# Patient Record
Sex: FEMALE | Race: WHITE | NOT HISPANIC OR LATINO | Employment: OTHER | URBAN - NONMETROPOLITAN AREA
[De-identification: names, ages, dates, MRNs, and addresses within clinical notes are randomized per-mention and may not be internally consistent; named-entity substitution may affect disease eponyms.]

---

## 2017-05-15 ENCOUNTER — OFFICE VISIT (OUTPATIENT)
Dept: CARDIOLOGY | Facility: CLINIC | Age: 77
End: 2017-05-15

## 2017-05-15 VITALS
BODY MASS INDEX: 28 KG/M2 | DIASTOLIC BLOOD PRESSURE: 70 MMHG | SYSTOLIC BLOOD PRESSURE: 130 MMHG | HEART RATE: 74 BPM | OXYGEN SATURATION: 98 % | WEIGHT: 158 LBS | HEIGHT: 63 IN

## 2017-05-15 DIAGNOSIS — Z95.1 S/P CABG X 2: Chronic | ICD-10-CM

## 2017-05-15 DIAGNOSIS — I10 ESSENTIAL HYPERTENSION: Chronic | ICD-10-CM

## 2017-05-15 DIAGNOSIS — I25.10 CAD IN NATIVE ARTERY: Primary | Chronic | ICD-10-CM

## 2017-05-15 DIAGNOSIS — E78.2 MIXED HYPERLIPIDEMIA: Chronic | ICD-10-CM

## 2017-05-15 PROCEDURE — 99214 OFFICE O/P EST MOD 30 MIN: CPT | Performed by: NURSE PRACTITIONER

## 2018-02-06 ENCOUNTER — OFFICE VISIT (OUTPATIENT)
Dept: CARDIOLOGY | Facility: CLINIC | Age: 78
End: 2018-02-06

## 2018-02-06 VITALS
HEIGHT: 63 IN | DIASTOLIC BLOOD PRESSURE: 82 MMHG | SYSTOLIC BLOOD PRESSURE: 150 MMHG | OXYGEN SATURATION: 95 % | BODY MASS INDEX: 29.95 KG/M2 | HEART RATE: 69 BPM | WEIGHT: 169 LBS

## 2018-02-06 DIAGNOSIS — R60.0 LOCALIZED EDEMA: ICD-10-CM

## 2018-02-06 DIAGNOSIS — I10 ESSENTIAL HYPERTENSION: Chronic | ICD-10-CM

## 2018-02-06 DIAGNOSIS — I25.10 CAD IN NATIVE ARTERY: Primary | Chronic | ICD-10-CM

## 2018-02-06 DIAGNOSIS — R06.02 SOB (SHORTNESS OF BREATH): Chronic | ICD-10-CM

## 2018-02-06 DIAGNOSIS — E78.2 MIXED HYPERLIPIDEMIA: Chronic | ICD-10-CM

## 2018-02-06 DIAGNOSIS — Z95.1 S/P CABG X 2: Chronic | ICD-10-CM

## 2018-02-06 PROCEDURE — 93000 ELECTROCARDIOGRAM COMPLETE: CPT | Performed by: NURSE PRACTITIONER

## 2018-02-06 PROCEDURE — 99214 OFFICE O/P EST MOD 30 MIN: CPT | Performed by: NURSE PRACTITIONER

## 2018-02-06 RX ORDER — FUROSEMIDE 20 MG/1
20 TABLET ORAL DAILY PRN
Qty: 30 TABLET | Refills: 11 | Status: SHIPPED | OUTPATIENT
Start: 2018-02-06 | End: 2019-02-06

## 2018-02-06 RX ORDER — LISINOPRIL 20 MG/1
20 TABLET ORAL 2 TIMES DAILY
COMMUNITY
Start: 2018-01-11

## 2018-02-06 RX ORDER — MIRABEGRON 25 MG/1
25 TABLET, FILM COATED, EXTENDED RELEASE ORAL DAILY PRN
COMMUNITY
Start: 2018-01-30 | End: 2020-02-10

## 2018-02-06 NOTE — PATIENT INSTRUCTIONS
"DASH Eating Plan  DASH stands for \"Dietary Approaches to Stop Hypertension.\" The DASH eating plan is a healthy eating plan that has been shown to reduce high blood pressure (hypertension). Additional health benefits may include reducing the risk of type 2 diabetes mellitus, heart disease, and stroke. The DASH eating plan may also help with weight loss.  What do I need to know about the DASH eating plan?  For the DASH eating plan, you will follow these general guidelines:  · Choose foods with less than 150 milligrams of sodium per serving (as listed on the food label).  · Use salt-free seasonings or herbs instead of table salt or sea salt.  · Check with your health care provider or pharmacist before using salt substitutes.  · Eat lower-sodium products. These are often labeled as \"low-sodium\" or \"no salt added.\"  · Eat fresh foods. Avoid eating a lot of canned foods.  · Eat more vegetables, fruits, and low-fat dairy products.  · Choose whole grains. Look for the word \"whole\" as the first word in the ingredient list.  · Choose fish and skinless chicken or turkey more often than red meat. Limit fish, poultry, and meat to 6 oz (170 g) each day.  · Limit sweets, desserts, sugars, and sugary drinks.  · Choose heart-healthy fats.  · Eat more home-cooked food and less restaurant, buffet, and fast food.  · Limit fried foods.  · Do not valdivia foods. Cook foods using methods such as baking, boiling, grilling, and broiling instead.  · When eating at a restaurant, ask that your food be prepared with less salt, or no salt if possible.  What foods can I eat?  Seek help from a dietitian for individual calorie needs.  Grains   Whole grain or whole wheat bread. Brown rice. Whole grain or whole wheat pasta. Quinoa, bulgur, and whole grain cereals. Low-sodium cereals. Corn or whole wheat flour tortillas. Whole grain cornbread. Whole grain crackers. Low-sodium crackers.  Vegetables   Fresh or frozen vegetables (raw, steamed, roasted, or " grilled). Low-sodium or reduced-sodium tomato and vegetable juices. Low-sodium or reduced-sodium tomato sauce and paste. Low-sodium or reduced-sodium canned vegetables.  Fruits   All fresh, canned (in natural juice), or frozen fruits.  Meat and Other Protein Products   Ground beef (85% or leaner), grass-fed beef, or beef trimmed of fat. Skinless chicken or turkey. Ground chicken or turkey. Pork trimmed of fat. All fish and seafood. Eggs. Dried beans, peas, or lentils. Unsalted nuts and seeds. Unsalted canned beans.  Dairy   Low-fat dairy products, such as skim or 1% milk, 2% or reduced-fat cheeses, low-fat ricotta or cottage cheese, or plain low-fat yogurt. Low-sodium or reduced-sodium cheeses.  Fats and Oils   Tub margarines without trans fats. Light or reduced-fat mayonnaise and salad dressings (reduced sodium). Avocado. Safflower, olive, or canola oils. Natural peanut or almond butter.  Other   Unsalted popcorn and pretzels.  The items listed above may not be a complete list of recommended foods or beverages. Contact your dietitian for more options.   What foods are not recommended?  Grains   White bread. White pasta. White rice. Refined cornbread. Bagels and croissants. Crackers that contain trans fat.  Vegetables   Creamed or fried vegetables. Vegetables in a cheese sauce. Regular canned vegetables. Regular canned tomato sauce and paste. Regular tomato and vegetable juices.  Fruits   Canned fruit in light or heavy syrup. Fruit juice.  Meat and Other Protein Products   Fatty cuts of meat. Ribs, chicken wings, edouard, sausage, bologna, salami, chitterlings, fatback, hot dogs, bratwurst, and packaged luncheon meats. Salted nuts and seeds. Canned beans with salt.  Dairy   Whole or 2% milk, cream, half-and-half, and cream cheese. Whole-fat or sweetened yogurt. Full-fat cheeses or blue cheese. Nondairy creamers and whipped toppings. Processed cheese, cheese spreads, or cheese curds.  Condiments   Onion and garlic  salt, seasoned salt, table salt, and sea salt. Canned and packaged gravies. Worcestershire sauce. Tartar sauce. Barbecue sauce. Teriyaki sauce. Soy sauce, including reduced sodium. Steak sauce. Fish sauce. Oyster sauce. Cocktail sauce. Horseradish. Ketchup and mustard. Meat flavorings and tenderizers. Bouillon cubes. Hot sauce. Tabasco sauce. Marinades. Taco seasonings. Relishes.  Fats and Oils   Butter, stick margarine, lard, shortening, ghee, and edouard fat. Coconut, palm kernel, or palm oils. Regular salad dressings.  Other   Pickles and olives. Salted popcorn and pretzels.  The items listed above may not be a complete list of foods and beverages to avoid. Contact your dietitian for more information.   Where can I find more information?  National [employer], [employer], [employer] [place]: www.nhlbi.nih.gov/health/health-topics/topics/dash/  This information is not intended to replace advice given to you by your health care provider. Make sure you discuss any questions you have with your health care provider.  Document Released: 12/06/2012 Document Revised: 05/25/2017 Document Reviewed: 10/22/2014  Elsevier Interactive Patient Education © 2017 Elsevier Inc.

## 2018-02-06 NOTE — PROGRESS NOTES
Subjective:     Encounter Date:02/06/2018    Chief Complaint:    Patient ID: Imani Chauhan is a 77 y.o. female here today for cardiac follow-up.    HPI     Coronary Artery Disease    Additional comments: nuclear stress test without ischemia 11/2016, has twinges in her chest that worry her, had CABG 2010           Shortness of Breath    Additional comments: gets SOA with activity, gets to wheezing, has to pace activities, has been a while since having PFTs. Has tried inhalers but doesn't like them - hard to use. Has never seen a pulmonologist. Doesn't exercise regularly. Has gained 11 lbs since last visit.             Hypertension    Additional comments: runs 120-130/70s at home but doesn't check much, runs higher at clinic visits       Last edited by SOFIYA Zimmerman on 2/6/2018  3:08 PM. (History)        History:   Past Medical History:   Diagnosis Date   • CAD in native artery    • GERD (gastroesophageal reflux disease)    • Heart murmur    • Hyperlipidemia    • Hypertension    • Hypothyroidism     Hypothyroidism   • Mitral regurgitation     mild per 2013 echo   • Myalgia    • Myalgia    • Near syncope    • SOB (shortness of breath)    • Syncope    • Tricuspid regurgitation     mild per 2013 echo     Past Surgical History:   Procedure Laterality Date   • BACK SURGERY     • CARDIAC CATHETERIZATION     • COLONOSCOPY     • CORONARY ARTERY BYPASS GRAFT  10/11/2010    Dr. Karina Lora, [facility]   • HYSTERECTOMY     • LAPAROSCOPIC CHOLECYSTECTOMY     • MEDIAN STERNOTOMY      Thymus cyst resection at same time as CABG   • OTHER SURGICAL HISTORY      Rectal surgery   • PARTIAL THYMECTOMY  10/11/2010    Dr. Lora   • REPLACEMENT TOTAL KNEE Left 2005     Social History     Social History   • Marital status:      Spouse name: N/A   • Number of children: N/A   • Years of education: N/A     Occupational History   • Not on file.     Social History Main Topics   • Smoking status: Passive Smoke Exposure - Never Smoker    • Smokeless tobacco: Never Used      Comment: father smoked pipe in the house,  smoked   • Alcohol use No   • Drug use: No   • Sexual activity: Defer     Other Topics Concern   • Not on file     Social History Narrative     Family History   Problem Relation Age of Onset   • Coronary artery disease Father    • Alzheimer's disease Father    • Hypertension Father    • Alzheimer's disease Mother    • Hypertension Mother    • Hyperlipidemia Mother        Outpatient Prescriptions Marked as Taking for the 2/6/18 encounter (Office Visit) with SOFIYA Zimmerman   Medication Sig Dispense Refill   • aspirin 81 MG EC tablet Take 81 mg by mouth Daily.     • atorvastatin (LIPITOR) 10 MG tablet Take 10 mg by mouth Every Night.     • DULoxetine (CYMBALTA) 30 MG capsule Take 30 mg by mouth Daily.     • estradiol (ESTRACE) 0.5 MG tablet Take 0.25 mg by mouth Daily. Takes 1/2 tab = 0.25 mg     • fluticasone (FLONASE) 50 MCG/ACT nasal spray 2 sprays into each nostril Daily As Needed for Rhinitis.     • GRALISE 600 MG tablet tablet Take 600-1,800 mg by mouth Daily Before Supper.     • levothyroxine (SYNTHROID, LEVOTHROID) 75 MCG tablet Take 75 mcg by mouth Daily. Takes 1/2 tab M, W, &F and 1 tab on S, Tu, Th, & Sa     • lisinopril (PRINIVIL,ZESTRIL) 20 MG tablet Take 20 mg by mouth Daily.     • magnesium oxide (MAGOX) 400 (241.3 MG) MG tablet tablet Take 400 mg by mouth 2 (Two) Times a Day.     • meloxicam (MOBIC) 7.5 MG tablet Take 7.5 mg by mouth Daily.     • metFORMIN (GLUCOPHAGE) 1000 MG tablet Take 500 mg by mouth 2 (Two) Times a Day With Meals.     • metoprolol tartrate (LOPRESSOR) 50 MG tablet Take 50 mg by mouth Every Night. She thinks may be taking succinate     • MYRBETRIQ 25 MG tablet sustained-release 24 hour 24 hr tablet Take 25 mg by mouth Daily.       Review of Systems:  Review of Systems   Constitution: Negative for chills, fever and malaise/fatigue.   HENT: Negative for congestion, nosebleeds and sore  "throat.    Eyes: Negative for blurred vision and double vision.   Cardiovascular: Positive for chest pain (occ twinge), dyspnea on exertion, irregular heartbeat and leg swelling (ankles a little at the end of the day ). Negative for orthopnea, paroxysmal nocturnal dyspnea and syncope.   Respiratory: Positive for shortness of breath. Negative for cough and wheezing.    Endocrine: Negative.    Hematologic/Lymphatic: Does not bruise/bleed easily.   Skin: Positive for dry skin.   Musculoskeletal: Positive for back pain, joint pain, muscle cramps, neck pain and stiffness.   Gastrointestinal: Positive for heartburn (depends on what she eats). Negative for abdominal pain, constipation, diarrhea, melena, nausea and vomiting.   Genitourinary: Positive for bladder incontinence and frequency. Negative for dysuria and hematuria.   Neurological: Positive for dizziness, headaches, loss of balance (seeing Dr. Powell, only when walking - goes to the left sometimes) and numbness. Negative for light-headedness.   Psychiatric/Behavioral: Negative for depression. The patient does not have insomnia and is not nervous/anxious.           Objective:   /82 (BP Location: Left arm, Patient Position: Sitting, Cuff Size: Adult)  Pulse 69  Ht 160 cm (63\")  Wt 76.7 kg (169 lb)  SpO2 95%  BMI 29.94 kg/m2  Wt Readings from Last 3 Encounters:   02/06/18 76.7 kg (169 lb)   05/15/17 71.7 kg (158 lb)   11/10/16 72.6 kg (160 lb)         Physical Exam   Constitutional: She is oriented to person, place, and time. She appears well-developed and well-nourished.   Eyes: No scleral icterus.   Neck: No JVD present.   Cardiovascular: Normal rate, regular rhythm, normal heart sounds and intact distal pulses.  Exam reveals no gallop and no friction rub.    No murmur heard.  Spider varicosities in ankles   Pulmonary/Chest: Effort normal and breath sounds normal.   Musculoskeletal: She exhibits edema (trace BLEs L worse than R) and tenderness (BLEs). "   Neurological: She is alert and oriented to person, place, and time.   Skin: Skin is warm and dry.   Psychiatric: She has a normal mood and affect.   Vitals reviewed.      Lab/Diagnostics Review:   11/22/2016 Stress Cardiolite no evidence of infarction or ischemia, EF 80% or greater     10/13/2016  BMP sodium 140, potassium 5, chloride 104, CO2 25, BUN 22, creatinine 1.1, calcium 9.7, glucose 74  Hemoglobin A1c 6.1%  Lipid panel total cholesterol 141, HDL 54, triglycerides 152, LDL 56  Hepatic function panel alkaline phosphatase 81, AST 24, ALT 30, total protein 6.4, albumin 3.7, total bilirubin 0.8     06/15/2016 EKG sinus rhythm 78 bpm) axis deviationand significant change from 11/28/2011     10/11/2010 median sternotomy with excision of anterior mediastinal mass and coronary bypass grafting ×2 with LIMA to ramus intermediate and saphenous vein graft to obtuse marginal      ECG 12 Lead  Date/Time: 2/6/2018 3:17 PM  Performed by: LAKISHA VERA  Authorized by: LAKISHA VERA   Comparison: compared with previous ECG from 6/15/2016  Similar to previous ECG  Rhythm: sinus rhythm  Rate: normal  BPM: 70  Other findings: LAE  Other findings comments: possible  Clinical impression comment: borderline                  Assessment/Plan:         Imani was seen today for coronary artery disease, shortness of breath and hypertension.    Diagnoses and all orders for this visit:    CAD in native artery  Comments:  stable s/p CABG x 2 2010, atypcial chest pain, continue medication, no reversible ischemia per nuclear stress 11/2016  Orders:  -     ECG 12 Lead    SOB (shortness of breath)  Comments:  and wheezingwill review most recent CXR or CT chest, check if not done recently, check PFTs with and without bronchodilator, encouraged routine aerobic exercise  Orders:  -     Pulmonary Function Test    Essential hypertension  Comments:  reportedly controlled per home readings    Mixed hyperlipidemia  Comments:  well  controlled per 7/2017 labs on atorvastatin    S/P CABG x 2    Localized edema  Comments:  add low dose furosemide as needed  Orders:  -     furosemide (LASIX) 20 MG tablet; Take 1 tablet by mouth Daily As Needed (swelling).    She will call if feels like needs to stay on diuretic daily so I can order a BMP.    Return in about 6 months (around 8/6/2018) for Recheck.           Meghan Freeman, APRN, ACNP-BC, CHFN-BC

## 2018-02-07 DIAGNOSIS — R06.02 SOB (SHORTNESS OF BREATH): Primary | ICD-10-CM

## 2018-02-09 DIAGNOSIS — R06.02 SOB (SHORTNESS OF BREATH): ICD-10-CM

## 2018-02-16 LAB
DIFF CAP.CO: 19.8 ML/MMHG SEC
FEV1/FVC: 75 %
FEV1: 1.98 LITERS
FVC VOL RESPIRATORY: 2.65 LITERS
TLC: 4.56 LITERS

## 2018-02-16 ASSESSMENT — PULMONARY FUNCTION TESTS
FVC: 2.65
FEV1: 1.98
FEV1/FVC: 75

## 2018-02-17 NOTE — PROGRESS NOTES
Notify patient PFTs show moderate restriction and small airway disease but no significant bronchodilator response.  Send a copy of PFTs to Dr. Wallace and have her review to see if any inhalers or further treatment/evaluation appropriate.

## 2018-02-19 ENCOUNTER — TELEPHONE (OUTPATIENT)
Dept: CARDIOLOGY | Facility: CLINIC | Age: 78
End: 2018-02-19

## 2018-02-19 NOTE — TELEPHONE ENCOUNTER
----- Message from SOFIYA Zimmerman sent at 2/9/2018  3:33 PM CST -----  Please notify her of normal CXR. Will await PFTs. TX!    Pt informed.

## 2018-02-19 NOTE — TELEPHONE ENCOUNTER
----- Message from SOFIYA Zimmerman sent at 2/16/2018  6:07 PM CST -----  Not sure if I routed this to you the first time. TX!  Called pt with results,  She voiced understand,  I did call her pcp because she said she had blood work done. So I was trying to get a copy of labs for our chart.

## 2018-02-28 ENCOUNTER — TELEPHONE (OUTPATIENT)
Dept: FAMILY MEDICINE CLINIC | Facility: CLINIC | Age: 78
End: 2018-02-28

## 2018-03-01 NOTE — TELEPHONE ENCOUNTER
Let her know Mg Plus Protein might be tolerated better and is available OTC if she wants to try it.  Unfortunately Mg is a natural laxative for most people.  TX!

## 2018-03-01 NOTE — TELEPHONE ENCOUNTER
Patient came by this morning because she said that she wouldn't be at home today to take any calls.  I notified her of increasing Mg filled foods, and asked if she thought she could increase the Mg supplement.  She said that she can not increase what she is taking now which in 400mg 2x a day.  She attempted to take more and she could not stay out of the bathroom. She said she heard of a different supplement of mg that was easier for bodies to process but she couldn't think of it.

## 2018-03-02 NOTE — TELEPHONE ENCOUNTER
CALLED PT HAD TO LVM WITH THIS INFO PER LAKISHA,  IF SHE HAS ANY QUESTIONS SHE CAN GIVE US A CALL BACK.

## 2018-05-01 ENCOUNTER — OUTSIDE FACILITY SERVICE (OUTPATIENT)
Dept: CARDIOLOGY | Facility: CLINIC | Age: 78
End: 2018-05-01

## 2018-05-01 PROCEDURE — 93306 TTE W/DOPPLER COMPLETE: CPT | Performed by: INTERNAL MEDICINE

## 2018-06-27 ENCOUNTER — HOSPITAL ENCOUNTER (OUTPATIENT)
Dept: NON INVASIVE DIAGNOSTICS | Age: 78
Discharge: HOME | End: 2018-06-27
Payer: MEDICARE

## 2018-06-27 ENCOUNTER — HOSPITAL ENCOUNTER (OUTPATIENT)
Dept: PREADMISSION TESTING | Age: 78
Setting detail: OUTPATIENT SURGERY
Discharge: HOME | End: 2018-07-01

## 2018-06-27 ENCOUNTER — HOSPITAL ENCOUNTER (OUTPATIENT)
Dept: LAB | Age: 78
Discharge: HOME | End: 2018-06-27
Payer: MEDICARE

## 2018-06-27 VITALS — BODY MASS INDEX: 29.02 KG/M2 | HEIGHT: 64 IN | WEIGHT: 170 LBS

## 2018-06-27 LAB
ANION GAP SERPL CALCULATED.3IONS-SCNC: 16 MMOL/L (ref 7–19)
BASOPHILS ABSOLUTE: 0 K/UL (ref 0–0.2)
BASOPHILS RELATIVE PERCENT: 0.6 % (ref 0–1)
BUN BLDV-MCNC: 29 MG/DL (ref 8–23)
CALCIUM SERPL-MCNC: 9.7 MG/DL (ref 8.8–10.2)
CHLORIDE BLD-SCNC: 99 MMOL/L (ref 98–111)
CO2: 25 MMOL/L (ref 22–29)
CREAT SERPL-MCNC: 1.2 MG/DL (ref 0.5–0.9)
EOSINOPHILS ABSOLUTE: 0.3 K/UL (ref 0–0.6)
EOSINOPHILS RELATIVE PERCENT: 4.9 % (ref 0–5)
GFR NON-AFRICAN AMERICAN: 44
GLUCOSE BLD-MCNC: 105 MG/DL (ref 74–109)
HCT VFR BLD CALC: 41.7 % (ref 37–47)
HEMOGLOBIN: 13.2 G/DL (ref 12–16)
LYMPHOCYTES ABSOLUTE: 1.9 K/UL (ref 1.1–4.5)
LYMPHOCYTES RELATIVE PERCENT: 30.3 % (ref 20–40)
MCH RBC QN AUTO: 30.5 PG (ref 27–31)
MCHC RBC AUTO-ENTMCNC: 31.7 G/DL (ref 33–37)
MCV RBC AUTO: 96.3 FL (ref 81–99)
MONOCYTES ABSOLUTE: 0.7 K/UL (ref 0–0.9)
MONOCYTES RELATIVE PERCENT: 11.2 % (ref 0–10)
NEUTROPHILS ABSOLUTE: 3.3 K/UL (ref 1.5–7.5)
NEUTROPHILS RELATIVE PERCENT: 52.7 % (ref 50–65)
PDW BLD-RTO: 14.9 % (ref 11.5–14.5)
PLATELET # BLD: 274 K/UL (ref 130–400)
PMV BLD AUTO: 11 FL (ref 9.4–12.3)
POTASSIUM SERPL-SCNC: 4.3 MMOL/L (ref 3.5–5)
RBC # BLD: 4.33 M/UL (ref 4.2–5.4)
SODIUM BLD-SCNC: 140 MMOL/L (ref 136–145)
WBC # BLD: 6.3 K/UL (ref 4.8–10.8)

## 2018-06-27 PROCEDURE — 93005 ELECTROCARDIOGRAM TRACING: CPT

## 2018-06-27 RX ORDER — FUROSEMIDE 20 MG/1
20 TABLET ORAL DAILY PRN
COMMUNITY

## 2018-06-27 RX ORDER — ESOMEPRAZOLE MAGNESIUM 40 MG/1
40 FOR SUSPENSION ORAL DAILY
COMMUNITY

## 2018-06-27 RX ORDER — MAGNESIUM OXIDE 400 MG/1
400 TABLET ORAL DAILY
COMMUNITY

## 2018-06-27 RX ORDER — LISINOPRIL 20 MG/1
20 TABLET ORAL DAILY
COMMUNITY

## 2018-06-27 RX ORDER — MELOXICAM 7.5 MG/1
7.5 TABLET ORAL DAILY
COMMUNITY

## 2018-06-27 RX ORDER — AMPICILLIN TRIHYDRATE 250 MG
CAPSULE ORAL
COMMUNITY

## 2018-06-27 RX ORDER — VITAMIN B COMPLEX
1 CAPSULE ORAL DAILY
COMMUNITY
End: 2019-05-15

## 2018-06-27 RX ORDER — ASPIRIN 81 MG/1
81 TABLET ORAL DAILY
Status: ON HOLD | COMMUNITY
End: 2019-06-06 | Stop reason: HOSPADM

## 2018-06-27 RX ORDER — LEVOTHYROXINE SODIUM 0.07 MG/1
75 TABLET ORAL DAILY
COMMUNITY

## 2018-06-27 RX ORDER — ATORVASTATIN CALCIUM 20 MG/1
10 TABLET, FILM COATED ORAL DAILY
COMMUNITY

## 2018-06-27 RX ORDER — DULOXETIN HYDROCHLORIDE 30 MG/1
30 CAPSULE, DELAYED RELEASE ORAL DAILY
COMMUNITY

## 2018-06-27 RX ORDER — M-VIT,TX,IRON,MINS/CALC/FOLIC 27MG-0.4MG
1 TABLET ORAL DAILY
COMMUNITY

## 2018-07-03 ENCOUNTER — ANESTHESIA EVENT (OUTPATIENT)
Dept: OPERATING ROOM | Age: 78
End: 2018-07-03

## 2018-07-05 ENCOUNTER — HOSPITAL ENCOUNTER (OUTPATIENT)
Age: 78
Setting detail: OUTPATIENT SURGERY
Discharge: HOME | End: 2018-07-05
Attending: ORTHOPAEDIC SURGERY | Admitting: ORTHOPAEDIC SURGERY

## 2018-07-05 ENCOUNTER — ANESTHESIA (OUTPATIENT)
Dept: OPERATING ROOM | Age: 78
End: 2018-07-05

## 2018-07-05 VITALS
SYSTOLIC BLOOD PRESSURE: 94 MMHG | OXYGEN SATURATION: 98 % | TEMPERATURE: 98.6 F | DIASTOLIC BLOOD PRESSURE: 56 MMHG | RESPIRATION RATE: 13 BRPM

## 2018-07-05 VITALS
WEIGHT: 170 LBS | HEART RATE: 70 BPM | DIASTOLIC BLOOD PRESSURE: 71 MMHG | OXYGEN SATURATION: 98 % | SYSTOLIC BLOOD PRESSURE: 114 MMHG | RESPIRATION RATE: 18 BRPM | BODY MASS INDEX: 29.02 KG/M2 | HEIGHT: 64 IN | TEMPERATURE: 97.5 F

## 2018-07-05 DIAGNOSIS — S83.271A COMPLEX TEAR OF LATERAL MENISCUS OF RIGHT KNEE AS CURRENT INJURY, INITIAL ENCOUNTER: Primary | ICD-10-CM

## 2018-07-05 PROCEDURE — 29881 ARTHRS KNE SRG MNISECTMY M/L: CPT

## 2018-07-05 PROCEDURE — G8907 PT DOC NO EVENTS ON DISCHARG: HCPCS | Performed by: NURSE PRACTITIONER

## 2018-07-05 PROCEDURE — G8916 PT W IV AB GIVEN ON TIME: HCPCS | Performed by: NURSE PRACTITIONER

## 2018-07-05 RX ORDER — LABETALOL HYDROCHLORIDE 5 MG/ML
5 INJECTION, SOLUTION INTRAVENOUS EVERY 10 MIN PRN
Status: DISCONTINUED | OUTPATIENT
Start: 2018-07-05 | End: 2018-07-05 | Stop reason: HOSPADM

## 2018-07-05 RX ORDER — MORPHINE SULFATE 10 MG/ML
4 INJECTION, SOLUTION INTRAMUSCULAR; INTRAVENOUS EVERY 5 MIN PRN
Status: DISCONTINUED | OUTPATIENT
Start: 2018-07-05 | End: 2018-07-05 | Stop reason: HOSPADM

## 2018-07-05 RX ORDER — LIDOCAINE HYDROCHLORIDE 10 MG/ML
INJECTION, SOLUTION EPIDURAL; INFILTRATION; INTRACAUDAL; PERINEURAL PRN
Status: DISCONTINUED | OUTPATIENT
Start: 2018-07-05 | End: 2018-07-05 | Stop reason: SDUPTHER

## 2018-07-05 RX ORDER — HYDROMORPHONE HCL 110MG/55ML
0.25 PATIENT CONTROLLED ANALGESIA SYRINGE INTRAVENOUS EVERY 5 MIN PRN
Status: DISCONTINUED | OUTPATIENT
Start: 2018-07-05 | End: 2018-07-05 | Stop reason: HOSPADM

## 2018-07-05 RX ORDER — FENTANYL CITRATE 50 UG/ML
INJECTION, SOLUTION INTRAMUSCULAR; INTRAVENOUS PRN
Status: DISCONTINUED | OUTPATIENT
Start: 2018-07-05 | End: 2018-07-05 | Stop reason: SDUPTHER

## 2018-07-05 RX ORDER — HYDROCODONE BITARTRATE AND ACETAMINOPHEN 5; 325 MG/1; MG/1
1 TABLET ORAL PRN
Status: DISCONTINUED | OUTPATIENT
Start: 2018-07-05 | End: 2018-07-05 | Stop reason: HOSPADM

## 2018-07-05 RX ORDER — HYDROCODONE BITARTRATE AND ACETAMINOPHEN 5; 325 MG/1; MG/1
2 TABLET ORAL PRN
Status: DISCONTINUED | OUTPATIENT
Start: 2018-07-05 | End: 2018-07-05 | Stop reason: HOSPADM

## 2018-07-05 RX ORDER — SODIUM CHLORIDE, SODIUM LACTATE, POTASSIUM CHLORIDE, CALCIUM CHLORIDE 600; 310; 30; 20 MG/100ML; MG/100ML; MG/100ML; MG/100ML
INJECTION, SOLUTION INTRAVENOUS CONTINUOUS
Status: DISCONTINUED | OUTPATIENT
Start: 2018-07-05 | End: 2018-07-05 | Stop reason: HOSPADM

## 2018-07-05 RX ORDER — BUPIVACAINE HYDROCHLORIDE 5 MG/ML
INJECTION, SOLUTION EPIDURAL; INTRACAUDAL PRN
Status: DISCONTINUED | OUTPATIENT
Start: 2018-07-05 | End: 2018-07-05 | Stop reason: HOSPADM

## 2018-07-05 RX ORDER — HYDROCODONE BITARTRATE AND ACETAMINOPHEN 10; 325 MG/1; MG/1
1 TABLET ORAL EVERY 4 HOURS PRN
Qty: 90 TABLET | Refills: 0 | Status: SHIPPED | OUTPATIENT
Start: 2018-07-05 | End: 2018-07-12

## 2018-07-05 RX ORDER — HYDROMORPHONE HCL 110MG/55ML
0.5 PATIENT CONTROLLED ANALGESIA SYRINGE INTRAVENOUS EVERY 5 MIN PRN
Status: DISCONTINUED | OUTPATIENT
Start: 2018-07-05 | End: 2018-07-05 | Stop reason: HOSPADM

## 2018-07-05 RX ORDER — MORPHINE SULFATE 10 MG/ML
2 INJECTION, SOLUTION INTRAMUSCULAR; INTRAVENOUS EVERY 5 MIN PRN
Status: DISCONTINUED | OUTPATIENT
Start: 2018-07-05 | End: 2018-07-05 | Stop reason: HOSPADM

## 2018-07-05 RX ORDER — METOCLOPRAMIDE HYDROCHLORIDE 5 MG/ML
10 INJECTION INTRAMUSCULAR; INTRAVENOUS
Status: DISCONTINUED | OUTPATIENT
Start: 2018-07-05 | End: 2018-07-05 | Stop reason: HOSPADM

## 2018-07-05 RX ORDER — ENALAPRILAT 2.5 MG/2ML
1.25 INJECTION INTRAVENOUS
Status: DISCONTINUED | OUTPATIENT
Start: 2018-07-05 | End: 2018-07-05 | Stop reason: HOSPADM

## 2018-07-05 RX ORDER — DIPHENHYDRAMINE HYDROCHLORIDE 50 MG/ML
12.5 INJECTION INTRAMUSCULAR; INTRAVENOUS
Status: DISCONTINUED | OUTPATIENT
Start: 2018-07-05 | End: 2018-07-05 | Stop reason: HOSPADM

## 2018-07-05 RX ORDER — MEPERIDINE HYDROCHLORIDE 25 MG/ML
12.5 INJECTION INTRAMUSCULAR; INTRAVENOUS; SUBCUTANEOUS EVERY 5 MIN PRN
Status: DISCONTINUED | OUTPATIENT
Start: 2018-07-05 | End: 2018-07-05 | Stop reason: HOSPADM

## 2018-07-05 RX ORDER — PROMETHAZINE HYDROCHLORIDE 25 MG/ML
6.25 INJECTION, SOLUTION INTRAMUSCULAR; INTRAVENOUS
Status: DISCONTINUED | OUTPATIENT
Start: 2018-07-05 | End: 2018-07-05 | Stop reason: HOSPADM

## 2018-07-05 RX ORDER — PROPOFOL 10 MG/ML
INJECTION, EMULSION INTRAVENOUS PRN
Status: DISCONTINUED | OUTPATIENT
Start: 2018-07-05 | End: 2018-07-05 | Stop reason: SDUPTHER

## 2018-07-05 RX ORDER — HYDRALAZINE HYDROCHLORIDE 20 MG/ML
5 INJECTION INTRAMUSCULAR; INTRAVENOUS EVERY 10 MIN PRN
Status: DISCONTINUED | OUTPATIENT
Start: 2018-07-05 | End: 2018-07-05 | Stop reason: HOSPADM

## 2018-07-05 RX ORDER — ONDANSETRON 2 MG/ML
INJECTION INTRAMUSCULAR; INTRAVENOUS PRN
Status: DISCONTINUED | OUTPATIENT
Start: 2018-07-05 | End: 2018-07-05 | Stop reason: SDUPTHER

## 2018-07-05 RX ADMIN — ONDANSETRON 4 MG: 2 INJECTION INTRAMUSCULAR; INTRAVENOUS at 10:46

## 2018-07-05 RX ADMIN — SODIUM CHLORIDE, SODIUM LACTATE, POTASSIUM CHLORIDE, CALCIUM CHLORIDE: 600; 310; 30; 20 INJECTION, SOLUTION INTRAVENOUS at 08:55

## 2018-07-05 RX ADMIN — FENTANYL CITRATE 50 MCG: 50 INJECTION, SOLUTION INTRAMUSCULAR; INTRAVENOUS at 10:22

## 2018-07-05 RX ADMIN — LIDOCAINE HYDROCHLORIDE 40 MG: 10 INJECTION, SOLUTION EPIDURAL; INFILTRATION; INTRACAUDAL; PERINEURAL at 09:51

## 2018-07-05 RX ADMIN — PROPOFOL 160 MG: 10 INJECTION, EMULSION INTRAVENOUS at 09:51

## 2018-07-05 RX ADMIN — FENTANYL CITRATE 50 MCG: 50 INJECTION, SOLUTION INTRAMUSCULAR; INTRAVENOUS at 10:01

## 2018-07-05 NOTE — ANESTHESIA PRE PROCEDURE
Department of Anesthesiology  Preprocedure Note       Name:  Michele Orellana   Age:  69 y.o.  :  1940                                          MRN:  [id number]         Date:  2018      Surgeon: Rosa Rodgers):  Mark Morel DO    Procedure: Procedure(s):  KNEE ARTHROSCOPY    Medications prior to admission:   Prior to Admission medications    Medication Sig Start Date End Date Taking? Authorizing Provider   levothyroxine (SYNTHROID) 75 MCG tablet Take 75 mcg by mouth Daily   Yes Historical Provider, MD   aspirin 81 MG EC tablet Take 81 mg by mouth daily   Yes Historical Provider, MD   DULoxetine (CYMBALTA) 30 MG extended release capsule Take 30 mg by mouth daily    Historical Provider, MD   lisinopril (PRINIVIL;ZESTRIL) 20 MG tablet Take 20 mg by mouth daily    Historical Provider, MD   meloxicam (MOBIC) 7.5 MG tablet Take 7.5 mg by mouth daily    Historical Provider, MD   metoprolol tartrate (LOPRESSOR) 25 MG tablet Take 25 mg by mouth 2 times daily    Historical Provider, MD   esomeprazole Magnesium (NEXIUM) 40 MG PACK Take 40 mg by mouth daily    Historical Provider, MD   furosemide (LASIX) 20 MG tablet Take 20 mg by mouth 2 times daily    Historical Provider, MD   metFORMIN (GLUCOPHAGE) 1000 MG tablet Take 1,000 mg by mouth 2 times daily (with meals)    Historical Provider, MD   Mirabegron ER (MYRBETRIQ) 25 MG TB24 Take by mouth    Historical Provider, MD   atorvastatin (LIPITOR) 20 MG tablet Take 10 mg by mouth daily    Historical Provider, MD   b complex vitamins capsule Take 1 capsule by mouth daily    Historical Provider, MD   Gabapentin, Once-Daily, (GRALISE) 600 MG TABS Take 600 mg by mouth. .    Historical Provider, MD   magnesium oxide (MAG-OX) 400 MG tablet Take 400 mg by mouth daily    Historical Provider, MD   Coenzyme Q10 (COQ10) 200 MG CAPS Take by mouth    Historical Provider, MD   Multiple Vitamins-Minerals (THERAPEUTIC MULTIVITAMIN-MINERALS) tablet Take 1 tablet by mouth daily    Historical 06/27/2018    RDW 14.9 06/27/2018     06/27/2018       CMP:   Lab Results   Component Value Date     06/27/2018    K 4.3 06/27/2018    CL 99 06/27/2018    CO2 25 06/27/2018    BUN 29 06/27/2018    CREATININE 1.2 06/27/2018    LABGLOM 44 06/27/2018    GLUCOSE 105 06/27/2018    CALCIUM 9.7 06/27/2018       POC Tests: No results for input(s): POCGLU, POCNA, POCK, POCCL, POCBUN, POCHEMO, POCHCT in the last 72 hours. Coags: No results found for: PROTIME, INR, APTT    HCG (If Applicable): No results found for: PREGTESTUR, PREGSERUM, HCG, HCGQUANT     ABGs: No results found for: PHART, PO2ART, QUJ1ZXI, LVC9VPI, BEART, H4NBWYGB     Type & Screen (If Applicable):  No results found for: LABABO, [address]    Anesthesia Evaluation  Patient summary reviewed and Nursing notes reviewed no history of anesthetic complications:   Airway: Mallampati: II  TM distance: >3 FB   Neck ROM: full  Mouth opening: > = 3 FB Dental: normal exam         Pulmonary:Negative Pulmonary ROS and normal exam                               Cardiovascular:    (+) hypertension:, CAD:,          Beta Blocker:  Dose within 24 Hrs         Neuro/Psych:   (+) depression/anxiety             GI/Hepatic/Renal:   (+) GERD: well controlled,           Endo/Other:    (+) DiabetesType II DM, , hypothyroidism::., .                 Abdominal:           Vascular: negative vascular ROS. Anesthesia Plan      general     ASA 3       Induction: intravenous. MIPS: Prophylactic antiemetics administered. Anesthetic plan and risks discussed with patient.                       Leonel Traylor, APRN - CRNA   7/5/2018

## 2018-07-05 NOTE — H&P
[facility], [place] ([facility]) Pre-Operative History and Physical    Patient Name: Madisyn Holly  : 1940        Chief complaint: Right knee pain    History of Present Illness:   69 y.o. female seen and evaluated in the office with complaints of right knee pain    Radiographic and clinical evaluation reveals  a moderate osteoarthritis right knee    Past Medical Hisotry:          Diagnosis Date    Arthritis     CAD (coronary artery disease)     by pass surgery    Diabetes mellitus ([address]     Hyperlipidemia     Hypertension     Thyroid disease        Past Surgical History:          Procedure Laterality Date    BACK SURGERY      CARPAL TUNNEL RELEASE Right     CHOLECYSTECTOMY      EYE SURGERY      pciol ou    HYSTERECTOMY      JOINT REPLACEMENT      left knee       Medications:      Prior to Admission medications    Medication Sig Start Date End Date Taking? Authorizing Provider   DULoxetine (CYMBALTA) 30 MG extended release capsule Take 30 mg by mouth daily    Historical Provider, MD   lisinopril (PRINIVIL;ZESTRIL) 20 MG tablet Take 20 mg by mouth daily    Historical Provider, MD   meloxicam (MOBIC) 7.5 MG tablet Take 7.5 mg by mouth daily    Historical Provider, MD   metoprolol tartrate (LOPRESSOR) 25 MG tablet Take 25 mg by mouth 2 times daily    Historical Provider, MD   esomeprazole Magnesium (NEXIUM) 40 MG PACK Take 40 mg by mouth daily    Historical Provider, MD   furosemide (LASIX) 20 MG tablet Take 20 mg by mouth 2 times daily    Historical Provider, MD   metFORMIN (GLUCOPHAGE) 1000 MG tablet Take 1,000 mg by mouth 2 times daily (with meals)    Historical Provider, MD   Mirabegron ER (MYRBETRIQ) 25 MG TB24 Take by mouth    Historical Provider, MD   atorvastatin (LIPITOR) 20 MG tablet Take 10 mg by mouth daily    Historical Provider, MD   b complex vitamins capsule Take 1 capsule by mouth daily    Historical Provider, MD   Gabapentin, Once-Daily, (GRALISE) 600 MG TABS Take 600 mg by mouth. .    Historical Provider, MD   levothyroxine (SYNTHROID) 75 MCG tablet Take 75 mcg by mouth Daily    Historical Provider, MD   magnesium oxide (MAG-OX) 400 MG tablet Take 400 mg by mouth daily    Historical Provider, MD   Coenzyme Q10 (COQ10) 200 MG CAPS Take by mouth    Historical Provider, MD   aspirin 81 MG EC tablet Take 81 mg by mouth daily    Historical Provider, MD   Multiple Vitamins-Minerals (THERAPEUTIC MULTIVITAMIN-MINERALS) tablet Take 1 tablet by mouth daily    Historical Provider, MD       Allergies:     Patient has no known allergies. Social History:   Tobacco:  reports that she has never smoked. She has never used smokeless tobacco.   Alcohol:  reports that she does not drink alcohol. Review of Systems:  General: Denies any fever or chills  EYES: Denies any diplopia  ENT: Tinnitus or vertigo  Resp: Denies any shortness of breath, cough or wheezing  Cardiac: Denies any chest pain, palpitations, claudication or edema  GI: Denies any melena, hematochezia, hematemesis or pyrosis  : Denies any frequency, urgency, hesitancy or incontinence  Musculoskeletal: Denies back pain, joint pain, myalgias  Heme: Denies bruising or bleeding easily  Endocrine: Denies any history of diabetes or thyroid disease  Psych: Denies anxiety or depression  Neuro: Denies any focal motor or sensory deficits      Physical Exam:  Vitals: There were no vitals taken for this visit. CONSTITUTIONAL: Alert, appropriate, no acute distress. PSYCH: mood and affect are normal with a normal rate and tone of speech  EYES: Non icteric, EOM intact, pupils equal round and reactive to light  ENT: Mucus membranes moist, no oral pharyngeal lesions, nares patent   NECK: Supple, no masses, no JVD, trachea mid line   CHEST/LUNGS: CTA bilaterally, normal respiratory effort   CARDIOVASCULAR: RRR, no murmurs,  2+ DP and radial pulses bilaterally  ABDOMEN: soft, nontender  EXTREMITIES: warm, well perfused, no edema.   Joint with mildly reduced range of motion and generalized tenderness. Neurovascular exam normal  SKIN: warm, dry with no rashes or lesions  LYMPH: No cervical or inguinal lymphadenopathy    Laboratory:  CBC :    Lab Results   Component Value Date    WBC 6.3 06/27/2018    HGB 13.2 06/27/2018    HCT 41.7 06/27/2018     06/27/2018     BMP:   Lab Results   Component Value Date     06/27/2018    K 4.3 06/27/2018    CL 99 06/27/2018    CO2 25 06/27/2018    BUN 29 06/27/2018    CREATININE 1.2 06/27/2018    CALCIUM 9.7 06/27/2018    LABGLOM 44 06/27/2018    GLUCOSE 105 06/27/2018     PT/INR:  No results found for: PROTIME, INR  U/A: No results found for: NITRITE, WBCUA, RBCUA, BACTERIA  HgBA1c:  No components found for: HGBA1C    Radiology:     IMPRESSION:  Mild to moderate arthritis right knee    PLAN: A lengthy discussion was carried out with the patient and the patient has agreed to proceed with the purposed operative procedure of arthroscopic chondroplasty right knee    Permit and pre-operative orders where completed in the office. Patient will be re-evaluated in the pre-operative holding area.         Provider: Jimi Castañeda  Date: 7/5/2018

## 2018-08-07 ENCOUNTER — OFFICE VISIT (OUTPATIENT)
Dept: CARDIOLOGY | Facility: CLINIC | Age: 78
End: 2018-08-07

## 2018-08-07 VITALS
OXYGEN SATURATION: 95 % | HEIGHT: 63 IN | HEART RATE: 71 BPM | SYSTOLIC BLOOD PRESSURE: 102 MMHG | BODY MASS INDEX: 28.7 KG/M2 | DIASTOLIC BLOOD PRESSURE: 52 MMHG | WEIGHT: 162 LBS | RESPIRATION RATE: 16 BRPM

## 2018-08-07 DIAGNOSIS — I10 ESSENTIAL HYPERTENSION: Chronic | ICD-10-CM

## 2018-08-07 DIAGNOSIS — Z95.1 S/P CABG X 2: Chronic | ICD-10-CM

## 2018-08-07 DIAGNOSIS — R06.02 SOB (SHORTNESS OF BREATH): ICD-10-CM

## 2018-08-07 DIAGNOSIS — I25.10 CAD IN NATIVE ARTERY: Primary | Chronic | ICD-10-CM

## 2018-08-07 DIAGNOSIS — E78.2 MIXED HYPERLIPIDEMIA: Chronic | ICD-10-CM

## 2018-08-07 PROCEDURE — 99214 OFFICE O/P EST MOD 30 MIN: CPT | Performed by: NURSE PRACTITIONER

## 2018-08-07 PROCEDURE — 93000 ELECTROCARDIOGRAM COMPLETE: CPT | Performed by: NURSE PRACTITIONER

## 2018-08-07 RX ORDER — ESOMEPRAZOLE MAGNESIUM 40 MG/1
40 CAPSULE, DELAYED RELEASE ORAL DAILY PRN
COMMUNITY
End: 2022-08-25 | Stop reason: ALTCHOICE

## 2018-08-07 RX ORDER — UBIDECARENONE 100 MG
200 CAPSULE ORAL DAILY
COMMUNITY
End: 2019-08-05

## 2018-08-07 NOTE — PROGRESS NOTES
Subjective:     Encounter Date:08/07/2018    Chief Complaint:    Patient ID: Imani Chauhan is a 78 y.o. female here today for 6 month cardiac follow-up.    HPI     Coronary Artery Disease    Additional comments: Patient not having the twinges anymore.  Leg swelling from arthritis in the knee.  No chest pain or chest discomfort.           Shortness of Breath    Additional comments: Shortness of breath has improved some over the last 6 months.  Patient is not on oxygen.  Had PFT done on 02/15/2018 at [facility] and saw Dr. Ha who recommended she lose weight but did not recommend medications/inhalers. He felt changes due to having CABG.            Hypertension    Additional comments: near syncope/fell while trying to get to the BR 7/5/2018 with urinary incontinence, remembers falling - hit her knee - denies LOC       Last edited by Meghan Freeman APRN on 8/7/2018  5:41 PM. (History)        History:   Past Medical History:   Diagnosis Date   • CAD in native artery    • GERD (gastroesophageal reflux disease)    • Heart murmur    • Hyperlipidemia    • Hypertension    • Hypothyroidism    • Mitral regurgitation     mild per 2013 echo   • Myalgia    • Near syncope    • SOB (shortness of breath)    • Syncope    • Tricuspid regurgitation     mild per 2013 echo     Past Surgical History:   Procedure Laterality Date   • BACK SURGERY     • CARDIAC CATHETERIZATION     • COLONOSCOPY     • CORONARY ARTERY BYPASS GRAFT  10/11/2010    Dr. Karina Lora, [facility]   • HYSTERECTOMY     • LAPAROSCOPIC CHOLECYSTECTOMY     • MEDIAN STERNOTOMY      Thymus cyst resection at same time as CABG   • OTHER SURGICAL HISTORY      Rectal surgery   • PARTIAL THYMECTOMY  10/11/2010    Dr. Lora   • REPLACEMENT TOTAL KNEE Left 2005     Social History     Social History   • Marital status:      Spouse name: N/A   • Number of children: N/A   • Years of education: N/A     Occupational History   • Not on file.     Social History Main Topics   • Smoking  status: Passive Smoke Exposure - Never Smoker   • Smokeless tobacco: Never Used      Comment: father smoked pipe in the house,  smoked   • Alcohol use No   • Drug use: No   • Sexual activity: Defer     Other Topics Concern   • Not on file     Social History Narrative   • No narrative on file     Family History   Problem Relation Age of Onset   • Coronary artery disease Father    • Alzheimer's disease Father    • Hypertension Father    • Alzheimer's disease Mother    • Hypertension Mother    • Hyperlipidemia Mother        Outpatient Prescriptions Marked as Taking for the 8/7/18 encounter (Office Visit) with Meghan Freeman APRN   Medication Sig Dispense Refill   • aspirin 81 MG EC tablet Take 81 mg by mouth Daily.     • atorvastatin (LIPITOR) 10 MG tablet Take 10 mg by mouth Every Night.     • coenzyme Q10 100 MG capsule Take 200 mg by mouth Daily.     • Cyanocobalamin (VITAMIN B 12 PO) Take  by mouth Daily.     • DULoxetine (CYMBALTA) 30 MG capsule Take 30 mg by mouth Daily.     • esomeprazole (nexIUM) 40 MG capsule Take 40 mg by mouth Every Morning Before Breakfast.     • furosemide (LASIX) 20 MG tablet Take 1 tablet by mouth Daily As Needed (swelling). (Patient taking differently: Take 20 mg by mouth Daily As Needed (swelling). Hasn't taken in a month) 30 tablet 11   • GRALISE 600 MG tablet tablet Take 600-1,800 mg by mouth Daily Before Supper.     • levothyroxine (SYNTHROID, LEVOTHROID) 75 MCG tablet Take 75 mcg by mouth Daily. Takes 1/2 tab M, W, &F and 1 tab on S, Tu, Th, & Sa     • lisinopril (PRINIVIL,ZESTRIL) 20 MG tablet Take 20 mg by mouth Daily.     • magnesium oxide (MAGOX) 400 (241.3 MG) MG tablet tablet Take 400 mg by mouth 2 (Two) Times a Day.     • metFORMIN (GLUCOPHAGE) 1000 MG tablet Take 500 mg by mouth 2 (Two) Times a Day With Meals.     • metoprolol tartrate (LOPRESSOR) 25 MG tablet Take 25 mg by mouth Every Night. She thinks may be taking succinate     • MYRBETRIQ 25 MG tablet  "sustained-release 24 hour 24 hr tablet Take 25 mg by mouth Daily.     • [DISCONTINUED] Carbonyl Iron (FEOSOL PO) Take 1 tablet by mouth Every Night.     • [DISCONTINUED] fluticasone (FLONASE) 50 MCG/ACT nasal spray 2 sprays into each nostril Daily As Needed for Rhinitis.         Review of Systems:  Review of Systems   Constitution: Negative for chills, fever, weakness and malaise/fatigue.   HENT: Negative for congestion, nosebleeds and sore throat.    Eyes: Negative for blurred vision and double vision.   Cardiovascular: Positive for leg swelling (R ankle a little at the end of the day since knee surgery) and syncope (07/05/2018 ). Negative for chest pain, dyspnea on exertion, irregular heartbeat, near-syncope, orthopnea and paroxysmal nocturnal dyspnea.   Respiratory: Positive for shortness of breath. Negative for cough and wheezing.    Endocrine: Negative.    Hematologic/Lymphatic: Does not bruise/bleed easily.   Skin: Positive for dry skin.   Musculoskeletal: Positive for back pain, falls and joint pain ( Especially R knee). Negative for muscle cramps, neck pain and stiffness.   Gastrointestinal: Positive for heartburn (depends on what she eats). Negative for abdominal pain, constipation, diarrhea, melena, nausea and vomiting.   Genitourinary: Positive for bladder incontinence and frequency. Negative for dysuria and hematuria.   Neurological: Positive for dizziness (when first gets up), light-headedness, loss of balance (seeing Dr. Powell, only when walking - goes to the left sometimes) and numbness. Negative for headaches.   Psychiatric/Behavioral: Negative for depression. The patient does not have insomnia and is not nervous/anxious.             Objective:   /52 (BP Location: Right arm, Patient Position: Sitting, Cuff Size: Adult)   Pulse 71   Resp 16   Ht 160 cm (63\")   Wt 73.5 kg (162 lb)   SpO2 95%   BMI 28.70 kg/m²   Wt Readings from Last 3 Encounters:   08/07/18 73.5 kg (162 lb)   02/06/18 76.7 " kg (169 lb)   05/15/17 71.7 kg (158 lb)         Physical Exam   Constitutional: She is oriented to person, place, and time. She appears well-developed and well-nourished.   Eyes: No scleral icterus.   Neck: No JVD present.   Cardiovascular: Normal rate, regular rhythm, normal heart sounds and intact distal pulses.  Exam reveals no gallop and no friction rub.    No murmur heard.  Spider varicosities in ankles   Pulmonary/Chest: Effort normal and breath sounds normal.   Musculoskeletal: She exhibits edema (trace BLEs R worse than L ) and tenderness (RLE).   Neurological: She is alert and oriented to person, place, and time. Gait (limping from R knee pain (meniscal tear)) abnormal.   Skin: Skin is warm and dry.   Psychiatric: She has a normal mood and affect.   Vitals reviewed.      Lab/Diagnostics Review:   05/01/2018 echocardiogram EF 65% stage I diastolic dysfunction, mild AI, trivial TR, normal RV size and function    05/01/2018 lower extremity Doppler normal.  Duplex exam of right lower extremity without evidence of deep vein thrombosis, as read by Dr. Aramis Hernandez    02/12/2018  CBC WBC 6700, hemoglobin 14, hematocrit 42.2%, platelets 224,000  BMP sodium 139, potassium 4.5, chloride 99, CO2 28, creatinine 1.0, BUN 24, calcium 9.9, glucose 109  Hepatic function panel alkaline phosphatase 71, AST 29, ALT 37, total protein 6.5, albumin 3.9, total bilirubin 0.6  Lipid panel total cholesterol 140, triglycerides 218, HDL 53, LDL 44  Magnesium 1.4  Hemoglobin A1c 8.4%  TSH 0.695, free T4 1.06    02/15/2018 PFTs optimal study restrictive pattern.  No significant bronchodilator response.  Small airway disease, moderate restriction, mild reduction in diffusion capacity but when corrected for alveolar volume .  DLCO was within normal limits.  As read by Dr. Ha     11/22/2016 Stress Cardiolite no evidence of infarction or ischemia, EF 80% or greater     10/13/2016  BMP sodium 140, potassium 5, chloride 104, CO2 25, BUN  22, creatinine 1.1, calcium 9.7, glucose 74  Hemoglobin A1c 6.1%  Lipid panel total cholesterol 141, HDL 54, triglycerides 152, LDL 56  Hepatic function panel alkaline phosphatase 81, AST 24, ALT 30, total protein 6.4, albumin 3.7, total bilirubin 0.8     06/15/2016 EKG sinus rhythm 78 bpm) axis deviationand significant change from 11/28/2011     10/11/2010 median sternotomy with excision of anterior mediastinal mass and coronary bypass grafting ×2 with LIMA to ramus intermediate and saphenous vein graft to obtuse marginal       ECG 12 Lead  Date/Time: 2/6/2018 3:17 PM  Performed by: LAKISHA VERA  Authorized by: LAKISHA VERA   Comparison: compared with previous ECG from 6/15/2016  Similar to previous ECG  Rhythm: sinus rhythm  Rate: normal  BPM: 70  Other findings: LAE  Other findings comments: possible  Clinical impression comment: borderline      ECG 12 Lead  Date/Time: 8/7/2018 5:43 PM  Performed by: LAKISHA VERA  Authorized by: LAKISHA VERA   Comparison: compared with previous ECG from 2/6/2018  Similar to previous ECG  Rhythm: sinus rhythm  BPM: 78  ST segment depression noted on lead: nonspecific ST abnormality.  Other findings: LAE  Other findings comments: possible                  Assessment/Plan:         Problem List Items Addressed This Visit        Cardiovascular and Mediastinum    CAD in native artery - Primary (Chronic)    Current Assessment & Plan     Stable, continue medications         Relevant Orders    ECG 12 Lead    Hypertension (Chronic)    Current Assessment & Plan     Well controlled but may need to decrease lisinopril if has hypotension (SBP less than 90). To call if has near syncope, syncope, or hypotension.          Hyperlipidemia (Chronic)    Current Assessment & Plan     Well controlled with exception of triglycerides. Encouraged low fat diet and increase activity (as knee pain tolerates).             Respiratory    SOB (shortness of breath)    Current  Assessment & Plan     Stable. Likely due to deconditioning. No major findings on PFTs and no recommendations from pulmonologist. Encouraged increased activity and to find activities she can tolerate with knee pain.             Other    S/P CABG x 2 (Chronic)          Return in about 1 year (around 8/7/2019) for Recheck, sooner if needed.           SOFIYA Blanco, ACNP-BC, CHFN-BC

## 2018-08-07 NOTE — ASSESSMENT & PLAN NOTE
Stable. Likely due to deconditioning. No major findings on PFTs and no recommendations from pulmonologist. Encouraged increased activity and to find activities she can tolerate with knee pain.

## 2018-08-07 NOTE — ASSESSMENT & PLAN NOTE
Well controlled but may need to decrease lisinopril if has hypotension (SBP less than 90). To call if has near syncope, syncope, or hypotension.

## 2018-08-07 NOTE — ASSESSMENT & PLAN NOTE
Well controlled with exception of triglycerides. Encouraged low fat diet and increase activity (as knee pain tolerates).

## 2019-05-06 RX ORDER — FUROSEMIDE 20 MG/1
20 TABLET ORAL DAILY PRN
Qty: 30 TABLET | Refills: 11 | Status: SHIPPED | OUTPATIENT
Start: 2019-05-06 | End: 2023-02-23

## 2019-05-15 ENCOUNTER — HOSPITAL ENCOUNTER (OUTPATIENT)
Dept: LAB | Age: 79
Discharge: HOME | End: 2019-05-15
Payer: MEDICARE

## 2019-05-15 ENCOUNTER — HOSPITAL ENCOUNTER (OUTPATIENT)
Dept: GENERAL RADIOLOGY | Age: 79
End: 2019-05-15
Payer: MEDICARE

## 2019-05-15 ENCOUNTER — HOSPITAL ENCOUNTER (OUTPATIENT)
Dept: PREADMISSION TESTING | Age: 79
Setting detail: OUTPATIENT SURGERY
Discharge: HOME | End: 2019-05-19

## 2019-05-15 ENCOUNTER — HOSPITAL ENCOUNTER (OUTPATIENT)
Dept: NON INVASIVE DIAGNOSTICS | Age: 79
Discharge: HOME | End: 2019-05-15
Payer: MEDICARE

## 2019-05-15 VITALS — BODY MASS INDEX: 28.35 KG/M2 | WEIGHT: 160 LBS | HEIGHT: 63 IN

## 2019-05-15 LAB
ANION GAP SERPL CALCULATED.3IONS-SCNC: 14 MMOL/L (ref 7–19)
BASOPHILS ABSOLUTE: 0.1 K/UL (ref 0–0.2)
BASOPHILS RELATIVE PERCENT: 0.8 % (ref 0–1)
BUN BLDV-MCNC: 16 MG/DL (ref 8–23)
CALCIUM SERPL-MCNC: 9.7 MG/DL (ref 8.8–10.2)
CHLORIDE BLD-SCNC: 105 MMOL/L (ref 98–111)
CO2: 28 MMOL/L (ref 22–29)
CREAT SERPL-MCNC: 1 MG/DL (ref 0.5–0.9)
EOSINOPHILS ABSOLUTE: 0.5 K/UL (ref 0–0.6)
EOSINOPHILS RELATIVE PERCENT: 6.9 % (ref 0–5)
GFR NON-AFRICAN AMERICAN: 54
GLUCOSE BLD-MCNC: 101 MG/DL (ref 74–109)
HCT VFR BLD CALC: 42.7 % (ref 37–47)
HEMOGLOBIN: 13.5 G/DL (ref 12–16)
LYMPHOCYTES ABSOLUTE: 1.9 K/UL (ref 1.1–4.5)
LYMPHOCYTES RELATIVE PERCENT: 25.3 % (ref 20–40)
MCH RBC QN AUTO: 29.3 PG (ref 27–31)
MCHC RBC AUTO-ENTMCNC: 31.6 G/DL (ref 33–37)
MCV RBC AUTO: 92.6 FL (ref 81–99)
MONOCYTES ABSOLUTE: 0.7 K/UL (ref 0–0.9)
MONOCYTES RELATIVE PERCENT: 8.6 % (ref 0–10)
NEUTROPHILS ABSOLUTE: 4.4 K/UL (ref 1.5–7.5)
NEUTROPHILS RELATIVE PERCENT: 58 % (ref 50–65)
PDW BLD-RTO: 15.3 % (ref 11.5–14.5)
PLATELET # BLD: 273 K/UL (ref 130–400)
PMV BLD AUTO: 11.1 FL (ref 9.4–12.3)
POTASSIUM SERPL-SCNC: 5.5 MMOL/L (ref 3.5–5)
RBC # BLD: 4.61 M/UL (ref 4.2–5.4)
SODIUM BLD-SCNC: 147 MMOL/L (ref 136–145)
WBC # BLD: 7.6 K/UL (ref 4.8–10.8)

## 2019-05-15 PROCEDURE — 93005 ELECTROCARDIOGRAM TRACING: CPT

## 2019-05-24 ENCOUNTER — ANESTHESIA EVENT (OUTPATIENT)
Dept: OPERATING ROOM | Age: 79
End: 2019-05-24

## 2019-06-06 ENCOUNTER — HOSPITAL ENCOUNTER (OUTPATIENT)
Age: 79
Setting detail: OUTPATIENT SURGERY
Discharge: HOME | End: 2019-06-06
Attending: ORTHOPAEDIC SURGERY | Admitting: ORTHOPAEDIC SURGERY

## 2019-06-06 ENCOUNTER — ANESTHESIA (OUTPATIENT)
Dept: OPERATING ROOM | Age: 79
End: 2019-06-06

## 2019-06-06 VITALS
SYSTOLIC BLOOD PRESSURE: 125 MMHG | DIASTOLIC BLOOD PRESSURE: 60 MMHG | BODY MASS INDEX: 28.35 KG/M2 | HEIGHT: 63 IN | OXYGEN SATURATION: 98 % | WEIGHT: 160 LBS | RESPIRATION RATE: 18 BRPM | TEMPERATURE: 97.6 F | HEART RATE: 67 BPM

## 2019-06-06 VITALS
SYSTOLIC BLOOD PRESSURE: 124 MMHG | RESPIRATION RATE: 4 BRPM | DIASTOLIC BLOOD PRESSURE: 65 MMHG | OXYGEN SATURATION: 97 %

## 2019-06-06 DIAGNOSIS — M24.662 FIBROSIS OF LEFT KNEE JOINT: Primary | ICD-10-CM

## 2019-06-06 PROCEDURE — 29877 ARTHRS KNEE SURG DBRDMT/SHVG: CPT

## 2019-06-06 PROCEDURE — G8916 PT W IV AB GIVEN ON TIME: HCPCS | Performed by: NURSE PRACTITIONER

## 2019-06-06 PROCEDURE — G8907 PT DOC NO EVENTS ON DISCHARG: HCPCS | Performed by: NURSE PRACTITIONER

## 2019-06-06 RX ORDER — KETOROLAC TROMETHAMINE 30 MG/ML
INJECTION, SOLUTION INTRAMUSCULAR; INTRAVENOUS PRN
Status: DISCONTINUED | OUTPATIENT
Start: 2019-06-06 | End: 2019-06-06 | Stop reason: SDUPTHER

## 2019-06-06 RX ORDER — HYDROCODONE BITARTRATE AND ACETAMINOPHEN 10; 325 MG/1; MG/1
1 TABLET ORAL EVERY 4 HOURS PRN
Qty: 90 TABLET | Refills: 0 | Status: SHIPPED | OUTPATIENT
Start: 2019-06-06 | End: 2019-06-13

## 2019-06-06 RX ORDER — ONDANSETRON 4 MG/1
8 TABLET, FILM COATED ORAL
Status: CANCELLED | OUTPATIENT
Start: 2019-06-06 | End: 2019-06-06

## 2019-06-06 RX ORDER — MIDAZOLAM HYDROCHLORIDE 1 MG/ML
INJECTION INTRAMUSCULAR; INTRAVENOUS PRN
Status: DISCONTINUED | OUTPATIENT
Start: 2019-06-06 | End: 2019-06-06 | Stop reason: SDUPTHER

## 2019-06-06 RX ORDER — PROPOFOL 10 MG/ML
INJECTION, EMULSION INTRAVENOUS PRN
Status: DISCONTINUED | OUTPATIENT
Start: 2019-06-06 | End: 2019-06-06 | Stop reason: SDUPTHER

## 2019-06-06 RX ORDER — FENTANYL CITRATE 50 UG/ML
INJECTION, SOLUTION INTRAMUSCULAR; INTRAVENOUS PRN
Status: DISCONTINUED | OUTPATIENT
Start: 2019-06-06 | End: 2019-06-06 | Stop reason: SDUPTHER

## 2019-06-06 RX ORDER — HYDROCODONE BITARTRATE AND ACETAMINOPHEN 5; 325 MG/1; MG/1
2 TABLET ORAL PRN
Status: DISCONTINUED | OUTPATIENT
Start: 2019-06-06 | End: 2019-06-06 | Stop reason: HOSPADM

## 2019-06-06 RX ORDER — HYDROCODONE BITARTRATE AND ACETAMINOPHEN 5; 325 MG/1; MG/1
1 TABLET ORAL PRN
Status: DISCONTINUED | OUTPATIENT
Start: 2019-06-06 | End: 2019-06-06 | Stop reason: HOSPADM

## 2019-06-06 RX ORDER — SODIUM CHLORIDE, SODIUM LACTATE, POTASSIUM CHLORIDE, CALCIUM CHLORIDE 600; 310; 30; 20 MG/100ML; MG/100ML; MG/100ML; MG/100ML
INJECTION, SOLUTION INTRAVENOUS CONTINUOUS
Status: DISCONTINUED | OUTPATIENT
Start: 2019-06-06 | End: 2019-06-06 | Stop reason: HOSPADM

## 2019-06-06 RX ORDER — SODIUM CHLORIDE, SODIUM LACTATE, POTASSIUM CHLORIDE, CALCIUM CHLORIDE 600; 310; 30; 20 MG/100ML; MG/100ML; MG/100ML; MG/100ML
INJECTION, SOLUTION INTRAVENOUS CONTINUOUS
Status: CANCELLED | OUTPATIENT
Start: 2019-06-06

## 2019-06-06 RX ORDER — DEXAMETHASONE SODIUM PHOSPHATE 4 MG/ML
INJECTION, SOLUTION INTRA-ARTICULAR; INTRALESIONAL; INTRAMUSCULAR; INTRAVENOUS; SOFT TISSUE PRN
Status: DISCONTINUED | OUTPATIENT
Start: 2019-06-06 | End: 2019-06-06 | Stop reason: SDUPTHER

## 2019-06-06 RX ORDER — ONDANSETRON 2 MG/ML
INJECTION INTRAMUSCULAR; INTRAVENOUS PRN
Status: DISCONTINUED | OUTPATIENT
Start: 2019-06-06 | End: 2019-06-06 | Stop reason: SDUPTHER

## 2019-06-06 RX ADMIN — SODIUM CHLORIDE, SODIUM LACTATE, POTASSIUM CHLORIDE, CALCIUM CHLORIDE: 600; 310; 30; 20 INJECTION, SOLUTION INTRAVENOUS at 06:18

## 2019-06-06 RX ADMIN — PROPOFOL 80 MG: 10 INJECTION, EMULSION INTRAVENOUS at 07:33

## 2019-06-06 RX ADMIN — MIDAZOLAM HYDROCHLORIDE 1 MG: 1 INJECTION INTRAMUSCULAR; INTRAVENOUS at 07:32

## 2019-06-06 RX ADMIN — FENTANYL CITRATE 50 MCG: 50 INJECTION, SOLUTION INTRAMUSCULAR; INTRAVENOUS at 07:32

## 2019-06-06 RX ADMIN — DEXAMETHASONE SODIUM PHOSPHATE 4 MG: 4 INJECTION, SOLUTION INTRA-ARTICULAR; INTRALESIONAL; INTRAMUSCULAR; INTRAVENOUS; SOFT TISSUE at 08:08

## 2019-06-06 RX ADMIN — PROPOFOL 70 MG: 10 INJECTION, EMULSION INTRAVENOUS at 07:32

## 2019-06-06 RX ADMIN — KETOROLAC TROMETHAMINE 30 MG: 30 INJECTION, SOLUTION INTRAMUSCULAR; INTRAVENOUS at 08:39

## 2019-06-06 RX ADMIN — KETOROLAC TROMETHAMINE 30 MG: 30 INJECTION, SOLUTION INTRAMUSCULAR; INTRAVENOUS at 08:37

## 2019-06-06 RX ADMIN — ONDANSETRON 4 MG: 2 INJECTION INTRAMUSCULAR; INTRAVENOUS at 08:08

## 2019-06-06 NOTE — H&P
[facility] ([facility]) Pre-Operative History and Physical    Patient Name: Eugenio Woodard  : 1940        Chief complaint: Left knee pain and stiffness    History of Present Illness:   69 y.o. female seen and evaluated in the office with complaints of left knee pain and stiffness    Radiographic and clinical evaluation reveals Arthrofibrosis status post left total knee replacement arthroplasty    Past Medical Hisotry:          Diagnosis Date    Arthritis     CAD (coronary artery disease)     by pass surgery    Diabetes mellitus ([address]     GERD (gastroesophageal reflux disease)     Hyperlipidemia     Hypertension     Thyroid disease        Past Surgical History:          Procedure Laterality Date    BACK SURGERY      CARDIAC SURGERY  2010    2 bypasses    CARPAL TUNNEL RELEASE Right     CHOLECYSTECTOMY      EYE SURGERY      pciol ou    HYSTERECTOMY      JOINT REPLACEMENT      left knee    KNEE ARTHROSCOPY Right     MI ARTHRS KNEE W/MENISCECTOMY MED&LAT W/SHAVING Right 2018    RIGHT KNEE ARTHROSCOPY, Arthroscopic chondroplasty patellofemoral joint, medial femoral condyle, lateral femoral condyle and partial lateral meniscectomy right knee performed by Benjamin Maloney DO at [facility] OR       Medications:      Prior to Admission medications    Medication Sig Start Date End Date Taking?  Authorizing Provider   levothyroxine (SYNTHROID) 75 MCG tablet Take 75 mcg by mouth Daily   Yes Historical Provider, MD   DULoxetine (CYMBALTA) 30 MG extended release capsule Take 30 mg by mouth daily    Historical Provider, MD   lisinopril (PRINIVIL;ZESTRIL) 20 MG tablet Take 20 mg by mouth daily    Historical Provider, MD   meloxicam (MOBIC) 7.5 MG tablet Take 7.5 mg by mouth daily    Historical Provider, MD   metoprolol tartrate (LOPRESSOR) 25 MG tablet Take 25 mg by mouth nightly     Historical Provider, MD   esomeprazole Magnesium (NEXIUM) 40 MG PACK Take 40 mg by mouth daily    Historical Provider, MD   furosemide (LASIX) 20 MG tablet Take 20 mg by mouth daily as needed     Historical Provider, MD   metFORMIN (GLUCOPHAGE) 1000 MG tablet Take 1,000 mg by mouth 2 times daily (with meals)    Historical Provider, MD   Mirabegron ER (MYRBETRIQ) 25 MG TB24 Take by mouth as needed     Historical Provider, MD   atorvastatin (LIPITOR) 20 MG tablet Take 10 mg by mouth daily    Historical Provider, MD   Gabapentin, Once-Daily, (GRALISE) 600 MG TABS Take 600 mg by mouth. .    Historical Provider, MD   magnesium oxide (MAG-OX) 400 MG tablet Take 400 mg by mouth daily    Historical Provider, MD   Coenzyme Q10 (COQ10) 200 MG CAPS Take by mouth    Historical Provider, MD   aspirin 81 MG EC tablet Take 81 mg by mouth daily    Historical Provider, MD   Multiple Vitamins-Minerals (THERAPEUTIC MULTIVITAMIN-MINERALS) tablet Take 1 tablet by mouth daily    Historical Provider, MD       Allergies:     Patient has no known allergies. Social History:   Tobacco:  reports that she has never smoked. She has never used smokeless tobacco.   Alcohol:  reports that she does not drink alcohol. Review of Systems:  General: Denies any fever or chills  EYES: Denies any diplopia  ENT: Tinnitus or vertigo  Resp: Denies any shortness of breath, cough or wheezing  Cardiac: Denies any chest pain, palpitations, claudication or edema  GI: Denies any melena, hematochezia, hematemesis or pyrosis  : Denies any frequency, urgency, hesitancy or incontinence  Musculoskeletal: Denies back pain, joint pain, myalgias  Heme: Denies bruising or bleeding easily  Endocrine: Denies any history of diabetes or thyroid disease  Psych: Denies anxiety or depression  Neuro: Denies any focal motor or sensory deficits      Physical Exam:  Vitals: /72   Pulse 75   Temp 97.6 °F (36.4 °C) (Temporal)   Resp 20   Ht 5' 3\" (1.6 m)   Wt 160 lb (72.6 kg)   SpO2 99%   BMI 28.34 kg/m²   CONSTITUTIONAL: Alert, appropriate, no acute distress.   PSYCH: mood and affect are normal with a normal rate and tone of speech  EYES: Non icteric, EOM intact, pupils equal round and reactive to light  ENT: Mucus membranes moist, no oral pharyngeal lesions, nares patent   NECK: Supple, no masses, no JVD, trachea mid line   CHEST/LUNGS: CTA bilaterally, normal respiratory effort   CARDIOVASCULAR: RRR, no murmurs,  2+ DP and radial pulses bilaterally  ABDOMEN: soft, nontender  EXTREMITIES: warm, well perfused, no edema. Joint with mildly reduced range of motion and generalized tenderness. Neurovascular exam normal  SKIN: warm, dry with no rashes or lesions  LYMPH: No cervical or inguinal lymphadenopathy    Laboratory:  CBC :    Lab Results   Component Value Date    WBC 7.6 05/15/2019    HGB 13.5 05/15/2019    HCT 42.7 05/15/2019     05/15/2019     BMP:   Lab Results   Component Value Date     05/15/2019    K 5.5 05/15/2019     05/15/2019    CO2 28 05/15/2019    BUN 16 05/15/2019    CREATININE 1.0 05/15/2019    CALCIUM 9.7 05/15/2019    LABGLOM 54 05/15/2019    GLUCOSE 101 05/15/2019     PT/INR:  No results found for: PROTIME, INR  U/A: No results found for: NITRITE, WBCUA, RBCUA, BACTERIA  HgBA1c:  No components found for: HGBA1C    Radiology: Plain x-rays left knee reveal left total knee replacement arthroplasty, no signs or symptoms of component complications or loosening    IMPRESSION:  Arthrofibrosis status post left total knee replacement arthroplasty    PLAN: A lengthy discussion was carried out with the patient and the patient has agreed to proceed with the purposed operative procedure of arthroscopic debridement and manipulation under anesthesia left total knee replacement arthroplasty    Permit and pre-operative orders where completed in the office. Patient will be re-evaluated in the pre-operative holding area.         Provider: Hillary De Leon  Date: 6/6/2019

## 2019-06-06 NOTE — ANESTHESIA POSTPROCEDURE EVALUATION
Department of Anesthesiology  Postprocedure Note    Patient: Frieda Weinstein  MRN: [id number]  YOB: 1940  Date of evaluation: 6/6/2019  Time:  9:19 AM     Procedure Summary     Date:  06/06/19 Room / Location:  [facility] OR  / [facility] OR    Anesthesia Start:  4154 Anesthesia Stop:  1258    Procedure:  ARTHROSCOPIC DEBRIDEMENT MANIPULATION UNDER ANESTHESIA LEFT KNEE (Left Knee) Diagnosis:  (ARTHROFIBROSIS)    Surgeon:  Humberto Vila DO Responsible Provider: YUDY Nickerson CRNA    Anesthesia Type:  general ASA Status:  2          Anesthesia Type: general    Becca Phase I:      Becca Phase II: Becca Score: 10    Last vitals: Reviewed and per EMR flowsheets.        Anesthesia Post Evaluation    Patient location during evaluation: bedside  Patient participation: complete - patient participated  Level of consciousness: awake  Airway patency: patent  Nausea & Vomiting: no nausea  Complications: no  Cardiovascular status: blood pressure returned to baseline  Respiratory status: room air and spontaneous ventilation  Hydration status: euvolemic

## 2019-06-06 NOTE — OP NOTE
arthroplasty     Arthroscopic electrocautery unit and arthroscopic shaver were intermittently introduced into the anterior medial arthroscopic portal. An arthroscopic debridement patellofemoral joint medial and lateral gutter anterior aspect left total knee replacement arthroplasty and manipulation under anesthesia left total knee replacement arthroplasty was carried out in the left knee      Arthroscopic photographs are taken throughout the procedure to confirmed both diagnosis and completion of procedure. Arthroscopic instruments were removed from the knee in their entirety. Arthroscopic portals reapproximated with surgical staples. Local anesthetic was injected into the incision sites for postoperative pain management. Sterile dressings were applied. Tourniquet was deflated. Estimated blood loss minimal. Sponge and needle counts being correct throughout the case. PLAN:  Activities as tolerated, active assisted, active range of motion exercises.  Follow up in the office in 2 weeks.

## 2019-06-06 NOTE — ANESTHESIA PRE PROCEDURE
medications:    Current Facility-Administered Medications   Medication Dose Route Frequency Provider Last Rate Last Dose    lactated ringers infusion   Intravenous Continuous YUDY Miller CRNA 125 mL/hr at 06/06/19 7359         Allergies:  No Known Allergies    Problem List:    Patient Active Problem List   Diagnosis Code    Complex tear of lateral meniscus of right knee as current injury S83.271A       Past Medical History:        Diagnosis Date    Arthritis     CAD (coronary artery disease)     by pass surgery    Diabetes mellitus ([address]     GERD (gastroesophageal reflux disease)     Hyperlipidemia     Hypertension     Thyroid disease        Past Surgical History:        Procedure Laterality Date    BACK SURGERY      CARDIAC SURGERY  2010    2 bypasses    CARPAL TUNNEL RELEASE Right     CHOLECYSTECTOMY      EYE SURGERY      pciol ou    HYSTERECTOMY      JOINT REPLACEMENT      left knee    KNEE ARTHROSCOPY Right     SD ARTHRS KNEE W/MENISCECTOMY MED&LAT W/SHAVING Right 7/5/2018    RIGHT KNEE ARTHROSCOPY, Arthroscopic chondroplasty patellofemoral joint, medial femoral condyle, lateral femoral condyle and partial lateral meniscectomy right knee performed by Eri Rodriguez DO at [facility] OR       Social History:    Social History     Tobacco Use    Smoking status: Never Smoker    Smokeless tobacco: Never Used   Substance Use Topics    Alcohol use:  No                                Counseling given: Not Answered      Vital Signs (Current):   Vitals:    06/06/19 0613   BP: 139/72   Pulse: 75   Resp: 20   Temp: 97.6 °F (36.4 °C)   TempSrc: Temporal   SpO2: 99%   Weight: 160 lb (72.6 kg)   Height: 5' 3\" (1.6 m)                                              BP Readings from Last 3 Encounters:   06/06/19 139/72   07/05/18 (!) 94/56   07/05/18 114/71       NPO Status: Time of last liquid consumption: 2330                        Time of last solid consumption: 2330                        Date of last liquid consumption: 06/05/19                        Date of last solid food consumption: 06/05/19    BMI:   Wt Readings from Last 3 Encounters:   06/06/19 160 lb (72.6 kg)   05/15/19 160 lb (72.6 kg)   07/05/18 170 lb (77.1 kg)     Body mass index is 28.34 kg/m². CBC:   Lab Results   Component Value Date    WBC 7.6 05/15/2019    RBC 4.61 05/15/2019    HGB 13.5 05/15/2019    HCT 42.7 05/15/2019    MCV 92.6 05/15/2019    RDW 15.3 05/15/2019     05/15/2019       CMP:   Lab Results   Component Value Date     05/15/2019    K 5.5 05/15/2019     05/15/2019    CO2 28 05/15/2019    BUN 16 05/15/2019    CREATININE 1.0 05/15/2019    LABGLOM 54 05/15/2019    GLUCOSE 101 05/15/2019    CALCIUM 9.7 05/15/2019       POC Tests: No results for input(s): POCGLU, POCNA, POCK, POCCL, POCBUN, POCHEMO, POCHCT in the last 72 hours. Coags: No results found for: PROTIME, INR, APTT    HCG (If Applicable): No results found for: PREGTESTUR, PREGSERUM, HCG, HCGQUANT     ABGs: No results found for: PHART, PO2ART, URA8ILS, LJO0KUK, BEART, [id number]     Type & Screen (If Applicable):  No results found for: LABABO, [address]    Anesthesia Evaluation  Patient summary reviewed and Nursing notes reviewed  Airway: Mallampati: II  TM distance: >3 FB   Neck ROM: full  Mouth opening: > = 3 FB Dental: normal exam         Pulmonary:Negative Pulmonary ROS and normal exam                               Cardiovascular:    (+) hypertension:,       ECG reviewed               Beta Blocker:  Not on Beta Blocker         Neuro/Psych:   Negative Neuro/Psych ROS              GI/Hepatic/Renal:   (+) GERD:,           Endo/Other:    (+) Diabetes, . Abdominal:           Vascular: negative vascular ROS. Anesthesia Plan      general     ASA 2       Induction: intravenous. MIPS: Postoperative opioids intended and Prophylactic antiemetics administered.   Anesthetic plan and risks discussed with patient. Plan discussed with CRNA.                   YUDY Miller - OTILIA   6/6/2019

## 2019-08-05 ENCOUNTER — OFFICE VISIT (OUTPATIENT)
Dept: CARDIOLOGY | Facility: CLINIC | Age: 79
End: 2019-08-05

## 2019-08-05 VITALS
OXYGEN SATURATION: 96 % | HEART RATE: 58 BPM | DIASTOLIC BLOOD PRESSURE: 80 MMHG | WEIGHT: 162.2 LBS | HEIGHT: 63 IN | BODY MASS INDEX: 28.74 KG/M2 | SYSTOLIC BLOOD PRESSURE: 150 MMHG

## 2019-08-05 DIAGNOSIS — I10 ESSENTIAL HYPERTENSION: Chronic | ICD-10-CM

## 2019-08-05 DIAGNOSIS — I25.10 CAD IN NATIVE ARTERY: Primary | Chronic | ICD-10-CM

## 2019-08-05 DIAGNOSIS — Z95.1 S/P CABG X 2: Chronic | ICD-10-CM

## 2019-08-05 DIAGNOSIS — E78.2 MIXED HYPERLIPIDEMIA: Chronic | ICD-10-CM

## 2019-08-05 PROCEDURE — 99214 OFFICE O/P EST MOD 30 MIN: CPT | Performed by: NURSE PRACTITIONER

## 2019-08-05 PROCEDURE — 93000 ELECTROCARDIOGRAM COMPLETE: CPT | Performed by: NURSE PRACTITIONER

## 2019-08-05 RX ORDER — NEBIVOLOL 10 MG/1
10 TABLET ORAL DAILY
Qty: 30 TABLET | Refills: 11 | Status: SHIPPED | OUTPATIENT
Start: 2019-08-05 | End: 2019-08-05

## 2019-08-05 RX ORDER — MELOXICAM 15 MG/1
TABLET ORAL
COMMUNITY
End: 2020-02-10

## 2019-08-05 RX ORDER — NEBIVOLOL 10 MG/1
10 TABLET ORAL DAILY
Qty: 30 TABLET | Refills: 11 | Status: SHIPPED | OUTPATIENT
Start: 2019-08-05 | End: 2020-08-04

## 2019-08-05 RX ORDER — M-VIT,TX,IRON,MINS/CALC/FOLIC 27MG-0.4MG
1 TABLET ORAL
COMMUNITY

## 2019-08-05 RX ORDER — CALCIUM CITRATE/VITAMIN D3 200MG-6.25
TABLET ORAL
Refills: 5 | COMMUNITY
Start: 2019-06-14

## 2019-08-05 NOTE — PROGRESS NOTES
Subjective:     Encounter Date:08/05/2019    Chief Complaint:    Patient ID: Imani Chauhan is a 79 y.o. female here today for yearly cardiac follow-up.    HPI     Coronary Artery Disease      Additional comments: no chest discomfort other than soreness from coughing              Hypertension      Additional comments: has had trouble controlling after taking steroids for bronchitis, feels tired, takes longer to get her usual housework done              Syncope      Additional comments: sometimes gets lightheaded or dizzy headed - mostly early in the morning and when stands up quickly          Last edited by Meghan Freeman APRN on 8/5/2019 11:57 AM. (History)        History:   Past Medical History:   Diagnosis Date   • CAD in native artery    • GERD (gastroesophageal reflux disease)    • Heart murmur    • Hyperlipidemia    • Hypertension    • Hypothyroidism    • Mitral regurgitation     mild per 2013 echo   • Myalgia    • Near syncope    • SOB (shortness of breath)    • Syncope    • Tricuspid regurgitation     mild per 2013 echo     Past Surgical History:   Procedure Laterality Date   • BACK SURGERY     • CARDIAC CATHETERIZATION     • COLONOSCOPY     • CORONARY ARTERY BYPASS GRAFT  10/11/2010    Dr. Karina Lora, [facility]   • HYSTERECTOMY     • KNEE SURGERY  06/2019   • LAPAROSCOPIC CHOLECYSTECTOMY     • MEDIAN STERNOTOMY      Thymus cyst resection at same time as CABG   • OTHER SURGICAL HISTORY      Rectal surgery   • PARTIAL THYMECTOMY  10/11/2010    Dr. Lora   • REPLACEMENT TOTAL KNEE Left 2005     Social History     Socioeconomic History   • Marital status:      Spouse name: Not on file   • Number of children: Not on file   • Years of education: Not on file   • Highest education level: Not on file   Tobacco Use   • Smoking status: Passive Smoke Exposure - Never Smoker   • Smokeless tobacco: Never Used   • Tobacco comment: father smoked pipe in the house,  smoked   Substance and Sexual Activity   •  Alcohol use: No   • Drug use: No   • Sexual activity: Defer     Family History   Problem Relation Age of Onset   • Coronary artery disease Father    • Alzheimer's disease Father    • Hypertension Father    • Alzheimer's disease Mother    • Hypertension Mother    • Hyperlipidemia Mother        Outpatient Medications Marked as Taking for the 8/5/19 encounter (Office Visit) with Meghan Freeman APRN   Medication Sig Dispense Refill   • aspirin 81 MG EC tablet Take 81 mg by mouth Daily.     • atorvastatin (LIPITOR) 10 MG tablet Take 10 mg by mouth Every Night.     • esomeprazole (nexIUM) 40 MG capsule Take 40 mg by mouth Every Morning Before Breakfast.     • furosemide (LASIX) 20 MG tablet Take 1 tablet by mouth Daily As Needed (swelling). 30 tablet 11   • GRALISE 600 MG tablet tablet Take 1,800 mg by mouth Daily Before Supper.     • levothyroxine (SYNTHROID, LEVOTHROID) 75 MCG tablet Take 37.5-75 mcg by mouth Daily. Takes 1/2 tab M, W, &F and 1 tab on S, Tu, Th, & Sa     • lisinopril (PRINIVIL,ZESTRIL) 20 MG tablet Take 40 mg by mouth 2 (Two) Times a Day.     • magnesium oxide (MAGOX) 400 (241.3 MG) MG tablet tablet Take 400 mg by mouth 2 (Two) Times a Day.     • metFORMIN (GLUCOPHAGE) 1000 MG tablet Take 500 mg by mouth 2 (Two) Times a Day With Meals.     • MYRBETRIQ 25 MG tablet sustained-release 24 hour 24 hr tablet Take 25 mg by mouth Daily As Needed.     • therapeutic multivitamin-minerals (THERAGRAN-M) tablet Take 1 tablet by mouth.     • TRUE METRIX BLOOD GLUCOSE TEST test strip   5   • [DISCONTINUED] metoprolol tartrate (LOPRESSOR) 25 MG tablet Take 50 mg by mouth 2 (Two) Times a Day.         Review of Systems:  Review of Systems   Constitution: Negative for chills, fever, weakness and malaise/fatigue.   HENT: Negative for congestion, nosebleeds and sore throat.    Eyes: Negative for blurred vision and double vision.   Cardiovascular: Positive for leg swelling (L ankle a little at the end of the day since  "knee surgery ). Negative for chest pain, dyspnea on exertion, irregular heartbeat, near-syncope, orthopnea, paroxysmal nocturnal dyspnea and syncope (07/05/2018 ).   Respiratory: Positive for cough and shortness of breath. Negative for wheezing.    Endocrine: Negative.    Hematologic/Lymphatic: Does not bruise/bleed easily.   Skin: Negative for dry skin, itching and rash.   Musculoskeletal: Positive for arthritis, back pain and joint pain ( Especially R knee). Negative for falls, muscle cramps, neck pain and stiffness.   Gastrointestinal: Positive for heartburn (depends on what she eats). Negative for abdominal pain, constipation, diarrhea, melena, nausea and vomiting.   Genitourinary: Positive for bladder incontinence, frequency and nocturia. Negative for dysuria and hematuria.   Neurological: Positive for excessive daytime sleepiness, dizziness (when first gets up), light-headedness and loss of balance (seeing Dr. Powell, only when walking - goes to the left sometimes). Negative for headaches and numbness.   Psychiatric/Behavioral: Negative for depression. The patient has insomnia. The patient is not nervous/anxious.           Objective:   /80 (BP Location: Left arm, Patient Position: Sitting, Cuff Size: Adult)   Pulse 58   Ht 160 cm (63\")   Wt 73.6 kg (162 lb 3.2 oz)   SpO2 96%   BMI 28.73 kg/m²   Wt Readings from Last 3 Encounters:   08/05/19 73.6 kg (162 lb 3.2 oz)   08/07/18 73.5 kg (162 lb)   02/06/18 76.7 kg (169 lb)       Physical Exam   Constitutional: She is oriented to person, place, and time. She appears well-developed and well-nourished.   Eyes: No scleral icterus.   Neck: No JVD present.   Cardiovascular: Normal rate, regular rhythm, normal heart sounds and intact distal pulses. Exam reveals no gallop and no friction rub.   No murmur heard.  Spider varicosities in ankles   Pulmonary/Chest: Effort normal and breath sounds normal.   Musculoskeletal: She exhibits edema (trace BLEs L worse than " R  ) and tenderness (LLE).   Neurological: She is alert and oriented to person, place, and time. Gait (limping from R knee pain (meniscal tear)) abnormal.   Skin: Skin is warm and dry.   Psychiatric: She has a normal mood and affect.   Vitals reviewed.    Lab/Diagnostics Review:   05/01/2018 echocardiogram EF 65% stage I diastolic dysfunction, mild AI, trivial TR, normal RV size and function     05/01/2018 lower extremity Doppler normal.  Duplex exam of right lower extremity without evidence of deep vein thrombosis, as read by Dr. Aramis Hernandez     02/12/2018  CBC WBC 6700, hemoglobin 14, hematocrit 42.2%, platelets 224,000  BMP sodium 139, potassium 4.5, chloride 99, CO2 28, creatinine 1.0, BUN 24, calcium 9.9, glucose 109  Hepatic function panel alkaline phosphatase 71, AST 29, ALT 37, total protein 6.5, albumin 3.9, total bilirubin 0.6  Lipid panel total cholesterol 140, triglycerides 218, HDL 53, LDL 44  Magnesium 1.4  Hemoglobin A1c 8.4%  TSH 0.695, free T4 1.06     02/15/2018 PFTs optimal study restrictive pattern.  No significant bronchodilator response.  Small airway disease, moderate restriction, mild reduction in diffusion capacity but when corrected for alveolar volume .  DLCO was within normal limits.  As read by Dr. Ha      11/22/2016 Stress Cardiolite no evidence of infarction or ischemia, EF 80% or greater     10/13/2016  BMP sodium 140, potassium 5, chloride 104, CO2 25, BUN 22, creatinine 1.1, calcium 9.7, glucose 74  Hemoglobin A1c 6.1%  Lipid panel total cholesterol 141, HDL 54, triglycerides 152, LDL 56  Hepatic function panel alkaline phosphatase 81, AST 24, ALT 30, total protein 6.4, albumin 3.7, total bilirubin 0.8     06/15/2016 EKG sinus rhythm 78 bpm) axis deviationand significant change from 11/28/2011     10/11/2010 median sternotomy with excision of anterior mediastinal mass and coronary bypass grafting ×2 with LIMA to ramus intermediate and saphenous vein graft to obtuse  marginal      ECG 12 Lead  Date/Time: 2/6/2018 3:17 PM  Performed by: MEGHAN VERA  Authorized by: MEGHAN VERA   Comparison: compared with previous ECG from 6/15/2016  Similar to previous ECG  Rhythm: sinus rhythm  Rate: normal  BPM: 70  Other findings: LAE  Other findings comments: possible  Clinical impression comment: borderline        ECG 12 Lead  Date/Time: 8/7/2018 5:43 PM  Performed by: MEGHAN VERA  Authorized by: MEGHAN VERA   Comparison: compared with previous ECG from 2/6/2018  Similar to previous ECG  Rhythm: sinus rhythm  BPM: 78  ST segment depression noted on lead: nonspecific ST abnormality.  Other findings: LAE  Other findings comments: possible      ECG 12 Lead  Date/Time: 8/5/2019 10:57 AM  Performed by: Meghan Vera APRN  Authorized by: Meghan Vera APRN   Comparison: compared with previous ECG from 8/7/2018  Comparison to previous ECG: Heart rate has decreased from 78 bpm  Rhythm: sinus bradycardia  Rate: bradycardic  BPM: 53  QRS axis: right  Other findings: left atrial abnormality    Clinical impression: abnormal EKG                Assessment/Plan:         Problem List Items Addressed This Visit        Cardiovascular and Mediastinum    CAD in native artery - Primary (Chronic)    Current Assessment & Plan     Stable, continue aspirin and metoprolol. Limit NSAIDs if possible and discontinue as soon as possible.          Relevant Medications    nebivolol (BYSTOLIC) 10 MG tablet    Other Relevant Orders    ECG 12 Lead    Hypertension (Chronic)    Current Assessment & Plan     Not controlled per today's reading. Willing to try Bystolic 10 mg daily instead of metoprolol. Hopefully will better control BP without bradycardia and may help with fatigue.         Relevant Medications    nebivolol (BYSTOLIC) 10 MG tablet    Hyperlipidemia (Chronic)    Current Assessment & Plan     Hasn't had checked since 2/2018 - never fasting when she goes in for labs.           Relevant Orders    Lipid Panel       Other    S/P CABG x 2 (Chronic)        Return in about 6 months (around 2/5/2020) for Recheck.           Meghan Freeman, APRN, ACNP-BC, CHFN-BC

## 2019-08-05 NOTE — ASSESSMENT & PLAN NOTE
Not controlled per today's reading. Willing to try Bystolic 10 mg daily instead of metoprolol. Hopefully will better control BP without bradycardia and may help with fatigue.

## 2019-08-05 NOTE — ASSESSMENT & PLAN NOTE
Stable, continue aspirin and metoprolol. Limit NSAIDs if possible and discontinue as soon as possible.

## 2019-08-07 ENCOUNTER — RESULTS ENCOUNTER (OUTPATIENT)
Dept: CARDIOLOGY | Facility: CLINIC | Age: 79
End: 2019-08-07

## 2019-08-07 DIAGNOSIS — E78.2 MIXED HYPERLIPIDEMIA: Chronic | ICD-10-CM

## 2019-08-14 ENCOUNTER — TELEPHONE (OUTPATIENT)
Dept: CARDIOLOGY | Facility: CLINIC | Age: 79
End: 2019-08-14

## 2019-11-12 ENCOUNTER — TELEPHONE (OUTPATIENT)
Dept: CARDIOLOGY | Facility: CLINIC | Age: 79
End: 2019-11-12

## 2019-11-12 NOTE — TELEPHONE ENCOUNTER
Pt states HR was 48 this morning , advise pt to go to the ER with BP being high and HR being low.

## 2019-11-12 NOTE — TELEPHONE ENCOUNTER
If HR is above 60 she can take an extra Bystolic. If doesn't improve within a hour or two, she needs to go to ER.

## 2019-11-12 NOTE — TELEPHONE ENCOUNTER
Pt mom  and service is tomorrow and her BP has been high the last 2 days, today its 201/99 and she is dizzy and has headache.

## 2020-02-10 ENCOUNTER — OFFICE VISIT (OUTPATIENT)
Dept: CARDIOLOGY | Facility: CLINIC | Age: 80
End: 2020-02-10

## 2020-02-10 VITALS
BODY MASS INDEX: 28.24 KG/M2 | SYSTOLIC BLOOD PRESSURE: 132 MMHG | HEART RATE: 62 BPM | OXYGEN SATURATION: 97 % | HEIGHT: 63 IN | DIASTOLIC BLOOD PRESSURE: 74 MMHG | WEIGHT: 159.4 LBS

## 2020-02-10 DIAGNOSIS — Z95.1 S/P CABG X 2: Chronic | ICD-10-CM

## 2020-02-10 DIAGNOSIS — E78.2 MIXED HYPERLIPIDEMIA: Chronic | ICD-10-CM

## 2020-02-10 DIAGNOSIS — I10 ESSENTIAL HYPERTENSION: Chronic | ICD-10-CM

## 2020-02-10 DIAGNOSIS — E03.9 HYPOTHYROIDISM, UNSPECIFIED TYPE: Chronic | ICD-10-CM

## 2020-02-10 DIAGNOSIS — I25.10 CAD IN NATIVE ARTERY: Primary | Chronic | ICD-10-CM

## 2020-02-10 PROCEDURE — 99214 OFFICE O/P EST MOD 30 MIN: CPT | Performed by: NURSE PRACTITIONER

## 2020-02-10 RX ORDER — AMLODIPINE BESYLATE 5 MG/1
5 TABLET ORAL NIGHTLY
COMMUNITY
Start: 2020-01-22 | End: 2020-10-02

## 2020-02-10 NOTE — PROGRESS NOTES
Subjective:     Encounter Date:02/10/2020    Chief Complaint:    Patient ID: Imani Chauhan is a 79 y.o. female here today for 6 month cardiac follow-up.    HPI     Coronary Artery Disease      Additional comments: no chest discomfort, never had angina before CABG x 2 in . Had decreased exercise tolerance/dyspnea prior to CABG - denies now. No ischemia per 2016 nuclear stress.              Hypertension      Additional comments: had to go to ER for 3 hours in November after mother , elevated again in December after having vomiting - Dr. Gamez treated with steroids for possible bronchitis. Bystolic helped. Dr. Gamez added amlodipine. BP running 130-140/70s.               Syncope      Additional comments: no syncope, feels a little dizzy when gets up in the mornings          Last edited by Meghan Freeman APRN on 2/10/2020 10:13 AM. (History)        History:   Past Medical History:   Diagnosis Date   • CAD in native artery    • GERD (gastroesophageal reflux disease)    • Heart murmur    • Hyperlipidemia    • Hypertension    • Hypothyroidism    • Mitral regurgitation     mild per 2013 echo   • Myalgia    • Near syncope    • SOB (shortness of breath)    • Syncope    • Tricuspid regurgitation     mild per 2013 echo     Past Surgical History:   Procedure Laterality Date   • BACK SURGERY     • CARDIAC CATHETERIZATION     • COLONOSCOPY     • CORONARY ARTERY BYPASS GRAFT  10/11/2010    Dr. Karina Lora, [facility]   • HYSTERECTOMY     • KNEE SURGERY  2019   • LAPAROSCOPIC CHOLECYSTECTOMY     • MEDIAN STERNOTOMY      Thymus cyst resection at same time as CABG   • OTHER SURGICAL HISTORY      Rectal surgery   • PARTIAL THYMECTOMY  10/11/2010    Dr. Lora   • REPLACEMENT TOTAL KNEE Left      Social History     Socioeconomic History   • Marital status:      Spouse name: Not on file   • Number of children: Not on file   • Years of education: Not on file   • Highest education level: Not on file   Tobacco Use      • Smoking status: Passive Smoke Exposure - Never Smoker   • Smokeless tobacco: Never Used   • Tobacco comment: father smoked pipe in the house,  smoked   Substance and Sexual Activity   • Alcohol use: Never     Frequency: Never   • Drug use: Never   • Sexual activity: Defer     Family History   Problem Relation Age of Onset   • Coronary artery disease Father    • Alzheimer's disease Father    • Hypertension Father    • Alzheimer's disease Mother    • Hypertension Mother    • Hyperlipidemia Mother      Outpatient Medications Marked as Taking for the 2/10/20 encounter (Office Visit) with Meghan Freeman APRN   Medication Sig Dispense Refill   • amLODIPine (NORVASC) 5 MG tablet Take 5 mg by mouth Every Night.     • aspirin 81 MG EC tablet Take 81 mg by mouth Daily.     • atorvastatin (LIPITOR) 10 MG tablet Take 10 mg by mouth Every Night.     • esomeprazole (nexIUM) 40 MG capsule Take 40 mg by mouth Every Morning Before Breakfast.     • furosemide (LASIX) 20 MG tablet Take 1 tablet by mouth Daily As Needed (swelling). 30 tablet 11   • GRALISE 600 MG tablet tablet Take 1,800 mg by mouth Daily Before Supper.     • levothyroxine (SYNTHROID, LEVOTHROID) 75 MCG tablet Take 37.5-75 mcg by mouth Daily. Takes 1/2 tab M, W, &F and 1 tab on S, Tu, Th, & Sa     • lisinopril (PRINIVIL,ZESTRIL) 20 MG tablet Take 40 mg by mouth 2 (Two) Times a Day.     • magnesium oxide (MAGOX) 400 (241.3 MG) MG tablet tablet Take 400 mg by mouth 2 (Two) Times a Day.     • metFORMIN (GLUCOPHAGE) 1000 MG tablet Take 500-1,000 mg by mouth 2 (Two) Times a Day With Meals.     • nebivolol (BYSTOLIC) 10 MG tablet Take 1 tablet by mouth Daily. 30 tablet 11   • therapeutic multivitamin-minerals (THERAGRAN-M) tablet Take 1 tablet by mouth.     • TRUE METRIX BLOOD GLUCOSE TEST test strip   5     Review of Systems:  Review of Systems   Constitution: Negative for chills, fever and malaise/fatigue.   HENT: Negative for congestion, nosebleeds and  "sore throat.    Eyes: Negative for blurred vision and double vision.   Cardiovascular: Positive for leg swelling (L ankle a little at the end of the day since knee surgery). Negative for chest pain, dyspnea on exertion, irregular heartbeat, near-syncope, orthopnea, paroxysmal nocturnal dyspnea and syncope (07/05/2018).   Respiratory: Negative for cough, shortness of breath and wheezing.    Endocrine: Negative.    Hematologic/Lymphatic: Does not bruise/bleed easily.   Skin: Negative for dry skin, itching and rash.   Musculoskeletal: Positive for arthritis, back pain, joint pain ( Especially R knee - thinking about having knee surgery) and neck pain. Negative for falls, muscle cramps and stiffness.   Gastrointestinal: Positive for heartburn (depends on what she eats). Negative for abdominal pain, constipation, diarrhea, melena, nausea and vomiting.   Genitourinary: Positive for nocturia. Negative for bladder incontinence, dysuria, frequency and hematuria.   Neurological: Positive for excessive daytime sleepiness, dizziness (when first gets up), light-headedness, loss of balance (seeing Dr. Powell, only when walking - goes to the left sometimes) and paresthesias (BLEs from neuropathy). Negative for headaches, numbness and weakness.   Psychiatric/Behavioral: Negative for depression. The patient has insomnia. The patient is not nervous/anxious.         Objective:   /74 (BP Location: Left arm, Patient Position: Sitting, Cuff Size: Adult)   Pulse 62   Ht 160 cm (63\")   Wt 72.3 kg (159 lb 6.4 oz)   SpO2 97%   BMI 28.24 kg/m²   Wt Readings from Last 3 Encounters:   02/10/20 72.3 kg (159 lb 6.4 oz)   08/05/19 73.6 kg (162 lb 3.2 oz)   08/07/18 73.5 kg (162 lb)       Physical Exam   Constitutional: She is oriented to person, place, and time. She appears well-developed and well-nourished.   Eyes: No scleral icterus.   Neck: No JVD present.   Cardiovascular: Normal rate, regular rhythm, normal heart sounds and intact " distal pulses. Exam reveals no gallop and no friction rub.   No murmur heard.  Spider varicosities in ankles   Pulmonary/Chest: Effort normal and breath sounds normal.   Musculoskeletal: She exhibits edema (trace BLEs L worse than R  ) and tenderness (LLE).   Neurological: She is alert and oriented to person, place, and time. Gait (limping from R knee pain/osteoarthritis) abnormal.   Skin: Skin is warm and dry.   Psychiatric: She has a normal mood and affect.   Vitals reviewed.    Lab/Diagnostics Review:   8/12/2019 Lipid panel total cholesterol 139, triglycerides 95, HDL 57, LDL 63    5/28/2019 BMP sodium 140 potassium 5.1 chloride 104 CO2 28 BUN 32 creatinine 1.2 calcium 10.2 glucose 101  Lab Results   Component Value Date    WBC 7.6 05/15/2019    HGB 13.5 05/15/2019    HCT 42.7 05/15/2019    MCV 92.6 05/15/2019     05/15/2019     05/01/2018 echocardiogram [facility] Dr. Alcantara EF 65% stage I diastolic dysfunction, mild AI, trivial TR, normal RV size and function    05/01/2018 lower extremity Doppler normal.  Duplex exam of right lower extremity without evidence of deep vein thrombosis, as read by Dr. Aramis Hernandez     02/12/2018  CBC WBC 6700, hemoglobin 14, hematocrit 42.2%, platelets 224,000  BMP sodium 139, potassium 4.5, chloride 99, CO2 28, creatinine 1.0, BUN 24, calcium 9.9, glucose 109  Hepatic function panel alkaline phosphatase 71, AST 29, ALT 37, total protein 6.5, albumin 3.9, total bilirubin 0.6  Lipid panel total cholesterol 140, triglycerides 218, HDL 53, LDL 44  Magnesium 1.4  Hemoglobin A1c 8.4%  TSH 0.695, free T4 1.06     02/15/2018 PFTs optimal study restrictive pattern.  No significant bronchodilator response.  Small airway disease, moderate restriction, mild reduction in diffusion capacity but when corrected for alveolar volume .  DLCO was within normal limits.  As read by Dr. Ha      11/22/2016 Stress Cardiolite no evidence of infarction or ischemia, EF 80% or  greater     10/13/2016  BMP sodium 140, potassium 5, chloride 104, CO2 25, BUN 22, creatinine 1.1, calcium 9.7, glucose 74  Hemoglobin A1c 6.1%  Lipid panel total cholesterol 141, HDL 54, triglycerides 152, LDL 56  Hepatic function panel alkaline phosphatase 81, AST 24, ALT 30, total protein 6.4, albumin 3.7, total bilirubin 0.8     06/15/2016 EKG sinus rhythm 78 bpm) axis deviationand significant change from 11/28/2011     10/11/2010 median sternotomy with excision of anterior mediastinal mass and coronary bypass grafting ×2 with LIMA to ramus intermediate and saphenous vein graft to obtuse marginal      ECG 12 Lead  Date/Time: 2/6/2018 3:17 PM  Performed by: MEGHAN VERA  Authorized by: MEGHAN VERA   Comparison: compared with previous ECG from 6/15/2016  Similar to previous ECG  Rhythm: sinus rhythm  Rate: normal  BPM: 70  Other findings: LAE  Other findings comments: possible  Clinical impression comment: borderline      ECG 12 Lead  Date/Time: 8/7/2018 5:43 PM  Performed by: MEGHAN VERA  Authorized by: MEGHAN VERA   Comparison: compared with previous ECG from 2/6/2018  Similar to previous ECG  Rhythm: sinus rhythm  BPM: 78  ST segment depression noted on lead: nonspecific ST abnormality.  Other findings: LAE  Other findings comments: possible     ECG 12 Lead  Date/Time: 8/5/2019 10:57 AM  Performed by: Meghan Vera APRN  Authorized by: Meghan Vera APRN   Comparison: compared with previous ECG from 8/7/2018  Comparison to previous ECG: Heart rate has decreased from 78 bpm  Rhythm: sinus bradycardia  Rate: bradycardic  BPM: 53  QRS axis: right  Other findings: left atrial abnormality    Clinical impression: abnormal EKG    Procedures: none in office today      Assessment/Plan:       Problem List Items Addressed This Visit        Cardiovascular and Mediastinum    CAD in native artery - Primary (Chronic)    Overview     CABG x 2 2010, Dr. Guido COMBS  No ischemia  11/2016 nuclear stress         Current Assessment & Plan     Stable, continue aspirin, Bystolic, and atorvastatin. Call if has decrease in exercise tolerance or dyspnea similar to before CABG in 2010.          Relevant Medications    amLODIPine (NORVASC) 5 MG tablet    Hyperlipidemia (Chronic)    Current Assessment & Plan     Very well controlled with atorvastatin per 8/2019 labs. Continue present therapy.         Hypertension (Chronic)    Current Assessment & Plan     Improved with addition of Bystolic and amlodipine. Continue medicaitons.         Relevant Medications    amLODIPine (NORVASC) 5 MG tablet       Endocrine    Hypothyroidism (Chronic)    Current Assessment & Plan     Reportedly controlled on levothyroxine.            Other    S/P CABG x 2 (Chronic)        Return in about 6 months (around 8/10/2020) for Recheck.         Meghan Freeman, APRN, ACNP-BC, CHFN-BC

## 2020-02-10 NOTE — ASSESSMENT & PLAN NOTE
Stable, continue aspirin, Bystolic, and atorvastatin. Call if has decrease in exercise tolerance or dyspnea similar to before CABG in 2010.

## 2020-10-02 ENCOUNTER — OFFICE VISIT (OUTPATIENT)
Dept: CARDIOLOGY | Facility: CLINIC | Age: 80
End: 2020-10-02

## 2020-10-02 VITALS
BODY MASS INDEX: 27.39 KG/M2 | SYSTOLIC BLOOD PRESSURE: 168 MMHG | HEART RATE: 82 BPM | OXYGEN SATURATION: 98 % | TEMPERATURE: 96.9 F | DIASTOLIC BLOOD PRESSURE: 74 MMHG | WEIGHT: 154.6 LBS | HEIGHT: 63 IN

## 2020-10-02 DIAGNOSIS — Z95.1 S/P CABG X 2: Chronic | ICD-10-CM

## 2020-10-02 DIAGNOSIS — I10 ESSENTIAL HYPERTENSION: Primary | Chronic | ICD-10-CM

## 2020-10-02 DIAGNOSIS — E78.2 MIXED HYPERLIPIDEMIA: Chronic | ICD-10-CM

## 2020-10-02 DIAGNOSIS — I25.10 CAD IN NATIVE ARTERY: Chronic | ICD-10-CM

## 2020-10-02 PROCEDURE — 93000 ELECTROCARDIOGRAM COMPLETE: CPT | Performed by: NURSE PRACTITIONER

## 2020-10-02 PROCEDURE — 99214 OFFICE O/P EST MOD 30 MIN: CPT | Performed by: NURSE PRACTITIONER

## 2020-10-02 RX ORDER — CLONIDINE HYDROCHLORIDE 0.1 MG/1
0.1 TABLET ORAL 2 TIMES DAILY PRN
COMMUNITY
End: 2021-01-18 | Stop reason: SDUPTHER

## 2020-10-02 RX ORDER — AMLODIPINE BESYLATE 2.5 MG/1
2.5 TABLET ORAL NIGHTLY
Qty: 30 TABLET | Refills: 11 | Status: SHIPPED | OUTPATIENT
Start: 2020-10-02 | End: 2021-01-25

## 2020-10-02 NOTE — ASSESSMENT & PLAN NOTE
Not as well controlled since stopping Bystolic and amlodipine. Restart amlodipine at 2.5 mg daily - will hold off on restarting Bystolic given previous bradycardia. Hold amlodipine and call if SBP consistently less than 100 or call if SBP consistently greater than 140.

## 2020-10-02 NOTE — PROGRESS NOTES
Subjective:     Encounter Date:10/02/2020    Chief Complaint:    Patient ID: Imani Chauhan is a 80 y.o. female here today for overdue 6 month cardiac follow-up.    HPI     Coronary Artery Disease      Additional comments: no chest discomfort other than occasional twinges, never had angina before CABG x 2 in 2010. Previous anginal equivalent was decreased exercise tolerance/dyspnea. No ischemia per 2016 nuclear stress. Knees limit her activity - surgery postponed due to COVID and then Dr. Quesada retired.              Hypertension      Additional comments: BP has been high for the last week 200/90 the other day - took a few hours after taking clonidine for it to come back down. Thinks Bystolic and amlodipine were stopped last winter after having low BP from diarrhea. Had been running 120/70 to 140/70s on lisinopril but hasn't been checking until recently. Complains of headache and dizziness with elevated BPs. Feet have been a little swollen.              Syncope      Additional comments: no further syncope, sometimes feels dizzy when she gets up but better since getting up more slowly          Last edited by Meghan Freeman APRN on 10/2/2020  2:22 PM. (History)        History:   Past Medical History:   Diagnosis Date   • CAD in native artery    • GERD (gastroesophageal reflux disease)    • Heart murmur    • Hyperlipidemia    • Hypertension    • Hypothyroidism    • Mitral regurgitation     mild per 2013 echo   • Myalgia    • Near syncope    • SOB (shortness of breath)    • Syncope    • Tricuspid regurgitation     mild per 2013 echo     Past Surgical History:   Procedure Laterality Date   • BACK SURGERY     • CARDIAC CATHETERIZATION     • COLONOSCOPY     • CORONARY ARTERY BYPASS GRAFT  10/11/2010    Dr. Karina Lora, [facility]   • HYSTERECTOMY     • KNEE SURGERY  06/2019   • LAPAROSCOPIC CHOLECYSTECTOMY     • MEDIAN STERNOTOMY      Thymus cyst resection at same time as CABG   • OTHER SURGICAL HISTORY      Rectal  surgery   • PARTIAL THYMECTOMY  10/11/2010    Dr. Lora   • REPLACEMENT TOTAL KNEE Left 2005     Social History     Socioeconomic History   • Marital status:      Spouse name: Not on file   • Number of children: Not on file   • Years of education: Not on file   • Highest education level: Not on file   Tobacco Use   • Smoking status: Passive Smoke Exposure - Never Smoker   • Smokeless tobacco: Never Used   • Tobacco comment: father smoked pipe in the house,  smoked   Substance and Sexual Activity   • Alcohol use: Never     Frequency: Never   • Drug use: Never   • Sexual activity: Defer     Family History   Problem Relation Age of Onset   • Coronary artery disease Father    • Alzheimer's disease Father    • Hypertension Father    • Alzheimer's disease Mother    • Hypertension Mother    • Hyperlipidemia Mother      Outpatient Medications Marked as Taking for the 10/2/20 encounter (Office Visit) with Meghan Freeman APRN   Medication Sig Dispense Refill   • aspirin 81 MG EC tablet Take 81 mg by mouth Daily.     • atorvastatin (LIPITOR) 10 MG tablet Take 10 mg by mouth Every Night.     • cloNIDine (CATAPRES) 0.1 MG tablet Take 0.1 mg by mouth 2 (Two) Times a Day As Needed for High Blood Pressure.     • esomeprazole (nexIUM) 40 MG capsule Take 40 mg by mouth Every Morning Before Breakfast.     • furosemide (LASIX) 20 MG tablet Take 1 tablet by mouth Daily As Needed (swelling). 30 tablet 11   • GRALISE 600 MG tablet tablet Take 1,800 mg by mouth Daily Before Supper.     • levothyroxine (SYNTHROID, LEVOTHROID) 75 MCG tablet Take 37.5-75 mcg by mouth Daily. Takes 1/2 tab M, W, &F and 1 tab on S, Tu, Th, & Sa     • lisinopril (PRINIVIL,ZESTRIL) 20 MG tablet Take 40 mg by mouth 2 (Two) Times a Day.     • magnesium oxide (MAGOX) 400 (241.3 MG) MG tablet tablet Take 400 mg by mouth 2 (Two) Times a Day.     • metFORMIN (GLUCOPHAGE) 1000 MG tablet Take 500-1,000 mg by mouth 2 (Two) Times a Day With Meals.     •  "therapeutic multivitamin-minerals (THERAGRAN-M) tablet Take 1 tablet by mouth.     • TRUE METRIX BLOOD GLUCOSE TEST test strip   5     Review of Systems:  Review of Systems   Constitution: Negative for chills, fever and malaise/fatigue.   HENT: Negative for congestion, nosebleeds and sore throat.    Eyes: Positive for blurred vision. Negative for double vision.   Cardiovascular: Positive for chest pain, dyspnea on exertion and leg swelling (L ankle a little at the end of the day since knee surgery). Negative for irregular heartbeat, near-syncope, orthopnea, palpitations, paroxysmal nocturnal dyspnea and syncope (07/05/2018).   Respiratory: Negative for cough, shortness of breath and wheezing.    Endocrine: Negative.    Hematologic/Lymphatic: Bruises/bleeds easily.   Skin: Negative for dry skin, itching and rash.   Musculoskeletal: Positive for arthritis, back pain, falls, joint pain ( Especially R knee - thinking about having knee surgery) and neck pain. Negative for muscle cramps and stiffness.   Gastrointestinal: Negative for abdominal pain, constipation, diarrhea, heartburn (depends on what she eats), melena, nausea and vomiting.   Genitourinary: Positive for nocturia. Negative for bladder incontinence, dysuria, frequency and hematuria.   Neurological: Positive for dizziness (when first gets up), light-headedness, loss of balance (seeing Dr. Powell, only when walking - goes to the left sometimes) and paresthesias (BLEs from neuropathy). Negative for excessive daytime sleepiness, headaches, numbness and weakness.   Psychiatric/Behavioral: Negative for depression. The patient has insomnia. The patient is not nervous/anxious.         Objective:   /74 (BP Location: Left arm, Patient Position: Sitting, Cuff Size: Adult)   Pulse 82   Temp 96.9 °F (36.1 °C) (Infrared)   Ht 160 cm (63\")   Wt 70.1 kg (154 lb 9.6 oz)   SpO2 98%   BMI 27.39 kg/m²   Wt Readings from Last 3 Encounters:   10/02/20 70.1 kg (154 lb " 9.6 oz)   02/10/20 72.3 kg (159 lb 6.4 oz)   08/05/19 73.6 kg (162 lb 3.2 oz)       Vitals signs reviewed.   Constitutional:       Appearance: Normal and healthy appearance. Well-developed, well-groomed and not in distress.   Eyes:      General: No scleral icterus.  HENT:      Head:      Comments: Wears glasses  Neck:      Vascular: No JVD.   Pulmonary:      Effort: Pulmonary effort is normal.      Breath sounds: Normal breath sounds.   Cardiovascular:      Normal rate. Regular rhythm.      No gallop.      Comments: BLEs tender to palpation  Pulses:     Intact distal pulses.   Edema:     Ankle: bilateral trace non-pitting edema of the ankle.  Skin:     General: Skin is warm and dry.   Neurological:      Mental Status: Alert and oriented to person, place, and time.       Lab/Diagnostics Review:   8/13/2020 [facility]  CMP sodium 143 potassium 4.8 chloride 108 CO2 27.9 BUN 18 creatinine 0.9 total protein 6.8 albumin 3.5 total bilirubin 0.41 alk phos 103 AST 22 ALT 29 calcium 9.0 glucose 106  Lipid total cholesterol 126 triglycerides 123 HDL 51 LDL 50  TSH 1.579    8/12/2019 Lipid panel total cholesterol 139, triglycerides 95, HDL 57, LDL 63     5/28/2019 BMP sodium 140 potassium 5.1 chloride 104 CO2 28 BUN 32 creatinine 1.2 calcium 10.2 glucose 101        Lab Results   Component Value Date     WBC 7.6 05/15/2019     HGB 13.5 05/15/2019     HCT 42.7 05/15/2019     MCV 92.6 05/15/2019      05/15/2019      05/01/2018 echocardiogram [facility] Dr. Alcantara EF 65% stage I diastolic dysfunction, mild AI, trivial TR, normal RV size and function     05/01/2018 lower extremity Doppler normal.  Duplex exam of right lower extremity without evidence of deep vein thrombosis, as read by Dr. Aramis Hernandez     02/12/2018  CBC WBC 6700, hemoglobin 14, hematocrit 42.2%, platelets 224,000  BMP sodium 139, potassium 4.5, chloride 99, CO2 28, creatinine 1.0, BUN 24, calcium 9.9, glucose 109  Hepatic function panel alkaline phosphatase 71, AST 29, ALT  37, total protein 6.5, albumin 3.9, total bilirubin 0.6  Lipid panel total cholesterol 140, triglycerides 218, HDL 53, LDL 44  Magnesium 1.4  Hemoglobin A1c 8.4%  TSH 0.695, free T4 1.06     02/15/2018 PFTs optimal study restrictive pattern.  No significant bronchodilator response.  Small airway disease, moderate restriction, mild reduction in diffusion capacity but when corrected for alveolar volume .  DLCO was within normal limits.  As read by Dr. Ha      11/22/2016 Stress Cardiolite no evidence of infarction or ischemia, EF 80% or greater     10/13/2016  BMP sodium 140, potassium 5, chloride 104, CO2 25, BUN 22, creatinine 1.1, calcium 9.7, glucose 74  Hemoglobin A1c 6.1%  Lipid panel total cholesterol 141, HDL 54, triglycerides 152, LDL 56  Hepatic function panel alkaline phosphatase 81, AST 24, ALT 30, total protein 6.4, albumin 3.7, total bilirubin 0.8     06/15/2016 EKG sinus rhythm 78 bpm) axis deviationand significant change from 11/28/2011     10/11/2010 median sternotomy with excision of anterior mediastinal mass and coronary bypass grafting ×2 with LIMA to ramus intermediate and saphenous vein graft to obtuse marginal      ECG 12 Lead  Date/Time: 2/6/2018 3:17 PM  Performed by: MEGHAN VERA  Authorized by: MEGHAN VERA   Comparison: compared with previous ECG from 6/15/2016  Similar to previous ECG  Rhythm: sinus rhythm  Rate: normal  BPM: 70  Other findings: LAE  Other findings comments: possible  Clinical impression comment: borderline      ECG 12 Lead  Date/Time: 8/7/2018 5:43 PM  Performed by: MEGHAN VERA  Authorized by: MEGHAN VERA   Comparison: compared with previous ECG from 2/6/2018  Similar to previous ECG  Rhythm: sinus rhythm  BPM: 78  ST segment depression noted on lead: nonspecific ST abnormality.  Other findings: LAE  Other findings comments: possible     ECG 12 Lead  Date/Time: 8/5/2019 10:57 AM  Performed by: Meghan Vera, SOFIYA  Authorized  by: Meghan Freeman APRN   Comparison: compared with previous ECG from 8/7/2018  Comparison to previous ECG: Heart rate has decreased from 78 bpm  Rhythm: sinus bradycardia  Rate: bradycardic  BPM: 53  QRS axis: right  Other findings: left atrial abnormality  Clinical impression: abnormal EKG      ECG 12 Lead    Date/Time: 10/2/2020 2:28 PM  Performed by: Meghan Freeman APRN  Authorized by: Meghan Freeman APRN   Comparison: compared with previous ECG from 8/5/2019  Comparison to previous ECG: HR has increased from 53 bpm  Rhythm: sinus rhythm  Rate: normal  BPM: 68  QRS axis: right  Other findings: left atrial abnormality    Clinical impression: abnormal EKG              Assessment/Plan:       Problem List Items Addressed This Visit        Cardiovascular and Mediastinum    CAD in native artery (Chronic)    Overview     CABG x 2 2010, [facility]. [facility]  No ischemia 11/2016 nuclear stress         Current Assessment & Plan     Stable, continue aspirin and atorvastatin. Call if has decrease in exercise tolerance or dyspnea as similar to before CABG in 2010. Continue risk factor modification.         Relevant Medications    amLODIPine (NORVASC) 2.5 MG tablet    Other Relevant Orders    ECG 12 Lead    Hyperlipidemia (Chronic)    Current Assessment & Plan     Very well controlled with atorvastatin per 8/2019 and 8/2020 labs. Continue present therapy.         Hypertension - Primary (Chronic)    Current Assessment & Plan     Not as well controlled since stopping Bystolic and amlodipine. Restart amlodipine at 2.5 mg daily - will hold off on restarting Bystolic given previous bradycardia. Hold amlodipine and call if SBP consistently less than 100 or call if SBP consistently greater than 140.         Relevant Medications    cloNIDine (CATAPRES) 0.1 MG tablet    amLODIPine (NORVASC) 2.5 MG tablet    Other Relevant Orders    ECG 12 Lead       Other    S/P CABG x 2 (Chronic)    Overview     2010             Advance  Care Planning   ACP discussion was held with the patient during this visit. Patient has an advance directive (not in EMR), copy requested.    Return in about 6 months (around 4/2/2021) for Recheck.         Meghan Freeman, SOFIYA, ACNP-BC, CHFN-BC

## 2021-01-18 DIAGNOSIS — I10 ESSENTIAL HYPERTENSION: Primary | Chronic | ICD-10-CM

## 2021-01-18 RX ORDER — CLONIDINE HYDROCHLORIDE 0.1 MG/1
0.1 TABLET ORAL 2 TIMES DAILY PRN
Qty: 60 TABLET | Refills: 11 | Status: SHIPPED | OUTPATIENT
Start: 2021-01-18 | End: 2021-04-07

## 2021-01-18 NOTE — TELEPHONE ENCOUNTER
Agreed. We need more readings. She can take her clonidine as needed for SBP greater than 160 or DBP greater than 100 if doesn't improve after taking usual medications. Continue amlodipine for now and take furosemide as needed and wear compression for any swelling. TX!

## 2021-01-18 NOTE — TELEPHONE ENCOUNTER
PT CALLED AND STATED HER BP HAS BEEN ELEVATED SHE STATED THIS AM IT /101 BEFORE MEDICATION THEN AFTER HER MEDICATION ABOUT 10 MIN LATER IT /91 HEART RATES IN THE 80''S SHE STATED SHE IS TAKING LISINOPRIL 20MG 2 TIMES A DAY AND THEN SHE STATED SHE HAD SOME NORVASC FROM BEFORE AND SHE HAS BEEN TAKING THIS FOR 2 WEEKS MADE PT AN APPT FOR 01/25/2021 AND INFORMED HER TO KEEP A BP/HR LOG AND ALSO BRING HER MACHINE IN THE DAY SHE COMES FOR APPT PLEASE ADVISE

## 2021-01-18 NOTE — TELEPHONE ENCOUNTER
Called spoke to pt informed her per flaquita pretty she needed to keep a bp/hr log and to take clonidine if her sbp greater than 160 and her dsp is greater than 100 and if this does not help to keep taking amlodipine for now and take furosemide for swelling and wear compression stockings pt voiced understanding

## 2021-01-25 ENCOUNTER — OFFICE VISIT (OUTPATIENT)
Dept: CARDIOLOGY | Facility: CLINIC | Age: 81
End: 2021-01-25

## 2021-01-25 VITALS
HEIGHT: 63 IN | DIASTOLIC BLOOD PRESSURE: 82 MMHG | SYSTOLIC BLOOD PRESSURE: 150 MMHG | TEMPERATURE: 96.9 F | WEIGHT: 148 LBS | OXYGEN SATURATION: 98 % | HEART RATE: 87 BPM | BODY MASS INDEX: 26.22 KG/M2

## 2021-01-25 DIAGNOSIS — E78.2 MIXED HYPERLIPIDEMIA: Chronic | ICD-10-CM

## 2021-01-25 DIAGNOSIS — Z95.1 S/P CABG X 2: Chronic | ICD-10-CM

## 2021-01-25 DIAGNOSIS — I10 ESSENTIAL HYPERTENSION: Chronic | ICD-10-CM

## 2021-01-25 DIAGNOSIS — I25.10 CAD IN NATIVE ARTERY: Primary | Chronic | ICD-10-CM

## 2021-01-25 PROCEDURE — 99215 OFFICE O/P EST HI 40 MIN: CPT | Performed by: NURSE PRACTITIONER

## 2021-01-25 RX ORDER — ACETAMINOPHEN 160 MG
2000 TABLET,DISINTEGRATING ORAL 2 TIMES DAILY
COMMUNITY
End: 2023-02-23

## 2021-01-25 RX ORDER — AMLODIPINE BESYLATE 10 MG/1
10 TABLET ORAL NIGHTLY
Qty: 30 TABLET | Refills: 11 | Status: SHIPPED | OUTPATIENT
Start: 2021-01-25 | End: 2021-04-07

## 2021-01-25 NOTE — ASSESSMENT & PLAN NOTE
Atypical but had no previous angina other than dyspnea. She declines repeating nuclear stress test at this time but agrees to call if worsens or go to ER with any severe symptoms. Continue current medications. Encouraged gradual increase in aerobic activity as knee permits.

## 2021-01-25 NOTE — ASSESSMENT & PLAN NOTE
Very well controlled with atorvastatin per 8/2019 and 8/2020 labs without side effects. Continue present therapy.

## 2021-01-25 NOTE — ASSESSMENT & PLAN NOTE
Not as well controlled since stopping Bystolic but better controlled since restarting amlodipine. Increase amlodipine to 5 mg daily (or 10 mg if already taking 5 mg) - will hold off on restarting Bystolic given previous bradycardia. Hold amlodipine and call if SBP consistently less than 100 or call if SBP consistently greater than 140.

## 2021-01-25 NOTE — PROGRESS NOTES
Encounter Date:01/25/2021  Chief Complaint:   Nasreen Chauhan is a 80 y.o. female who presents to [facility] today for three month cardiac follow-up.    History of Present Illness   Hypertension  This is a chronic problem. The current episode started more than 1 year ago. The problem has been waxing and waning since onset. The problem is uncontrolled. Associated symptoms include chest pain. Agents associated with hypertension include thyroid hormones. Risk factors for coronary artery disease include sedentary lifestyle and diabetes mellitus. Past treatments include beta blockers. Current antihypertension treatment includes calcium channel blockers and ACE inhibitors. The current treatment provides moderate improvement. Compliance problems include exercise (recovering from knee replacement 11/2020).  Hypertensive end-organ damage includes CAD/MI. Identifiable causes of hypertension include a thyroid problem.   Coronary Artery Disease  Presents for follow-up visit. Symptoms include chest pain. The symptoms have been stable. Compliance with diet is good. Compliance with exercise is poor. Compliance with medications is good.   Syncope  This is a chronic problem. The problem occurs rarely. The problem has been resolved. Associated symptoms include chest pain. Her past medical history is significant for CAD, DM and HTN.      HPI     Hypertension      Additional comments: BP has continued to fluctuate running 120-190/ but mostly 130-150/70-80s per home BP readings. She has taken clonidine several times. Thinks she may have been taking amlodipine 5 mg but isn't sure. Brought an old list from 2/2020. Not sleeping well - only 2 hours at a time. Hurting more from neuropathy. Left side of face/neck hurts when BP is elevated. Bystolic was working but too expensive. She isn't sure how much amlodipine she is taking. Heart rates have been 60-70s.              Coronary Artery  Disease      Additional comments: has had some midsternal chest discomfort at rest for a few minutes - never had angina before CABG x 2 2010. No ischemia per 2016 nuclear stress.              Syncope      Additional comments: no further episodes          Last edited by Meghan Freeman APRN on 1/25/2021  3:09 PM. (History)        History: Past medical, surgical, family, and social history reviewed.    Outpatient Medications Marked as Taking for the 1/25/21 encounter (Office Visit) with Meghan Freeman APRN   Medication Sig Dispense Refill   • amLODIPine (NORVASC) 2.5 MG tablet Take 1 tablet by mouth Every Night. 30 tablet 11   • aspirin 81 MG EC tablet Take 81 mg by mouth Daily.     • atorvastatin (LIPITOR) 10 MG tablet Take 10 mg by mouth Every Night.     • cloNIDine (CATAPRES) 0.1 MG tablet Take 1 tablet by mouth 2 (Two) Times a Day As Needed for High Blood Pressure (SBP greater than 160 or DBP greater than 100). 60 tablet 11   • esomeprazole (nexIUM) 40 MG capsule Take 40 mg by mouth Every Morning Before Breakfast.     • GRALISE 600 MG tablet tablet Take 1,800 mg by mouth Daily Before Supper.     • levothyroxine (SYNTHROID, LEVOTHROID) 75 MCG tablet Take 37.5-75 mcg by mouth Daily. Takes 1/2 tab M, W, &F and 1 tab on S, Tu, Th, & Sa     • lisinopril (PRINIVIL,ZESTRIL) 20 MG tablet Take 40 mg by mouth 2 (Two) Times a Day.     • MAGNESIUM GLYCINATE PO Take 400 mg by mouth 2 (two) times a day.     • metFORMIN (GLUCOPHAGE) 1000 MG tablet Take 500 mg by mouth 2 (Two) Times a Day With Meals.     • therapeutic multivitamin-minerals (THERAGRAN-M) tablet Take 1 tablet by mouth.     • TRUE METRIX BLOOD GLUCOSE TEST test strip   5   • vitamin D3 125 MCG (5000 UT) capsule capsule Take 5,000 Units by mouth Daily.     • [DISCONTINUED] magnesium oxide (MAGOX) 400 (241.3 MG) MG tablet tablet Take 400 mg by mouth 2 (Two) Times a Day.     She isn't sure if taking 2.5 or 5 mg of amlodipine. To call with current dose when  "she returns home.     Objective     Vital Signs:   /82 (BP Location: Left arm, Patient Position: Sitting, Cuff Size: Adult)   Pulse 87   Temp 96.9 °F (36.1 °C) (Infrared)   Ht 160 cm (63\")   Wt 67.1 kg (148 lb)   SpO2 98%   BMI 26.22 kg/m²   Wt Readings from Last 3 Encounters:   01/25/21 67.1 kg (148 lb)   10/02/20 70.1 kg (154 lb 9.6 oz)   02/10/20 72.3 kg (159 lb 6.4 oz)         Vitals signs reviewed.   Constitutional:       Appearance: Normal and healthy appearance. Well-developed, well-groomed and not in distress.   Eyes:      General: No scleral icterus.  HENT:      Head:      Comments: Wears glasses  Neck:      Vascular: No JVD.   Pulmonary:      Effort: Pulmonary effort is normal.      Breath sounds: Normal breath sounds.   Cardiovascular:      Normal rate. Regular rhythm.      No gallop.      Comments: BLEs mildly tender to palpation  Pulses:     Intact distal pulses.   Edema:     Peripheral edema absent.      Comments: Wearing knee high graduated compression  Skin:     General: Skin is warm and dry.   Neurological:      Mental Status: Alert and oriented to person, place, and time.   Psychiatric:         Mood and Affect: Mood is anxious (mildly).         Result Review  Data Reviewed:  The following data was reviewed by: SOFIYA Zimmerman on 01/25/2021       Scan on 11/5/2020 by Meghan Freeman APRN: CBC,BMP 11/5/2020  Scan on 10/2/2020 1538 by Tawanna Wilburn MA: EKG 10/02/2020  Scan on 8/13/2020 by Meghan Freeman APRN: [facility]/ 8- and Lipid Panel  Scan on 11/12/2019 by Meghan Freeman APRN: BMP, CBC 11/12/2019   Scan on 8/5/2019 1611 by Tawanna Wilburn MA: EKG 8/5/19  Scan on 8/12/2019 by Meghan Freeman APRN: LIPID 8/12/19    Scan on 5/1/2018 by Michael Alcantara MD: 05/01/2018 [facility] ECHO COMPLETE  Scan on 2/2/2018 1447 by Jaclyn Campos CMA: LIPID, HFP, BMP, CBC W/DIFF, HGBA1C, 07/31/2017  Scan on 11/30/2016 1410 by Nickie Astudillo, MACEY: " EXERCISE STRESS /MYOCARDIAL PERFUSION 11/22/2016  SCANNED - ECHOCARDIOGRAM (10/11/2010)  SCANNED - CARDIOLOGY (09/27/2010) Heart cath, Dr. Chavez, [facility]        Assessment and Plan   Problem List Items Addressed This Visit        Other    CAD in native artery - Primary (Chronic)    Overview     CABG x 2 2010, [facility], Dr. Lora  No ischemia 11/2016 nuclear stress         Current Assessment & Plan     Atypical but had no previous angina other than dyspnea. She declines repeating nuclear stress test at this time but agrees to call if worsens or go to ER with any severe symptoms. Continue current medications. Encouraged gradual increase in aerobic activity as knee permits.         Hyperlipidemia (Chronic)    Current Assessment & Plan     Very well controlled with atorvastatin per 8/2019 and 8/2020 labs without side effects. Continue present therapy.         Hypertension (Chronic)    Current Assessment & Plan     Not as well controlled since stopping Bystolic but better controlled since restarting amlodipine. Increase amlodipine to 5 mg daily (or 10 mg if already taking 5 mg) - will hold off on restarting Bystolic given previous bradycardia. Hold amlodipine and call if SBP consistently less than 100 or call if SBP consistently greater than 140.         S/P CABG x 2 (Chronic)    Overview     2010             Patient was given instructions and counseling regarding her condition or for health maintenance advice. Please see specific information pulled into the AVS if appropriate.    Answered questions about COVID and vaccine. She is on the list at [facility].    Follow Up :   Return for Next scheduled follow up for 3 months.       Time Spent: I spent 45 minutes caring for Imani on this date of service. This time includes time spent by me in the following activities: preparing for the visit, reviewing tests, performing a medically appropriate examination and/or evaluation, counseling and educating the  patient/family/caregiver, ordering medications, tests, or procedures and documenting information in the medical record.       Meghan Freeman APRN, ACNP-BC, CHFN-BC

## 2021-03-04 ENCOUNTER — TELEPHONE (OUTPATIENT)
Dept: CARDIOLOGY | Facility: CLINIC | Age: 81
End: 2021-03-04

## 2021-03-04 DIAGNOSIS — I10 ESSENTIAL HYPERTENSION: Primary | Chronic | ICD-10-CM

## 2021-03-04 RX ORDER — SPIRONOLACTONE 25 MG/1
25 TABLET ORAL DAILY
Qty: 30 TABLET | Refills: 11 | Status: SHIPPED | OUTPATIENT
Start: 2021-03-04 | End: 2021-04-07

## 2021-03-04 NOTE — TELEPHONE ENCOUNTER
Have her continue present therapy and add spironolactone 25 mg daily. Check BMP in 1-2 weeks. Continue to check BP and call readings in 2 weeks. TX!

## 2021-03-04 NOTE — TELEPHONE ENCOUNTER
Let her know I reviewed BP readings. She has some fluctuations but rarely needs clonidine and has some days it's really well controlled but frequently running just a little higher than I would like. How much amlodipine is she taking currently?

## 2021-03-04 NOTE — TELEPHONE ENCOUNTER
Patient brought in BP log for your review.  I have uploaded it to patients chart, under media.    Thank you

## 2021-03-05 NOTE — TELEPHONE ENCOUNTER
Advise pt to continue present therapy and add Spironolactone 25mg and labs in 1-2 weeks. Also keep BP log and call with reading in 2 weeks. Lab order sent to [facility].

## 2021-03-08 ENCOUNTER — TELEPHONE (OUTPATIENT)
Dept: CARDIOLOGY | Facility: CLINIC | Age: 81
End: 2021-03-08

## 2021-03-08 NOTE — TELEPHONE ENCOUNTER
Patient called and she does not think that she needs Amlodipine or Spironolactone. She states her BP was ok this weekend without Spironolactone and some times she did not even take the Amlodipine and only took Lisinopril. This morning just waking up BP was 131/81 before meds . Reading this weekend with Spironolactone were 106/66, 120/77, 118/66, 124/66.

## 2021-03-08 NOTE — TELEPHONE ENCOUNTER
Patient said she would stay on Amlodipine 5mg . I advise that id SBP is less than 100 to not take it. I also advise to keep an BP log .

## 2021-03-08 NOTE — TELEPHONE ENCOUNTER
Based on her BP readings she called in a few days ago, I would recommend she stay on at least amlodipine 5 mg or spironolactone daily as her readings were not quite to goal of less than 130/80. Have her continue to check her BP but only hold amlodipine if her SBP is less than 100. TX!

## 2021-03-17 ENCOUNTER — TELEPHONE (OUTPATIENT)
Dept: CARDIOLOGY | Facility: CLINIC | Age: 81
End: 2021-03-17

## 2021-03-17 NOTE — TELEPHONE ENCOUNTER
Patient stated she would come in tomorrow. She stated she just finished antibiotics for fluid on her ears and that is better, I advised her to still try taking the meclizine otc you suggested. Patient voiced understanding.

## 2021-03-17 NOTE — TELEPHONE ENCOUNTER
Ok to come in at 2:30 tomorrow or Friday or one day next week. Have her continue to monitor BP and HR and bring readings and monitor to visit. Call if has near syncope or syncope. If dizzy even with sitting she might try meclizine OTC just to make sure not related to inner ear/vertigo. TX!.

## 2021-03-17 NOTE — TELEPHONE ENCOUNTER
PATIENT CALLED AND SAID BP IS LOW    TOOK MEDS AT 7 AM  3-  113/53    DIZZY AND LIGHTHEADED ALL DAY    116/65         11:45    112/67        12:30    123/64          1:30    128/62          2:30    117/69          7:30   TOOK LISINIPRIL    130/59         9:30 PM    3-  5:45  128/77 (HAVENT TAKEN MEDS)    PATIENT WANTS TO COME IN AND BE SEEN, PATIENT STATED ITS UP AND DOWN    PLEASE ADVISE.

## 2021-04-07 ENCOUNTER — OFFICE VISIT (OUTPATIENT)
Dept: CARDIOLOGY | Facility: CLINIC | Age: 81
End: 2021-04-07

## 2021-04-07 VITALS
TEMPERATURE: 97.1 F | OXYGEN SATURATION: 97 % | WEIGHT: 150.6 LBS | HEIGHT: 63 IN | HEART RATE: 77 BPM | BODY MASS INDEX: 26.68 KG/M2 | SYSTOLIC BLOOD PRESSURE: 150 MMHG | DIASTOLIC BLOOD PRESSURE: 78 MMHG

## 2021-04-07 DIAGNOSIS — E83.42 HYPOMAGNESEMIA: ICD-10-CM

## 2021-04-07 DIAGNOSIS — I10 ESSENTIAL HYPERTENSION: Chronic | ICD-10-CM

## 2021-04-07 DIAGNOSIS — R42 DIZZINESS: ICD-10-CM

## 2021-04-07 DIAGNOSIS — Z95.1 S/P CABG X 2: Chronic | ICD-10-CM

## 2021-04-07 DIAGNOSIS — E78.2 MIXED HYPERLIPIDEMIA: Chronic | ICD-10-CM

## 2021-04-07 DIAGNOSIS — I25.10 CAD IN NATIVE ARTERY: Primary | Chronic | ICD-10-CM

## 2021-04-07 DIAGNOSIS — E03.9 HYPOTHYROIDISM, UNSPECIFIED TYPE: Chronic | ICD-10-CM

## 2021-04-07 PROCEDURE — 99215 OFFICE O/P EST HI 40 MIN: CPT | Performed by: NURSE PRACTITIONER

## 2021-04-07 RX ORDER — CLONIDINE HYDROCHLORIDE 0.1 MG/1
0.1 TABLET ORAL 2 TIMES DAILY PRN
Qty: 60 TABLET | Refills: 11
Start: 2021-04-07 | End: 2023-02-23

## 2021-04-07 RX ORDER — SPIRONOLACTONE 25 MG/1
12.5 TABLET ORAL DAILY
Start: 2021-04-07 | End: 2021-08-06 | Stop reason: SDUPTHER

## 2021-04-07 NOTE — ASSESSMENT & PLAN NOTE
Possibly related to hyper and/or hypotension or vertigo. May have some component of autonomic neuropathy. Provided Epley maneuver instructions for her to try. Call if worsens.

## 2021-04-07 NOTE — ASSESSMENT & PLAN NOTE
Continues to fluctuate but not taking spironolactone and amlodipine consistently. Encouraged her to try taking spironolactone 12.5 mg daily (she wants to take with evening dose of lisinopril) and hold amlodipine for now. Goal BP less than 130/80. Only take clonidine for persistent BP greater than 180/100. If unable to regulate will consider restarting Bystolic. Reviewed proper BP measurement and need to take medications consistently to achieve BP control. She will call BP and HR readings in 2 weeks and check BMP & Mg in 1 week.

## 2021-04-07 NOTE — ASSESSMENT & PLAN NOTE
Remains atypical but had no previous angina other than dyspnea. She declines repeating nuclear stress test at this time but agrees to call if worsens or go to ER with any severe symptoms. Take 1/2 tab spironolactone = 12.5 mg daily. Encouraged gradual increase in aerobic activity as knee permits.

## 2021-04-07 NOTE — PATIENT INSTRUCTIONS
How to Perform the Epley Maneuver  The Epley maneuver is an exercise that relieves symptoms of vertigo. Vertigo is the feeling that you or your surroundings are moving when they are not. When you feel vertigo, you may feel like the room is spinning and may have trouble walking. The Epley maneuver is used for a type of vertigo caused by a calcium deposit in a part of the inner ear. The maneuver involves changing head positions to help the deposit move out of the area.  You can do this maneuver at home whenever you have symptoms of vertigo. You can repeat it in 24 hours if your vertigo has not gone away.  Even though the Epley maneuver may relieve your vertigo for a few weeks, it is possible that your symptoms will return. This maneuver relieves vertigo, but it does not relieve dizziness.  What are the risks?  If it is done correctly, the Epley maneuver is considered safe. Sometimes it can lead to dizziness or nausea that goes away after a short time. If you develop other symptoms--such as changes in vision, weakness, or numbness--stop doing the maneuver and call your health care provider.  Supplies needed:  · A bed or table.  · A pillow.  How to do the Epley maneuver         1. Sit on the edge of a bed or table with your back straight and your legs extended or hanging over the edge of the bed or table.  2. Turn your head long term toward the affected ear or side as told by your health care provider.  3. Lie backward quickly with your head turned until you are lying flat on your back. You may want to position a pillow under your shoulders.  4. Hold this position for at least 30 seconds. If you feel dizzy or have symptoms of vertigo, continue to hold the position until the symptoms stop.  5. Turn your head to the opposite direction until your unaffected ear is facing the floor.  6. Hold this position for at least 30 seconds. If you feel dizzy or have symptoms of vertigo, continue to hold the position until the symptoms  stop.  7. Turn your whole body to the same side as your head so that you are positioned on your side. Your head will now be nearly facedown. Hold for at least 30 seconds. If you feel dizzy or have symptoms of vertigo, continue to hold the position until the symptoms stop.  8. Sit back up.  You can repeat the maneuver in 24 hours if your vertigo does not go away.  Follow these instructions at home:  For 24 hours after doing the Epley maneuver:  · Keep your head in an upright position.  · When lying down to sleep or rest, keep your head raised (elevated) with two or more pillows.  · Avoid excessive neck movements.  Activity  · Do not drive or use machinery if you feel dizzy.  · After doing the Epley maneuver, return to your normal activities as told by your health care provider. Ask your health care provider what activities are safe for you.  General instructions  · Drink enough fluid to keep your urine pale yellow.  · Do not drink alcohol.  · Take over-the-counter and prescription medicines only as told by your health care provider.  · Keep all follow-up visits as told by your health care provider. This is important.  Preventing vertigo symptoms  Ask your health care provider if there is anything you should do at home to prevent vertigo. He or she may recommend that you:  · Keep your head elevated with two or more pillows while you sleep.  · Do not sleep on the side of your affected ear.  · Get up slowly from bed.  · Avoid sudden movements during the day.  · Avoid extreme head positions or movement, such as looking up or bending over.  Contact a health care provider if:  · Your vertigo gets worse.  · You have other symptoms, including:  ? Nausea.  ? Vomiting.  ? Headache.  Get help right away if you:  · Have vision changes.  · Have a headache or neck pain that is severe or getting worse.  · Cannot stop vomiting.  · Have new numbness or weakness in any part of your body.  Summary  · Vertigo is the feeling that you or  your surroundings are moving when they are not.  · The Epley maneuver is an exercise that relieves symptoms of vertigo.  · If the Epley maneuver is done correctly, it is considered safe and relieves vertigo quickly.  This information is not intended to replace advice given to you by your health care provider. Make sure you discuss any questions you have with your health care provider.  Document Revised: 10/14/2020 Document Reviewed: 10/14/2020  Elsevier Patient Education © 2021 [employer]

## 2021-04-07 NOTE — PROGRESS NOTES
Encounter Date:04/07/2021  Chief Complaint:   Nasreen Chauhan is a 80 y.o. female who presents to [facility] [place] today for 3 month cardiac follow-up.    History of Present Illness   Coronary Artery Disease  Presents for follow-up visit. Symptoms include dizziness and leg swelling (at the end of the day). The symptoms have been stable. Compliance with diet is good. Compliance with exercise is good. Compliance with medications is variable.   Hypertension  This is a chronic problem. The current episode started more than 1 year ago. The problem has been waxing and waning since onset. The problem is uncontrolled. Associated symptoms include anxiety and headaches (when BP high). Risk factors for coronary artery disease include post-menopausal state. Current antihypertension treatment includes ACE inhibitors, calcium channel blockers, diuretics and lifestyle changes. The current treatment provides moderate improvement. Compliance problems include psychosocial issues.  Hypertensive end-organ damage includes CAD/MI. Identifiable causes of hypertension include a thyroid problem.      HPI     Coronary Artery Disease      Additional comments: twinge every now and then, some SOA walking up hill - nothing similar to SOA before CABG in 2010 - no ischemia per 2016 nuclear              Hypertension      Additional comments: still fluctuating, has had to take clonidine a few times. Has only been taking amlodipine when BP is high in the evenings and spironolactone when high in the mornings. Has only been taking lisinopril consistently. She is afraid to take too much and afraid she might not take enough and had a stroke. Still fighting dizziness especially when she leans or bends over. Hasn't tried Epley maneuvers. Complains of neuropathy in feet.          Last edited by Meghan Freeman APRN on 4/7/2021  2:36 PM. (History)        History: Past medical, surgical, family, and social  history reviewed.    Outpatient Medications Marked as Taking for the 4/7/21 encounter (Office Visit) with Meghan Freeman APRN   Medication Sig Dispense Refill   • aspirin 81 MG EC tablet Take 81 mg by mouth Daily.     • atorvastatin (LIPITOR) 10 MG tablet Take 10 mg by mouth Every Night.     • cloNIDine (CATAPRES) 0.1 MG tablet Take 1 tablet by mouth 2 (Two) Times a Day As Needed for High Blood Pressure (SBP greater than 180 or DBP greater than 100). 60 tablet 11   • esomeprazole (nexIUM) 40 MG capsule Take 40 mg by mouth Every Morning Before Breakfast.     • furosemide (LASIX) 20 MG tablet Take 1 tablet by mouth Daily As Needed (swelling). 30 tablet 11   • GRALISE 600 MG tablet tablet Take 1,800 mg by mouth Daily Before Supper.     • levothyroxine (SYNTHROID, LEVOTHROID) 75 MCG tablet Take 37.5-75 mcg by mouth Daily. Takes 1/2 tab M, W, &F and 1 tab on S, Tu, Th, & Sa     • lisinopril (PRINIVIL,ZESTRIL) 20 MG tablet Take 40 mg by mouth 2 (Two) Times a Day.     • MAGNESIUM GLYCINATE PO Take 400 mg by mouth 2 (two) times a day.     • metFORMIN (GLUCOPHAGE) 1000 MG tablet Take 500 mg by mouth 2 (Two) Times a Day With Meals.     • spironolactone (ALDACTONE) 25 MG tablet Take 0.5 tablets by mouth Daily.     • therapeutic multivitamin-minerals (THERAGRAN-M) tablet Take 1 tablet by mouth.     • TRUE METRIX BLOOD GLUCOSE TEST test strip   5   • vitamin D3 125 MCG (5000 UT) capsule capsule Take 5,000 Units by mouth Daily.     • [DISCONTINUED] amLODIPine (NORVASC) 10 MG tablet Take 1 tablet by mouth Every Night. (Patient taking differently: Take 10 mg by mouth Every Night. Only taking as needed) 30 tablet 11   • [DISCONTINUED] cloNIDine (CATAPRES) 0.1 MG tablet Take 1 tablet by mouth 2 (Two) Times a Day As Needed for High Blood Pressure (SBP greater than 160 or DBP greater than 100). (Patient taking differently: Take 0.1 mg by mouth 2 (Two) Times a Day As Needed for High Blood Pressure (SBP greater than 160 or DBP  "greater than 100). Taking as needed) 60 tablet 11   • [DISCONTINUED] spironolactone (ALDACTONE) 25 MG tablet Take 1 tablet by mouth Daily. (Patient taking differently: Take 25 mg by mouth Daily. Only taking as needed) 30 tablet 11        Objective     Vital Signs:   /78 (BP Location: Left arm, Patient Position: Sitting, Cuff Size: Adult)   Pulse 77   Temp 97.1 °F (36.2 °C) (Infrared)   Ht 160 cm (63\")   Wt 68.3 kg (150 lb 9.6 oz)   SpO2 97%   BMI 26.68 kg/m²   Wt Readings from Last 3 Encounters:   04/07/21 68.3 kg (150 lb 9.6 oz)   01/25/21 67.1 kg (148 lb)   10/02/20 70.1 kg (154 lb 9.6 oz)         Vitals reviewed.   Constitutional:       Appearance: Normal and healthy appearance. Well-developed, well-groomed and not in distress.   Eyes:      General: No scleral icterus.  HENT:      Head:      Comments: Wears glasses  Neck:      Vascular: No JVD.   Pulmonary:      Effort: Pulmonary effort is normal.      Breath sounds: Normal breath sounds.   Cardiovascular:      Normal rate. Regular rhythm.      No gallop.      Comments: LLE tender to palpation  Pulses:     Intact distal pulses.   Edema:     Peripheral edema absent.   Skin:     General: Skin is warm and dry.   Neurological:      Mental Status: Alert and oriented to person, place, and time.   Psychiatric:         Mood and Affect: Mood is anxious (mildly).         Result Review  Data Reviewed:  The following data was reviewed by: SOFIYA Zimmerman on 04/07/2021  Scan on 11/5/2020 by Meghan Freeman APRN: BGBMP 11/5/2020  Scan on 10/2/2020 1538 by Tawanna Wilburn MA: EKG 10/02/2020  Scan on 8/13/2020 by Meghan Freeman APRN: [facility]/ 8- and Lipid Panel  Scan on 11/12/2019 by Meghan Freeman APRN: [facility] BREEN 11/12/2019   Scan on 8/5/2019 1611 by Tawanna Wilburn MA: EKG 8/5/19  Scan on 8/12/2019 by Meghan Freeman APRN: LIPID 8/12/19    Scan on 5/1/2018 by Michael Alcantara MD: 05/01/2018 [facility] ECHO " COMPLETE  Scan on 2/2/2018 1447 by Jaclyn Cmapos CMA: LIPID, HFP, BMP, CBC W/DIFF, HGBA1C, 07/31/2017  Scan on 11/30/2016 1410 by Nickie Astudillo CMA: EXERCISE STRESS /MYOCARDIAL PERFUSION 11/22/2016  SCANNED - ECHOCARDIOGRAM (10/11/2010)  SCANNED - CARDIOLOGY (09/27/2010) Heart cath, Dr. Chavez, [facility]              Assessment and Plan   Problem List Items Addressed This Visit        Cardiac and Vasculature    CAD in native artery - Primary (Chronic)    Overview     CABG x 2 2010, [facility], Dr. Lora  No ischemia 11/2016 nuclear stress         Current Assessment & Plan     Remains atypical but had no previous angina other than dyspnea. She declines repeating nuclear stress test at this time but agrees to call if worsens or go to ER with any severe symptoms. Take 1/2 tab spironolactone = 12.5 mg daily. Encouraged gradual increase in aerobic activity as knee permits.         Hyperlipidemia (Chronic)    Current Assessment & Plan     Very well controlled with atorvastatin per 8/2019 and 8/2020 labs without side effects. Continue present therapy.         Hypertension (Chronic)    Current Assessment & Plan     Continues to fluctuate but not taking spironolactone and amlodipine consistently. Encouraged her to try taking spironolactone 12.5 mg daily (she wants to take with evening dose of lisinopril) and hold amlodipine for now. Goal BP less than 130/80. Only take clonidine for persistent BP greater than 180/100. If unable to regulate will consider restarting Bystolic. Reviewed proper BP measurement and need to take medications consistently to achieve BP control. She will call BP and HR readings in 2 weeks and check BMP & Mg in 1 week.         Relevant Medications    spironolactone (ALDACTONE) 25 MG tablet    cloNIDine (CATAPRES) 0.1 MG tablet    S/P CABG x 2 (Chronic)    Overview     2010            Endocrine and Metabolic    Hypothyroidism (Chronic)    Current Assessment & Plan     Reportedly controlled on levothyroxine.             Symptoms and Signs    Dizziness    Current Assessment & Plan     Possibly related to hyper and/or hypotension or vertigo. May have some component of autonomic neuropathy. Provided Epley maneuver instructions for her to try. Call if worsens.           Other Visit Diagnoses     Hypomagnesemia        Relevant Orders    Magnesium        Patient was given instructions and counseling regarding her condition or for health maintenance advice. Please see specific information pulled into the AVS if appropriate.    Advance Care Planning   ACP discussion was held with the patient during this visit. Patient has an advance directive (not in EMR), copy requested.    Follow Up :   Return in about 3 months (around 7/7/2021) for Recheck.       Time Spent: I spent 48 minutes caring for Imani on this date of service. This time includes time spent by me in the following activities: reviewing tests, performing a medically appropriate examination and/or evaluation, counseling and educating the patient/family/caregiver, ordering medications, tests, or procedures and documenting information in the medical record.       Meghan Freeman, APRN, ACNP-BC, CHFN-BC

## 2021-04-19 DIAGNOSIS — E83.42 HYPOMAGNESEMIA: Primary | ICD-10-CM

## 2021-04-19 DIAGNOSIS — I10 ESSENTIAL HYPERTENSION: ICD-10-CM

## 2021-04-27 ENCOUNTER — TELEPHONE (OUTPATIENT)
Dept: CARDIOLOGY | Facility: CLINIC | Age: 81
End: 2021-04-27

## 2021-04-27 NOTE — TELEPHONE ENCOUNTER
Patient brought in BP log today. I uploaded it to patients chart under media for your review.    Thank you

## 2021-04-28 NOTE — TELEPHONE ENCOUNTER
"She called back and relayed the message. She has \"bummed knee\". She starts physical therapy on 5/6/21  "

## 2021-04-28 NOTE — TELEPHONE ENCOUNTER
Please notify patient overall BPs are looking better. Encourage her to only check once or twice a day or if feeling poorly. She seems to be stressing about it more than she should. Only take clonidine for persistent BP greater than 180/100. Encourage healthy diet and gradual increase in routine aerobic activity. Continue present therapy. TX!

## 2021-05-20 DIAGNOSIS — E83.42 HYPOMAGNESEMIA: Primary | ICD-10-CM

## 2021-06-17 DIAGNOSIS — R42 DIZZINESS: Primary | ICD-10-CM

## 2021-06-17 DIAGNOSIS — E83.42 HYPOMAGNESEMIA: ICD-10-CM

## 2021-08-06 ENCOUNTER — OFFICE VISIT (OUTPATIENT)
Dept: CARDIOLOGY | Facility: CLINIC | Age: 81
End: 2021-08-06

## 2021-08-06 VITALS
HEART RATE: 76 BPM | OXYGEN SATURATION: 98 % | DIASTOLIC BLOOD PRESSURE: 80 MMHG | BODY MASS INDEX: 27.21 KG/M2 | SYSTOLIC BLOOD PRESSURE: 140 MMHG | WEIGHT: 153.6 LBS | HEIGHT: 63 IN

## 2021-08-06 DIAGNOSIS — I25.10 CAD IN NATIVE ARTERY: Primary | Chronic | ICD-10-CM

## 2021-08-06 DIAGNOSIS — I10 ESSENTIAL HYPERTENSION: Chronic | ICD-10-CM

## 2021-08-06 DIAGNOSIS — Z13.21 ENCOUNTER FOR VITAMIN DEFICIENCY SCREENING: ICD-10-CM

## 2021-08-06 DIAGNOSIS — E78.2 MIXED HYPERLIPIDEMIA: Chronic | ICD-10-CM

## 2021-08-06 DIAGNOSIS — Z95.1 S/P CABG X 2: Chronic | ICD-10-CM

## 2021-08-06 DIAGNOSIS — E83.42 HYPOMAGNESEMIA: Chronic | ICD-10-CM

## 2021-08-06 PROCEDURE — 99214 OFFICE O/P EST MOD 30 MIN: CPT | Performed by: NURSE PRACTITIONER

## 2021-08-06 RX ORDER — SPIRONOLACTONE 25 MG/1
25 TABLET ORAL DAILY
Qty: 90 TABLET | Refills: 3 | Status: SHIPPED | OUTPATIENT
Start: 2021-08-06 | End: 2022-08-25 | Stop reason: SDUPTHER

## 2021-08-06 RX ORDER — IBUPROFEN 200 MG
400 TABLET ORAL EVERY 6 HOURS PRN
COMMUNITY
End: 2023-02-23

## 2021-08-06 NOTE — ASSESSMENT & PLAN NOTE
Remains stable. Encouraged gradual increase in aerobic activity as knee permits. Continue present therapy. Call if worsens.

## 2021-08-06 NOTE — ASSESSMENT & PLAN NOTE
Better controlled per home readings. Continue present therapy. Call if persistently greater than 130/80.

## 2021-08-06 NOTE — ASSESSMENT & PLAN NOTE
Improved with supplementation but having GI side effects. She can try decreasing by 1 tablet daily and check with pharmacist to see if another formulation might be better tolerated. Recheck level in 2-4 weeks.

## 2021-08-06 NOTE — PROGRESS NOTES
Encounter Date:08/06/2021  Chief Complaint:   Subjective    Imani Chauhan is a 81 y.o. female who presents to [facility] [place] today for three month cardiac follow-up.      History of Present Illness   HPI     Coronary Artery Disease      Additional comments: hx CABG 2010, no ischemia per 2016 nuclear, no chest discomfort              Hypertension      Additional comments: has been better but up to 150/70s the last few days              Low Magnesium      Additional comments: levels are better but having GI upset - hasn't tried other formulations          Last edited by Meghan Freeman APRN on 8/6/2021  2:27 PM. (History)        History: Past medical, surgical, family, and social history reviewed.    Outpatient Medications Marked as Taking for the 8/6/21 encounter (Office Visit) with Meghan Freeman APRN   Medication Sig Dispense Refill   • aspirin 81 MG EC tablet Take 81 mg by mouth Daily.     • atorvastatin (LIPITOR) 10 MG tablet Take 10 mg by mouth Every Night.     • esomeprazole (nexIUM) 40 MG capsule Take 40 mg by mouth Daily As Needed.     • GRALISE 600 MG tablet tablet Take 1,200 mg by mouth Daily Before Supper.     • ibuprofen (ADVIL,MOTRIN) 200 MG tablet Take 400 mg by mouth Every 6 (Six) Hours As Needed for Mild Pain .     • levothyroxine (SYNTHROID, LEVOTHROID) 75 MCG tablet Take 37.5-75 mcg by mouth Daily. Takes 1/2 tab M, W, &F and 1 tab on S, Tu, Th, & Sa     • lisinopril (PRINIVIL,ZESTRIL) 20 MG tablet Take 20 mg by mouth 2 (Two) Times a Day.     • MAGNESIUM GLYCINATE PO Take 1,200 mg by mouth 2 (two) times a day.     • metFORMIN (GLUCOPHAGE) 1000 MG tablet Take 500 mg by mouth 2 (Two) Times a Day With Meals.     • spironolactone (ALDACTONE) 25 MG tablet Take 1 tablet by mouth Daily. 90 tablet 3   • therapeutic multivitamin-minerals (THERAGRAN-M) tablet Take 1 tablet by mouth.     • TRUE METRIX BLOOD GLUCOSE TEST test strip   5   • vitamin D3 125 MCG (5000 UT)  "capsule capsule Take 5,000 Units by mouth Daily.     • [DISCONTINUED] spironolactone (ALDACTONE) 25 MG tablet Take 0.5 tablets by mouth Daily. (Patient taking differently: Take 25 mg by mouth Daily.)          Objective     Vital Signs:   /80 (BP Location: Left arm, Patient Position: Sitting, Cuff Size: Adult)   Pulse 76   Ht 160 cm (63\")   Wt 69.7 kg (153 lb 9.6 oz)   SpO2 98%   BMI 27.21 kg/m²   Wt Readings from Last 3 Encounters:   08/06/21 69.7 kg (153 lb 9.6 oz)   04/07/21 68.3 kg (150 lb 9.6 oz)   01/25/21 67.1 kg (148 lb)         Vitals reviewed.   Constitutional:       Appearance: Normal and healthy appearance. Well-developed, well-groomed and not in distress.   Eyes:      General: No scleral icterus.  HENT:      Head:      Comments: Wears glasses  Neck:      Vascular: No JVD.   Pulmonary:      Effort: Pulmonary effort is normal.      Breath sounds: Normal breath sounds.   Cardiovascular:      Normal rate. Regular rhythm.      No gallop.      Comments: LLE tender to palpation  Pulses:     Intact distal pulses.   Edema:     Peripheral edema absent.   Skin:     General: Skin is warm and dry.   Neurological:      Mental Status: Alert and oriented to person, place, and time.   Psychiatric:         Mood and Affect: Mood and affect normal. Mood is not anxious.         Result Review  Data Reviewed:  The following data was reviewed by: SOFIYA Zimmerman on 08/06/2021  Scan on 7/15/2021 by Meghan Freeman APRN: MAGNESIUM  Scan on 6/17/2021 by Meghan Freeman APRN: MAGNESIUM   Scan on 5/19/2021 by Meghan Freeman APRN: BASIC METABOLIC PANEL  Mg  Scan on 4/16/2021 by Meghan Freeman APRN: BASIC METABOLIC PANEL Mg   Scan on 11/5/2020 by Meghan Freeman APRN: CBC,BMP 11/5/2020  Scan on 10/2/2020 1538 by Tawanna Wilburn MA: EKG 10/02/2020  Scan on 8/13/2020 by Meghan Freeman APRN: BMP/ [facility]/ 8- and Lipid Panel  Scan on 11/12/2019 by Meghan Freeman, " APRN: BMP, CBC 11/12/2019   Scan on 8/5/2019 1611 by Tawanna Wilburn, MA: EKG 8/5/19  Scan on 8/12/2019 by Meghan Freeman, APRN: LIPID 8/12/19    Scan on 5/1/2018 by Michael Alcantara MD: 05/01/2018 [facility] ECHO COMPLETE  Scan on 2/2/2018 1447 by Jaclyn Campos, CMA: LIPID, HFP, BMP, CBC W/DIFF, HGBA1C, 07/31/2017  Scan on 11/30/2016 1410 by Nickie Astudillo CMA: EXERCISE STRESS /MYOCARDIAL PERFUSION 11/22/2016  SCANNED - ECHOCARDIOGRAM (10/11/2010)  SCANNED - CARDIOLOGY (09/27/2010) Heart cath, Dr. Chavez, [facility]                 Assessment and Plan   Problem List Items Addressed This Visit        Cardiac and Vasculature    CAD in native artery - Primary (Chronic)    Overview     CABG x 2 2010, [facility], Dr. Lora  No ischemia 11/2016 nuclear stress         Current Assessment & Plan     Remains stable. Encouraged gradual increase in aerobic activity as knee permits. Continue present therapy. Call if worsens.         Relevant Orders    CBC Auto Differential    Lipid Panel    Vitamin D 1,25 Dihydroxy    Hyperlipidemia (Chronic)    Current Assessment & Plan     Very well controlled with atorvastatin per 8/2019 and 8/2020 labs without side effects. Continue present therapy. Check labs in 2-4 weeks.         Relevant Orders    Comprehensive Metabolic Panel    Lipid Panel    TSH    Hypertension (Chronic)    Current Assessment & Plan     Better controlled per home readings. Continue present therapy. Call if persistently greater than 130/80.         Relevant Medications    spironolactone (ALDACTONE) 25 MG tablet    S/P CABG x 2 (Chronic)    Overview     2010            Genitourinary and Reproductive     Hypomagnesemia (Chronic)    Current Assessment & Plan     Improved with supplementation but having GI side effects. She can try decreasing by 1 tablet daily and check with pharmacist to see if another formulation might be better tolerated. Recheck level in 2-4 weeks.         Relevant Orders    Magnesium      Other Visit  Diagnoses     Encounter for vitamin deficiency screening        Relevant Orders    Vitamin D 1,25 Dihydroxy    Vitamin B12        Patient was given instructions and counseling regarding her condition or for health maintenance advice. Please see specific information pulled into the AVS if appropriate.    Encouraged her to see PCP for Wellness visit, labs, and primary care.    Follow Up :   Return in about 6 months (around 2/6/2022) for Recheck.             Meghan Freeman APRN, ACNP-BC, CHFN-BC

## 2021-08-06 NOTE — ASSESSMENT & PLAN NOTE
Very well controlled with atorvastatin per 8/2019 and 8/2020 labs without side effects. Continue present therapy. Check labs in 2-4 weeks.

## 2021-08-12 ENCOUNTER — TELEPHONE (OUTPATIENT)
Dept: CARDIOLOGY | Facility: CLINIC | Age: 81
End: 2021-08-12

## 2021-08-12 NOTE — TELEPHONE ENCOUNTER
Patient stated she has shingles.  She thinks the medications they gave her is increased bp/hr    BP TODAY 177/87 hr 64  After 15 min of rest:  161/88 HR 61    methylprednisolone 4mg   Acyclovir 400 mg/ 1 pill 5x a day    Patient states that she needs to get her labs done, but didn't know if she needed to wait until after completing her antibiotic and steroid.    Please advise.

## 2021-08-12 NOTE — TELEPHONE ENCOUNTER
The pain from the shingles and the steroids can both increase her BP. I would wait a few weeks after finishing steroids before getting labs done. TX!

## 2021-08-17 ENCOUNTER — TELEPHONE (OUTPATIENT)
Dept: CARDIOLOGY | Facility: CLINIC | Age: 81
End: 2021-08-17

## 2021-08-17 NOTE — TELEPHONE ENCOUNTER
Have her recheck this evening and go ahead and take evening doses if SBP greater than 100. Make sure she is only taking clonidine as needed and not scheduled. She can decrease spironolactone to 1/2 tab tomorrow if SBP less than 110. Now that the steroids have worn off and less discomfort her BP will not be as high. Keep us updated on how she is doing. TX!

## 2021-08-17 NOTE — TELEPHONE ENCOUNTER
Patient called today stating she isnt feeling well and her bp is low.  She checked it today and it was 90/59  Patient states she's got dizziness.      8-15-21  112/67  105/56  Hr 70s    8-16-21  119/78  104/64  121/69  Hr 73    8-17-21 (held her medications today-afraid her bp may bottom)  98/66  115/67  90/59  Hr 72

## 2021-08-26 DIAGNOSIS — E11.65 TYPE 2 DIABETES MELLITUS WITH HYPERGLYCEMIA, WITHOUT LONG-TERM CURRENT USE OF INSULIN (HCC): Primary | ICD-10-CM

## 2022-02-09 ENCOUNTER — OFFICE VISIT (OUTPATIENT)
Dept: CARDIOLOGY | Facility: CLINIC | Age: 82
End: 2022-02-09

## 2022-02-09 VITALS
HEIGHT: 63 IN | OXYGEN SATURATION: 99 % | WEIGHT: 158 LBS | HEART RATE: 70 BPM | DIASTOLIC BLOOD PRESSURE: 72 MMHG | BODY MASS INDEX: 28 KG/M2 | SYSTOLIC BLOOD PRESSURE: 122 MMHG

## 2022-02-09 DIAGNOSIS — E83.42 HYPOMAGNESEMIA: Chronic | ICD-10-CM

## 2022-02-09 DIAGNOSIS — I25.10 CAD IN NATIVE ARTERY: Primary | Chronic | ICD-10-CM

## 2022-02-09 DIAGNOSIS — Z95.1 S/P CABG X 2: Chronic | ICD-10-CM

## 2022-02-09 DIAGNOSIS — I10 PRIMARY HYPERTENSION: Chronic | ICD-10-CM

## 2022-02-09 DIAGNOSIS — E78.2 MIXED HYPERLIPIDEMIA: Chronic | ICD-10-CM

## 2022-02-09 PROCEDURE — 93000 ELECTROCARDIOGRAM COMPLETE: CPT | Performed by: NURSE PRACTITIONER

## 2022-02-09 PROCEDURE — 99214 OFFICE O/P EST MOD 30 MIN: CPT | Performed by: NURSE PRACTITIONER

## 2022-02-09 RX ORDER — MONTELUKAST SODIUM 10 MG/1
10 TABLET ORAL DAILY
COMMUNITY
Start: 2022-01-10 | End: 2022-08-25 | Stop reason: ALTCHOICE

## 2022-02-09 NOTE — ASSESSMENT & PLAN NOTE
Improved with supplementation but having GI side effects. She requested refill on Nexium - I discussed impact on Mg absorption and recommended she try famotidine and discuss with PCP/GI. Recommended she recheck Mg after on new Mg formulation for a month or two as it may not be adequate since only taking once a day.

## 2022-02-09 NOTE — PROGRESS NOTES
Encounter Date:02/09/2022  Chief Complaint:   Nasreen Chauhan is a 81 y.o. female who presents to [facility] [place] today for six month cardiac follow-up.       History of Present Illness   HPI     Coronary Artery Disease      Additional comments: hx CABG 2010, no ischemia per 2016 nuclear, no chest discomfort, not exercising due to chronic back discomfort              Hypertension      Additional comments: Has been running 120s-140s/70-80s. No headaches. Chronic dizziness - better with allergy pill. No nosebleeds.              Hyperlipidemia      Additional comments: well controlled per 1/10/22 labs, no myalgias              Hypomagnesemia      Additional comments: quit taking 1,200 mg of Mg glycinate and started Extra Strength Mg daily recently - doesn't know dose - Mg 1.7 on 1/10/22          Last edited by Meghan Freeman APRN on 2/9/2022 12:03 PM. (History)        History: Past medical, surgical, family, and social history reviewed.    Outpatient Medications Marked as Taking for the 2/9/22 encounter (Office Visit) with Meghan Freeman APRN   Medication Sig Dispense Refill   • Ascorbic Acid (SUPER C COMPLEX PO) Take  by mouth Daily.     • aspirin 81 MG EC tablet Take 81 mg by mouth Daily.     • atorvastatin (LIPITOR) 10 MG tablet Take 10 mg by mouth Every Night.     • Bioflavonoid Products (SUPER-C 1000 PO) Take  by mouth. Super c, zinc OTC     • cholecalciferol (VITAMIN D3) 25 MCG (1000 UT) tablet Take 1,000 Units by mouth 2 (Two) Times a Day. Decreased from 10,000 - 15,000 units - Vit D 133 1/10/22     • cloNIDine (CATAPRES) 0.1 MG tablet Take 1 tablet by mouth 2 (Two) Times a Day As Needed for High Blood Pressure (SBP greater than 180 or DBP greater than 100). 60 tablet 11   • esomeprazole (nexIUM) 40 MG capsule Take 40 mg by mouth Daily As Needed.     • furosemide (LASIX) 20 MG tablet Take 1 tablet by mouth Daily As Needed (swelling). 30 tablet 11   •  "GRALISE 600 MG tablet tablet Take 1,200 mg by mouth Daily Before Supper.     • ibuprofen (ADVIL,MOTRIN) 200 MG tablet Take 400 mg by mouth Every 6 (Six) Hours As Needed for Mild Pain .     • levothyroxine (SYNTHROID, LEVOTHROID) 75 MCG tablet Take 37.5-75 mcg by mouth Daily. Takes 1/2 tab M, W, &F and 1 tab on S, Tu, Th, & Sa     • lisinopril (PRINIVIL,ZESTRIL) 20 MG tablet Take 20 mg by mouth 2 (Two) Times a Day.     • Magnesium Oxide (MAGNESIUM EXTRA STRENGTH PO) Take 1 tablet by mouth Daily.     • metFORMIN (GLUCOPHAGE) 1000 MG tablet Take 500 mg by mouth 2 (Two) Times a Day With Meals.     • montelukast (SINGULAIR) 10 MG tablet Take 10 mg by mouth Daily.     • spironolactone (ALDACTONE) 25 MG tablet Take 1 tablet by mouth Daily. 90 tablet 3   • therapeutic multivitamin-minerals (THERAGRAN-M) tablet Take 1 tablet by mouth.     • TRUE METRIX BLOOD GLUCOSE TEST test strip   5   • [DISCONTINUED] MAGNESIUM GLYCINATE PO Take 1,200 mg by mouth 2 (two) times a day.          Objective     Vital Signs:   /72 (BP Location: Left arm, Patient Position: Sitting, Cuff Size: Adult)   Pulse 70   Ht 160 cm (62.99\")   Wt 71.7 kg (158 lb)   SpO2 99%   BMI 28.00 kg/m²   Wt Readings from Last 3 Encounters:   02/09/22 71.7 kg (158 lb)   08/06/21 69.7 kg (153 lb 9.6 oz)   04/07/21 68.3 kg (150 lb 9.6 oz)         Vitals reviewed.   Constitutional:       Appearance: Normal and healthy appearance. Well-developed, well-groomed and not in distress.   Eyes:      General: No scleral icterus.  HENT:      Head:      Comments: Wears glasses  Neck:      Vascular: No JVD.   Pulmonary:      Effort: Pulmonary effort is normal.      Breath sounds: Normal breath sounds.   Cardiovascular:      Normal rate. Regular rhythm.      No gallop.      Comments: BLE tender to palpation  Pulses:     Intact distal pulses.   Edema:     Peripheral edema absent.   Skin:     General: Skin is warm and dry.   Neurological:      Mental Status: Alert and " oriented to person, place, and time.   Psychiatric:         Mood and Affect: Mood and affect normal. Mood is not anxious.         Result Review  Data Reviewed:  The following data was reviewed by: SOFIYA Zimmerman on 02/09/2022  1/10/22 labs reviewed from [facility] via Care Everywhere           ECG 12 Lead    Date/Time: 2/9/2022 11:02 AM  Performed by: Meghan Freeman APRN  Authorized by: Meghan Freeman APRN   Comparison: compared with previous ECG from 10/2/2020  Similar to previous ECG  Rhythm: sinus rhythm  Rate: normal  BPM: 65  Other findings comments: nonspecific T wave abnormality    Clinical impression: non-specific ECG               Assessment and Plan   Problem List Items Addressed This Visit        Cardiac and Vasculature    CAD in native artery - Primary (Chronic)    Overview     [facility]  No [facility]2016 [facility] stress         Current Assessment & Plan     Remains stable. Encouraged gradual increase in aerobic activity as knee/back permit. Continue present therapy. Call if worsens.         Relevant Orders    ECG 12 Lead    Hyperlipidemia (Chronic)    Current Assessment & Plan     Very well controlled with atorvastatin without side effects. Continue present therapy.         Hypertension (Chronic)    Current Assessment & Plan     Well controlled per office and most home readings. Continue present therapy. Call if persistently greater than 130/80.         Relevant Orders    ECG 12 Lead    S/P CABG x 2 (Chronic)    Overview     2010            Genitourinary and Reproductive     Hypomagnesemia (Chronic)    Current Assessment & Plan     Improved with supplementation but having GI side effects. She requested refill on Nexium - I discussed impact on Mg absorption and recommended she try famotidine and discuss with PCP/GI. Recommended she recheck Mg after on new Mg formulation for a month or two as it may not be adequate since only taking once a day.             Patient  was given instructions and counseling regarding her condition or for health maintenance advice. Please see specific information pulled into the AVS if appropriate.    Advance Care Planning   ACP discussion was held with the patient during this visit. Patient has an advance directive (not in EMR), copy requested.    Follow Up :   Return in about 6 months (around 8/9/2022) for Recheck.             Meghan Freeman, APRN, ACNP-BC, CHFN-BC

## 2022-02-09 NOTE — ASSESSMENT & PLAN NOTE
Remains stable. Encouraged gradual increase in aerobic activity as knee/back permit. Continue present therapy. Call if worsens.

## 2022-02-09 NOTE — ASSESSMENT & PLAN NOTE
Well controlled per office and most home readings. Continue present therapy. Call if persistently greater than 130/80.

## 2022-08-25 ENCOUNTER — OFFICE VISIT (OUTPATIENT)
Dept: CARDIOLOGY | Facility: CLINIC | Age: 82
End: 2022-08-25

## 2022-08-25 VITALS
BODY MASS INDEX: 28.6 KG/M2 | OXYGEN SATURATION: 99 % | DIASTOLIC BLOOD PRESSURE: 88 MMHG | HEART RATE: 69 BPM | WEIGHT: 161.4 LBS | SYSTOLIC BLOOD PRESSURE: 130 MMHG | HEIGHT: 63 IN

## 2022-08-25 DIAGNOSIS — E55.9 VITAMIN D INSUFFICIENCY: ICD-10-CM

## 2022-08-25 DIAGNOSIS — Z95.1 S/P CABG X 2: Chronic | ICD-10-CM

## 2022-08-25 DIAGNOSIS — E78.2 MIXED HYPERLIPIDEMIA: ICD-10-CM

## 2022-08-25 DIAGNOSIS — I10 ESSENTIAL HYPERTENSION: Chronic | ICD-10-CM

## 2022-08-25 DIAGNOSIS — R20.2 NUMBNESS AND TINGLING OF BOTH FEET: ICD-10-CM

## 2022-08-25 DIAGNOSIS — E03.9 HYPOTHYROIDISM, UNSPECIFIED TYPE: Chronic | ICD-10-CM

## 2022-08-25 DIAGNOSIS — I25.10 CAD IN NATIVE ARTERY: ICD-10-CM

## 2022-08-25 DIAGNOSIS — I10 PRIMARY HYPERTENSION: ICD-10-CM

## 2022-08-25 DIAGNOSIS — E83.42 HYPOMAGNESEMIA: Primary | ICD-10-CM

## 2022-08-25 DIAGNOSIS — R73.03 PRE-DIABETES: ICD-10-CM

## 2022-08-25 DIAGNOSIS — R20.0 NUMBNESS AND TINGLING OF BOTH FEET: ICD-10-CM

## 2022-08-25 PROCEDURE — 99214 OFFICE O/P EST MOD 30 MIN: CPT | Performed by: NURSE PRACTITIONER

## 2022-08-25 PROCEDURE — 93000 ELECTROCARDIOGRAM COMPLETE: CPT | Performed by: NURSE PRACTITIONER

## 2022-08-25 RX ORDER — UBIDECARENONE 200 MG
200 TABLET ORAL DAILY
COMMUNITY
End: 2023-02-23

## 2022-08-25 RX ORDER — LANSOPRAZOLE 15 MG/1
15 CAPSULE, DELAYED RELEASE ORAL DAILY PRN
COMMUNITY
End: 2023-02-23 | Stop reason: ALTCHOICE

## 2022-08-25 RX ORDER — SPIRONOLACTONE 25 MG/1
25 TABLET ORAL DAILY
Qty: 90 TABLET | Refills: 3 | Status: SHIPPED | OUTPATIENT
Start: 2022-08-25 | End: 2023-08-25

## 2022-08-25 NOTE — ASSESSMENT & PLAN NOTE
Has been well controlled per office and most home readings - a little high today. Continue present therapy. Call if persistently greater than 130/80 - check BP at least weekly.

## 2022-08-25 NOTE — PROGRESS NOTES
Encounter Date:08/25/2022  Chief Complaint:   Subjective    Imani Chauhan is a 82 y.o. female who presents to [facility] [place] today for six month cardiac follow-up.      History of Present Illness   HPI     Coronary Artery Disease      Additional comments: No chest discomfort - has been able to be a little more active since getting injections by ortho at [facility]. Hx CABG 2010, no ischemia per 2016 nuclear              Hyperlipidemia      Additional comments: Dr. Archer (ortho) has her off atorvastatin for a month - held about 2 weeks ago. Hasn't noticed any difference so far in back/hip discomfort. Injections are helping.              Hypertension      Additional comments: Hasn't been checking very often - was ?/80s. No headaches or nosebleeds. Chronic dizziness especially with standing - she attributes to allergy/sinus issues. Worse at night.              Follow-up      Additional comments: 6 mo fu - had [employer] screening (normal) and insurance nurse (abnormal) get different readings on ABIs - complains of numbness and tingling in both legs especially at night. Denies claudication.          Last edited by Meghan Freeman APRN on 8/25/2022  1:25 PM. (History)        History: Past medical, surgical, family, and social history reviewed.    Outpatient Medications Marked as Taking for the 8/25/22 encounter (Office Visit) with Meghan Freeman APRN   Medication Sig Dispense Refill   • aspirin 81 MG EC tablet Take 81 mg by mouth Daily.     • atorvastatin (LIPITOR) 10 MG tablet Take 10 mg by mouth Every Night. Orthopaedic stopped due to bursitis in hips- stopping for 1 month     • Bioflavonoid Products (SUPER-C 1000 PO) Take 1 tablet by mouth Daily. Super c 900 mg, zinc 11 mg, vitamin D3 1,000 units, Vit A 450 mcg, Vitamin E 15 mg OTC     • Cholecalciferol (Vitamin D3) 50 MCG (2000 UT) capsule Take 2,000 Units by mouth 2 (Two) Times a Day. Decreased from 10,000 - 15,000 units - Vit D  "133 1/10/22     • cloNIDine (CATAPRES) 0.1 MG tablet Take 1 tablet by mouth 2 (Two) Times a Day As Needed for High Blood Pressure (SBP greater than 180 or DBP greater than 100). 60 tablet 11   • Coenzyme Q10 200 MG tablet Take 200 mg by mouth Daily.     • Cyanocobalamin (Vitamin B-12) 500 MCG lozenge Take 500 mcg by mouth Daily.     • furosemide (LASIX) 20 MG tablet Take 1 tablet by mouth Daily As Needed (swelling). (Patient taking differently: Take 20 mg by mouth Daily As Needed (swelling). prn) 30 tablet 11   • GRALISE 600 MG tablet tablet Take 1,800 mg by mouth Daily Before Supper.     • ibuprofen (ADVIL,MOTRIN) 200 MG tablet Take 400 mg by mouth Every 6 (Six) Hours As Needed for Mild Pain . Rotates with Aleve and Tylenol Arthritis     • lansoprazole (PREVACID) 15 MG capsule Take 15 mg by mouth Daily As Needed (indigestion/reflux).     • levothyroxine (SYNTHROID, LEVOTHROID) 75 MCG tablet Take 37.5-75 mcg by mouth Daily. Takes 1/2 tab M, W, &F and 1 tab on S, Tu, Th, & Sa     • lisinopril (PRINIVIL,ZESTRIL) 20 MG tablet Take 20 mg by mouth 2 (Two) Times a Day.     • Magnesium Oxide -Mg Supplement 400 MG capsule Take 1 tablet by mouth Daily.     • metFORMIN (GLUCOPHAGE) 1000 MG tablet Take 500 mg by mouth 2 (Two) Times a Day With Meals.     • spironolactone (ALDACTONE) 25 MG tablet Take 1 tablet by mouth Daily. 90 tablet 3   • therapeutic multivitamin-minerals (THERAGRAN-M) tablet Take 1 tablet by mouth.     • TRUE METRIX BLOOD GLUCOSE TEST test strip   5   • [DISCONTINUED] Ascorbic Acid (SUPER C COMPLEX PO) Take  by mouth Daily. With zinc     • [DISCONTINUED] spironolactone (ALDACTONE) 25 MG tablet Take 1 tablet by mouth Daily. 90 tablet 3        Objective     Vital Signs:   /88 (BP Location: Left arm, Patient Position: Sitting, Cuff Size: Adult)   Pulse 69   Ht 160 cm (62.99\")   Wt 73.2 kg (161 lb 6.4 oz)   SpO2 99%   BMI 28.60 kg/m²   Wt Readings from Last 3 Encounters:   08/25/22 73.2 kg (161 lb 6.4 " oz)   02/09/22 71.7 kg (158 lb)   08/06/21 69.7 kg (153 lb 9.6 oz)         Vitals reviewed.   Constitutional:       Appearance: Normal and healthy appearance. Well-developed, well-groomed and not in distress.   Eyes:      General: No scleral icterus.  HENT:      Head:      Comments: Wears glasses  Neck:      Vascular: No JVD.   Pulmonary:      Effort: Pulmonary effort is normal.      Breath sounds: Normal breath sounds.   Cardiovascular:      Normal rate. Regular rhythm.      No gallop.      Comments: BLE tender to palpation  Pulses:     Intact distal pulses.   Edema:     Peripheral edema absent.   Skin:     General: Skin is warm and dry.   Neurological:      Mental Status: Alert and oriented to person, place, and time.      Gait: Gait abnormal (walks with a cane).   Psychiatric:         Mood and Affect: Mood and affect normal. Mood is not anxious.         Result Review  Data Reviewed:  The following data was reviewed by: SOFIYA Zimmerman on 08/25/2022  EXTERNAL MEDICAL RECORDS - SCAN - [facility] PAPERWORK/[facility]/08- (08/25/2022)  EXTERNAL MEDICAL RECORDS - SCAN - [employer] SCREENING/[facility]/04- (04/26/2022)    Scan on 1/10/2022 by Meghan Freeman APRN: CBC/CMP/LIPID/MG/TSH/B12/VITD/ [facility]/ 1-   Scan on 8/31/2021 by Meghan Freeman APRN: HEMOGLOBIN A1C   Scan on 8/20/2021 by Meghan Freeman APRN: VITAMIN D 1,25 DIHYDROXY           ECG 12 Lead    Date/Time: 8/25/2022 4:40 PM  Performed by: Meghan Freeman APRN  Authorized by: Meghan Freeman APRN   Comparison: compared with previous ECG from 2/9/2022  Comparison to previous ECG: Sinus arrhythmia vs PAC is new  Rhythm: sinus rhythm  Ectopy: atrial premature contractions  Ectopy comments: vs sinus arrhythmia  Rate: normal  BPM: 69    Clinical impression: non-specific ECG               Assessment and Plan   Problem List Items Addressed This Visit        Cardiac and Vasculature    CAD in  native artery (Chronic)    Overview     CABG x 2 2010, [facility], Dr. Lora  No ischemia 11/2016 nuclear stress         Current Assessment & Plan     Remains stable. Encouraged gradual increase in aerobic activity as knee/back permit. Encouraged chair exercises. She declines water aerobics - hates water. Continue present therapy. Call if worsens.         Relevant Orders    CBC Auto Differential    Hyperlipidemia (Chronic)    Current Assessment & Plan     Had been very well controlled with atorvastatin without side effects but ortho advised her to hold for a month. If no improvement in joint pain, recommend restarting - recheck CMP & lipid panel 6-8 weeks after restarting.         Relevant Orders    Comprehensive Metabolic Panel    Lipid Panel    Hypertension (Chronic)    Current Assessment & Plan     Has been well controlled per office and most home readings - a little high today. Continue present therapy. Call if persistently greater than 130/80 - check BP at least weekly.         Relevant Medications    spironolactone (ALDACTONE) 25 MG tablet    S/P CABG x 2 (Chronic)    Overview     2010            Endocrine and Metabolic    Pre-diabetes (Chronic)    Current Assessment & Plan     She requests order for Hgb A1c. She won't see Dr. Gamez until 10/2022.         Relevant Orders    Hemoglobin A1c    Vitamin D insufficiency (Chronic)    Current Assessment & Plan     Recheck Vit D levels - was supra-therapeutic. She has been taking more than I recommended.         Relevant Orders    Vitamin D 25 Hydroxy    Hypothyroidism (Chronic)    Current Assessment & Plan     Reportedly controlled on levothyroxine. She requested a TSH. Won't see Dr. Gamez until October. Will send him a copy of labs.         Relevant Orders    TSH       Genitourinary and Reproductive     Hypomagnesemia - Primary (Chronic)    Current Assessment & Plan     Recheck Mg level.         Relevant Orders    Magnesium       Symptoms and Signs    Numbness and  tingling of both feet    Current Assessment & Plan     No clear symptoms of claudication. Normal NHAN per [employer] screening but abnormal per insurance nurse. Continue aspirin. Discussed checking resting and exercise ABIs especially if develops claudication.         Relevant Orders    Vitamin B12      Other Visit Diagnoses     Essential hypertension  (Chronic)       Relevant Medications    spironolactone (ALDACTONE) 25 MG tablet        Patient was given instructions and counseling regarding her condition or for health maintenance advice. Please see specific information pulled into the AVS if appropriate.    Follow Up :   Return in about 6 months (around 2/25/2023) for Recheck.             Meghan Freeman, APRN, ACNP-BC, CHFN-BC

## 2022-08-25 NOTE — ASSESSMENT & PLAN NOTE
Please let patient know vitamin D level is slightly low and I will send in vitamin D replacement to take weekly. Recheck in 3 months. Her LDL cholesterol is slightly elevated at 109. She can lower this by exercise and taking a diet and polysaturated fats. Recheck in 3 months. If still elevated should consider taking a cholesterol-lowering medication.   Aline Gordon MD Reportedly controlled on levothyroxine. She requested a TSH. Won't see Dr. Gamez until October. Will send him a copy of labs.

## 2022-08-25 NOTE — ASSESSMENT & PLAN NOTE
Had been very well controlled with atorvastatin without side effects but ortho advised her to hold for a month. If no improvement in joint pain, recommend restarting - recheck CMP & lipid panel 6-8 weeks after restarting.

## 2022-08-25 NOTE — ASSESSMENT & PLAN NOTE
No clear symptoms of claudication. Normal NHAN per [employer] screening but abnormal per insurance nurse. Continue aspirin. Discussed checking resting and exercise ABIs especially if develops claudication.

## 2022-08-25 NOTE — ASSESSMENT & PLAN NOTE
Remains stable. Encouraged gradual increase in aerobic activity as knee/back permit. Encouraged chair exercises. She declines water aerobics - hates water. Continue present therapy. Call if worsens.

## 2022-08-29 DIAGNOSIS — E78.2 MIXED HYPERLIPIDEMIA: ICD-10-CM

## 2022-08-29 DIAGNOSIS — I25.10 CAD IN NATIVE ARTERY: ICD-10-CM

## 2022-08-29 DIAGNOSIS — E83.42 HYPOMAGNESEMIA: Primary | ICD-10-CM

## 2022-10-11 ENCOUNTER — TELEPHONE (OUTPATIENT)
Dept: CARDIOLOGY | Facility: CLINIC | Age: 82
End: 2022-10-11

## 2022-10-11 NOTE — TELEPHONE ENCOUNTER
"Patient called stating her BP has been running a lot higher than usually the last 2 days. She had been keeping a log of BP readings. Readings last month ranged 121/70 to 134/77. The last 2 days its been 171/83  163/89  180/78  209/90    Today 168/82    Advised patient to take clonidine if sbp higher than 160/180 or dbp 100 or higher. She voiced understanding.  Stated she took lisinopril and spironolactone this am around 9am. Pcp started mobic/meloxicam daily. She states her bp has increased since starting that medication.    Stated she feels \"swimmy headed\" and has been resting all day. Did not have any other symptoms of chest pain    Please advise.  "

## 2022-10-11 NOTE — TELEPHONE ENCOUNTER
Please have her notify her PCP of elevation in BP since starting meloxicam. This is a known side effect as well as increasing cardiovascular and stomach bleeding risk. See if they can try something else and if not, we will need to increase her BP medications if she's going to stay on meloxicam long term. She probably feels swimmy headed due to elevated BP. Call back if doesn't improve with clonidine. TX!

## 2022-10-11 NOTE — TELEPHONE ENCOUNTER
Meloxicam was prescribed by ortho, no pcp, my error. Advised patient to reach out to them for alternative medications for hip/ joint pain. She stated shes not hurting that bad and will just stop taking to see if BP comes down over the next few days. I advised her to still let them know and to call if bp still elevated after clonidine. She verbalized understanding.

## 2022-10-12 NOTE — TELEPHONE ENCOUNTER
Have her take furosemide today and see if that helps her SOA and elevated BP - the meloxicam may have caused some fluid retention too. Call if no improvement or any worsening. TX!

## 2022-10-12 NOTE — TELEPHONE ENCOUNTER
This pt came by the office today to have her bp check. BP in the office at 11 am was 170/90. It was 150/87 at 9 am. She took clonidine last at noon yesterday. She has soa with exertion. She d/c mobic Sunday.

## 2022-10-13 NOTE — TELEPHONE ENCOUNTER
Patient called stating her bp was 184/85 today, she sat and rested a while and is now 174/90. Advised to take a clonidine. She stated she took furosemide yesterday as instructed.    Please provide recommendations.    Thank you

## 2022-10-13 NOTE — TELEPHONE ENCOUNTER
Agree. Have her continue to take furosemide daily along with usual BP medications and clonidine as needed for sustained SBP greater than 160 or DBP greater than 100. Is her SOA any better?

## 2022-10-25 DIAGNOSIS — E83.42 HYPOMAGNESEMIA: ICD-10-CM

## 2022-10-25 DIAGNOSIS — N18.31 STAGE 3A CHRONIC KIDNEY DISEASE: Primary | ICD-10-CM

## 2022-11-22 NOTE — ASSESSMENT & PLAN NOTE
Stable, continue aspirin and atorvastatin. Call if has decrease in exercise tolerance or dyspnea as similar to before CABG in 2010. Continue risk factor modification.   Cyclosporine Counseling:  I discussed with the patient the risks of cyclosporine including but not limited to hypertension, gingival hyperplasia,myelosuppression, immunosuppression, liver damage, kidney damage, neurotoxicity, lymphoma, and serious infections. The patient understands that monitoring is required including baseline blood pressure, CBC, CMP, lipid panel and uric acid, and then 1-2 times monthly CMP and blood pressure.

## 2022-12-02 DIAGNOSIS — E83.42 HYPOMAGNESEMIA: Primary | Chronic | ICD-10-CM

## 2022-12-02 DIAGNOSIS — E87.5 HYPERKALEMIA: ICD-10-CM

## 2022-12-09 ENCOUNTER — TELEPHONE (OUTPATIENT)
Dept: CARDIOLOGY | Facility: CLINIC | Age: 82
End: 2022-12-09

## 2022-12-09 NOTE — TELEPHONE ENCOUNTER
Have her check with nephrology and see what they recommend. I've tried her on amlodipine, and she can't take higher dose of BYstolic due to HR in the 60s. I wouldn't increase spironolactone due to borderline hyperkalemia on current dose. Let me know what they recommend. Hydralazine or minoxidil might be some options. TX!

## 2022-12-09 NOTE — TELEPHONE ENCOUNTER
"Patient called stating that she has been experiencing higher than normal BP's again the last few days. She is experiencing some dizziness and her head is \"swimmy\" this morning. She went to see pcp last week for swollen lymph nodes and he told her to take Zyrtec otc. I verified that it does not have a decongestant. She also stated she saw nephrology and he advised her to drink around 50 oz of fluids/ day.    Her BP readings:    12-7-22  162/87  169/80- took clonidine  127/70    12-8-22  141/84  122/73  134/76    12-9-22  159/81  171/74    "

## 2022-12-16 ENCOUNTER — TELEPHONE (OUTPATIENT)
Dept: CARDIOLOGY | Facility: CLINIC | Age: 82
End: 2022-12-16

## 2022-12-16 NOTE — TELEPHONE ENCOUNTER
Were you able to contact Dr. Marte's office before you were out sick or have you heard back from them? TX!

## 2022-12-16 NOTE — TELEPHONE ENCOUNTER
Repeat BP was 158/80. Advised patient to continue clonidine if  or higher, or  or higher. Patient stated sometimes when its high she will take 1/2 clonidine. She states sometimes at night taking a whole clonidine drops her BP to 90's SBP. Advised to keep log and call if anything worsens.

## 2022-12-16 NOTE — TELEPHONE ENCOUNTER
"Ok for her to take 1/2 clonidine as needed if SBP staying above 160 or DBP staying above 100. She needs to go to ER if the dizziness and dull ache in L chest doesn't improve especially if worsens. I didn't know she was having symptoms when you told me about her BP readings when she was here earlier. If her symptoms have improved with an improvement in her BP just have her continue to monitor and let us know if staying above 130/80 or if having worsening chest discomfort. Dull aching discomfort is more concerning for angina than \"pain\". TX!  "

## 2023-01-01 ENCOUNTER — TELEPHONE (OUTPATIENT)
Dept: ONCOLOGY | Facility: CLINIC | Age: 83
End: 2023-01-01

## 2023-01-11 ENCOUNTER — TELEPHONE (OUTPATIENT)
Dept: CARDIOLOGY | Facility: CLINIC | Age: 83
End: 2023-01-11
Payer: MEDICARE

## 2023-01-11 NOTE — TELEPHONE ENCOUNTER
Left pt voicemail advising to continue current dose of magnesium glycinate. Repeat mg level in 1 month. Does she want us to order or her pcp?    Awaiting a call back.  Thanks

## 2023-01-12 ENCOUNTER — TELEPHONE (OUTPATIENT)
Dept: CARDIOLOGY | Facility: CLINIC | Age: 83
End: 2023-01-12
Payer: MEDICARE

## 2023-01-12 NOTE — TELEPHONE ENCOUNTER
Advise patient to continue current dose of MG and repeat lab in 1 month. She would like for PCP to order. She is going to let PCP know at appt at the end of Jan.

## 2023-02-23 ENCOUNTER — OFFICE VISIT (OUTPATIENT)
Dept: CARDIOLOGY | Facility: CLINIC | Age: 83
End: 2023-02-23
Payer: MEDICARE

## 2023-02-23 VITALS
OXYGEN SATURATION: 100 % | SYSTOLIC BLOOD PRESSURE: 154 MMHG | BODY MASS INDEX: 26.65 KG/M2 | WEIGHT: 150.4 LBS | DIASTOLIC BLOOD PRESSURE: 80 MMHG | HEART RATE: 71 BPM | HEIGHT: 63 IN

## 2023-02-23 DIAGNOSIS — E55.9 VITAMIN D INSUFFICIENCY: Chronic | ICD-10-CM

## 2023-02-23 DIAGNOSIS — R73.03 PRE-DIABETES: Chronic | ICD-10-CM

## 2023-02-23 DIAGNOSIS — E78.2 MIXED HYPERLIPIDEMIA: Chronic | ICD-10-CM

## 2023-02-23 DIAGNOSIS — E83.42 HYPOMAGNESEMIA: Chronic | ICD-10-CM

## 2023-02-23 DIAGNOSIS — Z95.1 S/P CABG X 2: Chronic | ICD-10-CM

## 2023-02-23 DIAGNOSIS — I10 PRIMARY HYPERTENSION: ICD-10-CM

## 2023-02-23 DIAGNOSIS — I25.10 CAD IN NATIVE ARTERY: Primary | Chronic | ICD-10-CM

## 2023-02-23 DIAGNOSIS — I10 ESSENTIAL HYPERTENSION: Chronic | ICD-10-CM

## 2023-02-23 PROCEDURE — 99214 OFFICE O/P EST MOD 30 MIN: CPT | Performed by: NURSE PRACTITIONER

## 2023-02-23 RX ORDER — LANSOPRAZOLE 15 MG/1
15 CAPSULE, DELAYED RELEASE ORAL DAILY PRN
COMMUNITY

## 2023-02-23 RX ORDER — SPIRONOLACTONE 25 MG/1
25 TABLET ORAL DAILY
Qty: 90 TABLET | Refills: 3 | Status: CANCELLED | OUTPATIENT
Start: 2023-02-23 | End: 2024-02-23

## 2023-02-23 RX ORDER — METFORMIN HYDROCHLORIDE 500 MG/1
500 TABLET, EXTENDED RELEASE ORAL 2 TIMES DAILY
COMMUNITY

## 2023-02-23 RX ORDER — ACETAMINOPHEN 500 MG
500 TABLET ORAL EVERY 6 HOURS PRN
COMMUNITY

## 2023-02-23 NOTE — ASSESSMENT & PLAN NOTE
Well controlled per 8/22 labs (84). Due for recheck next month. Dose decreased due to high levels on high dose supplement.

## 2023-02-23 NOTE — ASSESSMENT & PLAN NOTE
Remains stable. Encouraged gradual increase in aerobic activity as knee/back permit. Again encouraged chair exercises. She declines water aerobics - hates water. Continue present therapy. Call if worsens.

## 2023-02-23 NOTE — ASSESSMENT & PLAN NOTE
Remains very well controlled with atorvastatin without side effects per 10/222 labs. No improvement in joint pain when held for a month.

## 2023-02-23 NOTE — PROGRESS NOTES
Encounter Date:02/23/2023  Chief Complaint:   Subjective    Imani Chauhan is a 82 y.o. female who presents to [facility] today for six month cardiac follow-up.       History of Present Illness   HPI     Coronary Artery Disease     Additional comments: No chest discomfort. Not exercising. Does all her housework. Hx CABG 2010, no ischemia per 2016 nuclear           Hypertension     Additional comments: Brought bp log. PCP cut spironolactone in 1/2 due to low mg. BP up and down since then. 100-160s/60-80s. Occasional sharp shooting pain in temples. Chronic dizziness with standing unchanged - no near syncope or syncope. No nosebleeds.           Hyperlipidemia     Additional comments: Restarted atorvastatin. No myalgias.           Follow-up     Additional comments: 6 MO FU           Low Magnesium     Additional comments: Tolerating Mg glycinate since early January. Mg only up to 1.6 2/6/23 from 1.5. Only takes PPI PRN.           Last edited by Meghan Freeman APRN on 2/23/2023  3:31 PM.        History: Past medical, surgical, family, and social history reviewed.    Outpatient Medications Marked as Taking for the 2/23/23 encounter (Office Visit) with Meghan Freeman APRN   Medication Sig Dispense Refill   • acetaminophen (TYLENOL) 500 MG tablet Take 500 mg by mouth Every 6 (Six) Hours As Needed for Mild Pain. prn     • aspirin 81 MG EC tablet Take 81 mg by mouth Daily.     • atorvastatin (LIPITOR) 10 MG tablet Take 10 mg by mouth Every Night. Orthopaedic stopped due to bursitis in hips- stopping for 1 month     • Bioflavonoid Products (SUPER-C 1000 PO) Take 1 tablet by mouth Daily. Super c 900 mg, zinc 11 mg, vitamin D3 1,000 units, Vit A 450 mcg, Vitamin E 15 mg OTC     • Ca Carbonate-Mag Hydroxide (ROLAIDS PO) Take  by mouth Daily As Needed.     • cloNIDine (CATAPRES) 0.1 MG tablet Take 1 tablet by mouth 2 (Two) Times a Day As Needed for High Blood Pressure (SBP greater  "than 180 or DBP greater than 100). 60 tablet 11   • furosemide (LASIX) 20 MG tablet Take 1 tablet by mouth Daily As Needed (swelling). (Patient taking differently: Take 20 mg by mouth Daily As Needed (swelling). prn) 30 tablet 11   • Gabapentin, Once-Daily, 300 MG tablet Take 300 mg by mouth Daily Before Supper. 300 mg once at night     • lansoprazole (PREVACID) 15 MG capsule Take 15 mg by mouth Daily As Needed.     • levothyroxine (SYNTHROID, LEVOTHROID) 75 MCG tablet Take 37.5-75 mcg by mouth Daily. Takes 1/2 tab M, W, &F and 1 tab on S, Tu, Th, & Sa     • lisinopril (PRINIVIL,ZESTRIL) 20 MG tablet Take 20 mg by mouth 2 (Two) Times a Day.     • MAGNESIUM GLYCINATE PO Take 700 mg by mouth 2 (Two) Times a Day. Takes 2- 350 mg tablets 2 times a day     • metFORMIN ER (GLUCOPHAGE-XR) 500 MG 24 hr tablet Take 500 mg by mouth 2 (Two) Times a Day.     • spironolactone (ALDACTONE) 25 MG tablet Take 1 tablet by mouth Daily. (Patient taking differently: Take 12.5 mg by mouth Daily. Pcp cut in 1/2 due to low mg) 90 tablet 3   • therapeutic multivitamin-minerals (THERAGRAN-M) tablet Take 1 tablet by mouth.     • TRUE METRIX BLOOD GLUCOSE TEST test strip   5   • [DISCONTINUED] ibuprofen (ADVIL,MOTRIN) 200 MG tablet Take 400 mg by mouth Every 6 (Six) Hours As Needed for Mild Pain . Rotates with Aleve and Tylenol Arthritis     • [DISCONTINUED] lansoprazole (PREVACID) 15 MG capsule Take 15 mg by mouth Daily As Needed (indigestion/reflux).     • [DISCONTINUED] metFORMIN (GLUCOPHAGE) 500 MG tablet Take 500 mg by mouth 2 (Two) Times a Day With Meals.          Objective     Vital Signs:   /80 (BP Location: Left arm, Patient Position: Sitting, Cuff Size: Adult)   Pulse 71   Ht 160 cm (62.99\")   Wt 68.2 kg (150 lb 6.4 oz)   SpO2 100%   BMI 26.65 kg/m²   Wt Readings from Last 3 Encounters:   02/23/23 68.2 kg (150 lb 6.4 oz)   08/25/22 73.2 kg (161 lb 6.4 oz)   02/09/22 71.7 kg (158 lb)         Vitals reviewed. "   Constitutional:       Appearance: Normal and healthy appearance. Well-developed, well-groomed and not in distress.   Eyes:      General: No scleral icterus.  HENT:      Head:      Comments: Wears glasses  Neck:      Vascular: No JVD.   Pulmonary:      Effort: Pulmonary effort is normal.      Breath sounds: Normal breath sounds.   Cardiovascular:      Normal rate. Regular rhythm.      No gallop.      Comments: BLE tender to palpation  Pulses:     Intact distal pulses.   Edema:     Peripheral edema absent.   Skin:     General: Skin is warm and dry.   Neurological:      Mental Status: Alert and oriented to person, place, and time.      Gait: Gait abnormal (walks with a cane).   Psychiatric:         Mood and Affect: Mood and affect normal. Mood is not anxious.         Result Review  Data Reviewed:  The following data was reviewed by: SOFIYA Zimmerman on 02/23/2023  Scan on 2/6/2023 by Meghan Freeman APRN: MG/TSH/A1C/ [facility]/ 2-6-2023  Scan on 1/10/2023 1245 by Nilda Chu RegSched Rep: CMP/MG/JANEE SERUM/PTH/ [facility]/ 1-4-2023     Scan on 1/4/2023 by Meghan Freeman APRN: US RENAL/ [facility]/ 1-4-2023     Scan on 12/1/2022 0918 by Nilda Chu RegSched Rep: BMP/ MG/ [facility]/   11-     Scan on 10/25/2022 by Meghan Freeman APRN: CMP/ LIPID/ MG/ [facility]/ 10-                    Assessment and Plan   Problem List Items Addressed This Visit        Cardiac and Vasculature    CAD in native artery - Primary (Chronic)    Overview     CABG x 2 2010, NITADr. Lora  No ischemia 11/2016 nuclear stress         Current Assessment & Plan     Remains stable. Encouraged gradual increase in aerobic activity as knee/back permit. Again encouraged chair exercises. She declines water aerobics - hates water. Continue present therapy. Call if worsens.         Hyperlipidemia (Chronic)    Current Assessment & Plan     Remains very well controlled with atorvastatin without side effects per 10/222 labs. No improvement in  joint pain when held for a month.         Hypertension (Chronic)    Overview     154/80 2/23/23 office cuff  158/98 2/23/23 patient's monitor         Current Assessment & Plan     Not as well controlled since decrease in spironolactone by PCP. Consider increasing spironolactone or addition of amlodipine or other non-HR lowering med - defer to PCP/nephrology. Discussed when to take PRN clonidine.         S/P CABG x 2 (Chronic)    Overview     2010            Endocrine and Metabolic    Pre-diabetes (Chronic)    Current Assessment & Plan     Remains stable on metformin. Continue present therapy. PCP following.         Vitamin D insufficiency (Chronic)    Current Assessment & Plan     Well controlled per 8/22 labs (84). Due for recheck next month. Dose decreased due to high levels on high dose supplement.            Genitourinary and Reproductive     Hypomagnesemia (Chronic)    Current Assessment & Plan     Try increasing Mg glycinate to 3 tabs BID - she doesn't think she can remember to take TID. Go back to 2 tabs BID if unable to tolerate higher dose. Recheck Mg level ordered to be done next month by nephrology.        Other Visit Diagnoses     Essential hypertension  (Chronic)           Patient was given instructions and counseling regarding her condition or for health maintenance advice. Please see specific information pulled into the AVS if appropriate.    Follow Up :   Return in about 6 months (around 8/23/2023) for Recheck.             SOFIYA Blanco, ACNP-BC, CHFN-BC

## 2023-02-23 NOTE — ASSESSMENT & PLAN NOTE
Try increasing Mg glycinate to 3 tabs BID - she doesn't think she can remember to take TID. Go back to 2 tabs BID if unable to tolerate higher dose. Recheck Mg level ordered to be done next month by nephrology.

## 2023-02-23 NOTE — ASSESSMENT & PLAN NOTE
Not as well controlled since decrease in spironolactone by PCP. Consider increasing spironolactone or addition of amlodipine or other non-HR lowering med - defer to PCP/nephrology. Discussed when to take PRN clonidine.

## 2023-08-13 NOTE — TELEPHONE ENCOUNTER
"Patient came to office stating her BP is still running very high. I checked it in office with manual reading of 192/80 HR 84.  Patient is experiencing dizziness and dull ache on left side of chest- not \"pain\" necessarily she said.    Patient has not been taking clonidine tid, just using PRN.    Please advise    Thank you  " Kidney stone

## 2023-08-24 ENCOUNTER — OFFICE VISIT (OUTPATIENT)
Dept: CARDIOLOGY | Facility: CLINIC | Age: 83
End: 2023-08-24
Payer: MEDICARE

## 2023-08-24 VITALS
SYSTOLIC BLOOD PRESSURE: 142 MMHG | WEIGHT: 153.2 LBS | OXYGEN SATURATION: 98 % | HEART RATE: 81 BPM | DIASTOLIC BLOOD PRESSURE: 78 MMHG | HEIGHT: 63 IN | BODY MASS INDEX: 27.14 KG/M2

## 2023-08-24 DIAGNOSIS — E78.2 MIXED HYPERLIPIDEMIA: Chronic | ICD-10-CM

## 2023-08-24 DIAGNOSIS — I10 ESSENTIAL HYPERTENSION: Chronic | ICD-10-CM

## 2023-08-24 DIAGNOSIS — E83.42 HYPOMAGNESEMIA: Chronic | ICD-10-CM

## 2023-08-24 DIAGNOSIS — E55.9 VITAMIN D INSUFFICIENCY: Chronic | ICD-10-CM

## 2023-08-24 DIAGNOSIS — R60.0 LOCALIZED EDEMA: ICD-10-CM

## 2023-08-24 DIAGNOSIS — I10 PRIMARY HYPERTENSION: Chronic | ICD-10-CM

## 2023-08-24 DIAGNOSIS — Z95.1 S/P CABG X 2: Chronic | ICD-10-CM

## 2023-08-24 DIAGNOSIS — I25.10 CAD IN NATIVE ARTERY: Primary | Chronic | ICD-10-CM

## 2023-08-24 PROCEDURE — 1160F RVW MEDS BY RX/DR IN RCRD: CPT | Performed by: NURSE PRACTITIONER

## 2023-08-24 PROCEDURE — 93000 ELECTROCARDIOGRAM COMPLETE: CPT | Performed by: NURSE PRACTITIONER

## 2023-08-24 PROCEDURE — 1159F MED LIST DOCD IN RCRD: CPT | Performed by: NURSE PRACTITIONER

## 2023-08-24 PROCEDURE — 3077F SYST BP >= 140 MM HG: CPT | Performed by: NURSE PRACTITIONER

## 2023-08-24 PROCEDURE — 99214 OFFICE O/P EST MOD 30 MIN: CPT | Performed by: NURSE PRACTITIONER

## 2023-08-24 PROCEDURE — 3078F DIAST BP <80 MM HG: CPT | Performed by: NURSE PRACTITIONER

## 2023-08-24 RX ORDER — FUROSEMIDE 20 MG/1
20 TABLET ORAL DAILY PRN
Qty: 30 TABLET | Refills: 11 | Status: SHIPPED | OUTPATIENT
Start: 2023-08-24 | End: 2024-08-23

## 2023-08-24 RX ORDER — CLONIDINE HYDROCHLORIDE 0.1 MG/1
0.1 TABLET ORAL 2 TIMES DAILY PRN
Qty: 60 TABLET | Refills: 11
Start: 2023-08-24 | End: 2024-08-23

## 2023-08-24 NOTE — ASSESSMENT & PLAN NOTE
Remains stable. Encouraged gradual increase in aerobic activity as knee/back permit. Again encouraged chair exercises. She declines water aerobics - hates water. Continue present therapy. Call if worsens. Stopped taking Jardiance - she felt like it increased her BP and glucose. Willing to try Farxiga for CV and CKD benefits - will check to see if with Dr. Loyd - may need to stay on metformin too. Will try samples and have her check BP and post-prandial glucose for 2 weeks (if ok with Dr. Loyd - patient will call when/if needs samples/RX). Reviewed side effects. Considered nuclear stress test since had silent ischemia in the past. Will wait until next year due to CT/PET scans/radiation exposure. Advised her to call if has any anginal or equivalent symptoms.

## 2023-08-24 NOTE — ASSESSMENT & PLAN NOTE
Remains very well controlled with atorvastatin without side effects per 10/222 labs. No improvement in joint pain when held for a month. Continue present therapy. Recheck yearly.

## 2023-08-24 NOTE — PROGRESS NOTES
"Encounter Date:08/24/2023  Chief Complaint:   Subjective    Imani Chauhan is a 83 y.o. female who presents to [facility] CARDIOLOG[facility] today for six month cardiac follow-up. Has undergone workup for possible lymphoma but biopsy was not abnormal. Scheduled for repeat PET scan in October.      Coronary Artery Disease  Risk factors include hyperlipidemia.   Hyperlipidemia    Hypertension     HPI       Coronary Artery Disease     Additional comments: No chest discomfort - was asymptomatic, found during evaluation of thymus gland. Low risk nuclear stress 2016. Hx CABG 2010. Has some indigestion after eating about 3 times per week - better with Prevacid as needed. Tried Jardiance but felt like her glucose and BP were more elevated.             Hyperlipidemia     Additional comments: Doesn't think it's been checked since 10/2022. Hasn't been eating as well as she could. Loves fish but eats it fried. Eats fried chicken, okra, and squash. Eats some vegetables but not much fruit.              Hypertension     Additional comments: Head felt woozy yesterday. /80s. Glucose 108. Got too hot Tuesday mowing. Feels better today. Usually runs less than 140s. Doesn't check very often. Chronic dizziness with standing unchanged - not every time, goes away quickly. Has to hold on to a buggy when shopping or with a \"stick\" or walks off balance (neuropathy). Hasn't needed clonidine in months. Takes furosemide about once a week for feet/leg swelling.             Follow-up     Additional comments: 6 MO FU/ LABS, suspected lymphoma per PET/CT, CT REPORTS IN CHART. HAD BX W/ DR RESTREPO AT [facility]. Repeat PET/ CT w/ Dr Restrepo 10/23             Cardiac Valve Problem     Additional comments: Mild aortic regurgitation, trivial tricuspid regurgitation per 2018 echo. Occasional, chronic LE swelling unchanged.           Last edited by Meghan Freeman APRN on 8/24/2023  2:00 PM.        History: Past medical, surgical, " family, and social history reviewed.    Outpatient Medications Marked as Taking for the 8/24/23 encounter (Office Visit) with Meghan Freeman APRN   Medication Sig Dispense Refill    acetaminophen (TYLENOL) 500 MG tablet Take 1 tablet by mouth Every 6 (Six) Hours As Needed for Mild Pain. prn      aspirin 81 MG EC tablet Take 1 tablet by mouth Daily.      atorvastatin (LIPITOR) 10 MG tablet Take 1 tablet by mouth Every Night. Orthopaedic stopped due to bursitis in hips- stopping for 1 month      Bioflavonoid Products (SUPER-C 1000 PO) Take 1 tablet by mouth Daily. Super c 900 mg, zinc 11 mg, vitamin D3 1,000 units, Vit A 450 mcg, Vitamin E 15 mg OTC      Ca Carbonate-Mag Hydroxide (ROLAIDS PO) Take  by mouth Daily As Needed.      cloNIDine (CATAPRES) 0.1 MG tablet Take 1 tablet by mouth 2 (Two) Times a Day As Needed for High Blood Pressure (SBP greater than 180 or DBP greater than 100). 60 tablet 11    furosemide (LASIX) 20 MG tablet Take 1 tablet by mouth Daily As Needed (swelling). 30 tablet 11    Gabapentin, Once-Daily, 300 MG tablet Take 300-600 mg by mouth Daily Before Supper. Sometimes takes more than one but mostly takes one      lansoprazole (PREVACID) 15 MG capsule Take 1 capsule by mouth Daily As Needed.      levothyroxine (SYNTHROID, LEVOTHROID) 75 MCG tablet Take 0.5-1 tablets by mouth Daily. Takes 1/2 tab M, W, &F and 1 tab on S, Tu, Th, & Sa      lisinopril (PRINIVIL,ZESTRIL) 20 MG tablet Take 1 tablet by mouth 2 (Two) Times a Day.      MAGNESIUM GLYCINATE PO Take 650 mg by mouth 2 (Two) Times a Day. Takes 2- 650 mg in am and 1 650 mg at pm      metFORMIN ER (GLUCOPHAGE-XR) 500 MG 24 hr tablet Take 1 tablet by mouth 2 (Two) Times a Day.      spironolactone (ALDACTONE) 25 MG tablet Take 1 tablet by mouth Daily. (Patient taking differently: Take 1 tablet by mouth Daily. Pcp cut to half a tablet, but she increased back to 25 mg daily due to her HTN. Couldn't tolerate jardiance) 90 tablet 3     "therapeutic multivitamin-minerals (THERAGRAN-M) tablet Take 1 tablet by mouth.      TRUE METRIX BLOOD GLUCOSE TEST test strip   5    [DISCONTINUED] cloNIDine (CATAPRES) 0.1 MG tablet Take 1 tablet by mouth 2 (Two) Times a Day As Needed for High Blood Pressure (SBP greater than 180 or DBP greater than 100). 60 tablet 11    [DISCONTINUED] furosemide (LASIX) 20 MG tablet Take 1 tablet by mouth Daily As Needed (swelling). (Patient taking differently: Take 1 tablet by mouth Daily As Needed (swelling). prn) 30 tablet 11        Objective     Vital Signs:   /78 (BP Location: Left arm, Patient Position: Sitting, Cuff Size: Adult)   Pulse 81   Ht 160 cm (62.99\")   Wt 69.5 kg (153 lb 3.2 oz)   SpO2 98%   BMI 27.15 kg/mý   Wt Readings from Last 3 Encounters:   08/24/23 69.5 kg (153 lb 3.2 oz)   07/12/23 68 kg (150 lb)   07/05/23 68.4 kg (150 lb 12.8 oz)         Vitals reviewed.   Constitutional:       Appearance: Normal and healthy appearance. Well-developed, well-groomed and not in distress.   Eyes:      General: No scleral icterus.  HENT:      Head:      Comments: Wears glasses  Neck:      Vascular: No JVD.   Pulmonary:      Effort: Pulmonary effort is normal.      Breath sounds: Normal breath sounds.   Cardiovascular:      Normal rate. Regular rhythm.      No gallop.       Comments: BLE tender to palpation  Pulses:     Intact distal pulses.   Edema:     Peripheral edema absent.   Skin:     General: Skin is warm and dry.   Neurological:      Mental Status: Alert and oriented to person, place, and time.      Gait: Gait abnormal.   Psychiatric:         Mood and Affect: Mood and affect normal. Mood is not anxious.       Result Review  Data Reviewed:  The following data was reviewed by: SOFIYA Zimmerman on 08/24/2023  Fine Needle Aspiration (07/05/2023 09:00)   Scan on 6/8/2023 by Meghan Freeman APRN: CMP/LDH/URIC/ [facility]/ 6-8-2023    Scan on 4/18/2023 by Meghan Freeman APRN: " CBC/CMP/PHOS/URIC/MG/PTH/VIT D/ [facility]/ 4-    Scan on 2/6/2023 by Meghan Freeman APRN: MG/TSH/A1C/ [facility]/ 2-6-2023  Scan on 4/13/2023 by Meghan Freeman APRN: PET/CT SKULL-THIGH/ [facility]/ 4-      Scan on 3/15/2023 by Meghan Freeman APRN: CT AB PEL WO/ [facility]/ 3-    Scan on 1/4/2023 by Meghan Freeman APRN: US RENAL/ [facility]/ 1-4-2023      Lab Results - Last 18 Months   Lab Units 07/05/23  0744   INR  0.88*            ECG 12 Lead    Date/Time: 8/24/2023 3:15 PM  Performed by: Meghan Freeman APRN  Authorized by: Meghan Freeman APRN   Comparison: compared with previous ECG from 8/25/2022  Comparison to previous ECG: T wave abnormality in lead III is new  Rhythm: sinus rhythm and sinus arrhythmia  Rate: normal  BPM: 77  T inversion: III    Clinical impression: abnormal EKG           Assessment and Plan   Problem List Items Addressed This Visit          Cardiac and Vasculature    CAD in native artery - Primary (Chronic)    Overview     CABG x 2 2010, Dr. Guido COMBS  No ischemia 11/2016 nuclear stress         Current Assessment & Plan     Remains stable. Encouraged gradual increase in aerobic activity as knee/back permit. Again encouraged chair exercises. She declines water aerobics - hates water. Continue present therapy. Call if worsens. Stopped taking Jardiance - she felt like it increased her BP and glucose. Willing to try Farxiga for CV and CKD benefits - will check to see if with Dr. Loyd - may need to stay on metformin too. Will try samples and have her check BP and post-prandial glucose for 2 weeks (if ok with Dr. Loyd - patient will call when/if needs samples/RX). Reviewed side effects. Considered nuclear stress test since had silent ischemia in the past. Will wait until next year due to CT/PET scans/radiation exposure. Advised her to call if has any anginal or equivalent symptoms.         Hyperlipidemia (Chronic)    Current Assessment & Plan     Remains very  well controlled with atorvastatin without side effects per 10/222 labs. No improvement in joint pain when held for a month. Continue present therapy. Recheck yearly.         Hypertension (Chronic)    Overview     154/80 2/23/23 office cuff  158/98 2/23/23 patient's monitor         Current Assessment & Plan     Better controlled today but still not to goal. May need to consider increasing spironolactone or addition of amlodipine or other non-HR lowering med. Encouraged her to check more often at home to see what it is running when she feels ok. Goal BP 120s/70s.         Relevant Medications    cloNIDine (CATAPRES) 0.1 MG tablet    furosemide (LASIX) 20 MG tablet    S/P CABG x 2 (Chronic)    Overview     2010            Endocrine and Metabolic    Vitamin D insufficiency (Chronic)    Current Assessment & Plan     Remains well controlled per 4/2023 (73.3) and 8/22 labs (84). Had high levels on high dose supplement. Recheck yearly.            Genitourinary and Reproductive     Hypomagnesemia (Chronic)    Current Assessment & Plan     Recheck Mg level with next labs. Continue current Mg glycinate.         Relevant Orders    Magnesium       Symptoms and Signs    Localized edema    Relevant Medications    furosemide (LASIX) 20 MG tablet     Other Visit Diagnoses       Essential hypertension  (Chronic)       Relevant Medications    cloNIDine (CATAPRES) 0.1 MG tablet    furosemide (LASIX) 20 MG tablet          Patient was given instructions and counseling regarding her condition or for health maintenance advice. Please see specific information pulled into the AVS if appropriate.    Follow Up :   Return in about 6 months (around 2/24/2024) for Recheck.             Meghan Freeman, APRN, ACNP-BC, CHFN-BC

## 2023-08-24 NOTE — ASSESSMENT & PLAN NOTE
Remains well controlled per 4/2023 (73.3) and 8/22 labs (84). Had high levels on high dose supplement. Recheck yearly.

## 2023-08-24 NOTE — ASSESSMENT & PLAN NOTE
Better controlled today but still not to goal. May need to consider increasing spironolactone or addition of amlodipine or other non-HR lowering med. Encouraged her to check more often at home to see what it is running when she feels ok. Goal BP 120s/70s.

## 2023-08-30 DIAGNOSIS — E83.42 HYPOMAGNESEMIA: Chronic | ICD-10-CM

## 2023-08-30 DIAGNOSIS — E83.42 HYPOMAGNESEMIA: Primary | Chronic | ICD-10-CM

## 2023-09-14 ENCOUNTER — TELEPHONE (OUTPATIENT)
Dept: CARDIOLOGY | Facility: CLINIC | Age: 83
End: 2023-09-14
Payer: MEDICARE

## 2023-09-14 NOTE — TELEPHONE ENCOUNTER
Spoke to patient this morning regarding mg. While speaking she said after she left our office 8-24-23 she went to [facility] and fell. She said she noticed some bruising on her right side of her groin, the bruise is fading and now she call feel a hard knot and this morning it was red around it.  I told her I would let you know about that, most likely just needs to see her pcp. Her family thinks she needs to get it seen about.    She also stated she stopped her spironolactone after starting jardiance because her BP was running kind of low. She felt after she stopped the radha that her BP's have improved. Vickie routed her log to you yesterday 9-13-23.    Please advise

## 2023-09-14 NOTE — TELEPHONE ENCOUNTER
Agree - have her see PCP - may have a hematoma. Did she say how she fell? BP was better controlled since she stopped spironolactone. I'd like to keep her closer to 110-120s/60-70s and she's running higher in the 130s-140s. See if she could try taking 1/2 spironolactone and continue to monitor BP and HR. I though she told us at last visit she couldn't tolerate Jardiance.

## 2023-09-14 NOTE — TELEPHONE ENCOUNTER
Patient notified. She fell from catching her toe on a fruit pallet. Wasn't dizzy or anything she said. Agreed to 1/2 tab spironolactone daily and yes, she is tolerating Jardiance. Thx

## 2023-09-22 NOTE — PROGRESS NOTES
Please notify patient her Mg has improved to 1.8 which is in normal range. Continue present therapy. TX!

## 2023-11-11 ENCOUNTER — HOSPITAL ENCOUNTER (INPATIENT)
Facility: HOSPITAL | Age: 83
LOS: 9 days | Discharge: HOME HEALTH | DRG: 821 | End: 2023-11-20
Attending: EMERGENCY MEDICINE | Admitting: SURGERY
Payer: MEDICARE

## 2023-11-11 DIAGNOSIS — Z95.1 S/P CABG X 2: Chronic | ICD-10-CM

## 2023-11-11 DIAGNOSIS — I10 PRIMARY HYPERTENSION: Chronic | ICD-10-CM

## 2023-11-11 DIAGNOSIS — E55.9 VITAMIN D INSUFFICIENCY: Chronic | ICD-10-CM

## 2023-11-11 DIAGNOSIS — R20.0 NUMBNESS AND TINGLING OF BOTH FEET: ICD-10-CM

## 2023-11-11 DIAGNOSIS — E78.2 MIXED HYPERLIPIDEMIA: Chronic | ICD-10-CM

## 2023-11-11 DIAGNOSIS — E83.52 HYPERCALCEMIA: Primary | ICD-10-CM

## 2023-11-11 DIAGNOSIS — R60.0 LOCALIZED EDEMA: ICD-10-CM

## 2023-11-11 DIAGNOSIS — R42 DIZZINESS: ICD-10-CM

## 2023-11-11 DIAGNOSIS — R20.2 NUMBNESS AND TINGLING OF BOTH FEET: ICD-10-CM

## 2023-11-11 DIAGNOSIS — K21.9 GASTROESOPHAGEAL REFLUX DISEASE, UNSPECIFIED WHETHER ESOPHAGITIS PRESENT: Chronic | ICD-10-CM

## 2023-11-11 DIAGNOSIS — R06.02 SOB (SHORTNESS OF BREATH): ICD-10-CM

## 2023-11-11 DIAGNOSIS — R73.03 PRE-DIABETES: Chronic | ICD-10-CM

## 2023-11-11 DIAGNOSIS — R59.0 ABDOMINAL LYMPHADENOPATHY: ICD-10-CM

## 2023-11-11 DIAGNOSIS — I25.10 CAD IN NATIVE ARTERY: Chronic | ICD-10-CM

## 2023-11-11 DIAGNOSIS — E03.9 HYPOTHYROIDISM, UNSPECIFIED TYPE: Chronic | ICD-10-CM

## 2023-11-11 DIAGNOSIS — R59.1 LYMPHADENOPATHY: ICD-10-CM

## 2023-11-11 DIAGNOSIS — Z74.09 IMPAIRED FUNCTIONAL MOBILITY AND ACTIVITY TOLERANCE: ICD-10-CM

## 2023-11-11 DIAGNOSIS — N17.9 ACUTE KIDNEY INJURY: ICD-10-CM

## 2023-11-11 DIAGNOSIS — E83.42 HYPOMAGNESEMIA: Chronic | ICD-10-CM

## 2023-11-11 DIAGNOSIS — E87.1 HYPONATREMIA: ICD-10-CM

## 2023-11-11 LAB
ALBUMIN SERPL-MCNC: 3.9 G/DL (ref 3.5–5.2)
ALBUMIN/GLOB SERPL: 1.3 G/DL
ALP SERPL-CCNC: 185 U/L (ref 39–117)
ALT SERPL W P-5'-P-CCNC: 30 U/L (ref 1–33)
ANION GAP SERPL CALCULATED.3IONS-SCNC: 14 MMOL/L (ref 5–15)
AST SERPL-CCNC: 47 U/L (ref 1–32)
BACTERIA UR QL AUTO: ABNORMAL /HPF
BASOPHILS # BLD AUTO: 0.06 10*3/MM3 (ref 0–0.2)
BASOPHILS NFR BLD AUTO: 0.7 % (ref 0–1.5)
BILIRUB SERPL-MCNC: 0.5 MG/DL (ref 0–1.2)
BILIRUB UR QL STRIP: NEGATIVE
BUN SERPL-MCNC: 22 MG/DL (ref 8–23)
BUN/CREAT SERPL: 13.9 (ref 7–25)
CALCIUM SPEC-SCNC: 14.6 MG/DL (ref 8.6–10.5)
CHLORIDE SERPL-SCNC: 88 MMOL/L (ref 98–107)
CLARITY UR: CLEAR
CO2 SERPL-SCNC: 27 MMOL/L (ref 22–29)
COLOR UR: YELLOW
CREAT SERPL-MCNC: 1.58 MG/DL (ref 0.57–1)
D-LACTATE SERPL-SCNC: 2.2 MMOL/L (ref 0.5–2)
D-LACTATE SERPL-SCNC: 3.3 MMOL/L (ref 0.5–2)
D-LACTATE SERPL-SCNC: 3.9 MMOL/L (ref 0.5–2)
DEPRECATED RDW RBC AUTO: 44.8 FL (ref 37–54)
EGFRCR SERPLBLD CKD-EPI 2021: 32.6 ML/MIN/1.73
EOSINOPHIL # BLD AUTO: 0.1 10*3/MM3 (ref 0–0.4)
EOSINOPHIL NFR BLD AUTO: 1.1 % (ref 0.3–6.2)
ERYTHROCYTE [DISTWIDTH] IN BLOOD BY AUTOMATED COUNT: 13.8 % (ref 12.3–15.4)
GLOBULIN UR ELPH-MCNC: 2.9 GM/DL
GLUCOSE SERPL-MCNC: 92 MG/DL (ref 65–99)
GLUCOSE UR STRIP-MCNC: ABNORMAL MG/DL
HCT VFR BLD AUTO: 41.5 % (ref 34–46.6)
HGB BLD-MCNC: 13.4 G/DL (ref 12–15.9)
HGB UR QL STRIP.AUTO: NEGATIVE
HYALINE CASTS UR QL AUTO: ABNORMAL /LPF
IMM GRANULOCYTES # BLD AUTO: 0.07 10*3/MM3 (ref 0–0.05)
IMM GRANULOCYTES NFR BLD AUTO: 0.8 % (ref 0–0.5)
INR PPP: 1 (ref 0.91–1.09)
KETONES UR QL STRIP: ABNORMAL
LEUKOCYTE ESTERASE UR QL STRIP.AUTO: ABNORMAL
LIPASE SERPL-CCNC: 144 U/L (ref 13–60)
LYMPHOCYTES # BLD AUTO: 0.78 10*3/MM3 (ref 0.7–3.1)
LYMPHOCYTES NFR BLD AUTO: 8.6 % (ref 19.6–45.3)
MAGNESIUM SERPL-MCNC: 1.9 MG/DL (ref 1.6–2.4)
MCH RBC QN AUTO: 29.1 PG (ref 26.6–33)
MCHC RBC AUTO-ENTMCNC: 32.3 G/DL (ref 31.5–35.7)
MCV RBC AUTO: 90 FL (ref 79–97)
MONOCYTES # BLD AUTO: 0.64 10*3/MM3 (ref 0.1–0.9)
MONOCYTES NFR BLD AUTO: 7 % (ref 5–12)
NEUTROPHILS NFR BLD AUTO: 7.43 10*3/MM3 (ref 1.7–7)
NEUTROPHILS NFR BLD AUTO: 81.8 % (ref 42.7–76)
NITRITE UR QL STRIP: NEGATIVE
NRBC BLD AUTO-RTO: 0 /100 WBC (ref 0–0.2)
PH UR STRIP.AUTO: 5.5 [PH] (ref 5–8)
PLATELET # BLD AUTO: 538 10*3/MM3 (ref 140–450)
PMV BLD AUTO: 9.4 FL (ref 6–12)
POTASSIUM SERPL-SCNC: 4.8 MMOL/L (ref 3.5–5.2)
PROT SERPL-MCNC: 6.8 G/DL (ref 6–8.5)
PROT UR QL STRIP: NEGATIVE
PROTHROMBIN TIME: 13.3 SECONDS (ref 11.8–14.8)
RBC # BLD AUTO: 4.61 10*6/MM3 (ref 3.77–5.28)
RBC # UR STRIP: ABNORMAL /HPF
REF LAB TEST METHOD: ABNORMAL
SODIUM SERPL-SCNC: 129 MMOL/L (ref 136–145)
SP GR UR STRIP: 1.01 (ref 1–1.03)
SQUAMOUS #/AREA URNS HPF: ABNORMAL /HPF
UROBILINOGEN UR QL STRIP: ABNORMAL
WBC # UR STRIP: ABNORMAL /HPF
WBC NRBC COR # BLD: 9.08 10*3/MM3 (ref 3.4–10.8)

## 2023-11-11 PROCEDURE — 83605 ASSAY OF LACTIC ACID: CPT | Performed by: EMERGENCY MEDICINE

## 2023-11-11 PROCEDURE — 25810000003 SODIUM CHLORIDE 0.9 % SOLUTION: Performed by: EMERGENCY MEDICINE

## 2023-11-11 PROCEDURE — 25010000002 LABETALOL 5 MG/ML SOLUTION: Performed by: INTERNAL MEDICINE

## 2023-11-11 PROCEDURE — 36415 COLL VENOUS BLD VENIPUNCTURE: CPT

## 2023-11-11 PROCEDURE — 83735 ASSAY OF MAGNESIUM: CPT | Performed by: EMERGENCY MEDICINE

## 2023-11-11 PROCEDURE — 81001 URINALYSIS AUTO W/SCOPE: CPT | Performed by: EMERGENCY MEDICINE

## 2023-11-11 PROCEDURE — 83690 ASSAY OF LIPASE: CPT | Performed by: EMERGENCY MEDICINE

## 2023-11-11 PROCEDURE — 87086 URINE CULTURE/COLONY COUNT: CPT | Performed by: EMERGENCY MEDICINE

## 2023-11-11 PROCEDURE — 25810000003 LACTATED RINGERS SOLUTION: Performed by: EMERGENCY MEDICINE

## 2023-11-11 PROCEDURE — 99285 EMERGENCY DEPT VISIT HI MDM: CPT

## 2023-11-11 PROCEDURE — 25010000002 ZOLEDRONIC ACID 4 MG/100ML SOLUTION: Performed by: INTERNAL MEDICINE

## 2023-11-11 PROCEDURE — 80053 COMPREHEN METABOLIC PANEL: CPT | Performed by: EMERGENCY MEDICINE

## 2023-11-11 PROCEDURE — 85025 COMPLETE CBC W/AUTO DIFF WBC: CPT | Performed by: EMERGENCY MEDICINE

## 2023-11-11 PROCEDURE — 85610 PROTHROMBIN TIME: CPT | Performed by: EMERGENCY MEDICINE

## 2023-11-11 RX ORDER — SODIUM CHLORIDE 9 MG/ML
40 INJECTION, SOLUTION INTRAVENOUS AS NEEDED
Status: DISCONTINUED | OUTPATIENT
Start: 2023-11-11 | End: 2023-11-20 | Stop reason: HOSPADM

## 2023-11-11 RX ORDER — SODIUM CHLORIDE 9 MG/ML
150 INJECTION, SOLUTION INTRAVENOUS CONTINUOUS
Status: DISCONTINUED | OUTPATIENT
Start: 2023-11-11 | End: 2023-11-14 | Stop reason: SDUPTHER

## 2023-11-11 RX ORDER — LEVOTHYROXINE SODIUM 0.07 MG/1
37.5 TABLET ORAL
Status: DISCONTINUED | OUTPATIENT
Start: 2023-11-12 | End: 2023-11-20 | Stop reason: HOSPADM

## 2023-11-11 RX ORDER — ACETAMINOPHEN 325 MG/1
650 TABLET ORAL EVERY 4 HOURS PRN
Status: DISCONTINUED | OUTPATIENT
Start: 2023-11-11 | End: 2023-11-20 | Stop reason: HOSPADM

## 2023-11-11 RX ORDER — ATORVASTATIN CALCIUM 10 MG/1
10 TABLET, FILM COATED ORAL NIGHTLY
Status: DISCONTINUED | OUTPATIENT
Start: 2023-11-11 | End: 2023-11-20 | Stop reason: HOSPADM

## 2023-11-11 RX ORDER — ONDANSETRON 2 MG/ML
4 INJECTION INTRAMUSCULAR; INTRAVENOUS EVERY 6 HOURS PRN
Status: DISCONTINUED | OUTPATIENT
Start: 2023-11-11 | End: 2023-11-14 | Stop reason: SDUPTHER

## 2023-11-11 RX ORDER — NICOTINE POLACRILEX 4 MG
15 LOZENGE BUCCAL
Status: DISCONTINUED | OUTPATIENT
Start: 2023-11-11 | End: 2023-11-20 | Stop reason: HOSPADM

## 2023-11-11 RX ORDER — AMOXICILLIN 250 MG
2 CAPSULE ORAL 2 TIMES DAILY
Status: DISCONTINUED | OUTPATIENT
Start: 2023-11-11 | End: 2023-11-16

## 2023-11-11 RX ORDER — CLONIDINE HYDROCHLORIDE 0.1 MG/1
0.1 TABLET ORAL 2 TIMES DAILY PRN
Status: DISCONTINUED | OUTPATIENT
Start: 2023-11-11 | End: 2023-11-20 | Stop reason: HOSPADM

## 2023-11-11 RX ORDER — LABETALOL HYDROCHLORIDE 5 MG/ML
10 INJECTION, SOLUTION INTRAVENOUS EVERY 6 HOURS PRN
Status: DISCONTINUED | OUTPATIENT
Start: 2023-11-11 | End: 2023-11-20 | Stop reason: HOSPADM

## 2023-11-11 RX ORDER — SODIUM CHLORIDE 0.9 % (FLUSH) 0.9 %
10 SYRINGE (ML) INJECTION AS NEEDED
Status: DISCONTINUED | OUTPATIENT
Start: 2023-11-11 | End: 2023-11-20 | Stop reason: HOSPADM

## 2023-11-11 RX ORDER — FUROSEMIDE 20 MG/1
20 TABLET ORAL DAILY PRN
Status: DISCONTINUED | OUTPATIENT
Start: 2023-11-11 | End: 2023-11-20 | Stop reason: HOSPADM

## 2023-11-11 RX ORDER — IBUPROFEN 600 MG/1
1 TABLET ORAL
Status: DISCONTINUED | OUTPATIENT
Start: 2023-11-11 | End: 2023-11-20 | Stop reason: HOSPADM

## 2023-11-11 RX ORDER — BISACODYL 10 MG
10 SUPPOSITORY, RECTAL RECTAL DAILY PRN
Status: DISCONTINUED | OUTPATIENT
Start: 2023-11-11 | End: 2023-11-20 | Stop reason: HOSPADM

## 2023-11-11 RX ORDER — BISACODYL 5 MG/1
5 TABLET, DELAYED RELEASE ORAL DAILY PRN
Status: DISCONTINUED | OUTPATIENT
Start: 2023-11-11 | End: 2023-11-16

## 2023-11-11 RX ORDER — DEXTROSE MONOHYDRATE 25 G/50ML
25 INJECTION, SOLUTION INTRAVENOUS
Status: DISCONTINUED | OUTPATIENT
Start: 2023-11-11 | End: 2023-11-20 | Stop reason: HOSPADM

## 2023-11-11 RX ORDER — SODIUM CHLORIDE 0.9 % (FLUSH) 0.9 %
10 SYRINGE (ML) INJECTION EVERY 12 HOURS SCHEDULED
Status: DISCONTINUED | OUTPATIENT
Start: 2023-11-11 | End: 2023-11-20 | Stop reason: HOSPADM

## 2023-11-11 RX ORDER — ZOLEDRONIC ACID 0.04 MG/ML
4 INJECTION, SOLUTION INTRAVENOUS ONCE
Status: COMPLETED | OUTPATIENT
Start: 2023-11-11 | End: 2023-11-11

## 2023-11-11 RX ORDER — LISINOPRIL 10 MG/1
20 TABLET ORAL ONCE
Status: COMPLETED | OUTPATIENT
Start: 2023-11-11 | End: 2023-11-11

## 2023-11-11 RX ORDER — INSULIN LISPRO 100 [IU]/ML
2-7 INJECTION, SOLUTION INTRAVENOUS; SUBCUTANEOUS
Status: DISCONTINUED | OUTPATIENT
Start: 2023-11-11 | End: 2023-11-17

## 2023-11-11 RX ORDER — MULTIPLE VITAMINS W/ MINERALS TAB 9MG-400MCG
1 TAB ORAL DAILY
Status: DISCONTINUED | OUTPATIENT
Start: 2023-11-12 | End: 2023-11-20 | Stop reason: HOSPADM

## 2023-11-11 RX ORDER — POLYETHYLENE GLYCOL 3350 17 G/17G
17 POWDER, FOR SOLUTION ORAL DAILY PRN
Status: DISCONTINUED | OUTPATIENT
Start: 2023-11-11 | End: 2023-11-16

## 2023-11-11 RX ORDER — SODIUM CHLORIDE 9 MG/ML
125 INJECTION, SOLUTION INTRAVENOUS CONTINUOUS
Status: DISCONTINUED | OUTPATIENT
Start: 2023-11-11 | End: 2023-11-11 | Stop reason: DRUGHIGH

## 2023-11-11 RX ADMIN — CLONIDINE HYDROCHLORIDE 0.1 MG: 0.1 TABLET ORAL at 21:28

## 2023-11-11 RX ADMIN — SODIUM CHLORIDE 125 ML/HR: 9 INJECTION, SOLUTION INTRAVENOUS at 20:04

## 2023-11-11 RX ADMIN — ZOLEDRONIC ACID 4 MG: 0.04 INJECTION, SOLUTION INTRAVENOUS at 22:21

## 2023-11-11 RX ADMIN — EMPAGLIFLOZIN 10 MG: 10 TABLET, FILM COATED ORAL at 20:03

## 2023-11-11 RX ADMIN — SODIUM CHLORIDE 1000 ML: 9 INJECTION, SOLUTION INTRAVENOUS at 19:08

## 2023-11-11 RX ADMIN — LABETALOL HYDROCHLORIDE 10 MG: 5 INJECTION INTRAVENOUS at 22:53

## 2023-11-11 RX ADMIN — LISINOPRIL 20 MG: 10 TABLET ORAL at 20:03

## 2023-11-11 RX ADMIN — SODIUM CHLORIDE, POTASSIUM CHLORIDE, SODIUM LACTATE AND CALCIUM CHLORIDE 1000 ML: 600; 310; 30; 20 INJECTION, SOLUTION INTRAVENOUS at 18:13

## 2023-11-12 ENCOUNTER — APPOINTMENT (OUTPATIENT)
Dept: CT IMAGING | Facility: HOSPITAL | Age: 83
DRG: 821 | End: 2023-11-12
Payer: MEDICARE

## 2023-11-12 ENCOUNTER — APPOINTMENT (OUTPATIENT)
Dept: CARDIOLOGY | Facility: HOSPITAL | Age: 83
DRG: 821 | End: 2023-11-12
Payer: MEDICARE

## 2023-11-12 LAB
ALBUMIN SERPL-MCNC: 3.3 G/DL (ref 3.5–5.2)
ALBUMIN/GLOB SERPL: 1.4 G/DL
ALP SERPL-CCNC: 148 U/L (ref 39–117)
ALT SERPL W P-5'-P-CCNC: 24 U/L (ref 1–33)
ANION GAP SERPL CALCULATED.3IONS-SCNC: 12 MMOL/L (ref 5–15)
AST SERPL-CCNC: 39 U/L (ref 1–32)
BACTERIA SPEC AEROBE CULT: NO GROWTH
BASOPHILS # BLD AUTO: 0.07 10*3/MM3 (ref 0–0.2)
BASOPHILS NFR BLD AUTO: 0.8 % (ref 0–1.5)
BH CV ECHO MEAS - AO MAX PG: 13 MMHG
BH CV ECHO MEAS - AO MEAN PG: 7 MMHG
BH CV ECHO MEAS - AO ROOT DIAM: 2.6 CM
BH CV ECHO MEAS - AO V2 MAX: 180 CM/SEC
BH CV ECHO MEAS - AO V2 VTI: 35.8 CM
BH CV ECHO MEAS - AVA(I,D): 2.44 CM2
BH CV ECHO MEAS - EDV(CUBED): 53.6 ML
BH CV ECHO MEAS - EDV(MOD-SP2): 37.4 ML
BH CV ECHO MEAS - EDV(MOD-SP4): 36.8 ML
BH CV ECHO MEAS - EF(MOD-BP): 68.3 %
BH CV ECHO MEAS - EF(MOD-SP2): 70.9 %
BH CV ECHO MEAS - EF(MOD-SP4): 63.9 %
BH CV ECHO MEAS - ESV(CUBED): 6.3 ML
BH CV ECHO MEAS - ESV(MOD-SP2): 10.9 ML
BH CV ECHO MEAS - ESV(MOD-SP4): 13.3 ML
BH CV ECHO MEAS - FS: 50.9 %
BH CV ECHO MEAS - IVS/LVPW: 0.9 CM
BH CV ECHO MEAS - IVSD: 1.13 CM
BH CV ECHO MEAS - LA DIMENSION: 3 CM
BH CV ECHO MEAS - LAT PEAK E' VEL: 7.1 CM/SEC
BH CV ECHO MEAS - LV DIASTOLIC VOL/BSA (35-75): 20.9 CM2
BH CV ECHO MEAS - LV MASS(C)D: 149.5 GRAMS
BH CV ECHO MEAS - LV MAX PG: 8.5 MMHG
BH CV ECHO MEAS - LV MEAN PG: 4 MMHG
BH CV ECHO MEAS - LV SYSTOLIC VOL/BSA (12-30): 7.6 CM2
BH CV ECHO MEAS - LV V1 MAX: 146 CM/SEC
BH CV ECHO MEAS - LV V1 VTI: 27.8 CM
BH CV ECHO MEAS - LVIDD: 3.8 CM
BH CV ECHO MEAS - LVIDS: 1.85 CM
BH CV ECHO MEAS - LVOT AREA: 3.1 CM2
BH CV ECHO MEAS - LVOT DIAM: 2 CM
BH CV ECHO MEAS - LVPWD: 1.25 CM
BH CV ECHO MEAS - MED PEAK E' VEL: 5.6 CM/SEC
BH CV ECHO MEAS - MV A MAX VEL: 124 CM/SEC
BH CV ECHO MEAS - MV DEC TIME: 0.31 SEC
BH CV ECHO MEAS - MV E MAX VEL: 66.2 CM/SEC
BH CV ECHO MEAS - MV E/A: 0.53
BH CV ECHO MEAS - PI END-D VEL: 138 CM/SEC
BH CV ECHO MEAS - RAP SYSTOLE: 5 MMHG
BH CV ECHO MEAS - RVSP: 32.2 MMHG
BH CV ECHO MEAS - SI(MOD-SP2): 15.1 ML/M2
BH CV ECHO MEAS - SI(MOD-SP4): 13.4 ML/M2
BH CV ECHO MEAS - SV(LVOT): 87.3 ML
BH CV ECHO MEAS - SV(MOD-SP2): 26.5 ML
BH CV ECHO MEAS - SV(MOD-SP4): 23.5 ML
BH CV ECHO MEAS - TR MAX PG: 27.2 MMHG
BH CV ECHO MEAS - TR MAX VEL: 261 CM/SEC
BH CV ECHO MEASUREMENTS AVERAGE E/E' RATIO: 10.43
BILIRUB SERPL-MCNC: 0.4 MG/DL (ref 0–1.2)
BUN SERPL-MCNC: 23 MG/DL (ref 8–23)
BUN/CREAT SERPL: 13.5 (ref 7–25)
CALCIUM SPEC-SCNC: 13.4 MG/DL (ref 8.6–10.5)
CHLORIDE SERPL-SCNC: 92 MMOL/L (ref 98–107)
CO2 SERPL-SCNC: 25 MMOL/L (ref 22–29)
CREAT SERPL-MCNC: 1.71 MG/DL (ref 0.57–1)
D-LACTATE SERPL-SCNC: 2.6 MMOL/L (ref 0.5–2)
D-LACTATE SERPL-SCNC: 2.8 MMOL/L (ref 0.5–2)
D-LACTATE SERPL-SCNC: 3 MMOL/L (ref 0.5–2)
DEPRECATED RDW RBC AUTO: 47.1 FL (ref 37–54)
EGFRCR SERPLBLD CKD-EPI 2021: 29.6 ML/MIN/1.73
EOSINOPHIL # BLD AUTO: 0.08 10*3/MM3 (ref 0–0.4)
EOSINOPHIL NFR BLD AUTO: 0.9 % (ref 0.3–6.2)
ERYTHROCYTE [DISTWIDTH] IN BLOOD BY AUTOMATED COUNT: 13.9 % (ref 12.3–15.4)
GLOBULIN UR ELPH-MCNC: 2.4 GM/DL
GLUCOSE BLDC GLUCOMTR-MCNC: 100 MG/DL (ref 70–130)
GLUCOSE BLDC GLUCOMTR-MCNC: 113 MG/DL (ref 70–130)
GLUCOSE BLDC GLUCOMTR-MCNC: 115 MG/DL (ref 70–130)
GLUCOSE SERPL-MCNC: 86 MG/DL (ref 65–99)
HCT VFR BLD AUTO: 35.9 % (ref 34–46.6)
HGB BLD-MCNC: 11.3 G/DL (ref 12–15.9)
IMM GRANULOCYTES # BLD AUTO: 0.06 10*3/MM3 (ref 0–0.05)
IMM GRANULOCYTES NFR BLD AUTO: 0.7 % (ref 0–0.5)
LEFT ATRIUM VOLUME INDEX: 20.1 ML/M2
LEFT ATRIUM VOLUME: 35.4 ML
LIPASE SERPL-CCNC: 80 U/L (ref 13–60)
LYMPHOCYTES # BLD AUTO: 0.9 10*3/MM3 (ref 0.7–3.1)
LYMPHOCYTES NFR BLD AUTO: 10.1 % (ref 19.6–45.3)
MCH RBC QN AUTO: 29 PG (ref 26.6–33)
MCHC RBC AUTO-ENTMCNC: 31.5 G/DL (ref 31.5–35.7)
MCV RBC AUTO: 92.1 FL (ref 79–97)
MONOCYTES # BLD AUTO: 0.71 10*3/MM3 (ref 0.1–0.9)
MONOCYTES NFR BLD AUTO: 8 % (ref 5–12)
NEUTROPHILS NFR BLD AUTO: 7.06 10*3/MM3 (ref 1.7–7)
NEUTROPHILS NFR BLD AUTO: 79.5 % (ref 42.7–76)
NRBC BLD AUTO-RTO: 0 /100 WBC (ref 0–0.2)
OSMOLALITY UR: 195 MOSM/KG (ref 50–1400)
PLATELET # BLD AUTO: 452 10*3/MM3 (ref 140–450)
PMV BLD AUTO: 9.9 FL (ref 6–12)
POTASSIUM SERPL-SCNC: 4.5 MMOL/L (ref 3.5–5.2)
PROT SERPL-MCNC: 5.7 G/DL (ref 6–8.5)
RBC # BLD AUTO: 3.9 10*6/MM3 (ref 3.77–5.28)
SODIUM SERPL-SCNC: 129 MMOL/L (ref 136–145)
SODIUM UR-SCNC: 32 MMOL/L
T4 FREE SERPL-MCNC: 1.37 NG/DL (ref 0.93–1.7)
TSH SERPL DL<=0.05 MIU/L-ACNC: 2.95 UIU/ML (ref 0.27–4.2)
WBC NRBC COR # BLD: 8.88 10*3/MM3 (ref 3.4–10.8)

## 2023-11-12 PROCEDURE — 93306 TTE W/DOPPLER COMPLETE: CPT

## 2023-11-12 PROCEDURE — 74178 CT ABD&PLV WO CNTR FLWD CNTR: CPT

## 2023-11-12 PROCEDURE — 25810000003 SODIUM CHLORIDE 0.9 % SOLUTION: Performed by: HOSPITALIST

## 2023-11-12 PROCEDURE — 80053 COMPREHEN METABOLIC PANEL: CPT | Performed by: INTERNAL MEDICINE

## 2023-11-12 PROCEDURE — 25510000001 IOPAMIDOL 61 % SOLUTION: Performed by: HOSPITALIST

## 2023-11-12 PROCEDURE — 93306 TTE W/DOPPLER COMPLETE: CPT | Performed by: INTERNAL MEDICINE

## 2023-11-12 PROCEDURE — 83935 ASSAY OF URINE OSMOLALITY: CPT | Performed by: HOSPITALIST

## 2023-11-12 PROCEDURE — 82948 REAGENT STRIP/BLOOD GLUCOSE: CPT

## 2023-11-12 PROCEDURE — 83605 ASSAY OF LACTIC ACID: CPT | Performed by: EMERGENCY MEDICINE

## 2023-11-12 PROCEDURE — 25810000003 SODIUM CHLORIDE 0.9 % SOLUTION: Performed by: SURGERY

## 2023-11-12 PROCEDURE — 83690 ASSAY OF LIPASE: CPT | Performed by: INTERNAL MEDICINE

## 2023-11-12 PROCEDURE — 84300 ASSAY OF URINE SODIUM: CPT | Performed by: HOSPITALIST

## 2023-11-12 PROCEDURE — 84439 ASSAY OF FREE THYROXINE: CPT | Performed by: INTERNAL MEDICINE

## 2023-11-12 PROCEDURE — 84443 ASSAY THYROID STIM HORMONE: CPT | Performed by: INTERNAL MEDICINE

## 2023-11-12 PROCEDURE — 85025 COMPLETE CBC W/AUTO DIFF WBC: CPT | Performed by: INTERNAL MEDICINE

## 2023-11-12 RX ORDER — SPIRONOLACTONE 25 MG/1
25 TABLET ORAL DAILY
Status: ON HOLD | COMMUNITY
End: 2023-12-04

## 2023-11-12 RX ORDER — HYDROCODONE BITARTRATE AND ACETAMINOPHEN 7.5; 325 MG/1; MG/1
1 TABLET ORAL EVERY 6 HOURS PRN
Status: ON HOLD | COMMUNITY

## 2023-11-12 RX ORDER — HYDROCODONE BITARTRATE AND ACETAMINOPHEN 7.5; 325 MG/1; MG/1
1 TABLET ORAL EVERY 4 HOURS PRN
Status: DISCONTINUED | OUTPATIENT
Start: 2023-11-12 | End: 2023-11-14 | Stop reason: SDUPTHER

## 2023-11-12 RX ADMIN — EMPAGLIFLOZIN 10 MG: 10 TABLET, FILM COATED ORAL at 09:06

## 2023-11-12 RX ADMIN — Medication 10 ML: at 09:10

## 2023-11-12 RX ADMIN — DOCUSATE SODIUM 50 MG AND SENNOSIDES 8.6 MG 2 TABLET: 8.6; 5 TABLET, FILM COATED ORAL at 09:08

## 2023-11-12 RX ADMIN — HYDROCODONE BITARTRATE AND ACETAMINOPHEN 1 TABLET: 7.5; 325 TABLET ORAL at 03:53

## 2023-11-12 RX ADMIN — LEVOTHYROXINE SODIUM 37.5 MCG: 75 TABLET ORAL at 09:06

## 2023-11-12 RX ADMIN — SODIUM CHLORIDE 500 ML: 9 INJECTION, SOLUTION INTRAVENOUS at 15:58

## 2023-11-12 RX ADMIN — Medication 1 TABLET: at 09:06

## 2023-11-12 RX ADMIN — IOPAMIDOL 100 ML: 612 INJECTION, SOLUTION INTRAVENOUS at 17:13

## 2023-11-12 RX ADMIN — HYDROCODONE BITARTRATE AND ACETAMINOPHEN 1 TABLET: 7.5; 325 TABLET ORAL at 20:54

## 2023-11-12 RX ADMIN — SODIUM CHLORIDE 150 ML/HR: 9 INJECTION, SOLUTION INTRAVENOUS at 16:58

## 2023-11-12 RX ADMIN — ACETAMINOPHEN 650 MG: 325 TABLET, FILM COATED ORAL at 00:27

## 2023-11-12 RX ADMIN — ATORVASTATIN CALCIUM 10 MG: 10 TABLET, FILM COATED ORAL at 20:54

## 2023-11-12 RX ADMIN — SODIUM CHLORIDE 500 ML: 9 INJECTION, SOLUTION INTRAVENOUS at 17:36

## 2023-11-12 NOTE — CONSULTS
[facility] General Surgery Services - Consult Note    Patient Name: Imani Chauhan  : 1940  MRN: [id number]  Primary Care Physician:  Светлана Loyd MD  Referring Physician: Cale Lynch MD  Date of admission: 2023    Inpatient General Surgery Consult  Consult performed by: Adenike Ponce MD  Consult ordered by: Lei Baker DO          Subjective      Reason for Consult/ Chief Complaint: abdominal pain    History of Present Illness: Imani Chauhan is a 83 y.o. female presenting with abdominal pain and lymphadenopathy of unknown origin. The patient reports that over the last 3 weeks, she has had increased abdominal pain and distention. Reports moderate abdominal pain, infraumbilical, radiating to the left side. She had one episode of vomiting of food-like particles last night. No chest pains/SOB/fevers/chills. She has had a prior non-diagnostic CT guided retroperitoneal biopsy earlier this year.     ROS abdominal pain, nausea, vomiting    Personal History     Past Medical History:   Diagnosis Date    Aortic regurgitation     mild 2018    CAD in native artery     COVID-19 2022    mild case, not hospitalized, no MAB    GERD (gastroesophageal reflux disease)     Heart murmur     Hyperlipidemia     Hypertension     Hypothyroidism     Mitral regurgitation     mild per  echo, no significant per 2018    Myalgia     Near syncope     Pre-diabetes     SOB (shortness of breath)     Squamous cell cancer of skin of right forearm     Syncope     Tricuspid regurgitation     mild per  echo, trivial per 2018       Past Surgical History:   Procedure Laterality Date    BACK SURGERY      BIOPSY / EXCISION / DISSECTION AXILLARY NODE      had bx w/ Jennifer Restrepo MD at [facility]    CARDIAC CATHETERIZATION      COLONOSCOPY      CORONARY ARTERY BYPASS GRAFT  10/11/2010    Dr. Karina Lora, [facility]    HYSTERECTOMY      LAPAROSCOPIC CHOLECYSTECTOMY      MEDIAN STERNOTOMY       Thymus cyst resection at same time as CABG    OTHER SURGICAL HISTORY      Rectal surgery    PARTIAL THYMECTOMY  10/11/2010    Dr. Lora    REPLACEMENT TOTAL KNEE Left 2005    TOTAL KNEE ARTHROPLASTY Left 06/2019    TOTAL KNEE ARTHROPLASTY Right 11/04/2020    Dr. Santamaria       Family History: family history includes Alzheimer's disease in her father and mother; Coronary artery disease in her father; Hyperlipidemia in her mother; Hypertension in her father and mother. Otherwise pertinent FHx was reviewed and not pertinent to current issue.    Social History:  reports that she has never smoked. She has been exposed to tobacco smoke. She has never used smokeless tobacco. She reports that she does not drink alcohol and does not use drugs.    Home Medications:   Bioflavonoid Products, Ca Carbonate-Mag Hydroxide, Gabapentin (Once-Daily), HYDROcodone-acetaminophen, Magnesium Glycinate, acetaminophen, aspirin, atorvastatin, cloNIDine, empagliflozin, furosemide, glucose blood, lansoprazole, levothyroxine, lisinopril, metFORMIN ER, multivitamin with minerals, and spironolactone    Allergies:  No Known Allergies      Objective      Vitals:  Temp:  [97.7 °F (36.5 °C)-98.1 °F (36.7 °C)] 97.7 °F (36.5 °C)  Heart Rate:  [69-95] 70  Resp:  [18] 18  BP: (143-189)/(65-97) 162/70    Physical Exam  Constitutional:       General: He is not in acute distress.     Appearance: Normal appearance.   HENT:      Head: Normocephalic and atraumatic.   Eyes:      Extraocular Movements: Extraocular movements intact.      Pupils: Pupils are equal, round, and reactive to light.   Cardiovascular:      Rate and Rhythm: Normal rate and regular rhythm.      Pulses: Normal pulses.   Pulmonary:      Effort: Pulmonary effort is normal. No respiratory distress.      Breath sounds: Normal breath sounds.   Abdominal:      General: Bowel sounds are normal. +distention.      Palpations: Abdomen is soft, lower quadrants TTP, no rebound.   Neurological:      Mental  Status: He is alert.   Psychiatric:         Mood and Affect: Mood normal.         Behavior: Behavior normal.     Result Review    Result Review:  I have personally reviewed the results from the time of this admission to 11/12/2023 08:16 CST and agree with these findings:  [x]  Laboratory  []  Microbiology  []  Radiology  []  EKG/Telemetry   []  Cardiology/Vascular   []  Pathology  []  Old records  []  Other:  Most notable findings include: WBC wnL, hyponatremia      Assessment & Plan        Active Hospital Problems:  Active Hospital Problems    Diagnosis     **Hypercalcemia      Plan:   Per patient, she cannot receive MRI due to prior cardiac surgical restrictions.  Will proceed with CT abdomen-pelvis with prehydration. Benefits of obtaining CT, outweigh the risks of contrast.    Plan for repeat CT biopsy vs dx laparoscopy with biopsy and peritoneal fluid washout  ANTONIO OSUNA   Med-onc consult      Adenike Ponce MD  General Surgery  11/12/23  08:16 CST

## 2023-11-12 NOTE — ED PROVIDER NOTES
Subjective   History of Present Illness  83-year-old female presents to the ED with complaint of abdominal pain, back pain, nausea, generalized weakness fatigue, decreased appetite, decreased oral intake.  She has a history of hypertension, hyperlipidemia, and has been followed closely over the past 7 months for progressive lymphadenopathy thought secondary to malignant etiology.  Has had a CT-guided needle biopsy which was nondiagnostic.  Is followed by Dr. Ghazala Restrepo and has plan for excisional biopsy at some point in the near future.    Patient reports increased generalized weakness and fatigue over the past 2 weeks.  She is developed low back pain and bilateral hip pain, worsening abdominal discomfort.  She has had decreased appetite and decreased oral intake secondary to decreased appetite.  She also has nausea and last night reports vomiting.  No dysuria or hematuria.  She rates her symptoms as moderate severity with no aggravating relieving factors.    History provided by:  Patient      Review of Systems   All other systems reviewed and are negative.      Past Medical History:   Diagnosis Date    Aortic regurgitation     mild 2018    CAD in native artery     COVID-19 06/01/2022    mild case, not hospitalized, no MAB    GERD (gastroesophageal reflux disease)     Heart murmur     Hyperlipidemia     Hypertension     Hypothyroidism     Mitral regurgitation     mild per 2013 echo, no significant per 2018    Myalgia     Near syncope     Pre-diabetes     SOB (shortness of breath)     Squamous cell cancer of skin of right forearm     Syncope     Tricuspid regurgitation     mild per 2013 echo, trivial per 2018       No Known Allergies    Past Surgical History:   Procedure Laterality Date    BACK SURGERY      BIOPSY / EXCISION / DISSECTION AXILLARY NODE  2023    had bx w/ Jennifer Restreop MD at [facility]    CARDIAC CATHETERIZATION      COLONOSCOPY      CORONARY ARTERY BYPASS GRAFT  10/11/2010    Dr. Karina Lora, [facility]     HYSTERECTOMY      LAPAROSCOPIC CHOLECYSTECTOMY      MEDIAN STERNOTOMY      Thymus cyst resection at same time as CABG    OTHER SURGICAL HISTORY      Rectal surgery    PARTIAL THYMECTOMY  10/11/2010    Dr. Lora    REPLACEMENT TOTAL KNEE Left 2005    TOTAL KNEE ARTHROPLASTY Left 06/2019    TOTAL KNEE ARTHROPLASTY Right 11/04/2020    Dr. Santamaria       Family History   Problem Relation Age of Onset    Coronary artery disease Father     Alzheimer's disease Father     Hypertension Father     Alzheimer's disease Mother     Hypertension Mother     Hyperlipidemia Mother        Social History     Socioeconomic History    Marital status:    Tobacco Use    Smoking status: Never     Passive exposure: Yes    Smokeless tobacco: Never    Tobacco comments:     father smoked pipe in the house,  smoked   Vaping Use    Vaping Use: Never used   Substance and Sexual Activity    Alcohol use: Never    Drug use: Never    Sexual activity: Defer           Objective   Physical Exam  Vitals and nursing note reviewed.   Constitutional:       Appearance: She is well-developed and normal weight.   HENT:      Head: Normocephalic and atraumatic.   Cardiovascular:      Rate and Rhythm: Normal rate and regular rhythm.      Heart sounds: Normal heart sounds. No murmur heard.     No friction rub.   Pulmonary:      Effort: Pulmonary effort is normal.      Breath sounds: Normal breath sounds. No wheezing, rhonchi or rales.   Abdominal:      General: Abdomen is protuberant.      Palpations: Abdomen is soft.      Tenderness: There is generalized abdominal tenderness.      Hernia: No hernia is present.   Skin:     General: Skin is warm.      Capillary Refill: Capillary refill takes less than 2 seconds.      Coloration: Skin is not cyanotic.   Neurological:      General: No focal deficit present.      Mental Status: She is alert and oriented to person, place, and time.         Procedures       Lab Results (last 24 hours)       Procedure Component  Value Units Date/Time    CBC & Differential [[id number]]  (Abnormal) Collected: 11/11/23 1758    Specimen: Blood Updated: 11/11/23 1808    Narrative:      The following orders were created for panel order CBC & Differential.  Procedure                               Abnormality         Status                     ---------                               -----------         ------                     CBC Auto Differential[[id number]]        Abnormal            Final result                 Please view results for these tests on the individual orders.    Comprehensive Metabolic Panel [[id number]]  (Abnormal) Collected: 11/11/23 1758    Specimen: Blood Updated: 11/11/23 1831     Glucose 92 mg/dL      BUN 22 mg/dL      Creatinine 1.58 mg/dL      Sodium 129 mmol/L      Potassium 4.8 mmol/L      Chloride 88 mmol/L      CO2 27.0 mmol/L      Calcium 14.6 mg/dL      Total Protein 6.8 g/dL      Albumin 3.9 g/dL      ALT (SGPT) 30 U/L      AST (SGOT) 47 U/L      Alkaline Phosphatase 185 U/L      Total Bilirubin 0.5 mg/dL      Globulin 2.9 gm/dL      A/G Ratio 1.3 g/dL      BUN/Creatinine Ratio 13.9     Anion Gap 14.0 mmol/L      eGFR 32.6 mL/min/1.73     Narrative:      GFR Normal >60  Chronic Kidney Disease <60  Kidney Failure <15    The GFR formula is only valid for adults with stable renal function between ages 18 and 70.    Protime-INR [[id number]]  (Normal) Collected: 11/11/23 1758    Specimen: Blood Updated: 11/11/23 1816     Protime 13.3 Seconds      INR 1.00    Magnesium [[id number]]  (Normal) Collected: 11/11/23 1758    Specimen: Blood Updated: 11/11/23 1831     Magnesium 1.9 mg/dL     Lipase [[id number]]  (Abnormal) Collected: 11/11/23 1758    Specimen: Blood Updated: 11/11/23 1821     Lipase 144 U/L     Lactic Acid, Plasma [[id number]]  (Abnormal) Collected: 11/11/23 1758    Specimen: Blood Updated: 11/11/23 1831     Lactate 3.9 mmol/L     CBC Auto Differential [[id number]]  (Abnormal) Collected: 11/11/23 1758    Specimen:  Blood Updated: 11/11/23 1808     WBC 9.08 10*3/mm3      RBC 4.61 10*6/mm3      Hemoglobin 13.4 g/dL      Hematocrit 41.5 %      MCV 90.0 fL      MCH 29.1 pg      MCHC 32.3 g/dL      RDW 13.8 %      RDW-SD 44.8 fl      MPV 9.4 fL      Platelets 538 10*3/mm3      Neutrophil % 81.8 %      Lymphocyte % 8.6 %      Monocyte % 7.0 %      Eosinophil % 1.1 %      Basophil % 0.7 %      Immature Grans % 0.8 %      Neutrophils, Absolute 7.43 10*3/mm3      Lymphocytes, Absolute 0.78 10*3/mm3      Monocytes, Absolute 0.64 10*3/mm3      Eosinophils, Absolute 0.10 10*3/mm3      Basophils, Absolute 0.06 10*3/mm3      Immature Grans, Absolute 0.07 10*3/mm3      nRBC 0.0 /100 WBC          No Radiology Exams Resulted Within Past 24 Hours     ED Course  ED Course as of 11/11/23 1943   Sat Nov 11, 2023   1938 83-year-old female with history of hypertension, hyperlipidemia, diabetes who presents and has been followed closely for the past 7 months for progressive intra-abdominal and pelvic lymphadenopathy thought to be which is presumed to be secondary to a malignant etiology presents to the ED with abdominal pain, back pain, nausea, generalized weakness fatigue, decreased appetite and decreased oral intake.  In the ED, patient found to have hypercalcemia, calcium 14.6.  Albumin normal at 3.9.  Creatinine 1.58, no recent for comparison but may have acute kidney injury.  Sodium of 129.  Lactate 3.9.  We initially gave the patient a liter of LR but this was switched to a saline after we received calcium results.  Lipase 144, no epigastric tenderness.  GFR 32.6.     [AW]      ED Course User Index  [AW] Cale Lynch MD                                           Medical Decision Making  Amount and/or Complexity of Data Reviewed  Labs: ordered.    Risk  Prescription drug management.        Final diagnoses:   Hypercalcemia   Hyponatremia   Acute kidney injury       ED Disposition  ED Disposition       ED Disposition   Decision to Admit     Condition   --    Comment   Level of Care: Remote Telemetry [26]   Diagnosis: Hypercalcemia [275.42.ICD-9-CM]   Admitting Physician: MAHAD DAVIS [[id number]]   Attending Physician: MAHAD DAVIS [[id number]]   Isolate for COVID?: No [0]   Certification: I Certify That Inpatient Hospital Services Are Medically Necessary For Greater Than 2 Midnights                 No follow-up provider specified.       Medication List        Changed      spironolactone 25 MG tablet  Commonly known as: ALDACTONE  Take 1 tablet by mouth Daily.  What changed: additional instructions                 Cale Lynch MD  11/11/23 0204

## 2023-11-12 NOTE — H&P
[facility] Medicine Services  HISTORY AND PHYSICAL    Date of Admission: 11/11/2023  Primary Care Physician: Светлана Loyd MD    Subjective   Primary Historian: Patient    Chief Complaint: Abdominal pain and myalgia     Abdominal Pain  Associated symptoms include constipation, myalgias and vomiting. Pertinent negatives include no fever.   Vomiting   Associated symptoms include abdominal pain and myalgias. Pertinent negatives include no chills or fever.   83-year-old female who presents emergency department with complaints of generalized malaise.  Decreased p.o. intake.  Nausea and vomiting.  The patient has abdominal lymphadenopathy that she has a follow-up to see Dr. Restrepo next week.  She is supposed to have an incisional biopsy performed.  She was at [facility] 2 weeks ago for similar complaints, and was sent home.  She states that she has chest pain and abdominal pain that radiates into her right side.  When she has a deep breath it catches in her epigastric region secondary to her abdominal distention.  She has no dysuria.  She has chronic constipation, but she did have a bowel movement yesterday.  She is hypertensive in the emergency department on my exam.  The patient's work-up demonstrated multiple electrolyte abnormalities.        Review of Systems   Constitutional:  Negative for chills and fever.   Gastrointestinal:  Positive for abdominal distention, abdominal pain, constipation and vomiting.   Musculoskeletal:  Positive for myalgias.      Otherwise complete ROS reviewed and negative except as mentioned in the HPI.    Past Medical History:   Past Medical History:   Diagnosis Date    Aortic regurgitation     mild 2018    CAD in native artery     COVID-19 06/01/2022    mild case, not hospitalized, no MAB    GERD (gastroesophageal reflux disease)     Heart murmur     Hyperlipidemia     Hypertension     Hypothyroidism     Mitral regurgitation     mild per 2013 echo,  no significant per 2018    Myalgia     Near syncope     Pre-diabetes     SOB (shortness of breath)     Squamous cell cancer of skin of right forearm     Syncope     Tricuspid regurgitation     mild per 2013 echo, trivial per 2018     Past Surgical History:  Past Surgical History:   Procedure Laterality Date    BACK SURGERY      BIOPSY / EXCISION / DISSECTION AXILLARY NODE  2023    had bx w/ Jennifer Restrepo MD at [facility]    CARDIAC CATHETERIZATION      COLONOSCOPY      CORONARY ARTERY BYPASS GRAFT  10/11/2010    Dr. Karina Lora, [facility]    HYSTERECTOMY      LAPAROSCOPIC CHOLECYSTECTOMY      MEDIAN STERNOTOMY      Thymus cyst resection at same time as CABG    OTHER SURGICAL HISTORY      Rectal surgery    PARTIAL THYMECTOMY  10/11/2010    Dr. Lora    REPLACEMENT TOTAL KNEE Left 2005    TOTAL KNEE ARTHROPLASTY Left 06/2019    TOTAL KNEE ARTHROPLASTY Right 11/04/2020    Dr. Santamraia     Social History:  reports that she has never smoked. She has been exposed to tobacco smoke. She has never used smokeless tobacco. She reports that she does not drink alcohol and does not use drugs.    Family History: family history includes Alzheimer's disease in her father and mother; Coronary artery disease in her father; Hyperlipidemia in her mother; Hypertension in her father and mother.       Allergies:  No Known Allergies    Medications:  Prior to Admission medications    Medication Sig Start Date End Date Taking? Authorizing Provider   acetaminophen (TYLENOL) 500 MG tablet Take 1 tablet by mouth Every 6 (Six) Hours As Needed for Mild Pain. prn    Saige Cervantes MD   aspirin 81 MG EC tablet Take 1 tablet by mouth Daily.    ProviderSaige MD   atorvastatin (LIPITOR) 10 MG tablet Take 1 tablet by mouth Every Night. Orthopaedic stopped due to bursitis in hips- stopping for 1 month 8/16/16   ProviderSaige MD   Bioflavonoid Products (SUPER-C 1000 PO) Take 1 tablet by mouth Daily. Super c 900 mg, zinc 11 mg, vitamin D3 1,000  units, Vit A 450 mcg, Vitamin E 15 mg OTC    Saige Cervantes MD   Ca Carbonate-Mag Hydroxide (ROLAIDS PO) Take  by mouth Daily As Needed.    Saige Cervantes MD   cloNIDine (CATAPRES) 0.1 MG tablet Take 1 tablet by mouth 2 (Two) Times a Day As Needed for High Blood Pressure (SBP greater than 180 or DBP greater than 100). 8/24/23 8/23/24  Meghan Freeman APRN   empagliflozin (Jardiance) 10 MG tablet tablet Take 1 tablet by mouth Daily.    Saige Cervantes MD   furosemide (LASIX) 20 MG tablet Take 1 tablet by mouth Daily As Needed (swelling). 8/24/23 8/23/24  Meghan Freeman APRN   Gabapentin, Once-Daily, 300 MG tablet Take 300-600 mg by mouth Daily Before Supper. Sometimes takes more than one but mostly takes one 10/17/16   Saige Cervantes MD   lansoprazole (PREVACID) 15 MG capsule Take 1 capsule by mouth Daily As Needed.    Saige Cervantes MD   levothyroxine (SYNTHROID, LEVOTHROID) 75 MCG tablet Take 0.5-1 tablets by mouth Daily. Takes 1/2 tab M, W, &F and 1 tab on S, Tu, Th, & Sa 10/13/16   Saige Cervantes MD   lisinopril (PRINIVIL,ZESTRIL) 20 MG tablet Take 1 tablet by mouth 2 (Two) Times a Day. 1/11/18   Saige Cervantes MD   MAGNESIUM GLYCINATE PO Take 1,300 mg by mouth 2 (Two) Times a Day. Increase to 1300 mg BID 8/30/23 from 1300 in am and 650 in pm - Mg level 1.3    Saige Cervantes MD   metFORMIN ER (GLUCOPHAGE-XR) 500 MG 24 hr tablet Take 1 tablet by mouth 2 (Two) Times a Day.    Saige Cervantes MD   spironolactone (ALDACTONE) 25 MG tablet Take 1 tablet by mouth Daily.  Patient taking differently: Take 1 tablet by mouth Daily. Pcp cut to half a tablet, but she increased back to 25 mg daily due to her HTN. Couldn't tolerate jardiance 8/25/22 8/25/23  Meghan Freeman APRN   therapeutic multivitamin-minerals (THERAGRAN-M) tablet Take 1 tablet by mouth.    Siage Cervantes MD   TRUE METRIX BLOOD GLUCOSE TEST test strip  6/14/19   Provider,  "Historical, MD     I have utilized all available immediate resources to obtain, update, or review the patient's current medications (including all prescriptions, over-the-counter products, herbals, cannabis/cannabidiol products, and vitamin/mineral/dietary (nutritional) supplements).    Objective     Vital Signs: /94   Pulse 86   Temp 98.1 °F (36.7 °C) (Temporal)   Resp 18   Ht 162.6 cm (64\")   Wt 69.4 kg (153 lb)   SpO2 95%   BMI 26.26 kg/m²   Physical Exam  Vitals reviewed.   Constitutional:       Appearance: She is ill-appearing.   HENT:      Head: Normocephalic and atraumatic.      Right Ear: External ear normal.      Left Ear: External ear normal.      Nose: Nose normal.   Eyes:      General: No scleral icterus.     Conjunctiva/sclera: Conjunctivae normal.   Cardiovascular:      Rate and Rhythm: Normal rate and regular rhythm.      Heart sounds: Normal heart sounds.   Pulmonary:      Effort: Pulmonary effort is normal.      Breath sounds: Normal breath sounds.   Abdominal:      General: There is distension.      Palpations: Abdomen is soft.      Tenderness: There is abdominal tenderness.   Musculoskeletal:         General: No swelling or tenderness.      Cervical back: Normal range of motion and neck supple.      Right lower leg: No edema.      Left lower leg: No edema.   Skin:     General: Skin is warm and dry.   Neurological:      Mental Status: She is alert and oriented to person, place, and time.      Cranial Nerves: No cranial nerve deficit.   Psychiatric:         Mood and Affect: Mood normal.         Behavior: Behavior normal.        Results Reviewed:  Lab Results (last 24 hours)       Procedure Component Value Units Date/Time    Urinalysis With Culture If Indicated - Urine, Clean Catch [[id number]]  (Abnormal) Collected: 11/11/23 2016    Specimen: Urine, Clean Catch Updated: 11/11/23 2040     Color, UA Yellow     Appearance, UA Clear     pH, UA 5.5     Specific Gravity, UA 1.015     Glucose, "  mg/dL (1+)     Ketones, UA Trace     Bilirubin, UA Negative     Blood, UA Negative     Protein, UA Negative     Leuk Esterase, UA Trace     Nitrite, UA Negative     Urobilinogen, UA 0.2 E.U./dL    Narrative:      In absence of clinical symptoms, the presence of pyuria, bacteria, and/or nitrites on the urinalysis result does not correlate with infection.    Urinalysis, Microscopic Only - Urine, Clean Catch [[id number]]  (Abnormal) Collected: 11/11/23 2016    Specimen: Urine, Clean Catch Updated: 11/11/23 2040     RBC, UA 0-2 /HPF      WBC, UA 6-10 /HPF      Bacteria, UA None Seen /HPF      Squamous Epithelial Cells, UA 0-2 /HPF      Hyaline Casts, UA 3-6 /LPF      Methodology Automated Microscopy    Urine Culture - Urine, Urine, Clean Catch [[id number]] Collected: 11/11/23 2016    Specimen: Urine, Clean Catch Updated: 11/11/23 2040    STAT Lactic Acid, Reflex [[id number]] Collected: 11/11/23 2023    Specimen: Blood Updated: 11/11/23 2033    Comprehensive Metabolic Panel [[id number]]  (Abnormal) Collected: 11/11/23 1758    Specimen: Blood Updated: 11/11/23 1831     Glucose 92 mg/dL      BUN 22 mg/dL      Creatinine 1.58 mg/dL      Sodium 129 mmol/L      Potassium 4.8 mmol/L      Chloride 88 mmol/L      CO2 27.0 mmol/L      Calcium 14.6 mg/dL      Total Protein 6.8 g/dL      Albumin 3.9 g/dL      ALT (SGPT) 30 U/L      AST (SGOT) 47 U/L      Alkaline Phosphatase 185 U/L      Total Bilirubin 0.5 mg/dL      Globulin 2.9 gm/dL      A/G Ratio 1.3 g/dL      BUN/Creatinine Ratio 13.9     Anion Gap 14.0 mmol/L      eGFR 32.6 mL/min/1.73     Narrative:      GFR Normal >60  Chronic Kidney Disease <60  Kidney Failure <15    The GFR formula is only valid for adults with stable renal function between ages 18 and 70.    Magnesium [[id number]]  (Normal) Collected: 11/11/23 1758    Specimen: Blood Updated: 11/11/23 1831     Magnesium 1.9 mg/dL     Lactic Acid, Plasma [[id number]]  (Abnormal) Collected: 11/11/23 1758    Specimen:  Blood Updated: 11/11/23 1831     Lactate 3.9 mmol/L     Lipase [[id number]]  (Abnormal) Collected: 11/11/23 1758    Specimen: Blood Updated: 11/11/23 1821     Lipase 144 U/L     Protime-INR [[id number]]  (Normal) Collected: 11/11/23 1758    Specimen: Blood Updated: 11/11/23 1816     Protime 13.3 Seconds      INR 1.00    CBC & Differential [[id number]]  (Abnormal) Collected: 11/11/23 1758    Specimen: Blood Updated: 11/11/23 1808    Narrative:      The following orders were created for panel order CBC & Differential.  Procedure                               Abnormality         Status                     ---------                               -----------         ------                     CBC Auto Differential[[id number]]        Abnormal            Final result                 Please view results for these tests on the individual orders.    CBC Auto Differential [[id number]]  (Abnormal) Collected: 11/11/23 1758    Specimen: Blood Updated: 11/11/23 1808     WBC 9.08 10*3/mm3      RBC 4.61 10*6/mm3      Hemoglobin 13.4 g/dL      Hematocrit 41.5 %      MCV 90.0 fL      MCH 29.1 pg      MCHC 32.3 g/dL      RDW 13.8 %      RDW-SD 44.8 fl      MPV 9.4 fL      Platelets 538 10*3/mm3      Neutrophil % 81.8 %      Lymphocyte % 8.6 %      Monocyte % 7.0 %      Eosinophil % 1.1 %      Basophil % 0.7 %      Immature Grans % 0.8 %      Neutrophils, Absolute 7.43 10*3/mm3      Lymphocytes, Absolute 0.78 10*3/mm3      Monocytes, Absolute 0.64 10*3/mm3      Eosinophils, Absolute 0.10 10*3/mm3      Basophils, Absolute 0.06 10*3/mm3      Immature Grans, Absolute 0.07 10*3/mm3      nRBC 0.0 /100 WBC           Imaging Results (Last 24 Hours)       ** No results found for the last 24 hours. **          I have personally reviewed and interpreted the radiology studies and ECG obtained at time of admission.     Assessment / Plan   Assessment:   Active Hospital Problems    Diagnosis     **Hypercalcemia       Impression:  Hypercalcemia  Hypernatremia  Myalgia  Nausea and vomiting  Abdominal pain  RICHA  7.   Abnormal lymph nodes in the abdomen  8.   NIDDM  9.   Hypothyroid     Treatment Plan  [facility]  Zometa  Cardiac echho  Telemetry  General surgery consult  Bowel program with nausea control  SSI with accu-cehcks  FU labs in the am to include TSH and free T4    The patient will be admitted to my service here at [facility]. Primary team to take over in the am    Medical Decision Making  Number and Complexity of problems: >4 complex  Differential Diagnosis: Canceer    Conditions and Status        Condition is unchanged.     [facility] Data  External documents reviewed: None  Cardiac tracing (EKG, telemetry) interpretation: None  Radiology interpretation: None  Labs reviewed: Reviewed  Any tests that were considered but not ordered: None     Decision rules/scores evaluated (example [id number], Wells, etc): None     Discussed with: Patient and family at bedside     Care Planning  Shared decision making: Patient, Family, and ED staff  Code status and discussions: Full    Disposition  Social Determinants of Health that impact treatment or disposition: None  Estimated length of stay is 1-2 days.     I confirmed that the patient's advanced care plan is present, code status is documented, and a surrogate decision maker is listed in the patient's medical record.     The patient's surrogate decision maker is Family.     The patient was seen and examined by me on 11/11/2023 at 730.    Electronically signed by Lei Baker DO, 11/11/23, 20:52 CST.

## 2023-11-12 NOTE — PLAN OF CARE
Goal Outcome Evaluation:  Plan of Care Reviewed With: patient        Progress: no change  Outcome Evaluation: Patient recieved from ED without complication at start of shift. Patient alert and oriented x4, on room air. Patient abdomen is distended and tender. Patient reports more discomfort on her left hip and back, but reports generalized abdominal discomfort. Patient calcium is currently 13.4 post zometa in the 2200 hour. Patient up x 1 with walker to bathroom- voids spontaneously. Patient last M was 11/9.  Patient has recieved PRN pain medication - see mar.

## 2023-11-12 NOTE — CASE MANAGEMENT/SOCIAL WORK
Discharge Planning Assessment  [facility]     Patient Name: Imani Chauhan  MRN: [id number]  Today's Date: 11/12/2023    Admit Date: 11/11/2023        Discharge Needs Assessment       Row Name 11/12/23 1503       Living Environment    People in Home alone    Current Living Arrangements home    Potentially Unsafe Housing Conditions none    Primary Care Provided by self    Provides Primary Care For no one    Family Caregiver if Needed child(ana), adult;grandchild(ana), adult    Quality of Family Relationships helpful;involved;supportive       Resource/Environmental Concerns    Resource/Environmental Concerns none       Transition Planning    Patient/Family Anticipates Transition to home    Patient/Family Anticipated Services at Transition none    Transportation Anticipated family or friend will provide       Discharge Needs Assessment    Readmission Within the Last 30 Days no previous admission in last 30 days    Equipment Currently Used at Home cane, straight    Concerns to be Addressed adjustment to diagnosis/illness    Anticipated Changes Related to Illness none    Equipment Needed After Discharge none    Current Discharge Risk lives alone    Discharge Coordination/Progress Pt lives at home alone with an adult granddaughter that lives across the street. She also has a cousin near her who can help if needed. Pt was independent at home and uses a cane. She has rx coverage for her meds. Pt will return home at d/c. Pt will likely not have any needs at d/c unless something changes. She is possibly having a biospy tomorrow. Will follow.                   Discharge Plan    No documentation.                 Continued Care and Services - Admitted Since 11/11/2023    Coordination has not been started for this encounter.          Demographic Summary    No documentation.                  Functional Status    No documentation.                  Psychosocial    No documentation.                  Abuse/Neglect    No  documentation.                  Legal    No documentation.                  Substance Abuse    No documentation.                  Patient Forms    No documentation.                     JADE Ibarra

## 2023-11-12 NOTE — PROGRESS NOTES
[facility] Medicine Services  INPATIENT PROGRESS NOTE    Patient Name: Imani Chauhan  Date of Admission: 11/11/2023  Today's Date: 11/12/23  Length of Stay: 1  Primary Care Physician: Светлана Loyd MD    Subjective   Chief Complaint: Abdominal pain and abdominal distention, nausea vomiting and the patient at admission, generalized weakness  Less abdominal pain today, nausea or vomiting, still constipation.  Likely improving generalized weakness.  Mentation is normal  Abdominal Pain  Associated symptoms include constipation.   Vomiting   Associated symptoms include abdominal pain.        83 years old lady with a history of hypertension, type 2 diabetes, dyslipidemia, hypothyroidism, seen outpatient for retroperitoneal mass suspicious of lymphoma, seen by hematology oncology as outpatient general surgery, post nondiagnostic CT-guided retroperitoneal biopsy earlier this year, admitted with abdominal pain and increasing abdominal distention, nausea vomiting and constipation and generalized weakness.  She was found with severe symptomatic hypercalcemia, RICHA.   General surgery was consulted, she was treated with IV fluids and 4 mg IV Zometa.          Review of Systems   Constitutional:  Positive for activity change and fatigue.   HENT: Negative.     Eyes: Negative.    Respiratory: Negative.     Cardiovascular: Negative.    Gastrointestinal:  Positive for abdominal pain and constipation.   Endocrine: Negative.    Genitourinary: Negative.    Musculoskeletal:  Positive for gait problem.   Allergic/Immunologic: Negative.    Neurological:  Positive for weakness.   Hematological: Negative.    Psychiatric/Behavioral: Negative.        All pertinent negatives and positives are as above. All other systems have been reviewed and are negative unless otherwise stated.     Objective    Temp:  [97.6 °F (36.4 °C)-98.1 °F (36.7 °C)] 97.6 °F (36.4 °C)  Heart Rate:  [69-95] 70  Resp:  [18]  18  BP: (143-189)/(63-97) 149/63  Physical Exam  Vitals reviewed.   Constitutional:       Appearance: She is ill-appearing.   HENT:      Head: Normocephalic and atraumatic.      Right Ear: External ear normal.      Left Ear: External ear normal.      Nose: Nose normal.      Mouth/Throat:      Mouth: Mucous membranes are moist.      Pharynx: Oropharynx is clear.   Eyes:      Extraocular Movements: Extraocular movements intact.      Conjunctiva/sclera: Conjunctivae normal.      Pupils: Pupils are equal, round, and reactive to light.   Cardiovascular:      Rate and Rhythm: Normal rate and regular rhythm.      Pulses: Normal pulses.      Heart sounds: Normal heart sounds.   Pulmonary:      Effort: Pulmonary effort is normal.      Breath sounds: Normal breath sounds.   Abdominal:      General: Bowel sounds are normal. There is distension.   Musculoskeletal:         General: Normal range of motion.      Cervical back: Normal range of motion and neck supple.   Skin:     General: Skin is warm.      Capillary Refill: Capillary refill takes less than 2 seconds.   Neurological:      General: No focal deficit present.      Mental Status: She is alert and oriented to person, place, and time. Mental status is at baseline.      Motor: Weakness present.   Psychiatric:         Mood and Affect: Mood normal.         Behavior: Behavior normal.         Thought Content: Thought content normal.         Judgment: Judgment normal.             Results Review:  I have reviewed the labs, radiology results, and diagnostic studies.    Laboratory Data:   Results from last 7 days   Lab Units 11/12/23  0336 11/11/23  1758   WBC 10*3/mm3 8.88 9.08   HEMOGLOBIN g/dL 11.3* 13.4   HEMATOCRIT % 35.9 41.5   PLATELETS 10*3/mm3 452* 538*        Results from last 7 days   Lab Units 11/12/23  0336 11/11/23  1758   SODIUM mmol/L 129* 129*   POTASSIUM mmol/L 4.5 4.8   CHLORIDE mmol/L 92* 88*   CO2 mmol/L 25.0 27.0   BUN mg/dL 23 22   CREATININE mg/dL 1.71*  1.58*   CALCIUM mg/dL 13.4* 14.6*   BILIRUBIN mg/dL 0.4 0.5   ALK PHOS U/L 148* 185*   ALT (SGPT) U/L 24 30   AST (SGOT) U/L 39* 47*   GLUCOSE mg/dL 86 92       Culture Data:   [unfilled]    Radiology Data:   Imaging Results (Last 24 Hours)       ** No results found for the last 24 hours. **            I have reviewed the patient's current medications.     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  Supposed to have as outpatient robotic laparoscopic retroperitoneal lymph node excision  Prior CT-guided retroperitoneal mass biopsy nondiagnostic  Suspect lymphoma  Check LDH level in a.m.  Hematology oncology consulted    -Type 2 diabetes not on long-term insulin use.  Issac on hold for RICHA  Continue Jardiance and Humalog sliding scale    -Lactic acidosis, nonrelated with sepsis  Lactic acid trending down with IV fluids    -Hypothyroidism-on levothyroxine  TSH 2.95    -Hypertension-lisinopril on hold for RICHA  Labetalol and clonidine as needed    -Dyslipidemia-on statin    -Constipation-likely in the context of hypercalcemia  Bowel care protocol    DVT prophylaxis-sequential compressive device    Discussed with the granddaughter present at bedside  Prior to admission the patient was living alone, walking with a cane.  Family- living  close by and being involved in her care      Medical Decision Making  Number and Complexity of problems: 3      Conditions and Status        fair     MDM Data  External documents reviewed: yes  Cardiac tracing (EKG, telemetry) interpretation: yes  Radiology interpretation: yes  Labs reviewed: yes       Discussed with: granddaughter present at bedside      Care Planning  Shared decision making: yes  Code status and discussions: full code    Disposition  Social Determinants of Health that impact treatment or disposition: no  I expect the patient to be discharged to home in 3 days.     Electronically signed by Jessi Rushing MD, 11/12/23, 10:54 CST.

## 2023-11-12 NOTE — NURSING NOTE
SPOKE WITH DR ROBLES, ABOUT PATIENTS GFR 29, CT CALLED WITH SAYING THAT IT WAS A RISK FOR THE PATIENT TO HAVE A CT WITH CONTRAST. DR ROBLES VOICED HE ORDERED A BOLUS OF FLUIDS BEFORE AND IMMEDIATELY AFTER PROCEDURE AND WANTED A CT SCAN WITH CONTRAST.

## 2023-11-12 NOTE — NURSING NOTE
5736 SPOKE WITH DR ROBLES AND GIVE HIM THE RADIOLOGIST RECOMMENDATIONS. HE WAS OK WITH PATIENT GETTING THE CT WITH CONTRAST. JEFFRY GODFREY RN

## 2023-11-12 NOTE — ED NOTES
"Nursing report ED to floor  Imani Chauhan  83 y.o.  female    HPI:   Chief Complaint   Patient presents with    Abdominal Pain    Vomiting       Admitting doctor:   Lei Baker DO    Consulting provider(s):  Consults       No orders found from 10/13/2023 to 11/12/2023.             Admitting diagnosis:   The primary encounter diagnosis was Hypercalcemia. Diagnoses of Hyponatremia and Acute kidney injury were also pertinent to this visit.    Code status:   Current Code Status       Date Active Code Status Order ID Comments User Context       Not on file            Allergies:   Patient has no known allergies.    Intake and Output  No intake or output data in the 24 hours ending 11/11/23 2034    Weight:       11/11/23 1739   Weight: 69.4 kg (153 lb)       Most recent vitals:   Vitals:    11/11/23 1739 11/11/23 1959 11/11/23 2024   BP: 172/86 (!) 189/97 177/94   BP Location: Right arm     Patient Position: Sitting     Pulse: 95 81 86   Resp: 18     Temp: 98.1 °F (36.7 °C)     TempSrc: Temporal     SpO2: 97% 94% 95%   Weight: 69.4 kg (153 lb)     Height: 162.6 cm (64\")       Oxygen Therapy: .    Active LDAs/IV Access:   Lines, Drains & Airways       Active LDAs       Name Placement date Placement time Site Days    Peripheral IV 11/11/23 1759 Anterior;Proximal;Right Forearm 11/11/23 1759  Forearm  less than 1                    Labs (abnormal labs have a star):   Labs Reviewed   COMPREHENSIVE METABOLIC PANEL - Abnormal; Notable for the following components:       Result Value    Creatinine 1.58 (*)     Sodium 129 (*)     Chloride 88 (*)     Calcium 14.6 (*)     AST (SGOT) 47 (*)     Alkaline Phosphatase 185 (*)     eGFR 32.6 (*)     All other components within normal limits    Narrative:     GFR Normal >60  Chronic Kidney Disease <60  Kidney Failure <15    The GFR formula is only valid for adults with stable renal function between ages 18 and 70.   LIPASE - Abnormal; Notable for the following components:    " Lipase 144 (*)     All other components within normal limits   LACTIC ACID, PLASMA - Abnormal; Notable for the following components:    Lactate 3.9 (*)     All other components within normal limits   CBC WITH AUTO DIFFERENTIAL - Abnormal; Notable for the following components:    Platelets 538 (*)     Neutrophil % 81.8 (*)     Lymphocyte % 8.6 (*)     Immature Grans % 0.8 (*)     Neutrophils, Absolute 7.43 (*)     Immature Grans, Absolute 0.07 (*)     All other components within normal limits   PROTIME-INR - Normal   MAGNESIUM - Normal   LACTIC ACID, REFLEX   URINALYSIS W/ CULTURE IF INDICATED   CBC AND DIFFERENTIAL    Narrative:     The following orders were created for panel order CBC & Differential.  Procedure                               Abnormality         Status                     ---------                               -----------         ------                     CBC Auto Differential[[id number]]        Abnormal            Final result                 Please view results for these tests on the individual orders.       Meds given in ED:   Medications   sodium chloride 0.9 % flush 10 mL (has no administration in time range)   sodium chloride 0.9 % infusion (0 mL/hr Intravenous Stopped 11/11/23 2007)   empagliflozin (JARDIANCE) tablet 10 mg (10 mg Oral Given 11/11/23 2003)   lactated ringers bolus 1,000 mL (0 mL Intravenous Stopped 11/11/23 1905)   sodium chloride 0.9 % bolus 1,000 mL (0 mL Intravenous Stopped 11/11/23 2004)   lisinopril (PRINIVIL,ZESTRIL) tablet 20 mg (20 mg Oral Given 11/11/23 2003)     sodium chloride, 125 mL/hr, Last Rate: Stopped (11/11/23 2007)         NIH Stroke Scale:       Isolation/Infection(s):  No active isolations   No active infections     COVID Testing  Collected .  Resulted .    Nursing report ED to floor:  Mental status: . A/O x4  Ambulatory status: . Assist x1  Precautions: .    ED nurse phone extentsion- .. [phone]

## 2023-11-13 PROBLEM — R59.1 LYMPHADENOPATHY: Status: ACTIVE | Noted: 2023-11-11

## 2023-11-13 LAB
ANION GAP SERPL CALCULATED.3IONS-SCNC: 8 MMOL/L (ref 5–15)
BASOPHILS # BLD AUTO: 0.07 10*3/MM3 (ref 0–0.2)
BASOPHILS NFR BLD AUTO: 0.9 % (ref 0–1.5)
BUN SERPL-MCNC: 25 MG/DL (ref 8–23)
BUN/CREAT SERPL: 13.9 (ref 7–25)
CALCIUM SPEC-SCNC: 12.1 MG/DL (ref 8.6–10.5)
CHLORIDE SERPL-SCNC: 99 MMOL/L (ref 98–107)
CO2 SERPL-SCNC: 24 MMOL/L (ref 22–29)
CREAT SERPL-MCNC: 1.8 MG/DL (ref 0.57–1)
DEPRECATED RDW RBC AUTO: 47 FL (ref 37–54)
EGFRCR SERPLBLD CKD-EPI 2021: 27.8 ML/MIN/1.73
EOSINOPHIL # BLD AUTO: 0.24 10*3/MM3 (ref 0–0.4)
EOSINOPHIL NFR BLD AUTO: 3 % (ref 0.3–6.2)
ERYTHROCYTE [DISTWIDTH] IN BLOOD BY AUTOMATED COUNT: 14.1 % (ref 12.3–15.4)
GLUCOSE BLDC GLUCOMTR-MCNC: 104 MG/DL (ref 70–130)
GLUCOSE BLDC GLUCOMTR-MCNC: 85 MG/DL (ref 70–130)
GLUCOSE BLDC GLUCOMTR-MCNC: 93 MG/DL (ref 70–130)
GLUCOSE BLDC GLUCOMTR-MCNC: 93 MG/DL (ref 70–130)
GLUCOSE SERPL-MCNC: 91 MG/DL (ref 65–99)
HCT VFR BLD AUTO: 34.6 % (ref 34–46.6)
HGB BLD-MCNC: 10.7 G/DL (ref 12–15.9)
IMM GRANULOCYTES # BLD AUTO: 0.08 10*3/MM3 (ref 0–0.05)
IMM GRANULOCYTES NFR BLD AUTO: 1 % (ref 0–0.5)
LDH SERPL-CCNC: 1023 U/L (ref 135–214)
LYMPHOCYTES # BLD AUTO: 0.43 10*3/MM3 (ref 0.7–3.1)
LYMPHOCYTES NFR BLD AUTO: 5.4 % (ref 19.6–45.3)
MCH RBC QN AUTO: 28.4 PG (ref 26.6–33)
MCHC RBC AUTO-ENTMCNC: 30.9 G/DL (ref 31.5–35.7)
MCV RBC AUTO: 91.8 FL (ref 79–97)
MONOCYTES # BLD AUTO: 0.59 10*3/MM3 (ref 0.1–0.9)
MONOCYTES NFR BLD AUTO: 7.5 % (ref 5–12)
NEUTROPHILS NFR BLD AUTO: 6.49 10*3/MM3 (ref 1.7–7)
NEUTROPHILS NFR BLD AUTO: 82.2 % (ref 42.7–76)
NRBC BLD AUTO-RTO: 0 /100 WBC (ref 0–0.2)
OSMOLALITY SERPL: 285 MOSM/KG (ref 280–301)
PLATELET # BLD AUTO: 399 10*3/MM3 (ref 140–450)
PMV BLD AUTO: 9.7 FL (ref 6–12)
POTASSIUM SERPL-SCNC: 4.4 MMOL/L (ref 3.5–5.2)
RBC # BLD AUTO: 3.77 10*6/MM3 (ref 3.77–5.28)
SODIUM SERPL-SCNC: 131 MMOL/L (ref 136–145)
WBC NRBC COR # BLD: 7.9 10*3/MM3 (ref 3.4–10.8)

## 2023-11-13 PROCEDURE — 80048 BASIC METABOLIC PNL TOTAL CA: CPT | Performed by: HOSPITALIST

## 2023-11-13 PROCEDURE — 85025 COMPLETE CBC W/AUTO DIFF WBC: CPT | Performed by: HOSPITALIST

## 2023-11-13 PROCEDURE — 82948 REAGENT STRIP/BLOOD GLUCOSE: CPT

## 2023-11-13 PROCEDURE — 83930 ASSAY OF BLOOD OSMOLALITY: CPT | Performed by: HOSPITALIST

## 2023-11-13 PROCEDURE — 25810000003 SODIUM CHLORIDE 0.9 % SOLUTION: Performed by: HOSPITALIST

## 2023-11-13 PROCEDURE — 83615 LACTATE (LD) (LDH) ENZYME: CPT | Performed by: HOSPITALIST

## 2023-11-13 PROCEDURE — 25010000002 HYDRALAZINE PER 20 MG: Performed by: INTERNAL MEDICINE

## 2023-11-13 PROCEDURE — 99222 1ST HOSP IP/OBS MODERATE 55: CPT | Performed by: INTERNAL MEDICINE

## 2023-11-13 RX ORDER — NIFEDIPINE 30 MG/1
30 TABLET, EXTENDED RELEASE ORAL
Status: DISCONTINUED | OUTPATIENT
Start: 2023-11-13 | End: 2023-11-20 | Stop reason: HOSPADM

## 2023-11-13 RX ORDER — LORAZEPAM 0.5 MG/1
0.5 TABLET ORAL EVERY 8 HOURS PRN
Status: DISCONTINUED | OUTPATIENT
Start: 2023-11-13 | End: 2023-11-20 | Stop reason: HOSPADM

## 2023-11-13 RX ORDER — HYDRALAZINE HYDROCHLORIDE 20 MG/ML
10 INJECTION INTRAMUSCULAR; INTRAVENOUS EVERY 4 HOURS PRN
Status: DISCONTINUED | OUTPATIENT
Start: 2023-11-13 | End: 2023-11-20 | Stop reason: HOSPADM

## 2023-11-13 RX ORDER — TEMAZEPAM 15 MG/1
15 CAPSULE ORAL NIGHTLY PRN
Status: DISCONTINUED | OUTPATIENT
Start: 2023-11-13 | End: 2023-11-20 | Stop reason: HOSPADM

## 2023-11-13 RX ADMIN — DOCUSATE SODIUM 50 MG AND SENNOSIDES 8.6 MG 2 TABLET: 8.6; 5 TABLET, FILM COATED ORAL at 08:59

## 2023-11-13 RX ADMIN — HYDRALAZINE HYDROCHLORIDE 10 MG: 20 INJECTION INTRAMUSCULAR; INTRAVENOUS at 20:03

## 2023-11-13 RX ADMIN — NIFEDIPINE 30 MG: 30 TABLET, FILM COATED, EXTENDED RELEASE ORAL at 18:53

## 2023-11-13 RX ADMIN — SODIUM CHLORIDE 150 ML/HR: 9 INJECTION, SOLUTION INTRAVENOUS at 20:33

## 2023-11-13 RX ADMIN — EMPAGLIFLOZIN 10 MG: 10 TABLET, FILM COATED ORAL at 08:58

## 2023-11-13 RX ADMIN — SODIUM CHLORIDE 150 ML/HR: 9 INJECTION, SOLUTION INTRAVENOUS at 12:57

## 2023-11-13 RX ADMIN — HYDROCODONE BITARTRATE AND ACETAMINOPHEN 1 TABLET: 7.5; 325 TABLET ORAL at 21:51

## 2023-11-13 RX ADMIN — ATORVASTATIN CALCIUM 10 MG: 10 TABLET, FILM COATED ORAL at 20:03

## 2023-11-13 RX ADMIN — Medication 1 TABLET: at 08:58

## 2023-11-13 NOTE — CONSULTS
Pt refusing any iv attempts at this time.  Notified Aminta, pt's rn, to reconsult if AC iv quits working.

## 2023-11-13 NOTE — PAYOR COMM NOTE
"Imani Branch (83 y.o. Female)          [id number]   NEW REQUEST ADMIT  11/11    [facility] phone     Fax                  Date of Birth   1940    Social Security Number       [place]   [address] [address]    Home Phone   [phone]    MRN   [id number]       Tenriism   Nondenominational of [place]    Marital Status                               Admission Date   11/11/23    Admission Type   Emergency    Admitting Provider   Alvarez Lam MD    Attending Provider   Alvarez Lam MD    Department, Room/Bed   [facility] 3C, 361/1       Discharge Date       Discharge Disposition       Discharge Destination                                 Attending Provider: Alvarez Lam MD    Allergies: No Known Allergies    Isolation: None   Infection: None   Code Status: CPR    Ht: 162.6 cm (64.02\")   Wt: 71.2 kg (156 lb 14.4 oz)    Admission Cmt: None   Principal Problem: Hypercalcemia [E83.52]                   Active Insurance as of 11/11/2023       Primary Coverage       Payor Plan Insurance Group Employer/Plan Group    ANTHEM MEDICARE REPLACEMENT ANTHEM MEDICARE ADVANTAGE [id number]       Payor Plan Address Payor Plan Phone Number Payor Plan Fax Number Effective Dates    [address] [phone]  1/1/2015 - None Entered    [facility] [id number]         Subscriber Name Subscriber Birth Date Member ID       IMANI BRANCH 1940 [id number]                     Emergency Contacts        (Rel.) Home Phone Work Phone Mobile Phone    Rosamaria Branch (Relative) [phone] -- --    Mary Rivas (Grandchild) -- -- [phone]                 History & Physical        Lei Baker DO at 11/11/23 2052              [facility] Medicine Services  HISTORY AND PHYSICAL    Date of Admission: 11/11/2023  Primary Care Physician: Светлана Loyd MD    Subjective "   Primary Historian: Patient    Chief Complaint: Abdominal pain and myalgia     Abdominal Pain  Associated symptoms include constipation, myalgias and vomiting. Pertinent negatives include no fever.   Vomiting   Associated symptoms include abdominal pain and myalgias. Pertinent negatives include no chills or fever.   83-year-old female who presents emergency department with complaints of generalized malaise.  Decreased p.o. intake.  Nausea and vomiting.  The patient has abdominal lymphadenopathy that she has a follow-up to see Dr. Restrepo next week.  She is supposed to have an incisional biopsy performed.  She was at [facility] 2 weeks ago for similar complaints, and was sent home.  She states that she has chest pain and abdominal pain that radiates into her right side.  When she has a deep breath it catches in her epigastric region secondary to her abdominal distention.  She has no dysuria.  She has chronic constipation, but she did have a bowel movement yesterday.  She is hypertensive in the emergency department on my exam.  The patient's work-up demonstrated multiple electrolyte abnormalities.        Review of Systems   Constitutional:  Negative for chills and fever.   Gastrointestinal:  Positive for abdominal distention, abdominal pain, constipation and vomiting.   Musculoskeletal:  Positive for myalgias.      Otherwise complete ROS reviewed and negative except as mentioned in the HPI.    Past Medical History:   Past Medical History:   Diagnosis Date    Aortic regurgitation     mild 2018    CAD in native artery     COVID-19 06/01/2022    mild case, not hospitalized, no MAB    GERD (gastroesophageal reflux disease)     Heart murmur     Hyperlipidemia     Hypertension     Hypothyroidism     Mitral regurgitation     mild per 2013 echo, no significant per 2018    Myalgia     Near syncope     Pre-diabetes     SOB (shortness of breath)     Squamous cell cancer of skin of right forearm     Syncope     Tricuspid  regurgitation     mild per 2013 echo, trivial per 2018     Past Surgical History:  Past Surgical History:   Procedure Laterality Date    BACK SURGERY      BIOPSY / EXCISION / DISSECTION AXILLARY NODE  2023    had bx w/ Jennifer Restrepo MD at [facility]    CARDIAC CATHETERIZATION      COLONOSCOPY      CORONARY ARTERY BYPASS GRAFT  10/11/2010    Dr. Karina Lora, [facility]    HYSTERECTOMY      LAPAROSCOPIC CHOLECYSTECTOMY      MEDIAN STERNOTOMY      Thymus cyst resection at same time as CABG    OTHER SURGICAL HISTORY      Rectal surgery    PARTIAL THYMECTOMY  10/11/2010    Dr. Lora    REPLACEMENT TOTAL KNEE Left 2005    TOTAL KNEE ARTHROPLASTY Left 06/2019    TOTAL KNEE ARTHROPLASTY Right 11/04/2020    Dr. Santamaria     Social History:  reports that she has never smoked. She has been exposed to tobacco smoke. She has never used smokeless tobacco. She reports that she does not drink alcohol and does not use drugs.    Family History: family history includes Alzheimer's disease in her father and mother; Coronary artery disease in her father; Hyperlipidemia in her mother; Hypertension in her father and mother.       Allergies:  No Known Allergies    Medications:  Prior to Admission medications    Medication Sig Start Date End Date Taking? Authorizing Provider   acetaminophen (TYLENOL) 500 MG tablet Take 1 tablet by mouth Every 6 (Six) Hours As Needed for Mild Pain. prn    Saige Cervantes MD   aspirin 81 MG EC tablet Take 1 tablet by mouth Daily.    ProviderSaige MD   atorvastatin (LIPITOR) 10 MG tablet Take 1 tablet by mouth Every Night. Orthopaedic stopped due to bursitis in hips- stopping for 1 month 8/16/16   Saige Cervantes MD   Bioflavonoid Products (SUPER-C 1000 PO) Take 1 tablet by mouth Daily. Super c 900 mg, zinc 11 mg, vitamin D3 1,000 units, Vit A 450 mcg, Vitamin E 15 mg OTC    Saige Cervantes MD   Ca Carbonate-Mag Hydroxide (ROLAIDS PO) Take  by mouth Daily As Needed.    Saige Cervantes MD    cloNIDine (CATAPRES) 0.1 MG tablet Take 1 tablet by mouth 2 (Two) Times a Day As Needed for High Blood Pressure (SBP greater than 180 or DBP greater than 100). 8/24/23 8/23/24  Meghan Freeman APRN   empagliflozin (Jardiance) 10 MG tablet tablet Take 1 tablet by mouth Daily.    Saige Cervantes MD   furosemide (LASIX) 20 MG tablet Take 1 tablet by mouth Daily As Needed (swelling). 8/24/23 8/23/24  Meghan Freeman APRN   Gabapentin, Once-Daily, 300 MG tablet Take 300-600 mg by mouth Daily Before Supper. Sometimes takes more than one but mostly takes one 10/17/16   Saige Cervantes MD   lansoprazole (PREVACID) 15 MG capsule Take 1 capsule by mouth Daily As Needed.    Saige Cervantes MD   levothyroxine (SYNTHROID, LEVOTHROID) 75 MCG tablet Take 0.5-1 tablets by mouth Daily. Takes 1/2 tab M, W, &F and 1 tab on S, Tu, Th, & Sa 10/13/16   Saige Cervantes MD   lisinopril (PRINIVIL,ZESTRIL) 20 MG tablet Take 1 tablet by mouth 2 (Two) Times a Day. 1/11/18   Saige Cervantes MD   MAGNESIUM GLYCINATE PO Take 1,300 mg by mouth 2 (Two) Times a Day. Increase to 1300 mg BID 8/30/23 from 1300 in am and 650 in pm - Mg level 1.3    Saige Cervantes MD   metFORMIN ER (GLUCOPHAGE-XR) 500 MG 24 hr tablet Take 1 tablet by mouth 2 (Two) Times a Day.    Saige Cervantes MD   spironolactone (ALDACTONE) 25 MG tablet Take 1 tablet by mouth Daily.  Patient taking differently: Take 1 tablet by mouth Daily. Pcp cut to half a tablet, but she increased back to 25 mg daily due to her HTN. Couldn't tolerate jardiance 8/25/22 8/25/23  Meghan Freeman APRN   therapeutic multivitamin-minerals (THERAGRAN-M) tablet Take 1 tablet by mouth.    Saige Cervantes MD   TRUE METRIX BLOOD GLUCOSE TEST test strip  6/14/19   Saige Cervantes MD     I have utilized all available immediate resources to obtain, update, or review the patient's current medications (including all prescriptions,  "over-the-counter products, herbals, cannabis/cannabidiol products, and vitamin/mineral/dietary (nutritional) supplements).    Objective     Vital Signs: /94   Pulse 86   Temp 98.1 °F (36.7 °C) (Temporal)   Resp 18   Ht 162.6 cm (64\")   Wt 69.4 kg (153 lb)   SpO2 95%   BMI 26.26 kg/m²   Physical Exam  Vitals reviewed.   Constitutional:       Appearance: She is ill-appearing.   HENT:      Head: Normocephalic and atraumatic.      Right Ear: External ear normal.      Left Ear: External ear normal.      Nose: Nose normal.   Eyes:      General: No scleral icterus.     Conjunctiva/sclera: Conjunctivae normal.   Cardiovascular:      Rate and Rhythm: Normal rate and regular rhythm.      Heart sounds: Normal heart sounds.   Pulmonary:      Effort: Pulmonary effort is normal.      Breath sounds: Normal breath sounds.   Abdominal:      General: There is distension.      Palpations: Abdomen is soft.      Tenderness: There is abdominal tenderness.   Musculoskeletal:         General: No swelling or tenderness.      Cervical back: Normal range of motion and neck supple.      Right lower leg: No edema.      Left lower leg: No edema.   Skin:     General: Skin is warm and dry.   Neurological:      Mental Status: She is alert and oriented to person, place, and time.      Cranial Nerves: No cranial nerve deficit.   Psychiatric:         Mood and Affect: Mood normal.         Behavior: Behavior normal.        Results Reviewed:  Lab Results (last 24 hours)       Procedure Component Value Units Date/Time    Urinalysis With Culture If Indicated - Urine, Clean Catch [[id number]]  (Abnormal) Collected: 11/11/23 2016    Specimen: Urine, Clean Catch Updated: 11/11/23 2040     Color, UA Yellow     Appearance, UA Clear     pH, UA 5.5     Specific Gravity, UA 1.015     Glucose,  mg/dL (1+)     Ketones, UA Trace     Bilirubin, UA Negative     Blood, UA Negative     Protein, UA Negative     Leuk Esterase, UA Trace     Nitrite, UA " Negative     Urobilinogen, UA 0.2 E.U./dL    Narrative:      In absence of clinical symptoms, the presence of pyuria, bacteria, and/or nitrites on the urinalysis result does not correlate with infection.    Urinalysis, Microscopic Only - Urine, Clean Catch [[id number]]  (Abnormal) Collected: 11/11/23 2016    Specimen: Urine, Clean Catch Updated: 11/11/23 2040     RBC, UA 0-2 /HPF      WBC, UA 6-10 /HPF      Bacteria, UA None Seen /HPF      Squamous Epithelial Cells, UA 0-2 /HPF      Hyaline Casts, UA 3-6 /LPF      Methodology Automated Microscopy    Urine Culture - Urine, Urine, Clean Catch [[id number]] Collected: 11/11/23 2016    Specimen: Urine, Clean Catch Updated: 11/11/23 2040    STAT Lactic Acid, Reflex [[id number]] Collected: 11/11/23 2023    Specimen: Blood Updated: 11/11/23 2033    Comprehensive Metabolic Panel [[id number]]  (Abnormal) Collected: 11/11/23 1758    Specimen: Blood Updated: 11/11/23 1831     Glucose 92 mg/dL      BUN 22 mg/dL      Creatinine 1.58 mg/dL      Sodium 129 mmol/L      Potassium 4.8 mmol/L      Chloride 88 mmol/L      CO2 27.0 mmol/L      Calcium 14.6 mg/dL      Total Protein 6.8 g/dL      Albumin 3.9 g/dL      ALT (SGPT) 30 U/L      AST (SGOT) 47 U/L      Alkaline Phosphatase 185 U/L      Total Bilirubin 0.5 mg/dL      Globulin 2.9 gm/dL      A/G Ratio 1.3 g/dL      BUN/Creatinine Ratio 13.9     Anion Gap 14.0 mmol/L      eGFR 32.6 mL/min/1.73     Narrative:      GFR Normal >60  Chronic Kidney Disease <60  Kidney Failure <15    The GFR formula is only valid for adults with stable renal function between ages 18 and 70.    Magnesium [[id number]]  (Normal) Collected: 11/11/23 1758    Specimen: Blood Updated: 11/11/23 1831     Magnesium 1.9 mg/dL     Lactic Acid, Plasma [[id number]]  (Abnormal) Collected: 11/11/23 1758    Specimen: Blood Updated: 11/11/23 1831     Lactate 3.9 mmol/L     Lipase [[id number]]  (Abnormal) Collected: 11/11/23 1758    Specimen: Blood Updated: 11/11/23 5636      Lipase 144 U/L     Protime-INR [[id number]]  (Normal) Collected: 11/11/23 1758    Specimen: Blood Updated: 11/11/23 1816     Protime 13.3 Seconds      INR 1.00    CBC & Differential [[id number]]  (Abnormal) Collected: 11/11/23 1758    Specimen: Blood Updated: 11/11/23 1808    Narrative:      The following orders were created for panel order CBC & Differential.  Procedure                               Abnormality         Status                     ---------                               -----------         ------                     CBC Auto Differential[[id number]]        Abnormal            Final result                 Please view results for these tests on the individual orders.    CBC Auto Differential [[id number]]  (Abnormal) Collected: 11/11/23 1758    Specimen: Blood Updated: 11/11/23 1808     WBC 9.08 10*3/mm3      RBC 4.61 10*6/mm3      Hemoglobin 13.4 g/dL      Hematocrit 41.5 %      MCV 90.0 fL      MCH 29.1 pg      MCHC 32.3 g/dL      RDW 13.8 %      RDW-SD 44.8 fl      MPV 9.4 fL      Platelets 538 10*3/mm3      Neutrophil % 81.8 %      Lymphocyte % 8.6 %      Monocyte % 7.0 %      Eosinophil % 1.1 %      Basophil % 0.7 %      Immature Grans % 0.8 %      Neutrophils, Absolute 7.43 10*3/mm3      Lymphocytes, Absolute 0.78 10*3/mm3      Monocytes, Absolute 0.64 10*3/mm3      Eosinophils, Absolute 0.10 10*3/mm3      Basophils, Absolute 0.06 10*3/mm3      Immature Grans, Absolute 0.07 10*3/mm3      nRBC 0.0 /100 WBC           Imaging Results (Last 24 Hours)       ** No results found for the last 24 hours. **          I have personally reviewed and interpreted the radiology studies and ECG obtained at time of admission.     Assessment / Plan   Assessment:   Active Hospital Problems    Diagnosis     **Hypercalcemia      Impression:  Hypercalcemia  Hypernatremia  Myalgia  Nausea and vomiting  Abdominal pain  RICHA  7.   Abnormal lymph nodes in the abdomen  8.   NIDDM  9.   Hypothyroid     Treatment Plan  Admit to  observationn  IVF  Zometa  Cardiac echho  Telemetry  General surgery consult  Bowel program with nausea control  SSI with accu-cehcks  FU labs in the am to include TSH and free T4    The patient will be admitted to my service here at [facility]. Primary team to take over in the am    Medical Decision Making  Number and Complexity of problems: >4 complex  Differential Diagnosis: Canceer    Conditions and Status        Condition is unchanged.     [facility] Data  External documents reviewed: None  Cardiac tracing (EKG, telemetry) interpretation: None  Radiology interpretation: None  Labs reviewed: Reviewed  Any tests that were considered but not ordered: None     Decision rules/scores evaluated (example [id number], Wells, etc): None     Discussed with: Patient and family at bedside     Care Planning  Shared decision making: Patient, Family, and ED staff  Code status and discussions: Full    Disposition  Social Determinants of Health that impact treatment or disposition: None  Estimated length of stay is 1-2 days.     I confirmed that the patient's advanced care plan is present, code status is documented, and a surrogate decision maker is listed in the patient's medical record.     The patient's surrogate decision maker is Family.     The patient was seen and examined by me on 11/11/2023 at 730.    Electronically signed by Lei Baker DO, 11/11/23, 20:52 CST.                Electronically signed by Lei Baker DO at 11/11/23 2344          Emergency Department Notes        Hortencia Jain, RN at 11/11/23 2034          Nursing report ED to floor  Imani Chauhan  83 y.o.  female    HPI:   Chief Complaint   Patient presents with    Abdominal Pain    Vomiting       Admitting doctor:   Lei Baker DO    Consulting provider(s):  Consults       No orders found from 10/13/2023 to 11/12/2023.             Admitting diagnosis:   The primary encounter diagnosis was Hypercalcemia. Diagnoses of  "Hyponatremia and Acute kidney injury were also pertinent to this visit.    Code status:   Current Code Status       Date Active Code Status Order ID Comments User Context       Not on file            Allergies:   Patient has no known allergies.    Intake and Output  No intake or output data in the 24 hours ending 11/11/23 2034    Weight:       11/11/23 1739   Weight: 69.4 kg (153 lb)       Most recent vitals:   Vitals:    11/11/23 1739 11/11/23 1959 11/11/23 2024   BP: 172/86 (!) 189/97 177/94   BP Location: Right arm     Patient Position: Sitting     Pulse: 95 81 86   Resp: 18     Temp: 98.1 °F (36.7 °C)     TempSrc: Temporal     SpO2: 97% 94% 95%   Weight: 69.4 kg (153 lb)     Height: 162.6 cm (64\")       Oxygen Therapy: .    Active LDAs/IV Access:   Lines, Drains & Airways       Active LDAs       Name Placement date Placement time Site Days    Peripheral IV 11/11/23 1759 Anterior;Proximal;Right Forearm 11/11/23 1759  Forearm  less than 1                    Labs (abnormal labs have a star):   Labs Reviewed   COMPREHENSIVE METABOLIC PANEL - Abnormal; Notable for the following components:       Result Value    Creatinine 1.58 (*)     Sodium 129 (*)     Chloride 88 (*)     Calcium 14.6 (*)     AST (SGOT) 47 (*)     Alkaline Phosphatase 185 (*)     eGFR 32.6 (*)     All other components within normal limits    Narrative:     GFR Normal >60  Chronic Kidney Disease <60  Kidney Failure <15    The GFR formula is only valid for adults with stable renal function between ages 18 and 70.   LIPASE - Abnormal; Notable for the following components:    Lipase 144 (*)     All other components within normal limits   LACTIC ACID, PLASMA - Abnormal; Notable for the following components:    Lactate 3.9 (*)     All other components within normal limits   CBC WITH AUTO DIFFERENTIAL - Abnormal; Notable for the following components:    Platelets 538 (*)     Neutrophil % 81.8 (*)     Lymphocyte % 8.6 (*)     Immature Grans % 0.8 (*)     " Neutrophils, Absolute 7.43 (*)     Immature Grans, Absolute 0.07 (*)     All other components within normal limits   PROTIME-INR - Normal   MAGNESIUM - Normal   LACTIC ACID, REFLEX   URINALYSIS W/ CULTURE IF INDICATED   CBC AND DIFFERENTIAL    Narrative:     The following orders were created for panel order CBC & Differential.  Procedure                               Abnormality         Status                     ---------                               -----------         ------                     CBC Auto Differential[[id number]]        Abnormal            Final result                 Please view results for these tests on the individual orders.       Meds given in ED:   Medications   sodium chloride 0.9 % flush 10 mL (has no administration in time range)   sodium chloride 0.9 % infusion (0 mL/hr Intravenous Stopped 11/11/23 2007)   empagliflozin (JARDIANCE) tablet 10 mg (10 mg Oral Given 11/11/23 2003)   lactated ringers bolus 1,000 mL (0 mL Intravenous Stopped 11/11/23 1905)   sodium chloride 0.9 % bolus 1,000 mL (0 mL Intravenous Stopped 11/11/23 2004)   lisinopril (PRINIVIL,ZESTRIL) tablet 20 mg (20 mg Oral Given 11/11/23 2003)     sodium chloride, 125 mL/hr, Last Rate: Stopped (11/11/23 2007)         NIH Stroke Scale:       Isolation/Infection(s):  No active isolations   No active infections     COVID Testing  Collected .  Resulted .    Nursing report ED to floor:  Mental status: . A/O x4  Ambulatory status: . Assist x1  Precautions: .    ED nurse phone extentsion- .. [phone]      Electronically signed by Hortencia Jain RN at 11/11/23 2036       Cale Lynch MD at 11/11/23 1822          Subjective   History of Present Illness  83-year-old female presents to the ED with complaint of abdominal pain, back pain, nausea, generalized weakness fatigue, decreased appetite, decreased oral intake.  She has a history of hypertension, hyperlipidemia, and has been followed closely over the past 7 months for  progressive lymphadenopathy thought secondary to malignant etiology.  Has had a CT-guided needle biopsy which was nondiagnostic.  Is followed by Dr. Ghazala Restrepo and has plan for excisional biopsy at some point in the near future.    Patient reports increased generalized weakness and fatigue over the past 2 weeks.  She is developed low back pain and bilateral hip pain, worsening abdominal discomfort.  She has had decreased appetite and decreased oral intake secondary to decreased appetite.  She also has nausea and last night reports vomiting.  No dysuria or hematuria.  She rates her symptoms as moderate severity with no aggravating relieving factors.    History provided by:  Patient      Review of Systems   All other systems reviewed and are negative.      Past Medical History:   Diagnosis Date    Aortic regurgitation     mild 2018    CAD in native artery     COVID-19 06/01/2022    mild case, not hospitalized, no MAB    GERD (gastroesophageal reflux disease)     Heart murmur     Hyperlipidemia     Hypertension     Hypothyroidism     Mitral regurgitation     mild per 2013 echo, no significant per 2018    Myalgia     Near syncope     Pre-diabetes     SOB (shortness of breath)     Squamous cell cancer of skin of right forearm     Syncope     Tricuspid regurgitation     mild per 2013 echo, trivial per 2018       No Known Allergies    Past Surgical History:   Procedure Laterality Date    BACK SURGERY      BIOPSY / EXCISION / DISSECTION AXILLARY NODE  2023    had bx w/ Jennifer Restrepo MD at [facility]    CARDIAC CATHETERIZATION      COLONOSCOPY      CORONARY ARTERY BYPASS GRAFT  10/11/2010    Dr. Karina Lora, [facility]    HYSTERECTOMY      LAPAROSCOPIC CHOLECYSTECTOMY      MEDIAN STERNOTOMY      Thymus cyst resection at same time as CABG    OTHER SURGICAL HISTORY      Rectal surgery    PARTIAL THYMECTOMY  10/11/2010    Dr. Lora    REPLACEMENT TOTAL KNEE Left 2005    TOTAL KNEE ARTHROPLASTY Left 06/2019    TOTAL KNEE ARTHROPLASTY  Right 11/04/2020    Dr. Santamaria       Family History   Problem Relation Age of Onset    Coronary artery disease Father     Alzheimer's disease Father     Hypertension Father     Alzheimer's disease Mother     Hypertension Mother     Hyperlipidemia Mother        Social History     Socioeconomic History    Marital status:    Tobacco Use    Smoking status: Never     Passive exposure: Yes    Smokeless tobacco: Never    Tobacco comments:     father smoked pipe in the house,  smoked   Vaping Use    Vaping Use: Never used   Substance and Sexual Activity    Alcohol use: Never    Drug use: Never    Sexual activity: Defer           Objective   Physical Exam  Vitals and nursing note reviewed.   Constitutional:       Appearance: She is well-developed and normal weight.   HENT:      Head: Normocephalic and atraumatic.   Cardiovascular:      Rate and Rhythm: Normal rate and regular rhythm.      Heart sounds: Normal heart sounds. No murmur heard.     No friction rub.   Pulmonary:      Effort: Pulmonary effort is normal.      Breath sounds: Normal breath sounds. No wheezing, rhonchi or rales.   Abdominal:      General: Abdomen is protuberant.      Palpations: Abdomen is soft.      Tenderness: There is generalized abdominal tenderness.      Hernia: No hernia is present.   Skin:     General: Skin is warm.      Capillary Refill: Capillary refill takes less than 2 seconds.      Coloration: Skin is not cyanotic.   Neurological:      General: No focal deficit present.      Mental Status: She is alert and oriented to person, place, and time.         Procedures      Lab Results (last 24 hours)       Procedure Component Value Units Date/Time    CBC & Differential [[id number]]  (Abnormal) Collected: 11/11/23 1758    Specimen: Blood Updated: 11/11/23 1808    Narrative:      The following orders were created for panel order CBC & Differential.  Procedure                               Abnormality         Status                      ---------                               -----------         ------                     CBC Auto Differential[[id number]]        Abnormal            Final result                 Please view results for these tests on the individual orders.    Comprehensive Metabolic Panel [[id number]]  (Abnormal) Collected: 11/11/23 1758    Specimen: Blood Updated: 11/11/23 1831     Glucose 92 mg/dL      BUN 22 mg/dL      Creatinine 1.58 mg/dL      Sodium 129 mmol/L      Potassium 4.8 mmol/L      Chloride 88 mmol/L      CO2 27.0 mmol/L      Calcium 14.6 mg/dL      Total Protein 6.8 g/dL      Albumin 3.9 g/dL      ALT (SGPT) 30 U/L      AST (SGOT) 47 U/L      Alkaline Phosphatase 185 U/L      Total Bilirubin 0.5 mg/dL      Globulin 2.9 gm/dL      A/G Ratio 1.3 g/dL      BUN/Creatinine Ratio 13.9     Anion Gap 14.0 mmol/L      eGFR 32.6 mL/min/1.73     Narrative:      GFR Normal >60  Chronic Kidney Disease <60  Kidney Failure <15    The GFR formula is only valid for adults with stable renal function between ages 18 and 70.    Protime-INR [[id number]]  (Normal) Collected: 11/11/23 1758    Specimen: Blood Updated: 11/11/23 1816     Protime 13.3 Seconds      INR 1.00    Magnesium [[id number]]  (Normal) Collected: 11/11/23 1758    Specimen: Blood Updated: 11/11/23 1831     Magnesium 1.9 mg/dL     Lipase [[id number]]  (Abnormal) Collected: 11/11/23 1758    Specimen: Blood Updated: 11/11/23 1821     Lipase 144 U/L     Lactic Acid, Plasma [[id number]]  (Abnormal) Collected: 11/11/23 1758    Specimen: Blood Updated: 11/11/23 1831     Lactate 3.9 mmol/L     CBC Auto Differential [[id number]]  (Abnormal) Collected: 11/11/23 1758    Specimen: Blood Updated: 11/11/23 1808     WBC 9.08 10*3/mm3      RBC 4.61 10*6/mm3      Hemoglobin 13.4 g/dL      Hematocrit 41.5 %      MCV 90.0 fL      MCH 29.1 pg      MCHC 32.3 g/dL      RDW 13.8 %      RDW-SD 44.8 fl      MPV 9.4 fL      Platelets 538 10*3/mm3      Neutrophil % 81.8 %      Lymphocyte % 8.6 %       Monocyte % 7.0 %      Eosinophil % 1.1 %      Basophil % 0.7 %      Immature Grans % 0.8 %      Neutrophils, Absolute 7.43 10*3/mm3      Lymphocytes, Absolute 0.78 10*3/mm3      Monocytes, Absolute 0.64 10*3/mm3      Eosinophils, Absolute 0.10 10*3/mm3      Basophils, Absolute 0.06 10*3/mm3      Immature Grans, Absolute 0.07 10*3/mm3      nRBC 0.0 /100 WBC          No Radiology Exams Resulted Within Past 24 Hours     ED Course  ED Course as of 11/11/23 1943   Sat Nov 11, 2023   1938 83-year-old female with history of hypertension, hyperlipidemia, diabetes who presents and has been followed closely for the past 7 months for progressive intra-abdominal and pelvic lymphadenopathy thought to be which is presumed to be secondary to a malignant etiology presents to the ED with abdominal pain, back pain, nausea, generalized weakness fatigue, decreased appetite and decreased oral intake.  In the ED, patient found to have hypercalcemia, calcium 14.6.  Albumin normal at 3.9.  Creatinine 1.58, no recent for comparison but may have acute kidney injury.  Sodium of 129.  Lactate 3.9.  We initially gave the patient a liter of LR but this was switched to a saline after we received calcium results.  Lipase 144, no epigastric tenderness.  GFR 32.6.     [AW]      ED Course User Index  [AW] Cale Lynch MD                                           Medical Decision Making  Amount and/or Complexity of Data Reviewed  Labs: ordered.    Risk  Prescription drug management.        Final diagnoses:   Hypercalcemia   Hyponatremia   Acute kidney injury       ED Disposition  ED Disposition       ED Disposition   Decision to Admit    Condition   --    Comment   Level of Care: Remote Telemetry [26]   Diagnosis: Hypercalcemia [275.42.ICD-9-CM]   Admitting Physician: MAHAD DAVIS [[id number]]   Attending Physician: MAHAD DAVIS [[id number]]   Isolate for COVID?: No [0]   Certification: I Certify That Inpatient Hospital Services Are  Medically Necessary For Greater Than 2 Midnights                 No follow-up provider specified.       Medication List        Changed      spironolactone 25 MG tablet  Commonly known as: ALDACTONE  Take 1 tablet by mouth Daily.  What changed: additional instructions                 Cale Lynch MD  11/11/23 1944      Electronically signed by Cale Lynch MD at 11/11/23 1944       Current Facility-Administered Medications   Medication Dose Route Frequency Provider Last Rate Last Admin    acetaminophen (TYLENOL) tablet 650 mg  650 mg Oral Q4H PRN Lei Baker DO   650 mg at 11/12/23 0027    atorvastatin (LIPITOR) tablet 10 mg  10 mg Oral Nightly Lei Baker DO   10 mg at 11/12/23 2054    sennosides-docusate (PERICOLACE) 8.6-50 MG per tablet 2 tablet  2 tablet Oral BID Lei Baker DO   2 tablet at 11/12/23 0908    And    polyethylene glycol (MIRALAX) packet 17 g  17 g Oral Daily PRN Lei Baker DO        And    bisacodyl (DULCOLAX) EC tablet 5 mg  5 mg Oral Daily PRN Lei Baker DO        And    bisacodyl (DULCOLAX) suppository 10 mg  10 mg Rectal Daily PRN Lei Baker DO        cloNIDine (CATAPRES) tablet 0.1 mg  0.1 mg Oral BID PRN Lei Baker DO   0.1 mg at 11/11/23 2128    dextrose (D50W) (25 g/50 mL) IV injection 25 g  25 g Intravenous Q15 Min PRN Lei Baker DO        dextrose (GLUTOSE) oral gel 15 g  15 g Oral Q15 Min PRN Lei Baker DO        empagliflozin (JARDIANCE) tablet 10 mg  10 mg Oral Daily Cale Lynch MD   10 mg at 11/12/23 0906    furosemide (LASIX) tablet 20 mg  20 mg Oral Daily PRN Lei Baker DO        glucagon (GLUCAGEN) injection 1 mg  1 mg Intramuscular Q15 Min PRN Lei Baker DO        HYDROcodone-acetaminophen (NORCO) 7.5-325 MG per tablet 1 tablet  1 tablet Oral Q4H PRN Lei Baker, DO   1 tablet at 11/12/23 2054    Insulin Lispro (humaLOG) injection 2-7 Units  2-7  Units Subcutaneous 4x Daily AC & at Bedtime Lei Baker DO        labetalol (NORMODYNE,TRANDATE) injection 10 mg  10 mg Intravenous Q6H PRN Lei Baker DO   10 mg at 11/11/23 2253    levothyroxine (SYNTHROID, LEVOTHROID) tablet 37.5 mcg  37.5 mcg Oral Q AM Lei Baker DO   37.5 mcg at 11/12/23 0906    multivitamin with minerals 1 tablet  1 tablet Oral Daily Lei Baker DO   1 tablet at 11/12/23 0906    ondansetron (ZOFRAN) injection 4 mg  4 mg Intravenous Q6H PRN Lei Baker DO        sodium chloride 0.9 % flush 10 mL  10 mL Intravenous PRN Cale Lynch MD        sodium chloride 0.9 % flush 10 mL  10 mL Intravenous Q12H Lei Baker DO   10 mL at 11/12/23 0910    sodium chloride 0.9 % flush 10 mL  10 mL Intravenous PRN Lei Baker DO        sodium chloride 0.9 % infusion 40 mL  40 mL Intravenous PRN Lei Baker DO        sodium chloride 0.9 % infusion  150 mL/hr Intravenous Continuous Jessi Rushing  mL/hr at 11/12/23 1658 150 mL/hr at 11/12/23 1658        Physician Progress Notes (last 24 hours)        Jessi Rushing MD at 11/12/23 1053              [facility] Medicine Services  INPATIENT PROGRESS NOTE    Patient Name: Imani Chauhan  Date of Admission: 11/11/2023  Today's Date: 11/12/23  Length of Stay: 1  Primary Care Physician: Светлана Loyd MD    Subjective   Chief Complaint: Abdominal pain and abdominal distention, nausea vomiting and the patient at admission, generalized weakness  Less abdominal pain today, nausea or vomiting, still constipation.  Likely improving generalized weakness.  Mentation is normal  Abdominal Pain  Associated symptoms include constipation.   Vomiting   Associated symptoms include abdominal pain.        83 years old lady with a history of hypertension, type 2 diabetes, dyslipidemia, hypothyroidism, seen outpatient for retroperitoneal mass suspicious of  lymphoma, seen by hematology oncology as outpatient general surgery, post nondiagnostic CT-guided retroperitoneal biopsy earlier this year, admitted with abdominal pain and increasing abdominal distention, nausea vomiting and constipation and generalized weakness.  She was found with severe symptomatic hypercalcemia, RICHA.   General surgery was consulted, she was treated with IV fluids and 4 mg IV Zometa.          Review of Systems   Constitutional:  Positive for activity change and fatigue.   HENT: Negative.     Eyes: Negative.    Respiratory: Negative.     Cardiovascular: Negative.    Gastrointestinal:  Positive for abdominal pain and constipation.   Endocrine: Negative.    Genitourinary: Negative.    Musculoskeletal:  Positive for gait problem.   Allergic/Immunologic: Negative.    Neurological:  Positive for weakness.   Hematological: Negative.    Psychiatric/Behavioral: Negative.        All pertinent negatives and positives are as above. All other systems have been reviewed and are negative unless otherwise stated.     Objective    Temp:  [97.6 °F (36.4 °C)-98.1 °F (36.7 °C)] 97.6 °F (36.4 °C)  Heart Rate:  [69-95] 70  Resp:  [18] 18  BP: (143-189)/(63-97) 149/63  Physical Exam  Vitals reviewed.   Constitutional:       Appearance: She is ill-appearing.   HENT:      Head: Normocephalic and atraumatic.      Right Ear: External ear normal.      Left Ear: External ear normal.      Nose: Nose normal.      Mouth/Throat:      Mouth: Mucous membranes are moist.      Pharynx: Oropharynx is clear.   Eyes:      Extraocular Movements: Extraocular movements intact.      Conjunctiva/sclera: Conjunctivae normal.      Pupils: Pupils are equal, round, and reactive to light.   Cardiovascular:      Rate and Rhythm: Normal rate and regular rhythm.      Pulses: Normal pulses.      Heart sounds: Normal heart sounds.   Pulmonary:      Effort: Pulmonary effort is normal.      Breath sounds: Normal breath sounds.   Abdominal:       General: Bowel sounds are normal. There is distension.   Musculoskeletal:         General: Normal range of motion.      Cervical back: Normal range of motion and neck supple.   Skin:     General: Skin is warm.      Capillary Refill: Capillary refill takes less than 2 seconds.   Neurological:      General: No focal deficit present.      Mental Status: She is alert and oriented to person, place, and time. Mental status is at baseline.      Motor: Weakness present.   Psychiatric:         Mood and Affect: Mood normal.         Behavior: Behavior normal.         Thought Content: Thought content normal.         Judgment: Judgment normal.             Results Review:  I have reviewed the labs, radiology results, and diagnostic studies.    Laboratory Data:   Results from last 7 days   Lab Units 11/12/23  0336 11/11/23  1758   WBC 10*3/mm3 8.88 9.08   HEMOGLOBIN g/dL 11.3* 13.4   HEMATOCRIT % 35.9 41.5   PLATELETS 10*3/mm3 452* 538*        Results from last 7 days   Lab Units 11/12/23  0336 11/11/23  1758   SODIUM mmol/L 129* 129*   POTASSIUM mmol/L 4.5 4.8   CHLORIDE mmol/L 92* 88*   CO2 mmol/L 25.0 27.0   BUN mg/dL 23 22   CREATININE mg/dL 1.71* 1.58*   CALCIUM mg/dL 13.4* 14.6*   BILIRUBIN mg/dL 0.4 0.5   ALK PHOS U/L 148* 185*   ALT (SGPT) U/L 24 30   AST (SGOT) U/L 39* 47*   GLUCOSE mg/dL 86 92       Culture Data:   [facility]    Radiology Data:   Imaging Results (Last 24 Hours)       ** No results found for the last 24 hours. **            I have reviewed the patient's current medications.     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  Supposed to have as outpatient robotic laparoscopic retroperitoneal lymph node excision  Prior CT-guided retroperitoneal mass biopsy nondiagnostic  Suspect lymphoma  Check LDH level in a.m.  Hematology oncology consulted    -Type 2 diabetes not on long-term insulin use.  [place] on hold for RICHA  Continue Jardiance and Humalog sliding scale    -Lactic acidosis, nonrelated with sepsis  Lactic acid trending down with IV fluids    -Hypothyroidism-on levothyroxine  TSH 2.95    -Hypertension-lisinopril on hold for RICHA  Labetalol and clonidine as needed    -Dyslipidemia-on statin    -Constipation-likely in the context of hypercalcemia  Bowel care protocol    DVT prophylaxis-sequential compressive device    Discussed with the granddaughter present at bedside  Prior to admission the patient was living alone, walking with a cane.  Family- living close by and being involved in her care      Medical Decision Making  Number and Complexity of problems: 3      Conditions and Status        fair     [facility] Data  External documents reviewed: yes  Cardiac tracing (EKG, telemetry) interpretation: yes  Radiology interpretation: yes  Labs reviewed: yes       Discussed with: granddaughter present at bedside      Care Planning  Shared decision making: yes  Code status and discussions: full code    Disposition  Social Determinants of Health that impact treatment or disposition: no  I expect the patient to be discharged to home in 3 days.     Electronically signed by Jessi Rushing MD, 11/12/23, 10:54 CST.     Electronically signed by Jessi Rushing MD at 11/12/23 1157          Consult Notes (last 24 hours)        Fabian  MD Arnaldo at 11/13/23 0548        Consult Orders    1. Inpatient Hematology & Oncology Consult [[id number]] ordered by Jessi Rushing MD at 11/12/23 1031                     [facility] Oncology/Hematology Services  CONSULT NOTE    PATIENT NAME:  Imani Chauhan  YOB: 1940  PATIENT MRN:  [id number]    Date of Admission:  11/11/2023  Consultation Date:  11/13/2023  Referring Provider: Cale Lynch, *    Subjective     Reason for Consultation: Retroperitoneal mass.    History of present illness: Imani Chauhan is a pleasant 83 y.o. female who is seen on consult for ongoing adenopathy.  She is seen with nurse Zarco at the bedside.  History from the chart and patient      She presented with nausea, vomiting, abdominal distention for the past 3 weeks and abdominal pain.  Patient scheduled for biopsy.    CBC 11/11/2023 remarkable for platelets of 538 and ANC 7.4.  CMP remarkable for sodium of 129, calcium 14.6, AST 47, alkaline phosphatase 95 and GFR 32.6.  Urinalysis showed WBC 6-10.  Lipase 144.  Lactate 3.9.  She was given Zometa 11/11/2023.    CT abdomen pelvis 11/12/2023.Markedly progressed bulky lymphadenopathy (lower mediastinal,  retrocrural, perigastric, retroperitoneal, right common iliac, periportal, mesenteric) as detailed above, compatible with metastatic disease. The bulky lymphadenopathy causes severe narrowing of the right renal artery and severe narrowing/near occlusion of the IVC. Soft tissue infiltration of the felisa hepatis as well as along the left portal vein with associated mild intrahepatic biliary ductal dilation, compatible with malignancy. 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 This causes severe narrowing and near occlusion of the left portal vein. Small left and trace right pleural effusions with mild passive atelectasis. Mild left proximal urothelial enhancement, correlate with urinalysis for urinary tract  "infection. Small volume ascites.    Hemoglobin 11.3, platelet 452, sodium 129, calcium 13.4, albumin 3.3, AST 39, alkaline phosphatase 148, GFR 29.6 urine osmolality 195 and urine sodium 32 on 11/12/2023    CBC 11/13/2023 remarkable for hemoglobin 10.7.  LDH 1023, sodium 131, calcium 12.1 and GFR 27.8.  Serum osmolality 285.    Previous biopsy 7/5/2023.Successful CT guided biopsy of the left retroperitoneal mass as  discussed above. A total of 6 2.2 cm 18-gauge core specimens were obtained all of which visually appear to be of good quality. 2 of these cores were submitted in their entirety for flow cytometry. There were no immediate complications.    Pathology report 7/6/2023.  Left retroperitoneal lymph node, CT-guided biopsy (touch preps and core biopsies):Mixed lymphoid infiltrates and fibrous tissue.  Material was sent to integrated laboratory for flow cytometry. In the sample analyzed, flow cytometry shows no detectable clonal B-cell population. Because of poor viability and low cell yield, a limited antibody panel was performed. Clinical correlation is recommended.    She had seen Dr. Restrepo 7/13/2023.  Offered robotic mesenteric lymph node biopsy versus monitoring and repeat PET.  Patient decided to proceed with repeat imaging.    Patient has seen Dr. Wood in [place] in the past and is currently seeing Dr. Diaz in [place]. \"  I will see him back after my biopsy.\"     Past Medical History:  Past Medical History:   Diagnosis Date    Aortic regurgitation     mild 2018    CAD in native artery     COVID-19 06/01/2022    mild case, not hospitalized, no MAB    GERD (gastroesophageal reflux disease)     Heart murmur     Hyperlipidemia     Hypertension     Hypothyroidism     Mitral regurgitation     mild per 2013 echo, no significant per 2018    Myalgia     Near syncope     Pre-diabetes     SOB (shortness of breath)     Squamous cell cancer of skin of right forearm     Syncope     Tricuspid regurgitation     mild " per 2013 echo, trivial per 2018     Prior Surgeries:  Past Surgical History:   Procedure Laterality Date    BACK SURGERY      BIOPSY / EXCISION / DISSECTION AXILLARY NODE  2023    had bx w/ Jennifer Restrepo MD at [facility]    CARDIAC CATHETERIZATION      COLONOSCOPY      CORONARY ARTERY BYPASS GRAFT  10/11/2010    Dr. Karina Lora, [facility]    HYSTERECTOMY      LAPAROSCOPIC CHOLECYSTECTOMY      MEDIAN STERNOTOMY      Thymus cyst resection at same time as CABG    OTHER SURGICAL HISTORY      Rectal surgery    PARTIAL THYMECTOMY  10/11/2010    Dr. Lora    REPLACEMENT TOTAL KNEE Left 2005    TOTAL KNEE ARTHROPLASTY Left 06/2019    TOTAL KNEE ARTHROPLASTY Right 11/04/2020    Dr. Santamaria        Allergies:Patient has no known allergies.  PTA Meds:  Medications Prior to Admission   Medication Sig Dispense Refill Last Dose    acetaminophen (TYLENOL) 500 MG tablet Take 1 tablet by mouth Every 6 (Six) Hours As Needed for Mild Pain. prn   Past Week    aspirin 81 MG EC tablet Take 1 tablet by mouth Daily.   Past Week    atorvastatin (LIPITOR) 10 MG tablet Take 1 tablet by mouth Every Night.   Past Week    Bioflavonoid Products (SUPER-C 1000 PO) Take 1 tablet by mouth Daily. Super c 900 mg, zinc 11 mg, vitamin D3 1,000 units, Vit A 450 mcg, Vitamin E 15 mg OTC   Past Week    Ca Carbonate-Mag Hydroxide (ROLAIDS PO) Take 1 tablet by mouth Daily As Needed (heart burn).   Past Week    empagliflozin (Jardiance) 10 MG tablet tablet Take 1 tablet by mouth Daily.   Past Week    Gabapentin, Once-Daily, 300 MG tablet Take 300-600 mg by mouth Daily Before Supper. Sometimes takes more than one but mostly takes one   Past Week    HYDROcodone-acetaminophen (NORCO) 7.5-325 MG per tablet Take 1 tablet by mouth Every 6 (Six) Hours As Needed for Moderate Pain or Severe Pain.   Past Week    lansoprazole (PREVACID) 15 MG capsule Take 1 capsule by mouth Daily As Needed.   Past Week    levothyroxine (SYNTHROID, LEVOTHROID) 75 MCG tablet Take 0.5-1 tablets by mouth  Daily. Takes 1/2 tab M, W, & F and 1 tab on S, Tu, Th, & Sa   Past Week    lisinopril (PRINIVIL,ZESTRIL) 20 MG tablet Take 1 tablet by mouth 2 (Two) Times a Day.   Past Week    MAGNESIUM GLYCINATE PO Take 1,300 mg by mouth 2 (Two) Times a Day.   Past Week    metFORMIN ER (GLUCOPHAGE-XR) 500 MG 24 hr tablet Take 1 tablet by mouth 2 (Two) Times a Day.   Past Week    spironolactone (ALDACTONE) 25 MG tablet Take 1 tablet by mouth Daily.   Patient Taking Differently    therapeutic multivitamin-minerals (THERAGRAN-M) tablet Take 1 tablet by mouth Daily.   Past Week    cloNIDine (CATAPRES) 0.1 MG tablet Take 1 tablet by mouth 2 (Two) Times a Day As Needed for High Blood Pressure (SBP greater than 180 or DBP greater than 100). 60 tablet 11 More than a month    furosemide (LASIX) 20 MG tablet Take 1 tablet by mouth Daily As Needed (swelling). 30 tablet 11 More than a month      Current Meds:   Current Facility-Administered Medications   Medication Dose Route Frequency Provider Last Rate Last Admin    acetaminophen (TYLENOL) tablet 650 mg  650 mg Oral Q4H PRN Lei Baker DO   650 mg at 11/12/23 0027    atorvastatin (LIPITOR) tablet 10 mg  10 mg Oral Nightly Lei Baker DO   10 mg at 11/12/23 2054    sennosides-docusate (PERICOLACE) 8.6-50 MG per tablet 2 tablet  2 tablet Oral BID Lei Baker DO   2 tablet at 11/12/23 0908    And    polyethylene glycol (MIRALAX) packet 17 g  17 g Oral Daily PRN Lei Baker DO        And    bisacodyl (DULCOLAX) EC tablet 5 mg  5 mg Oral Daily PRN Lei Baker DO        And    bisacodyl (DULCOLAX) suppository 10 mg  10 mg Rectal Daily PRN Lei Baker DO        cloNIDine (CATAPRES) tablet 0.1 mg  0.1 mg Oral BID PRN Lei Baker DO   0.1 mg at 11/11/23 2128    dextrose (D50W) (25 g/50 mL) IV injection 25 g  25 g Intravenous Q15 Min PRN Lei Baker,         dextrose (GLUTOSE) oral gel 15 g  15 g Oral Q15 Min PRN Lei Baker, DO         empagliflozin (JARDIANCE) tablet 10 mg  10 mg Oral Daily Cale Lynch MD   10 mg at 11/12/23 0906    furosemide (LASIX) tablet 20 mg  20 mg Oral Daily PRN Lei Baker DO        glucagon (GLUCAGEN) injection 1 mg  1 mg Intramuscular Q15 Min PRN Lei Baker DO        HYDROcodone-acetaminophen (NORCO) 7.5-325 MG per tablet 1 tablet  1 tablet Oral Q4H PRN Lei Baker DO   1 tablet at 11/12/23 2054    Insulin Lispro (humaLOG) injection 2-7 Units  2-7 Units Subcutaneous 4x Daily AC & at Bedtime Lei Baker DO        labetalol (NORMODYNE,TRANDATE) injection 10 mg  10 mg Intravenous Q6H PRN Lei Baker DO   10 mg at 11/11/23 2253    levothyroxine (SYNTHROID, LEVOTHROID) tablet 37.5 mcg  37.5 mcg Oral Q AM Lei Baker DO   37.5 mcg at 11/12/23 0906    multivitamin with minerals 1 tablet  1 tablet Oral Daily Lei Baker DO   1 tablet at 11/12/23 0906    ondansetron (ZOFRAN) injection 4 mg  4 mg Intravenous Q6H PRN Lei Baker DO        sodium chloride 0.9 % flush 10 mL  10 mL Intravenous PRN Cale Lynch MD        sodium chloride 0.9 % flush 10 mL  10 mL Intravenous Q12H Lei Baker DO   10 mL at 11/12/23 0910    sodium chloride 0.9 % flush 10 mL  10 mL Intravenous PRN Lei Baker DO        sodium chloride 0.9 % infusion 40 mL  40 mL Intravenous PRN Lei Baker DO        sodium chloride 0.9 % infusion  150 mL/hr Intravenous Continuous Jessi Rushing  mL/hr at 11/12/23 1658 150 mL/hr at 11/12/23 1658       Family History:family history includes Alzheimer's disease in her father and mother; Coronary artery disease in her father; Hyperlipidemia in her mother; Hypertension in her father and mother.   Social History: reports that she has never smoked. She has been exposed to tobacco smoke. She has never used smokeless tobacco. She reports that she does not drink alcohol and does not use drugs.    Review of  Systems  Review of Systems   Constitutional:  Positive for fatigue. Negative for fever.   HENT:  Negative for congestion and facial swelling.    Eyes:  Negative for redness and visual disturbance.   Respiratory:  Negative for shortness of breath and wheezing.    Cardiovascular:  Negative for chest pain and palpitations.   Gastrointestinal:  Positive for abdominal distention. Negative for nausea and vomiting.   Endocrine: Negative for polydipsia and polyphagia.   Genitourinary:  Negative for dysuria and hematuria.   Musculoskeletal:  Negative for gait problem and neck stiffness.   Skin:  Positive for pallor.   Allergic/Immunologic: Negative for food allergies.   Neurological:  Negative for dizziness and facial asymmetry.   Hematological:  Does not bruise/bleed easily.   Psychiatric/Behavioral:  Negative for agitation, confusion and hallucinations.          Objective      Vital Signs   Temp:  [97.6 °F (36.4 °C)-97.9 °F (36.6 °C)] 97.7 °F (36.5 °C)  Heart Rate:  [70-79] 72  Resp:  [16-18] 16  BP: (128-167)/(63-71) 128/71    Physical Exam  Vitals reviewed.   Constitutional:       Appearance: She is ill-appearing.   HENT:      Head: Normocephalic and atraumatic.   Eyes:      General: No scleral icterus.  Cardiovascular:      Rate and Rhythm: Normal rate.   Pulmonary:      Effort: No respiratory distress.      Breath sounds: No wheezing.   Abdominal:      General: There is no distension.      Palpations: Abdomen is soft.   Musculoskeletal:         General: Swelling present.      Cervical back: Neck supple.   Skin:     Coloration: Skin is pale.   Neurological:      Mental Status: She is oriented to person, place, and time.   Psychiatric:         Mood and Affect: Mood normal.         Behavior: Behavior normal.          Results from last 7 days   Lab Units 11/13/23  0449 11/12/23  0336 11/11/23  1758   WBC 10*3/mm3 7.90 8.88 9.08   HEMOGLOBIN g/dL 10.7* 11.3* 13.4   HEMATOCRIT % 34.6 35.9 41.5   PLATELETS 10*3/mm3 399 452*  "538*        Results from last 7 days   Lab Units 11/13/23  0449 11/12/23  0336 11/11/23  1758   SODIUM mmol/L 131* 129* 129*   POTASSIUM mmol/L 4.4 4.5 4.8   CHLORIDE mmol/L 99 92* 88*   CO2 mmol/L 24.0 25.0 27.0   BUN mg/dL 25* 23 22   CREATININE mg/dL 1.80* 1.71* 1.58*   CALCIUM mg/dL 12.1* 13.4* 14.6*   BILIRUBIN mg/dL  --  0.4 0.5   ALK PHOS U/L  --  148* 185*   ALT (SGPT) U/L  --  24 30   AST (SGOT) U/L  --  39* 47*   GLUCOSE mg/dL 91 86 92       ABG:  No results found for: \"PHART\", \"PO2ART\", \"TFK0SFB\"    Culture Data:   Urine Culture   Date Value Ref Range Status   11/11/2023 No growth  Final       Radiology:   Imaging Results (Last 7 Days)       Procedure Component Value Units Date/Time    CT Abdomen Pelvis With & Without Contrast [[id number]] Collected: 11/12/23 1724     Updated: 11/12/23 1758    Narrative:      EXAM: CT ABDOMEN PELVIS W WO CONTRAST-     INDICATION: Abdominal pain, acute, nonlocalized; E83.52-Hypercalcemia;  E87.7-Wvss-fkjneslmvg and hyponatremia; N17.9-Acute kidney failure,  unspecified        TECHNIQUE: Helically acquired CT images were obtained of the abdomen and  pelvis prior to and after the administration of intravenous contrast.  Coronal and sagittal reformations were performed.        CONTRAST:  Isovue-300      DOSE LENGTH PRODUCT: 675 mGy cm. Automated exposure control was also  utilized to decrease patient radiation dose.     COMPARISON: PET/CT 4/13/2023     FINDINGS:     Lower Chest: Small left and trace right pleural effusions with basilar  passive atelectasis.     Liver: Soft tissue attenuation tracks into the felisa hepatis and lies  along the left portal vein causing severe narrowing and near occlusion  (series 4 image 21).     Biliary Tree: Status post cholecystectomy. Minimal intrahepatic biliary  ductal dilation involving segments 2, 3, 4, and 8.     Spleen: Unremarkable.     Pancreas: Unremarkable.     Adrenal Glands: Unremarkable.     Kidneys/Ureters/Bladder: Small left " renal cyst. Mild left proximal  urothelial enhancement.     Reproductive Organs: Status post hysterectomy.     Gastrointestinal Tract: Colonic diverticulosis.     Lymphatics: Progressed bulky lymphadenopathy, including: Lower  mediastinal, retrocrural, perigastric, retroperitoneal, right common  iliac, periportal, and mesenteric lymphadenopathy, some of which are  centrally necrotic. Representative reproducible examples below. The  retroperitoneal and mesenteric lymphadenopathy causes encasement of the  celiac, SMA, bilateral renal arteries, and portions of the aorta.  *  Retrocrural: 4.2 x 2.9 cm, previously nonexistent.  *  Confluent mesenteric: 14.5 x 8.5 cm (series 4 image 40).  *  Periportal: 2.6 x 1.9 cm (series 4 image 25).     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     Severe narrowing and near occlusion of the left portal vein. Severe  narrowing of the right renal artery. Moderate atherosclerosis.     Peritoneum/Retroperitoneum: Small volume ascites.     Abdominal Wall/Soft Tissues: Tiny fat-containing umbilical hernia.     Osseous Structures: Diffuse osteopenia.       Impression:            Markedly progressed bulky lymphadenopathy (lower mediastinal,  retrocrural, perigastric, retroperitoneal, right common iliac,  periportal, mesenteric) as detailed above, compatible with metastatic  disease. The bulky lymphadenopathy causes severe narrowing of the right  renal artery and severe narrowing/near occlusion of the IVC.     Soft tissue infiltration of the felisa hepatis as well as along the left  portal vein with associated mild intrahepatic biliary ductal dilation,  compatible with malignancy. 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 This causes severe narrowing and near  occlusion of the left portal vein.     Small left and trace right pleural effusions with mild passive  atelectasis.     Mild left proximal urothelial  "enhancement, correlate with urinalysis for  urinary tract infection.     Small volume ascites.        This report was signed and finalized on 11/12/2023 5:55 PM CST by Navneet Hoang.                    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  Prior CT-guided retroperitoneal mass biopsy was nondiagnostic.  2. 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  Zometa on 11/11/2023.  3. Thrombocytosis, likely reactive process, history of.  4. Chronic kidney disease stage IV, GFR 27.8 mm/min on 11/13/2023.  5.  Normocytic anemia, suspect from malignancy.  6.  Coronary artery disease.  Followed by cardiology        PLAN:   regarding the reason for the consult.  She is aware of nondiagnostic CT-guided biopsy on 7/5/2023.  PET was denied by insurance.   regard need for biopsy to confirm type of malignancy, prognosis and plan of care.  3.   regarding hypercalcemia due to malignancy.  She was given Zometa on 11/11/2023.  4.  Clinic appointment with Dr. Diaz in [place] for continuity of care. \"  I will call his office after my biopsy.  I have an appointment with him on the 30th.\"  5.  I will sign off.  Please call if needed.        I discussed the patients findings and my recommendations with patient and nurse Carolee.  They verbalized understanding.    Arnaldo Peralta MD  11/13/23  05:48 CST    I spent 51 total minutes, face-to-face, caring for Imani today. Greater than 50% of this time involved counseling and/or coordination of care as documented within this note.     Thank you for allowing me to participate in the care of Ms. Imani HUMPHREY Tien      Electronically signed by Arnaldo Peralta MD at 11/13/23 0707       Adenike Ponce MD at 11/12/23 0816        Consult Orders    1. Inpatient General Surgery Consult [[id number]] ordered by Lei Baker DO                     [facility] General Surgery Services - Consult Note    Patient Name: Imani HUMPHREY " Tien  : 1940  MRN: [id number]  Primary Care Physician:  Светлана Loyd MD  Referring Physician: Cale Lynch MD  Date of admission: 2023    Inpatient General Surgery Consult  Consult performed by: Adenike Ponce MD  Consult ordered by: Lei Baker DO          Subjective      Reason for Consult/ Chief Complaint: abdominal pain    History of Present Illness: Imani Chauhan is a 83 y.o. female presenting with abdominal pain and lymphadenopathy of unknown origin. The patient reports that over the last 3 weeks, she has had increased abdominal pain and distention. Reports moderate abdominal pain, infraumbilical, radiating to the left side. She had one episode of vomiting of food-like particles last night. No chest pains/SOB/fevers/chills. She has had a prior non-diagnostic CT guided retroperitoneal biopsy earlier this year.     ROS abdominal pain, nausea, vomiting    Personal History     Past Medical History:   Diagnosis Date    Aortic regurgitation     mild     CAD in native artery     COVID-19 2022    mild case, not hospitalized, no MAB    GERD (gastroesophageal reflux disease)     Heart murmur     Hyperlipidemia     Hypertension     Hypothyroidism     Mitral regurgitation     mild per  echo, no significant per 2018    Myalgia     Near syncope     Pre-diabetes     SOB (shortness of breath)     Squamous cell cancer of skin of right forearm     Syncope     Tricuspid regurgitation     mild per  echo, trivial per 2018       Past Surgical History:   Procedure Laterality Date    BACK SURGERY      BIOPSY / EXCISION / DISSECTION AXILLARY NODE      had bx w/ Jennifer Restrepo MD at [facility]    CARDIAC CATHETERIZATION      COLONOSCOPY      CORONARY ARTERY BYPASS GRAFT  10/11/2010    Dr. Karina Lora, [facility]    HYSTERECTOMY      LAPAROSCOPIC CHOLECYSTECTOMY      MEDIAN STERNOTOMY      Thymus cyst resection at same time as CABG    OTHER SURGICAL HISTORY      Rectal surgery     PARTIAL THYMECTOMY  10/11/2010    Dr. Lora    REPLACEMENT TOTAL KNEE Left 2005    TOTAL KNEE ARTHROPLASTY Left 06/2019    TOTAL KNEE ARTHROPLASTY Right 11/04/2020    Dr. Santamaria       Family History: family history includes Alzheimer's disease in her father and mother; Coronary artery disease in her father; Hyperlipidemia in her mother; Hypertension in her father and mother. Otherwise pertinent FHx was reviewed and not pertinent to current issue.    Social History:  reports that she has never smoked. She has been exposed to tobacco smoke. She has never used smokeless tobacco. She reports that she does not drink alcohol and does not use drugs.    Home Medications:   Bioflavonoid Products, Ca Carbonate-Mag Hydroxide, Gabapentin (Once-Daily), HYDROcodone-acetaminophen, Magnesium Glycinate, acetaminophen, aspirin, atorvastatin, cloNIDine, empagliflozin, furosemide, glucose blood, lansoprazole, levothyroxine, lisinopril, metFORMIN ER, multivitamin with minerals, and spironolactone    Allergies:  No Known Allergies      Objective      Vitals:  Temp:  [97.7 °F (36.5 °C)-98.1 °F (36.7 °C)] 97.7 °F (36.5 °C)  Heart Rate:  [69-95] 70  Resp:  [18] 18  BP: (143-189)/(65-97) 162/70    Physical Exam  Constitutional:       General: He is not in acute distress.     Appearance: Normal appearance.   HENT:      Head: Normocephalic and atraumatic.   Eyes:      Extraocular Movements: Extraocular movements intact.      Pupils: Pupils are equal, round, and reactive to light.   Cardiovascular:      Rate and Rhythm: Normal rate and regular rhythm.      Pulses: Normal pulses.   Pulmonary:      Effort: Pulmonary effort is normal. No respiratory distress.      Breath sounds: Normal breath sounds.   Abdominal:      General: Bowel sounds are normal. +distention.      Palpations: Abdomen is soft, lower quadrants TTP, no rebound.   Neurological:      Mental Status: He is alert.   Psychiatric:         Mood and Affect: Mood normal.         Behavior:  Behavior normal.     Result Review    Result Review:  I have personally reviewed the results from the time of this admission to 11/12/2023 08:16 CST and agree with these findings:  [x]  Laboratory  []  Microbiology  []  Radiology  []  EKG/Telemetry   []  Cardiology/Vascular   []  Pathology  []  Old records  []  Other:  Most notable findings include: WBC wnL, hyponatremia      Assessment & Plan        Active Hospital Problems:  Active Hospital Problems    Diagnosis     **Hypercalcemia      Plan:   Per patient, she cannot receive MRI due to prior cardiac surgical restrictions.  Will proceed with CT abdomen-pelvis with prehydration. Benefits of obtaining CT, outweigh the risks of contrast.    Plan for repeat CT biopsy vs dx laparoscopy with biopsy and peritoneal fluid washout  ADAT  OOBA   Med-onc consult      Adenike Ponce MD  General Surgery  11/12/23  08:16 CST       Electronically signed by Adenike Ponce MD at 11/12/23 1653         11/12  ATIENT ALERT, ORIENTED X3, PROBLEMS WITH IV SETTING OFF ALARM, VAC TEAM CONSULT IN. PATIENT IS TO BE NPO AT MIDNIGHT FOR POSSIBLE PROCEDURE TOMORROW. LACTIC ACID ELEVATED BUT VALUE IS DECREASING. LUNGS CLEAR, GENERAL EDEMA TENDERNESS TO ABDOMENT. PATIENT HAS HAD 2 500ML BOLUS OF NORMAL SALINE. PATIENT HAD A CT TODAY. JEFFRY GODFREY RN

## 2023-11-13 NOTE — NURSING NOTE
PATIENTS IV IN RAC CONTINUES TO SET OFF ALARM, PATIENT REQUESTED IT TO BE CHANGED, I ATTEMPTED TO RESTART X2, A SECOND NURSE ATTEMPTED X1. PATIENT ASKED TO STOP AT THIS TIME. VASU CRENSHAW

## 2023-11-13 NOTE — PROGRESS NOTES
[facility]   Progress Note    Patient Name: Imani Chauhan  : 1940  MRN: [id number]  Primary Care Physician:  Светлана Loyd MD  Date of admission: 2023    Subjective   Subjective    Awake, alert. No n/v.         Objective     Vitals:   Temp:  [97.6 °F (36.4 °C)-98.3 °F (36.8 °C)] 98.3 °F (36.8 °C)  Heart Rate:  [70-81] 81  Resp:  [16-18] 16  BP: (128-176)/(64-71) 176/71  Physical Exam    Constitutional: Awake, alert   Eyes: PERRLA, sclerae anicteric, no conjunctival injection   HENT: NCAT, mucous membranes moist   Neck: Supple, no thyromegaly, no lymphadenopathy, trachea midline   Respiratory: Clear to auscultation bilaterally, nonlabored respirations    Cardiovascular: RRR, no murmurs, rubs, or gallops, palpable pedal pulses bilaterally   Gastrointestinal: Positive bowel sounds, soft, nontender, +distention   Musculoskeletal: No bilateral ankle edema, no clubbing or cyanosis to extremities   Psychiatric: Appropriate affect, cooperative   Neurologic: Oriented x 3, strength symmetric in all extremities, Cranial Nerves grossly intact to confrontation, speech clear   Skin: No rashes     Result Review    Result Review:  I have personally reviewed the results from the time of this admission to 2023 08:53 CST and agree with these findings:  [x]  Laboratory list / accordion  []  Microbiology  []  Radiology  []  EKG/Telemetry   []  Cardiology/Vascular   []  Pathology  []  Old records  []  Other:      Assessment & Plan   Assessment / Plan     Brief Patient Summary:  Imani Chauhan is a 83 y.o. female who presents with distention and lymphadenopathy    Active Hospital Problems:  Active Hospital Problems    Diagnosis     **Hypercalcemia      Plan:    Plan for dx laparoscopy tomorrow    DVT prophylaxis:  Mechanical DVT prophylaxis orders are present.    CODE STATUS:   Level Of Support Discussed With: Patient  Code Status (Patient has no pulse and is not breathing): CPR (Attempt to  Resuscitate)  Medical Interventions (Patient has pulse or is breathing): Full Support        MD Adenike Aranda MD  General Surgery  11/13/23  08:53 CST

## 2023-11-13 NOTE — CONSULTS
[facility] Oncology/Hematology Services  CONSULT NOTE    PATIENT NAME:  Imani Chauhan  YOB: 1940  PATIENT MRN:  [id number]    Date of Admission:  11/11/2023  Consultation Date:  11/13/2023  Referring Provider: Cale Lynch, *    Subjective     Reason for Consultation: Retroperitoneal mass.    History of present illness: Imani Chauhan is a pleasant 83 y.o. female who is seen on consult for ongoing adenopathy.  She is seen with nurse Zarco at the bedside.  History from the chart and patient      She presented with nausea, vomiting, abdominal distention for the past 3 weeks and abdominal pain.  Patient scheduled for biopsy.    CBC 11/11/2023 remarkable for platelets of 538 and ANC 7.4.  CMP remarkable for sodium of 129, calcium 14.6, AST 47, alkaline phosphatase 95 and GFR 32.6.  Urinalysis showed WBC 6-10.  Lipase 144.  Lactate 3.9.  She was given Zometa 11/11/2023.    CT abdomen pelvis 11/12/2023.Markedly progressed bulky lymphadenopathy (lower mediastinal,  retrocrural, perigastric, retroperitoneal, right common iliac, periportal, mesenteric) as detailed above, compatible with metastatic disease. The bulky lymphadenopathy causes severe narrowing of the right renal artery and severe narrowing/near occlusion of the IVC. Soft tissue infiltration of the felisa hepatis as well as along the left portal vein with associated mild intrahepatic biliary ductal dilation, compatible with malignancy. 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 This causes severe narrowing and near occlusion of the left portal vein. Small left and trace right pleural effusions with mild passive atelectasis. Mild left proximal urothelial enhancement, correlate with urinalysis for urinary tract infection. Small volume ascites.    Hemoglobin 11.3, platelet 452, sodium 129, calcium 13.4, albumin 3.3, AST 39, alkaline phosphatase 148, GFR 29.6 urine osmolality 195 and urine  "sodium 32 on 11/12/2023    CBC 11/13/2023 remarkable for hemoglobin 10.7.  LDH 1023, sodium 131, calcium 12.1 and GFR 27.8.  Serum osmolality 285.    Previous biopsy 7/5/2023.Successful CT guided biopsy of the left retroperitoneal mass as  discussed above. A total of 6 2.2 cm 18-gauge core specimens were obtained all of which visually appear to be of good quality. 2 of these cores were submitted in their entirety for flow cytometry. There were no immediate complications.    Pathology report 7/6/2023.  Left retroperitoneal lymph node, CT-guided biopsy (touch preps and core biopsies):Mixed lymphoid infiltrates and fibrous tissue.  Material was sent to [facility] laboratory for flow cytometry. In the sample analyzed, flow cytometry shows no detectable clonal B-cell population. Because of poor viability and low cell yield, a limited antibody panel was performed. Clinical correlation is recommended.    She had seen Dr. Restrepo 7/13/2023.  Offered robotic mesenteric lymph node biopsy versus monitoring and repeat PET.  Patient decided to proceed with repeat imaging.    Patient has seen Dr. Wood in [place] in the past and is currently seeing Dr. Diaz in [place]. \"  I will see him back after my biopsy.\"     Past Medical History:  Past Medical History:   Diagnosis Date    Aortic regurgitation     mild 2018    CAD in native artery     COVID-19 06/01/2022    mild case, not hospitalized, no MAB    GERD (gastroesophageal reflux disease)     Heart murmur     Hyperlipidemia     Hypertension     Hypothyroidism     Mitral regurgitation     mild per 2013 echo, no significant per 2018    Myalgia     Near syncope     Pre-diabetes     SOB (shortness of breath)     Squamous cell cancer of skin of right forearm     Syncope     Tricuspid regurgitation     mild per 2013 echo, trivial per 2018     Prior Surgeries:  Past Surgical History:   Procedure Laterality Date    BACK SURGERY      BIOPSY / EXCISION / DISSECTION AXILLARY NODE  2023    " had bx w/ Jennifer Restrepo MD at [facility]    CARDIAC CATHETERIZATION      COLONOSCOPY      CORONARY ARTERY BYPASS GRAFT  10/11/2010    Dr. Karina Lora, [facility]    HYSTERECTOMY      LAPAROSCOPIC CHOLECYSTECTOMY      MEDIAN STERNOTOMY      Thymus cyst resection at same time as CABG    OTHER SURGICAL HISTORY      Rectal surgery    PARTIAL THYMECTOMY  10/11/2010    Dr. Lora    REPLACEMENT TOTAL KNEE Left 2005    TOTAL KNEE ARTHROPLASTY Left 06/2019    TOTAL KNEE ARTHROPLASTY Right 11/04/2020    Dr. Santamaria        Allergies:Patient has no known allergies.  PTA Meds:  Medications Prior to Admission   Medication Sig Dispense Refill Last Dose    acetaminophen (TYLENOL) 500 MG tablet Take 1 tablet by mouth Every 6 (Six) Hours As Needed for Mild Pain. prn   Past Week    aspirin 81 MG EC tablet Take 1 tablet by mouth Daily.   Past Week    atorvastatin (LIPITOR) 10 MG tablet Take 1 tablet by mouth Every Night.   Past Week    Bioflavonoid Products (SUPER-C 1000 PO) Take 1 tablet by mouth Daily. Super c 900 mg, zinc 11 mg, vitamin D3 1,000 units, Vit A 450 mcg, Vitamin E 15 mg OTC   Past Week    Ca Carbonate-Mag Hydroxide (ROLAIDS PO) Take 1 tablet by mouth Daily As Needed (heart burn).   Past Week    empagliflozin (Jardiance) 10 MG tablet tablet Take 1 tablet by mouth Daily.   Past Week    Gabapentin, Once-Daily, 300 MG tablet Take 300-600 mg by mouth Daily Before Supper. Sometimes takes more than one but mostly takes one   Past Week    HYDROcodone-acetaminophen (NORCO) 7.5-325 MG per tablet Take 1 tablet by mouth Every 6 (Six) Hours As Needed for Moderate Pain or Severe Pain.   Past Week    lansoprazole (PREVACID) 15 MG capsule Take 1 capsule by mouth Daily As Needed.   Past Week    levothyroxine (SYNTHROID, LEVOTHROID) 75 MCG tablet Take 0.5-1 tablets by mouth Daily. Takes 1/2 tab M, W, & F and 1 tab on S, Tu, Th, & Sa   Past Week    lisinopril (PRINIVIL,ZESTRIL) 20 MG tablet Take 1 tablet by mouth 2 (Two) Times a Day.   Past Week     MAGNESIUM GLYCINATE PO Take 1,300 mg by mouth 2 (Two) Times a Day.   Past Week    metFORMIN ER (GLUCOPHAGE-XR) 500 MG 24 hr tablet Take 1 tablet by mouth 2 (Two) Times a Day.   Past Week    spironolactone (ALDACTONE) 25 MG tablet Take 1 tablet by mouth Daily.   Patient Taking Differently    therapeutic multivitamin-minerals (THERAGRAN-M) tablet Take 1 tablet by mouth Daily.   Past Week    cloNIDine (CATAPRES) 0.1 MG tablet Take 1 tablet by mouth 2 (Two) Times a Day As Needed for High Blood Pressure (SBP greater than 180 or DBP greater than 100). 60 tablet 11 More than a month    furosemide (LASIX) 20 MG tablet Take 1 tablet by mouth Daily As Needed (swelling). 30 tablet 11 More than a month      Current Meds:   Current Facility-Administered Medications   Medication Dose Route Frequency Provider Last Rate Last Admin    acetaminophen (TYLENOL) tablet 650 mg  650 mg Oral Q4H PRN Lei Baker DO   650 mg at 11/12/23 0027    atorvastatin (LIPITOR) tablet 10 mg  10 mg Oral Nightly Lei Baker DO   10 mg at 11/12/23 2054    sennosides-docusate (PERICOLACE) 8.6-50 MG per tablet 2 tablet  2 tablet Oral BID Lei Baker DO   2 tablet at 11/12/23 0908    And    polyethylene glycol (MIRALAX) packet 17 g  17 g Oral Daily PRN Lei Baker DO        And    bisacodyl (DULCOLAX) EC tablet 5 mg  5 mg Oral Daily PRN Lei Baker DO        And    bisacodyl (DULCOLAX) suppository 10 mg  10 mg Rectal Daily PRN Lei Baker DO        cloNIDine (CATAPRES) tablet 0.1 mg  0.1 mg Oral BID PRN Lei Baker DO   0.1 mg at 11/11/23 2128    dextrose (D50W) (25 g/50 mL) IV injection 25 g  25 g Intravenous Q15 Min PRN Lei Baker DO        dextrose (GLUTOSE) oral gel 15 g  15 g Oral Q15 Min PRN Lei Baker DO        empagliflozin (JARDIANCE) tablet 10 mg  10 mg Oral Daily Cale Lynch MD   10 mg at 11/12/23 0906    furosemide (LASIX) tablet 20 mg  20 mg Oral Daily PRN  Lei Baker DO        glucagon (GLUCAGEN) injection 1 mg  1 mg Intramuscular Q15 Min PRN Lei Baker DO        HYDROcodone-acetaminophen (NORCO) 7.5-325 MG per tablet 1 tablet  1 tablet Oral Q4H PRN Lei Baker DO   1 tablet at 11/12/23 2054    Insulin Lispro (humaLOG) injection 2-7 Units  2-7 Units Subcutaneous 4x Daily AC & at Bedtime Lei Baker DO        labetalol (NORMODYNE,TRANDATE) injection 10 mg  10 mg Intravenous Q6H PRN Lei Baker DO   10 mg at 11/11/23 2253    levothyroxine (SYNTHROID, LEVOTHROID) tablet 37.5 mcg  37.5 mcg Oral Q AM Lei Baker DO   37.5 mcg at 11/12/23 0906    multivitamin with minerals 1 tablet  1 tablet Oral Daily Lei Baker DO   1 tablet at 11/12/23 0906    ondansetron (ZOFRAN) injection 4 mg  4 mg Intravenous Q6H PRN Lei Baker DO        sodium chloride 0.9 % flush 10 mL  10 mL Intravenous PRN Cale Lynch MD        sodium chloride 0.9 % flush 10 mL  10 mL Intravenous Q12H Lei Baker DO   10 mL at 11/12/23 0910    sodium chloride 0.9 % flush 10 mL  10 mL Intravenous PRN Lei Baker DO        sodium chloride 0.9 % infusion 40 mL  40 mL Intravenous PRN Lei Baker DO        sodium chloride 0.9 % infusion  150 mL/hr Intravenous Continuous Jessi Rushing  mL/hr at 11/12/23 1658 150 mL/hr at 11/12/23 1658       Family History:family history includes Alzheimer's disease in her father and mother; Coronary artery disease in her father; Hyperlipidemia in her mother; Hypertension in her father and mother.   Social History: reports that she has never smoked. She has been exposed to tobacco smoke. She has never used smokeless tobacco. She reports that she does not drink alcohol and does not use drugs.    Review of Systems  Review of Systems   Constitutional:  Positive for fatigue. Negative for fever.   HENT:  Negative for congestion and facial swelling.    Eyes:  Negative for redness  and visual disturbance.   Respiratory:  Negative for shortness of breath and wheezing.    Cardiovascular:  Negative for chest pain and palpitations.   Gastrointestinal:  Positive for abdominal distention. Negative for nausea and vomiting.   Endocrine: Negative for polydipsia and polyphagia.   Genitourinary:  Negative for dysuria and hematuria.   Musculoskeletal:  Negative for gait problem and neck stiffness.   Skin:  Positive for pallor.   Allergic/Immunologic: Negative for food allergies.   Neurological:  Negative for dizziness and facial asymmetry.   Hematological:  Does not bruise/bleed easily.   Psychiatric/Behavioral:  Negative for agitation, confusion and hallucinations.          Objective      Vital Signs   Temp:  [97.6 °F (36.4 °C)-97.9 °F (36.6 °C)] 97.7 °F (36.5 °C)  Heart Rate:  [70-79] 72  Resp:  [16-18] 16  BP: (128-167)/(63-71) 128/71    Physical Exam  Vitals reviewed.   Constitutional:       Appearance: She is ill-appearing.   HENT:      Head: Normocephalic and atraumatic.   Eyes:      General: No scleral icterus.  Cardiovascular:      Rate and Rhythm: Normal rate.   Pulmonary:      Effort: No respiratory distress.      Breath sounds: No wheezing.   Abdominal:      General: There is no distension.      Palpations: Abdomen is soft.   Musculoskeletal:         General: Swelling present.      Cervical back: Neck supple.   Skin:     Coloration: Skin is pale.   Neurological:      Mental Status: She is oriented to person, place, and time.   Psychiatric:         Mood and Affect: Mood normal.         Behavior: Behavior normal.          Results from last 7 days   Lab Units 11/13/23 0449 11/12/23 0336 11/11/23  1758   WBC 10*3/mm3 7.90 8.88 9.08   HEMOGLOBIN g/dL 10.7* 11.3* 13.4   HEMATOCRIT % 34.6 35.9 41.5   PLATELETS 10*3/mm3 399 452* 538*        Results from last 7 days   Lab Units 11/13/23 0449 11/12/23 0336 11/11/23  1758   SODIUM mmol/L 131* 129* 129*   POTASSIUM mmol/L 4.4 4.5 4.8   CHLORIDE mmol/L  "99 92* 88*   CO2 mmol/L 24.0 25.0 27.0   BUN mg/dL 25* 23 22   CREATININE mg/dL 1.80* 1.71* 1.58*   CALCIUM mg/dL 12.1* 13.4* 14.6*   BILIRUBIN mg/dL  --  0.4 0.5   ALK PHOS U/L  --  148* 185*   ALT (SGPT) U/L  --  24 30   AST (SGOT) U/L  --  39* 47*   GLUCOSE mg/dL 91 86 92       ABG:  No results found for: \"PHART\", \"PO2ART\", \"XYF6HVU\"    Culture Data:   Urine Culture   Date Value Ref Range Status   11/11/2023 No growth  Final       Radiology:   Imaging Results (Last 7 Days)       Procedure Component Value Units Date/Time    CT Abdomen Pelvis With & Without Contrast [[id number]] Collected: 11/12/23 1724     Updated: 11/12/23 1758    Narrative:      EXAM: CT ABDOMEN PELVIS W WO CONTRAST-     INDICATION: Abdominal pain, acute, nonlocalized; E83.52-Hypercalcemia;  E87.8-Xhob-sufsnzbpqp and hyponatremia; N17.9-Acute kidney failure,  unspecified        TECHNIQUE: Helically acquired CT images were obtained of the abdomen and  pelvis prior to and after the administration of intravenous contrast.  Coronal and sagittal reformations were performed.        CONTRAST:  Isovue-300      DOSE LENGTH PRODUCT: 675 mGy cm. Automated exposure control was also  utilized to decrease patient radiation dose.     COMPARISON: PET/CT 4/13/2023     FINDINGS:     Lower Chest: Small left and trace right pleural effusions with basilar  passive atelectasis.     Liver: Soft tissue attenuation tracks into the felisa hepatis and lies  along the left portal vein causing severe narrowing and near occlusion  (series 4 image 21).     Biliary Tree: Status post cholecystectomy. Minimal intrahepatic biliary  ductal dilation involving segments 2, 3, 4, and 8.     Spleen: Unremarkable.     Pancreas: Unremarkable.     Adrenal Glands: Unremarkable.     Kidneys/Ureters/Bladder: Small left renal cyst. Mild left proximal  urothelial enhancement.     Reproductive Organs: Status post hysterectomy.     Gastrointestinal Tract: Colonic diverticulosis.     Lymphatics: " Progressed bulky lymphadenopathy, including: Lower  mediastinal, retrocrural, perigastric, retroperitoneal, right common  iliac, periportal, and mesenteric lymphadenopathy, some of which are  centrally necrotic. Representative reproducible examples below. The  retroperitoneal and mesenteric lymphadenopathy causes encasement of the  celiac, SMA, bilateral renal arteries, and portions of the aorta.  *  Retrocrural: 4.2 x 2.9 cm, previously nonexistent.  *  Confluent mesenteric: 14.5 x 8.5 cm (series 4 image 40).  *  Periportal: 2.6 x 1.9 cm (series 4 image 25).     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     Severe narrowing and near occlusion of the left portal vein. Severe  narrowing of the right renal artery. Moderate atherosclerosis.     Peritoneum/Retroperitoneum: Small volume ascites.     Abdominal Wall/Soft Tissues: Tiny fat-containing umbilical hernia.     Osseous Structures: Diffuse osteopenia.       Impression:            Markedly progressed bulky lymphadenopathy (lower mediastinal,  retrocrural, perigastric, retroperitoneal, right common iliac,  periportal, mesenteric) as detailed above, compatible with metastatic  disease. The bulky lymphadenopathy causes severe narrowing of the right  renal artery and severe narrowing/near occlusion of the IVC.     Soft tissue infiltration of the felisa hepatis as well as along the left  portal vein with associated mild intrahepatic biliary ductal dilation,  compatible with malignancy. 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 This causes severe narrowing and near  occlusion of the left portal vein.     Small left and trace right pleural effusions with mild passive  atelectasis.     Mild left proximal urothelial enhancement, correlate with urinalysis for  urinary tract infection.     Small volume ascites.        This report was signed and finalized on 11/12/2023 5:55 PM CST by  "Navneet Hoang.                    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  Prior CT-guided retroperitoneal mass biopsy was nondiagnostic.  2. 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  Zometa on 11/11/2023.  3. Thrombocytosis, likely reactive process, history of.  4. Chronic kidney disease stage IV, GFR 27.8 mm/min on 11/13/2023.  5.  Normocytic anemia, suspect from malignancy.  6.  Coronary artery disease.  Followed by cardiology        PLAN:   regarding the reason for the consult.  She is aware of nondiagnostic CT-guided biopsy on 7/5/2023.  PET was denied by insurance.   regard need for biopsy to confirm type of malignancy, prognosis and plan of care.  3.   regarding hypercalcemia due to malignancy.  She was given Zometa on 11/11/2023.  4.  Clinic appointment with Dr. Diaz in [place] for continuity of care. \"  I will call his office after my biopsy.  I have an appointment with him on the 30th.\"  5.  I will sign off.  Please call if needed.        I discussed the patients findings and my recommendations with patient and nurse Carolee.  They verbalized understanding.    Arnaldo Peralta MD  11/13/23  05:48 CST    I spent 51 total minutes, face-to-face, caring for Imani today. Greater than 50% of this time involved counseling and/or coordination of care as documented within this note.     Thank you for allowing me to participate in the care of Ms. Imani Caseyqua    "

## 2023-11-13 NOTE — PLAN OF CARE
Goal Outcome Evaluation:  Plan of Care Reviewed With: patient        Progress: no change  Outcome Evaluation: PATIENT ALERT, ORIENTED X3, PROBLEMS WITH IV SETTING OFF ALARM, VAC TEAM CONSULT IN. PATIENT IS TO BE NPO AT MIDNIGHT FOR POSSIBLE PROCEDURE TOMORROW. LACTIC ACID ELEVATED BUT VALUE IS DECREASING. LUNGS CLEAR, GENERAL EDEMA TENDERNESS TO ABDOMENT. PATIENT HAS HAD 2 500ML BOLUS OF NORMAL SALINE. PATIENT HAD A CT TODAY. JEFFRY GODFREY RN

## 2023-11-14 ENCOUNTER — ANESTHESIA EVENT (OUTPATIENT)
Dept: PERIOP | Facility: HOSPITAL | Age: 83
End: 2023-11-14
Payer: MEDICARE

## 2023-11-14 ENCOUNTER — ANESTHESIA (OUTPATIENT)
Dept: PERIOP | Facility: HOSPITAL | Age: 83
End: 2023-11-14
Payer: MEDICARE

## 2023-11-14 LAB
ABO GROUP BLD: NORMAL
BLD GP AB SCN SERPL QL: NEGATIVE
GLUCOSE BLDC GLUCOMTR-MCNC: 139 MG/DL (ref 70–130)
GLUCOSE BLDC GLUCOMTR-MCNC: 74 MG/DL (ref 70–130)
GLUCOSE BLDC GLUCOMTR-MCNC: 78 MG/DL (ref 70–130)
GLUCOSE BLDC GLUCOMTR-MCNC: 84 MG/DL (ref 70–130)
RH BLD: NEGATIVE
T&S EXPIRATION DATE: NORMAL

## 2023-11-14 PROCEDURE — 88342 IMHCHEM/IMCYTCHM 1ST ANTB: CPT | Performed by: SURGERY

## 2023-11-14 PROCEDURE — 25810000003 LACTATED RINGERS PER 1000 ML: Performed by: SURGERY

## 2023-11-14 PROCEDURE — 25010000002 ONDANSETRON PER 1 MG: Performed by: INTERNAL MEDICINE

## 2023-11-14 PROCEDURE — 25010000002 FENTANYL CITRATE (PF) 50 MCG/ML SOLUTION: Performed by: ANESTHESIOLOGY

## 2023-11-14 PROCEDURE — 82948 REAGENT STRIP/BLOOD GLUCOSE: CPT

## 2023-11-14 PROCEDURE — 25810000003 LACTATED RINGERS PER 1000 ML: Performed by: ANESTHESIOLOGY

## 2023-11-14 PROCEDURE — 25010000002 MORPHINE PER 10 MG: Performed by: INTERNAL MEDICINE

## 2023-11-14 PROCEDURE — 86901 BLOOD TYPING SEROLOGIC RH(D): CPT | Performed by: ANESTHESIOLOGY

## 2023-11-14 PROCEDURE — 86900 BLOOD TYPING SEROLOGIC ABO: CPT | Performed by: ANESTHESIOLOGY

## 2023-11-14 PROCEDURE — 88341 IMHCHEM/IMCYTCHM EA ADD ANTB: CPT | Performed by: SURGERY

## 2023-11-14 PROCEDURE — 25810000003 LACTATED RINGERS PER 1000 ML: Performed by: INTERNAL MEDICINE

## 2023-11-14 PROCEDURE — 88313 SPECIAL STAINS GROUP 2: CPT | Performed by: SURGERY

## 2023-11-14 PROCEDURE — 0FB04ZX EXCISION OF LIVER, PERCUTANEOUS ENDOSCOPIC APPROACH, DIAGNOSTIC: ICD-10-PCS | Performed by: SURGERY

## 2023-11-14 PROCEDURE — 86850 RBC ANTIBODY SCREEN: CPT | Performed by: ANESTHESIOLOGY

## 2023-11-14 PROCEDURE — 25010000002 CEFAZOLIN PER 500 MG: Performed by: STUDENT IN AN ORGANIZED HEALTH CARE EDUCATION/TRAINING PROGRAM

## 2023-11-14 PROCEDURE — 25010000002 ONDANSETRON PER 1 MG: Performed by: NURSE ANESTHETIST, CERTIFIED REGISTERED

## 2023-11-14 PROCEDURE — 25810000003 SODIUM CHLORIDE PER 500 ML: Performed by: SURGERY

## 2023-11-14 PROCEDURE — 0W9G4ZZ DRAINAGE OF PERITONEAL CAVITY, PERCUTANEOUS ENDOSCOPIC APPROACH: ICD-10-PCS | Performed by: SURGERY

## 2023-11-14 PROCEDURE — 25010000002 HYDRALAZINE PER 20 MG: Performed by: SURGERY

## 2023-11-14 PROCEDURE — 25010000002 FENTANYL CITRATE (PF) 100 MCG/2ML SOLUTION: Performed by: NURSE ANESTHETIST, CERTIFIED REGISTERED

## 2023-11-14 PROCEDURE — 88305 TISSUE EXAM BY PATHOLOGIST: CPT | Performed by: SURGERY

## 2023-11-14 PROCEDURE — 25010000002 BUPIVACAINE (PF) 0.25 % SOLUTION 30 ML VIAL: Performed by: SURGERY

## 2023-11-14 PROCEDURE — 88112 CYTOPATH CELL ENHANCE TECH: CPT | Performed by: SURGERY

## 2023-11-14 PROCEDURE — 88307 TISSUE EXAM BY PATHOLOGIST: CPT | Performed by: SURGERY

## 2023-11-14 PROCEDURE — 25010000002 SUGAMMADEX 200 MG/2ML SOLUTION: Performed by: NURSE ANESTHETIST, CERTIFIED REGISTERED

## 2023-11-14 PROCEDURE — 25010000002 PROPOFOL 10 MG/ML EMULSION: Performed by: NURSE ANESTHETIST, CERTIFIED REGISTERED

## 2023-11-14 PROCEDURE — 25010000002 HEPARIN (PORCINE) PER 1000 UNITS: Performed by: STUDENT IN AN ORGANIZED HEALTH CARE EDUCATION/TRAINING PROGRAM

## 2023-11-14 PROCEDURE — 25810000003 SODIUM CHLORIDE 0.9 % SOLUTION: Performed by: HOSPITALIST

## 2023-11-14 RX ORDER — SODIUM CHLORIDE, SODIUM LACTATE, POTASSIUM CHLORIDE, CALCIUM CHLORIDE 600; 310; 30; 20 MG/100ML; MG/100ML; MG/100ML; MG/100ML
100 INJECTION, SOLUTION INTRAVENOUS CONTINUOUS
Status: DISCONTINUED | OUTPATIENT
Start: 2023-11-14 | End: 2023-11-14 | Stop reason: SDUPTHER

## 2023-11-14 RX ORDER — OXYCODONE HYDROCHLORIDE AND ACETAMINOPHEN 5; 325 MG/1; MG/1
1 TABLET ORAL EVERY 4 HOURS PRN
Status: DISCONTINUED | OUTPATIENT
Start: 2023-11-14 | End: 2023-11-20 | Stop reason: HOSPADM

## 2023-11-14 RX ORDER — FENTANYL CITRATE 50 UG/ML
25 INJECTION, SOLUTION INTRAMUSCULAR; INTRAVENOUS
Status: DISCONTINUED | OUTPATIENT
Start: 2023-11-14 | End: 2023-11-14 | Stop reason: HOSPADM

## 2023-11-14 RX ORDER — DEXTROSE MONOHYDRATE 25 G/50ML
25 INJECTION, SOLUTION INTRAVENOUS ONCE
Status: COMPLETED | OUTPATIENT
Start: 2023-11-14 | End: 2023-11-14

## 2023-11-14 RX ORDER — SODIUM CHLORIDE, SODIUM LACTATE, POTASSIUM CHLORIDE, CALCIUM CHLORIDE 600; 310; 30; 20 MG/100ML; MG/100ML; MG/100ML; MG/100ML
30 INJECTION, SOLUTION INTRAVENOUS CONTINUOUS
Status: DISCONTINUED | OUTPATIENT
Start: 2023-11-14 | End: 2023-11-14

## 2023-11-14 RX ORDER — BUPIVACAINE HCL/0.9 % NACL/PF 0.125 %
PLASTIC BAG, INJECTION (ML) EPIDURAL AS NEEDED
Status: DISCONTINUED | OUTPATIENT
Start: 2023-11-14 | End: 2023-11-14 | Stop reason: SURG

## 2023-11-14 RX ORDER — ALBUTEROL SULFATE 90 UG/1
AEROSOL, METERED RESPIRATORY (INHALATION) AS NEEDED
Status: DISCONTINUED | OUTPATIENT
Start: 2023-11-14 | End: 2023-11-14 | Stop reason: SURG

## 2023-11-14 RX ORDER — SODIUM CHLORIDE 0.9 % (FLUSH) 0.9 %
3-10 SYRINGE (ML) INJECTION AS NEEDED
Status: DISCONTINUED | OUTPATIENT
Start: 2023-11-14 | End: 2023-11-14 | Stop reason: HOSPADM

## 2023-11-14 RX ORDER — MAGNESIUM HYDROXIDE 1200 MG/15ML
LIQUID ORAL AS NEEDED
Status: DISCONTINUED | OUTPATIENT
Start: 2023-11-14 | End: 2023-11-14 | Stop reason: HOSPADM

## 2023-11-14 RX ORDER — SPIRONOLACTONE 25 MG/1
25 TABLET ORAL DAILY
Status: DISCONTINUED | OUTPATIENT
Start: 2023-11-14 | End: 2023-11-18

## 2023-11-14 RX ORDER — ONDANSETRON 2 MG/ML
4 INJECTION INTRAMUSCULAR; INTRAVENOUS
Status: DISCONTINUED | OUTPATIENT
Start: 2023-11-14 | End: 2023-11-14 | Stop reason: HOSPADM

## 2023-11-14 RX ORDER — HYDROMORPHONE HYDROCHLORIDE 1 MG/ML
0.5 INJECTION, SOLUTION INTRAMUSCULAR; INTRAVENOUS; SUBCUTANEOUS
Status: DISCONTINUED | OUTPATIENT
Start: 2023-11-14 | End: 2023-11-14 | Stop reason: HOSPADM

## 2023-11-14 RX ORDER — HEPARIN SODIUM 5000 [USP'U]/ML
5000 INJECTION, SOLUTION INTRAVENOUS; SUBCUTANEOUS ONCE
Status: COMPLETED | OUTPATIENT
Start: 2023-11-14 | End: 2023-11-14

## 2023-11-14 RX ORDER — DROPERIDOL 2.5 MG/ML
0.62 INJECTION, SOLUTION INTRAMUSCULAR; INTRAVENOUS ONCE AS NEEDED
Status: DISCONTINUED | OUTPATIENT
Start: 2023-11-14 | End: 2023-11-14 | Stop reason: HOSPADM

## 2023-11-14 RX ORDER — SODIUM CHLORIDE, SODIUM LACTATE, POTASSIUM CHLORIDE, CALCIUM CHLORIDE 600; 310; 30; 20 MG/100ML; MG/100ML; MG/100ML; MG/100ML
100 INJECTION, SOLUTION INTRAVENOUS CONTINUOUS
Status: DISPENSED | OUTPATIENT
Start: 2023-11-14 | End: 2023-11-15

## 2023-11-14 RX ORDER — LABETALOL HYDROCHLORIDE 5 MG/ML
5 INJECTION, SOLUTION INTRAVENOUS
Status: DISCONTINUED | OUTPATIENT
Start: 2023-11-14 | End: 2023-11-14 | Stop reason: HOSPADM

## 2023-11-14 RX ORDER — PROPOFOL 10 MG/ML
VIAL (ML) INTRAVENOUS AS NEEDED
Status: DISCONTINUED | OUTPATIENT
Start: 2023-11-14 | End: 2023-11-14 | Stop reason: SURG

## 2023-11-14 RX ORDER — NEOSTIGMINE METHYLSULFATE 5 MG/5 ML
SYRINGE (ML) INTRAVENOUS AS NEEDED
Status: DISCONTINUED | OUTPATIENT
Start: 2023-11-14 | End: 2023-11-14 | Stop reason: SURG

## 2023-11-14 RX ORDER — ROCURONIUM BROMIDE 10 MG/ML
INJECTION, SOLUTION INTRAVENOUS AS NEEDED
Status: DISCONTINUED | OUTPATIENT
Start: 2023-11-14 | End: 2023-11-14 | Stop reason: SURG

## 2023-11-14 RX ORDER — FENTANYL CITRATE 50 UG/ML
INJECTION, SOLUTION INTRAMUSCULAR; INTRAVENOUS AS NEEDED
Status: DISCONTINUED | OUTPATIENT
Start: 2023-11-14 | End: 2023-11-14 | Stop reason: SURG

## 2023-11-14 RX ORDER — SODIUM CHLORIDE 9 MG/ML
40 INJECTION, SOLUTION INTRAVENOUS AS NEEDED
Status: DISCONTINUED | OUTPATIENT
Start: 2023-11-14 | End: 2023-11-14 | Stop reason: HOSPADM

## 2023-11-14 RX ORDER — ONDANSETRON 2 MG/ML
INJECTION INTRAMUSCULAR; INTRAVENOUS AS NEEDED
Status: DISCONTINUED | OUTPATIENT
Start: 2023-11-14 | End: 2023-11-14 | Stop reason: SURG

## 2023-11-14 RX ORDER — NALOXONE HCL 0.4 MG/ML
0.04 VIAL (ML) INJECTION AS NEEDED
Status: DISCONTINUED | OUTPATIENT
Start: 2023-11-14 | End: 2023-11-14 | Stop reason: HOSPADM

## 2023-11-14 RX ORDER — HYDROCODONE BITARTRATE AND ACETAMINOPHEN 7.5; 325 MG/1; MG/1
1 TABLET ORAL EVERY 6 HOURS PRN
Status: DISCONTINUED | OUTPATIENT
Start: 2023-11-14 | End: 2023-11-20 | Stop reason: HOSPADM

## 2023-11-14 RX ORDER — SODIUM CHLORIDE 9 MG/ML
INJECTION, SOLUTION INTRAVENOUS AS NEEDED
Status: DISCONTINUED | OUTPATIENT
Start: 2023-11-14 | End: 2023-11-14 | Stop reason: HOSPADM

## 2023-11-14 RX ORDER — FENTANYL CITRATE 50 UG/ML
50 INJECTION, SOLUTION INTRAMUSCULAR; INTRAVENOUS
Status: DISCONTINUED | OUTPATIENT
Start: 2023-11-14 | End: 2023-11-14 | Stop reason: HOSPADM

## 2023-11-14 RX ORDER — ONDANSETRON 2 MG/ML
4 INJECTION INTRAMUSCULAR; INTRAVENOUS EVERY 6 HOURS PRN
Status: DISCONTINUED | OUTPATIENT
Start: 2023-11-14 | End: 2023-11-20 | Stop reason: HOSPADM

## 2023-11-14 RX ORDER — FLUMAZENIL 0.1 MG/ML
0.2 INJECTION INTRAVENOUS AS NEEDED
Status: DISCONTINUED | OUTPATIENT
Start: 2023-11-14 | End: 2023-11-14 | Stop reason: HOSPADM

## 2023-11-14 RX ORDER — MORPHINE SULFATE 2 MG/ML
2 INJECTION, SOLUTION INTRAMUSCULAR; INTRAVENOUS EVERY 4 HOURS PRN
Status: DISCONTINUED | OUTPATIENT
Start: 2023-11-14 | End: 2023-11-16 | Stop reason: ALTCHOICE

## 2023-11-14 RX ORDER — ONDANSETRON 4 MG/1
4 TABLET, FILM COATED ORAL EVERY 6 HOURS PRN
Status: DISCONTINUED | OUTPATIENT
Start: 2023-11-14 | End: 2023-11-20 | Stop reason: HOSPADM

## 2023-11-14 RX ORDER — HYDROMORPHONE HYDROCHLORIDE 1 MG/ML
0.25 INJECTION, SOLUTION INTRAMUSCULAR; INTRAVENOUS; SUBCUTANEOUS
Status: DISCONTINUED | OUTPATIENT
Start: 2023-11-14 | End: 2023-11-14 | Stop reason: HOSPADM

## 2023-11-14 RX ORDER — HYDROCODONE BITARTRATE AND ACETAMINOPHEN 7.5; 325 MG/1; MG/1
1 TABLET ORAL EVERY 6 HOURS PRN
Status: DISCONTINUED | OUTPATIENT
Start: 2023-11-14 | End: 2023-11-14

## 2023-11-14 RX ORDER — SODIUM CHLORIDE 0.9 % (FLUSH) 0.9 %
3 SYRINGE (ML) INJECTION EVERY 12 HOURS SCHEDULED
Status: DISCONTINUED | OUTPATIENT
Start: 2023-11-14 | End: 2023-11-14 | Stop reason: HOSPADM

## 2023-11-14 RX ORDER — LIDOCAINE HYDROCHLORIDE 20 MG/ML
INJECTION, SOLUTION EPIDURAL; INFILTRATION; INTRACAUDAL; PERINEURAL AS NEEDED
Status: DISCONTINUED | OUTPATIENT
Start: 2023-11-14 | End: 2023-11-14 | Stop reason: SURG

## 2023-11-14 RX ADMIN — HYDRALAZINE HYDROCHLORIDE 10 MG: 20 INJECTION INTRAMUSCULAR; INTRAVENOUS at 23:50

## 2023-11-14 RX ADMIN — MORPHINE SULFATE 2 MG: 2 INJECTION, SOLUTION INTRAMUSCULAR; INTRAVENOUS at 23:10

## 2023-11-14 RX ADMIN — ALBUTEROL SULFATE 4 PUFF: 90 AEROSOL, METERED RESPIRATORY (INHALATION) at 12:06

## 2023-11-14 RX ADMIN — SUGAMMADEX 100 MG: 100 INJECTION, SOLUTION INTRAVENOUS at 13:04

## 2023-11-14 RX ADMIN — FENTANYL CITRATE 50 MCG: 50 INJECTION, SOLUTION INTRAMUSCULAR; INTRAVENOUS at 10:53

## 2023-11-14 RX ADMIN — SODIUM CHLORIDE, POTASSIUM CHLORIDE, SODIUM LACTATE AND CALCIUM CHLORIDE 30 ML/HR: 600; 310; 30; 20 INJECTION, SOLUTION INTRAVENOUS at 11:10

## 2023-11-14 RX ADMIN — Medication 3 MG: at 12:44

## 2023-11-14 RX ADMIN — FENTANYL CITRATE 50 MCG: 50 INJECTION, SOLUTION INTRAMUSCULAR; INTRAVENOUS at 12:07

## 2023-11-14 RX ADMIN — CEFAZOLIN 2 G: 2 INJECTION, POWDER, FOR SOLUTION INTRAMUSCULAR; INTRAVENOUS at 12:00

## 2023-11-14 RX ADMIN — SODIUM CHLORIDE, POTASSIUM CHLORIDE, SODIUM LACTATE AND CALCIUM CHLORIDE 100 ML/HR: 600; 310; 30; 20 INJECTION, SOLUTION INTRAVENOUS at 10:52

## 2023-11-14 RX ADMIN — DOCUSATE SODIUM 50 MG AND SENNOSIDES 8.6 MG 2 TABLET: 8.6; 5 TABLET, FILM COATED ORAL at 20:54

## 2023-11-14 RX ADMIN — Medication 10 ML: at 21:07

## 2023-11-14 RX ADMIN — PROPOFOL 120 MG: 10 INJECTION, EMULSION INTRAVENOUS at 11:52

## 2023-11-14 RX ADMIN — ONDANSETRON 4 MG: 2 INJECTION INTRAMUSCULAR; INTRAVENOUS at 16:52

## 2023-11-14 RX ADMIN — GLYCOPYRROLATE 0.4 MG: 0.2 INJECTION INTRAMUSCULAR; INTRAVENOUS at 12:44

## 2023-11-14 RX ADMIN — ROCURONIUM BROMIDE 50 MG: 10 INJECTION, SOLUTION INTRAVENOUS at 11:52

## 2023-11-14 RX ADMIN — ONDANSETRON 4 MG: 2 INJECTION INTRAMUSCULAR; INTRAVENOUS at 23:07

## 2023-11-14 RX ADMIN — DEXTROSE MONOHYDRATE 25 ML: 25 INJECTION, SOLUTION INTRAVENOUS at 11:15

## 2023-11-14 RX ADMIN — NIFEDIPINE 30 MG: 30 TABLET, FILM COATED, EXTENDED RELEASE ORAL at 08:23

## 2023-11-14 RX ADMIN — Medication 10 ML: at 08:24

## 2023-11-14 RX ADMIN — SODIUM CHLORIDE, POTASSIUM CHLORIDE, SODIUM LACTATE AND CALCIUM CHLORIDE 100 ML/HR: 600; 310; 30; 20 INJECTION, SOLUTION INTRAVENOUS at 23:46

## 2023-11-14 RX ADMIN — ATORVASTATIN CALCIUM 10 MG: 10 TABLET, FILM COATED ORAL at 20:54

## 2023-11-14 RX ADMIN — ONDANSETRON 4 MG: 2 INJECTION INTRAMUSCULAR; INTRAVENOUS at 12:44

## 2023-11-14 RX ADMIN — Medication 100 MCG: at 12:44

## 2023-11-14 RX ADMIN — SODIUM CHLORIDE 150 ML/HR: 9 INJECTION, SOLUTION INTRAVENOUS at 03:02

## 2023-11-14 RX ADMIN — MORPHINE SULFATE 2 MG: 2 INJECTION, SOLUTION INTRAMUSCULAR; INTRAVENOUS at 16:52

## 2023-11-14 RX ADMIN — SUGAMMADEX 100 MG: 100 INJECTION, SOLUTION INTRAVENOUS at 13:06

## 2023-11-14 RX ADMIN — HEPARIN SODIUM 5000 UNITS: 5000 INJECTION INTRAVENOUS; SUBCUTANEOUS at 10:58

## 2023-11-14 RX ADMIN — FENTANYL CITRATE 50 MCG: 50 INJECTION, SOLUTION INTRAMUSCULAR; INTRAVENOUS at 11:51

## 2023-11-14 RX ADMIN — LIDOCAINE HYDROCHLORIDE 100 MG: 20 INJECTION, SOLUTION EPIDURAL; INFILTRATION; INTRACAUDAL; PERINEURAL at 11:52

## 2023-11-14 NOTE — PLAN OF CARE
Goal Outcome Evaluation:  Plan of Care Reviewed With: patient        Progress: no change     Pt A&O x4. Medicated x1 for pain with good relief. RA. IVF infusing per orders. NPO as of midnight for procedure today. Tele running NSR. Accu checks ACHS, no coverage needed per orders at bedtime. Up with x1 assist with walker. VSS safety maintained.

## 2023-11-14 NOTE — ANESTHESIA POSTPROCEDURE EVALUATION
"Patient: Imani Chauhan    Procedure Summary       Date: 11/14/23 Room / Location:  [facility] OR  /  [facility] OR    Anesthesia Start: 1149 Anesthesia Stop: 1308    Procedure: DIAGNOSTIC LAPAROSCOPY (Abdomen) Diagnosis:       Lymphadenopathy      (Lymphadenopathy [R59.1])    Surgeons: Adenike Ponce MD Provider: Kelly Gary CRNA    Anesthesia Type: general ASA Status: 3            Anesthesia Type: general    Vitals  Vitals Value Taken Time   /70 11/14/23 1430   Temp 97.8 °F (36.6 °C) 11/14/23 1430   Pulse 86 11/14/23 1430   Resp 18 11/14/23 1430   SpO2 92 % 11/14/23 1430           Post Anesthesia Care and Evaluation    Patient location during evaluation: PACU  Patient participation: complete - patient participated  Level of consciousness: awake and alert  Pain management: adequate    Airway patency: patent  Anesthetic complications: No anesthetic complications    Cardiovascular status: acceptable  Respiratory status: acceptable  Hydration status: acceptable    Comments: Blood pressure 161/76, pulse 88, temperature 97.9 °F (36.6 °C), temperature source Oral, resp. rate 18, height 162.6 cm (64.02\"), weight 71.2 kg (156 lb 14.4 oz), SpO2 92%.    Pt discharged from PACU based on john score >8    "

## 2023-11-14 NOTE — ANESTHESIA PROCEDURE NOTES
Airway  Date/Time: 11/14/2023 11:53 AM  Airway not difficult    General Information and Staff    Patient location during procedure: OR  CRNA/CAA: Monster Xiong CRNA    Indications and Patient Condition  Indications for airway management: airway protection    Preoxygenated: yes  Mask difficulty assessment: 0 - not attempted    Final Airway Details  Final airway type: endotracheal airway      Successful airway: ETT  Cuffed: yes   Successful intubation technique: video laryngoscopy  Blade: Lantigua  Blade size: 3  ETT size (mm): 7.5  Cormack-Lehane Classification: grade I - full view of glottis  Placement verified by: chest auscultation and capnometry   Cuff volume (mL): 7  Measured from: teeth  ETT/EBT  to teeth (cm): 2  Number of attempts at approach: 1  Assessment: lips, teeth, and gum same as pre-op and atraumatic intubation

## 2023-11-14 NOTE — PROGRESS NOTES
[facility] Medicine Services  INPATIENT PROGRESS NOTE    Patient Name: Imani Chauhan  Date of Admission: 11/11/2023  Today's Date: 11/14/23  Length of Stay: 3  Primary Care Physician: Светлана Loyd MD    Subjective     Patient was down for surgery when I attempted to examine her.        Objective    Temp:  [97.7 °F (36.5 °C)-99.2 °F (37.3 °C)] 97.7 °F (36.5 °C)  Heart Rate:  [] 86  Resp:  [16-21] 18  BP: (140-186)/(64-85) 154/73    General: No acute distress  HEENT: normocephalic, atraumatic  Neck: Supple  CV: RRR  Lung: Clear to auscultation bilaterally.  No wheeze, rales, or rhonchi noted.  Abdominal exam: Positive bowel sounds, nontender, non distended  Extremity: No edema noted  Neurological exam: AAO x3. Cranial nerve II to XII grossly intact  Skin exam: Dry and warm  Psychiatric exam: Calm, cooperative, and normal affect       Results Review:  I have reviewed the labs, radiology results, and diagnostic studies.    Laboratory Data:   Results from last 7 days   Lab Units 11/13/23 0449 11/12/23 0336 11/11/23  1758   WBC 10*3/mm3 7.90 8.88 9.08   HEMOGLOBIN g/dL 10.7* 11.3* 13.4   HEMATOCRIT % 34.6 35.9 41.5   PLATELETS 10*3/mm3 399 452* 538*        Results from last 7 days   Lab Units 11/13/23 0449 11/12/23 0336 11/11/23  1758   SODIUM mmol/L 131* 129* 129*   POTASSIUM mmol/L 4.4 4.5 4.8   CHLORIDE mmol/L 99 92* 88*   CO2 mmol/L 24.0 25.0 27.0   BUN mg/dL 25* 23 22   CREATININE mg/dL 1.80* 1.71* 1.58*   CALCIUM mg/dL 12.1* 13.4* 14.6*   BILIRUBIN mg/dL  --  0.4 0.5   ALK PHOS U/L  --  148* 185*   ALT (SGPT) U/L  --  24 30   AST (SGOT) U/L  --  39* 47*   GLUCOSE mg/dL 91 86 92       Culture Data:   Urine Culture   Date Value Ref Range Status   11/11/2023 No growth  Final       Radiology Data:   Imaging Results (Last 24 Hours)       ** No results found for the last 24 hours. **            I have reviewed the patient's current medications.     Assessment/Plan    Assessment  Active Hospital Problems    Diagnosis     **Hypercalcemia     Lymphadenopathy        Assessment and Plan:    Hypercalcemia likely secondary to undiagnosed metastatic cancer:  -She received 1 doses zoledronic acid on 11/11 per oncology     Abdominal lymphadenopathy likely secondary to metastatic cancer:  -pathology results pending     RICHA vs CKD: unknown Cr baseline  - x1 L  -recheck labs in am    Hypothyroidism:  -cont home Synthroid         Dispo: She went for diagnostic laparoscopy today with liver biopsy and peritoneal washout        Alvarez Lam MD 11/14/23, 17:42 CST.                  Treatment Plan      Medical Decision Making  Number and Complexity of problems:   Differential Diagnosis:     Conditions and Status             MDM Data  External documents reviewed:   Cardiac tracing (EKG, telemetry) interpretation:   Radiology interpretation:   Labs reviewed:   Any tests that were considered but not ordered:      Decision rules/scores evaluated (example [id number], Wells, etc):      Discussed with:      Care Planning  Shared decision making:   Code status and discussions:     Disposition  Social Determinants of Health that impact treatment or disposition:   I expect the patient to be discharged to  in  days.     Electronically signed by Alvarez Lam MD, 11/14/23, 17:42 CST.\

## 2023-11-14 NOTE — ANESTHESIA PREPROCEDURE EVALUATION
Anesthesia Evaluation     Patient summary reviewed   no history of anesthetic complications:   NPO Solid Status: > 8 hours             Airway   Mallampati: III  TM distance: >3 FB  Neck ROM: full  Possible difficult intubation and Small opening  Dental      Pulmonary    (+) ,shortness of breath  (-) asthma, sleep apnea, not a smoker  Cardiovascular     (+) hypertension, valvular problems/murmurs MR, CAD, CABG (2010), hyperlipidemia  (-) cardiac stents    ROS comment: Echo:   ·  Left ventricular systolic function is normal. Left ventricular ejection fraction appears to be 66 - 70%.  ·  Left ventricular wall thickness is consistent with mild concentric hypertrophy.  ·  Left ventricular diastolic function is consistent with (grade I) impaired relaxation.  ·  Normal right ventricular cavity size and systolic function noted.  ·  There is no significant (greater than mild) valvular dysfunction.  ·  Estimated right ventricular systolic pressure from tricuspid regurgitation is normal (<35 mmHg).         Neuro/Psych  (-) seizures, TIA, CVA  GI/Hepatic/Renal/Endo    (+) GERD, renal disease-, diabetes mellitus, thyroid problem   (-) liver disease    Musculoskeletal     Abdominal    Substance History      OB/GYN          Other      history of cancer    ROS/Med Hx Other: CT abdomen pelvis 11/12/2023.Markedly progressed bulky lymphadenopathy (lower mediastinal,  retrocrural, perigastric, retroperitoneal, right common iliac, periportal, mesenteric) as detailed above, compatible with metastatic disease. The bulky lymphadenopathy causes severe narrowing of the right renal artery and severe narrowing/near occlusion of the IVC. Soft tissue infiltration of the felisa hepatis as well as along the left portal vein with associated mild intrahepatic biliary ductal dilation, compatible with malignancy. 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 This causes severe narrowing and near occlusion of the left portal  vein. Small left and trace right pleural effusions with mild passive atelectasis. Mild left proximal urothelial enhancement, correlate with urinalysis for urinary tract infection. Small volume ascites.                  Anesthesia Plan    ASA 3     general     intravenous induction     Anesthetic plan, risks, benefits, and alternatives have been provided, discussed and informed consent has been obtained with: patient.    CODE STATUS:    Level Of Support Discussed With: Patient  Code Status (Patient has no pulse and is not breathing): CPR (Attempt to Resuscitate)  Medical Interventions (Patient has pulse or is breathing): Full Support

## 2023-11-14 NOTE — PROGRESS NOTES
[facility] Medicine Services  INPATIENT PROGRESS NOTE    Patient Name: Imani Chauhan  Date of Admission: 11/11/2023  Today's Date: 11/13/23  Length of Stay: 2  Primary Care Physician: Светлана Loyd MD    Subjective     Patient reports her pain is well controlled.         Objective    Temp:  [97.7 °F (36.5 °C)-98.4 °F (36.9 °C)] 98.3 °F (36.8 °C)  Heart Rate:  [70-82] 82  Resp:  [16] 16  BP: (128-186)/(65-85) 186/85    General: No acute distress  HEENT: normocephalic, atraumatic  Neck: Supple  CV: RRR  Lung: Clear to auscultation bilaterally.  No wheeze, rales, or rhonchi noted.  Abdominal exam: Positive bowel sounds, nontender, non distended  Extremity: No edema noted  Neurological exam: AAO x3. Cranial nerve II to XII grossly intact  Skin exam: Dry and warm  Psychiatric exam: Calm, cooperative, and normal affect       Results Review:  I have reviewed the labs, radiology results, and diagnostic studies.    Laboratory Data:   Results from last 7 days   Lab Units 11/13/23 0449 11/12/23 0336 11/11/23  1758   WBC 10*3/mm3 7.90 8.88 9.08   HEMOGLOBIN g/dL 10.7* 11.3* 13.4   HEMATOCRIT % 34.6 35.9 41.5   PLATELETS 10*3/mm3 399 452* 538*        Results from last 7 days   Lab Units 11/13/23 0449 11/12/23  0336 11/11/23  1758   SODIUM mmol/L 131* 129* 129*   POTASSIUM mmol/L 4.4 4.5 4.8   CHLORIDE mmol/L 99 92* 88*   CO2 mmol/L 24.0 25.0 27.0   BUN mg/dL 25* 23 22   CREATININE mg/dL 1.80* 1.71* 1.58*   CALCIUM mg/dL 12.1* 13.4* 14.6*   BILIRUBIN mg/dL  --  0.4 0.5   ALK PHOS U/L  --  148* 185*   ALT (SGPT) U/L  --  24 30   AST (SGOT) U/L  --  39* 47*   GLUCOSE mg/dL 91 86 92       Culture Data:   Urine Culture   Date Value Ref Range Status   11/11/2023 No growth  Final       Radiology Data:   Imaging Results (Last 24 Hours)       ** No results found for the last 24 hours. **            I have reviewed the patient's current medications.     Assessment/Plan   Assessment  Active  Hospital Problems    Diagnosis     **Hypercalcemia     Lymphadenopathy        Assessment and Plan:    Hypercalcemia likely secondary to undiagnosed metastatic cancer:  -She received 1 doses zoledronic acid on 11/11 per oncology              Dispo: NPO after midnight for surgery in the morning    Alvarez Lam MD 11/13/23, 18:46 CST.                  Treatment Plan      Medical Decision Making  Number and Complexity of problems:   Differential Diagnosis:     Conditions and Status             MDM Data  External documents reviewed:   Cardiac tracing (EKG, telemetry) interpretation:   Radiology interpretation:   Labs reviewed:   Any tests that were considered but not ordered:      Decision rules/scores evaluated (example [id number], Wells, etc):      Discussed with:      Care Planning  Shared decision making:   Code status and discussions:     Disposition  Social Determinants of Health that impact treatment or disposition:   I expect the patient to be discharged to  in  days.     Electronically signed by Alvarez Lam MD, 11/13/23, 18:46 CST.

## 2023-11-14 NOTE — PLAN OF CARE
Goal Outcome Evaluation:              Outcome Evaluation: A&O. Room air. Reg diet. NPO at midnight tonight for procedure tomorrow. Tele; NS. SCDs for VTE prevention. IVF. Voiding. Safety maintained.

## 2023-11-14 NOTE — OP NOTE
Operative Note    DIAGNOSTIC LAPAROSCOPY      Imani Chauhan  11/14/2023    Pre-op Diagnosis:   Lymphadenopathy [R59.1]       Post-Op Diagnosis Codes:     * Lymphadenopathy [R59.1]    Procedure/CPT® Codes:  Diagnostic laparoscopy, segment 4 liver biopsy, peritoneal washout      Procedure(s):  DIAGNOSTIC LAPAROSCOPY, segment 4 liver biopsy, peritoneal washout              Surgeon(s):  Adenike Ponce MD    Anesthesia: General    Staff:   Circulator: Nisreen Romero RN  Scrub Person: Catherine Taylor, CST; Judy Hernández; Kong Le         Estimated Blood Loss: minimal    Urine Voided: * No values recorded between 11/14/2023 11:44 AM and 11/14/2023 12:57 PM *    Specimens:   Liver Bx                       Drains: * No LDAs found *    Findings: chylous ascites, patchy liver infiltrate        Complications: none          Adenike Ponce MD     Date: 11/14/2023  Time: 12:59 CST      Procedure Description: The patient was taken to the OR, given IV sedation and ETT. Veress access was obtained in the LUQ, followed by placement of a 5mm trocar at the umbilicus and two 5mm trocars on the left side of the abdomen. 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 The liver was inspected and there were white, patchy infiltration of the liver. A nisreen-cut biopsy of segment 4 of the liver was performed, hemostasis was obtained with bovie electrocautery. The gallbladder was absent. The hepatic fossa was noted to be without any clear adenopathy. The stomach was inspected and noted to be without gross abnormality. The spleen was not observed. Both diaphragms were inspected along with the peritoneum and noted to be without infiltrate. Further inspection was terminated since that may commit her to an open biopsy, which may be performed in the future if necessary. Further ascites of the peritoneal was suctioned and irrigated. Counts were correct x 2. The patient tolerated the  procedure well.     Adenike Ponce MD  General Surgery  11/14/23  12:59 CST    Adenkie Ponce MD  General Surgery  11/14/23  12:59 CST

## 2023-11-15 LAB
ALBUMIN SERPL-MCNC: 2.7 G/DL (ref 3.5–5.2)
ALBUMIN/GLOB SERPL: 1.1 G/DL
ALP SERPL-CCNC: 127 U/L (ref 39–117)
ALT SERPL W P-5'-P-CCNC: 20 U/L (ref 1–33)
ANION GAP SERPL CALCULATED.3IONS-SCNC: 12 MMOL/L (ref 5–15)
AST SERPL-CCNC: 43 U/L (ref 1–32)
BASOPHILS # BLD AUTO: 0.04 10*3/MM3 (ref 0–0.2)
BASOPHILS NFR BLD AUTO: 0.3 % (ref 0–1.5)
BILIRUB SERPL-MCNC: 0.3 MG/DL (ref 0–1.2)
BUN SERPL-MCNC: 24 MG/DL (ref 8–23)
BUN/CREAT SERPL: 15.5 (ref 7–25)
CALCIUM SPEC-SCNC: 9.9 MG/DL (ref 8.6–10.5)
CHLORIDE SERPL-SCNC: 102 MMOL/L (ref 98–107)
CO2 SERPL-SCNC: 19 MMOL/L (ref 22–29)
CREAT SERPL-MCNC: 1.55 MG/DL (ref 0.57–1)
DEPRECATED RDW RBC AUTO: 49.2 FL (ref 37–54)
EGFRCR SERPLBLD CKD-EPI 2021: 33.3 ML/MIN/1.73
EOSINOPHIL # BLD AUTO: 0.02 10*3/MM3 (ref 0–0.4)
EOSINOPHIL NFR BLD AUTO: 0.1 % (ref 0.3–6.2)
ERYTHROCYTE [DISTWIDTH] IN BLOOD BY AUTOMATED COUNT: 14.6 % (ref 12.3–15.4)
GLOBULIN UR ELPH-MCNC: 2.5 GM/DL
GLUCOSE BLDC GLUCOMTR-MCNC: 104 MG/DL (ref 70–130)
GLUCOSE BLDC GLUCOMTR-MCNC: 106 MG/DL (ref 70–130)
GLUCOSE BLDC GLUCOMTR-MCNC: 94 MG/DL (ref 70–130)
GLUCOSE BLDC GLUCOMTR-MCNC: 95 MG/DL (ref 70–130)
GLUCOSE SERPL-MCNC: 103 MG/DL (ref 65–99)
HCT VFR BLD AUTO: 33.8 % (ref 34–46.6)
HGB BLD-MCNC: 10.6 G/DL (ref 12–15.9)
IMM GRANULOCYTES # BLD AUTO: 0.08 10*3/MM3 (ref 0–0.05)
IMM GRANULOCYTES NFR BLD AUTO: 0.6 % (ref 0–0.5)
LAB AP CASE REPORT: NORMAL
LYMPHOCYTES # BLD AUTO: 0.51 10*3/MM3 (ref 0.7–3.1)
LYMPHOCYTES NFR BLD AUTO: 3.6 % (ref 19.6–45.3)
Lab: NORMAL
MAGNESIUM SERPL-MCNC: 1.3 MG/DL (ref 1.6–2.4)
MCH RBC QN AUTO: 28.7 PG (ref 26.6–33)
MCHC RBC AUTO-ENTMCNC: 31.4 G/DL (ref 31.5–35.7)
MCV RBC AUTO: 91.6 FL (ref 79–97)
MONOCYTES # BLD AUTO: 0.46 10*3/MM3 (ref 0.1–0.9)
MONOCYTES NFR BLD AUTO: 3.3 % (ref 5–12)
NEUTROPHILS NFR BLD AUTO: 12.91 10*3/MM3 (ref 1.7–7)
NEUTROPHILS NFR BLD AUTO: 92.1 % (ref 42.7–76)
NRBC BLD AUTO-RTO: 0 /100 WBC (ref 0–0.2)
PATH REPORT.FINAL DX SPEC: NORMAL
PATH REPORT.GROSS SPEC: NORMAL
PHOSPHATE SERPL-MCNC: 3.1 MG/DL (ref 2.5–4.5)
PLATELET # BLD AUTO: 362 10*3/MM3 (ref 140–450)
PMV BLD AUTO: 9.5 FL (ref 6–12)
POTASSIUM SERPL-SCNC: 4.2 MMOL/L (ref 3.5–5.2)
PROT SERPL-MCNC: 5.2 G/DL (ref 6–8.5)
RBC # BLD AUTO: 3.69 10*6/MM3 (ref 3.77–5.28)
SODIUM SERPL-SCNC: 133 MMOL/L (ref 136–145)
WBC NRBC COR # BLD: 14.02 10*3/MM3 (ref 3.4–10.8)

## 2023-11-15 PROCEDURE — 83735 ASSAY OF MAGNESIUM: CPT | Performed by: INTERNAL MEDICINE

## 2023-11-15 PROCEDURE — 25010000002 MAGNESIUM SULFATE 2 GM/50ML SOLUTION: Performed by: INTERNAL MEDICINE

## 2023-11-15 PROCEDURE — 84100 ASSAY OF PHOSPHORUS: CPT | Performed by: INTERNAL MEDICINE

## 2023-11-15 PROCEDURE — 82948 REAGENT STRIP/BLOOD GLUCOSE: CPT

## 2023-11-15 PROCEDURE — 25010000002 FUROSEMIDE PER 20 MG: Performed by: INTERNAL MEDICINE

## 2023-11-15 PROCEDURE — 80053 COMPREHEN METABOLIC PANEL: CPT | Performed by: INTERNAL MEDICINE

## 2023-11-15 PROCEDURE — 85025 COMPLETE CBC W/AUTO DIFF WBC: CPT | Performed by: INTERNAL MEDICINE

## 2023-11-15 PROCEDURE — 25010000002 ONDANSETRON PER 1 MG: Performed by: SURGERY

## 2023-11-15 RX ORDER — ONDANSETRON 2 MG/ML
4 INJECTION INTRAMUSCULAR; INTRAVENOUS ONCE
Status: COMPLETED | OUTPATIENT
Start: 2023-11-15 | End: 2023-11-15

## 2023-11-15 RX ORDER — FUROSEMIDE 10 MG/ML
20 INJECTION INTRAMUSCULAR; INTRAVENOUS ONCE
Status: COMPLETED | OUTPATIENT
Start: 2023-11-15 | End: 2023-11-15

## 2023-11-15 RX ORDER — MAGNESIUM SULFATE HEPTAHYDRATE 40 MG/ML
2 INJECTION, SOLUTION INTRAVENOUS ONCE
Status: COMPLETED | OUTPATIENT
Start: 2023-11-15 | End: 2023-11-15

## 2023-11-15 RX ADMIN — Medication 1 TABLET: at 09:50

## 2023-11-15 RX ADMIN — FUROSEMIDE 20 MG: 10 INJECTION, SOLUTION INTRAMUSCULAR; INTRAVENOUS at 19:34

## 2023-11-15 RX ADMIN — LEVOTHYROXINE SODIUM 37.5 MCG: 75 TABLET ORAL at 05:10

## 2023-11-15 RX ADMIN — EMPAGLIFLOZIN 10 MG: 10 TABLET, FILM COATED ORAL at 09:49

## 2023-11-15 RX ADMIN — MAGNESIUM SULFATE HEPTAHYDRATE 2 G: 2 INJECTION, SOLUTION INTRAVENOUS at 13:36

## 2023-11-15 RX ADMIN — CLONIDINE HYDROCHLORIDE 0.1 MG: 0.1 TABLET ORAL at 00:58

## 2023-11-15 RX ADMIN — Medication 10 ML: at 20:12

## 2023-11-15 RX ADMIN — DOCUSATE SODIUM 50 MG AND SENNOSIDES 8.6 MG 2 TABLET: 8.6; 5 TABLET, FILM COATED ORAL at 09:52

## 2023-11-15 RX ADMIN — HYDROCODONE BITARTRATE AND ACETAMINOPHEN 1 TABLET: 7.5; 325 TABLET ORAL at 13:42

## 2023-11-15 RX ADMIN — NIFEDIPINE 30 MG: 30 TABLET, FILM COATED, EXTENDED RELEASE ORAL at 09:49

## 2023-11-15 RX ADMIN — DOCUSATE SODIUM 50 MG AND SENNOSIDES 8.6 MG 2 TABLET: 8.6; 5 TABLET, FILM COATED ORAL at 20:12

## 2023-11-15 RX ADMIN — ONDANSETRON 4 MG: 2 INJECTION INTRAMUSCULAR; INTRAVENOUS at 00:43

## 2023-11-15 RX ADMIN — ATORVASTATIN CALCIUM 10 MG: 10 TABLET, FILM COATED ORAL at 20:12

## 2023-11-15 NOTE — PLAN OF CARE
Goal Outcome Evaluation:  Plan of Care Reviewed With: patient        Progress: improving  Outcome Evaluation: MAG = 1.3  MAG RUN X 1 PER ORDER. PT. TOLERATING CLEAR LIQUIDS OK. PT. VOIDING. NO BM YET. O2 ON AT 2 L/M PER NASAL CANNULA. CONT. PULSE OX INTACT. PRN PAIN MED AVAILABLE AND PT. HAS REQUIRED THIS SHIFT. ACCUCHECKS WITH SS COVERAGE AVAILABLE IF NEEDED. NO DISTRESS NOTED.

## 2023-11-15 NOTE — CASE MANAGEMENT/SOCIAL WORK
Continued Stay Note   [facility]     Patient Name: Imani Chauhan  MRN: [id number]  Today's Date: 11/15/2023    Admit Date: 11/11/2023    Plan: Home   Discharge Plan       Row Name 11/15/23 0815       Plan    Plan Home    Patient/Family in Agreement with Plan yes    Plan Comments Pt has been up with help and assist of a walker.  If she still needs to use walker closer to d/c may benefit from one ordered for home. Pt lives alone with family close that can assist. Will follow.                   Discharge Codes    No documentation.                 Expected Discharge Date and Time       Expected Discharge Date Expected Discharge Time    Nov 14, 2023               JADE Hammond

## 2023-11-15 NOTE — PLAN OF CARE
Goal Outcome Evaluation:  Plan of Care Reviewed With: patient      Patient c/o pain in abdomen, PRN pain med given. Patient c/o nausea, PRN Zofran given. On call provider notified of continued nausea, see orders. SCDs on. IVF infusing. Patient on 2 L/min O2 per NC, continuous pulse ox in use. Lap sites c/d/I. Patient hypertensive at times, PRN meds given, see MAR.

## 2023-11-15 NOTE — PROGRESS NOTES
[facility]   Progress Note    Patient Name: Imani Chauhan  : 1940  MRN: [id number]  Primary Care Physician:  Светлана Loyd MD  Date of admission: 2023    Subjective   Subjective   Tolerating clears. Reports less abdominal pain today.        Objective     Vitals:   Temp:  [97.6 °F (36.4 °C)-99.2 °F (37.3 °C)] 98.4 °F (36.9 °C)  Heart Rate:  [] 88  Resp:  [16-21] 20  BP: (131-186)/(64-85) 153/78  Flow (L/min):  [2-15] 2  Physical Exam    Constitutional: Awake, alert   Eyes: PERRLA, sclerae anicteric, no conjunctival injection   HENT: NCAT, mucous membranes moist   Neck: Supple, no thyromegaly, no lymphadenopathy, trachea midline   Respiratory: Clear to auscultation bilaterally, nonlabored respirations    Cardiovascular: RRR, no murmurs, rubs, or gallops, palpable pedal pulses bilaterally   Gastrointestinal: Positive bowel sounds, soft, nontender, +distention   Musculoskeletal: No bilateral ankle edema, no clubbing or cyanosis to extremities   Psychiatric: Appropriate affect, cooperative   Neurologic: Oriented x 3, strength symmetric in all extremities, Cranial Nerves grossly intact to confrontation, speech clear   Skin: No rashes     Result Review    Result Review:  I have personally reviewed the results from the time of this admission to 11/15/2023 09:29 CST and agree with these findings:  [x]  Laboratory list / accordion  []  Microbiology  []  Radiology  []  EKG/Telemetry   []  Cardiology/Vascular   []  Pathology  []  Old records  []  Other:      Assessment & Plan   Assessment / Plan     Brief Patient Summary:  Imani Chauhan is a 83 y.o. female who presents with distention and lymphadenopathy    Active Hospital Problems:  Active Hospital Problems    Diagnosis     **Hypercalcemia     Lymphadenopathy      Plan:   Await results of biopsy and cytology  Will re-order CT biopsy of retroperitoneal nodes  Clears, ADAT    DVT prophylaxis:  Mechanical DVT prophylaxis orders are  present.    CODE STATUS:   Level Of Support Discussed With: Patient  Code Status (Patient has no pulse and is not breathing): CPR (Attempt to Resuscitate)  Medical Interventions (Patient has pulse or is breathing): Full Support        MD Adenike Aranda MD  General Surgery  11/15/23  08:53 CST

## 2023-11-15 NOTE — PLAN OF CARE
Goal Outcome Evaluation:  Plan of Care Reviewed With: patient        Progress: no change  Outcome Evaluation: iid patent x1, ivf in progress. abd with lap wounds x4, no drainage, open to air. medicated x1 for post op pain. requires encouragement to move about. remains drowsy. no acute distress noted. cont to monitor.

## 2023-11-16 ENCOUNTER — APPOINTMENT (OUTPATIENT)
Dept: CT IMAGING | Facility: HOSPITAL | Age: 83
DRG: 821 | End: 2023-11-16
Payer: MEDICARE

## 2023-11-16 LAB
ALBUMIN SERPL-MCNC: 2.8 G/DL (ref 3.5–5.2)
ALBUMIN/GLOB SERPL: 1 G/DL
ALP SERPL-CCNC: 430 U/L (ref 39–117)
ALT SERPL W P-5'-P-CCNC: 27 U/L (ref 1–33)
ANION GAP SERPL CALCULATED.3IONS-SCNC: 14 MMOL/L (ref 5–15)
APTT PPP: 38.5 SECONDS (ref 24.5–36)
AST SERPL-CCNC: 95 U/L (ref 1–32)
BASOPHILS # BLD AUTO: 0.04 10*3/MM3 (ref 0–0.2)
BASOPHILS NFR BLD AUTO: 0.3 % (ref 0–1.5)
BILIRUB SERPL-MCNC: 0.5 MG/DL (ref 0–1.2)
BUN SERPL-MCNC: 23 MG/DL (ref 8–23)
BUN/CREAT SERPL: 14.4 (ref 7–25)
CALCIUM SPEC-SCNC: 10.3 MG/DL (ref 8.6–10.5)
CHLORIDE SERPL-SCNC: 102 MMOL/L (ref 98–107)
CO2 SERPL-SCNC: 20 MMOL/L (ref 22–29)
CREAT SERPL-MCNC: 1.6 MG/DL (ref 0.57–1)
CYTO UR: NORMAL
DEPRECATED RDW RBC AUTO: 49.7 FL (ref 37–54)
EGFRCR SERPLBLD CKD-EPI 2021: 32.1 ML/MIN/1.73
EOSINOPHIL # BLD AUTO: 0.18 10*3/MM3 (ref 0–0.4)
EOSINOPHIL NFR BLD AUTO: 1.3 % (ref 0.3–6.2)
ERYTHROCYTE [DISTWIDTH] IN BLOOD BY AUTOMATED COUNT: 14.6 % (ref 12.3–15.4)
GLOBULIN UR ELPH-MCNC: 2.9 GM/DL
GLUCOSE BLDC GLUCOMTR-MCNC: 106 MG/DL (ref 70–130)
GLUCOSE BLDC GLUCOMTR-MCNC: 93 MG/DL (ref 70–130)
GLUCOSE BLDC GLUCOMTR-MCNC: 93 MG/DL (ref 70–130)
GLUCOSE BLDC GLUCOMTR-MCNC: 96 MG/DL (ref 70–130)
GLUCOSE SERPL-MCNC: 91 MG/DL (ref 65–99)
HCT VFR BLD AUTO: 34.3 % (ref 34–46.6)
HGB BLD-MCNC: 10.9 G/DL (ref 12–15.9)
IMM GRANULOCYTES # BLD AUTO: 0.08 10*3/MM3 (ref 0–0.05)
IMM GRANULOCYTES NFR BLD AUTO: 0.6 % (ref 0–0.5)
INR PPP: 1.08 (ref 0.91–1.09)
LAB AP CASE REPORT: NORMAL
LYMPHOCYTES # BLD AUTO: 0.4 10*3/MM3 (ref 0.7–3.1)
LYMPHOCYTES NFR BLD AUTO: 2.8 % (ref 19.6–45.3)
Lab: NORMAL
MAGNESIUM SERPL-MCNC: 1.8 MG/DL (ref 1.6–2.4)
MCH RBC QN AUTO: 29.5 PG (ref 26.6–33)
MCHC RBC AUTO-ENTMCNC: 31.8 G/DL (ref 31.5–35.7)
MCV RBC AUTO: 93 FL (ref 79–97)
MONOCYTES # BLD AUTO: 0.56 10*3/MM3 (ref 0.1–0.9)
MONOCYTES NFR BLD AUTO: 3.9 % (ref 5–12)
NEUTROPHILS NFR BLD AUTO: 12.92 10*3/MM3 (ref 1.7–7)
NEUTROPHILS NFR BLD AUTO: 91.1 % (ref 42.7–76)
NRBC BLD AUTO-RTO: 0 /100 WBC (ref 0–0.2)
PATH REPORT.FINAL DX SPEC: NORMAL
PATH REPORT.GROSS SPEC: NORMAL
PLATELET # BLD AUTO: 377 10*3/MM3 (ref 140–450)
PMV BLD AUTO: 9.9 FL (ref 6–12)
POTASSIUM SERPL-SCNC: 3.8 MMOL/L (ref 3.5–5.2)
PROT SERPL-MCNC: 5.7 G/DL (ref 6–8.5)
PROTHROMBIN TIME: 14.1 SECONDS (ref 11.8–14.8)
RBC # BLD AUTO: 3.69 10*6/MM3 (ref 3.77–5.28)
SODIUM SERPL-SCNC: 136 MMOL/L (ref 136–145)
WBC NRBC COR # BLD: 14.18 10*3/MM3 (ref 3.4–10.8)

## 2023-11-16 PROCEDURE — 88342 IMHCHEM/IMCYTCHM 1ST ANTB: CPT | Performed by: INTERNAL MEDICINE

## 2023-11-16 PROCEDURE — 25010000002 LABETALOL 5 MG/ML SOLUTION: Performed by: SURGERY

## 2023-11-16 PROCEDURE — 85610 PROTHROMBIN TIME: CPT | Performed by: SURGERY

## 2023-11-16 PROCEDURE — 77012 CT SCAN FOR NEEDLE BIOPSY: CPT

## 2023-11-16 PROCEDURE — 85730 THROMBOPLASTIN TIME PARTIAL: CPT | Performed by: SURGERY

## 2023-11-16 PROCEDURE — 80053 COMPREHEN METABOLIC PANEL: CPT | Performed by: INTERNAL MEDICINE

## 2023-11-16 PROCEDURE — 88341 IMHCHEM/IMCYTCHM EA ADD ANTB: CPT

## 2023-11-16 PROCEDURE — 85025 COMPLETE CBC W/AUTO DIFF WBC: CPT | Performed by: INTERNAL MEDICINE

## 2023-11-16 PROCEDURE — 83735 ASSAY OF MAGNESIUM: CPT | Performed by: INTERNAL MEDICINE

## 2023-11-16 PROCEDURE — 25010000002 FUROSEMIDE PER 20 MG: Performed by: INTERNAL MEDICINE

## 2023-11-16 PROCEDURE — 88177 CYTP FNA EVAL EA ADDL: CPT | Performed by: INTERNAL MEDICINE

## 2023-11-16 PROCEDURE — 88323 CONSLTJ&REPRT MATRL PREP SLD: CPT

## 2023-11-16 PROCEDURE — 88341 IMHCHEM/IMCYTCHM EA ADD ANTB: CPT | Performed by: INTERNAL MEDICINE

## 2023-11-16 PROCEDURE — 25010000002 LIDOCAINE 1 % SOLUTION: Performed by: STUDENT IN AN ORGANIZED HEALTH CARE EDUCATION/TRAINING PROGRAM

## 2023-11-16 PROCEDURE — 88172 CYTP DX EVAL FNA 1ST EA SITE: CPT | Performed by: INTERNAL MEDICINE

## 2023-11-16 PROCEDURE — 88305 TISSUE EXAM BY PATHOLOGIST: CPT | Performed by: INTERNAL MEDICINE

## 2023-11-16 PROCEDURE — 82948 REAGENT STRIP/BLOOD GLUCOSE: CPT

## 2023-11-16 PROCEDURE — 07BD3ZX EXCISION OF AORTIC LYMPHATIC, PERCUTANEOUS APPROACH, DIAGNOSTIC: ICD-10-PCS | Performed by: STUDENT IN AN ORGANIZED HEALTH CARE EDUCATION/TRAINING PROGRAM

## 2023-11-16 RX ORDER — BISACODYL 5 MG/1
10 TABLET, DELAYED RELEASE ORAL DAILY
Status: DISCONTINUED | OUTPATIENT
Start: 2023-11-16 | End: 2023-11-18

## 2023-11-16 RX ORDER — LIDOCAINE HYDROCHLORIDE 10 MG/ML
INJECTION, SOLUTION INFILTRATION; PERINEURAL AS NEEDED
Status: COMPLETED | OUTPATIENT
Start: 2023-11-16 | End: 2023-11-16

## 2023-11-16 RX ORDER — FUROSEMIDE 10 MG/ML
20 INJECTION INTRAMUSCULAR; INTRAVENOUS ONCE
Status: COMPLETED | OUTPATIENT
Start: 2023-11-16 | End: 2023-11-16

## 2023-11-16 RX ORDER — DRONABINOL 2.5 MG/1
2.5 CAPSULE ORAL 2 TIMES DAILY
Status: DISCONTINUED | OUTPATIENT
Start: 2023-11-16 | End: 2023-11-16

## 2023-11-16 RX ADMIN — ATORVASTATIN CALCIUM 10 MG: 10 TABLET, FILM COATED ORAL at 20:50

## 2023-11-16 RX ADMIN — EMPAGLIFLOZIN 10 MG: 10 TABLET, FILM COATED ORAL at 08:37

## 2023-11-16 RX ADMIN — LEVOTHYROXINE SODIUM 37.5 MCG: 75 TABLET ORAL at 04:06

## 2023-11-16 RX ADMIN — HYDROCODONE BITARTRATE AND ACETAMINOPHEN 1 TABLET: 7.5; 325 TABLET ORAL at 15:25

## 2023-11-16 RX ADMIN — Medication 10 ML: at 08:38

## 2023-11-16 RX ADMIN — LABETALOL HYDROCHLORIDE 10 MG: 5 INJECTION INTRAVENOUS at 03:58

## 2023-11-16 RX ADMIN — LIDOCAINE HYDROCHLORIDE 10 ML: 10 INJECTION, SOLUTION INFILTRATION; PERINEURAL at 09:36

## 2023-11-16 RX ADMIN — DOCUSATE SODIUM 50 MG AND SENNOSIDES 8.6 MG 2 TABLET: 8.6; 5 TABLET, FILM COATED ORAL at 08:37

## 2023-11-16 RX ADMIN — Medication 10 ML: at 20:50

## 2023-11-16 RX ADMIN — Medication 1 TABLET: at 08:37

## 2023-11-16 RX ADMIN — BISACODYL 10 MG: 5 TABLET ORAL at 15:24

## 2023-11-16 RX ADMIN — NIFEDIPINE 30 MG: 30 TABLET, FILM COATED, EXTENDED RELEASE ORAL at 08:37

## 2023-11-16 RX ADMIN — FUROSEMIDE 20 MG: 10 INJECTION, SOLUTION INTRAMUSCULAR; INTRAVENOUS at 15:24

## 2023-11-16 RX ADMIN — HYDROCODONE BITARTRATE AND ACETAMINOPHEN 1 TABLET: 7.5; 325 TABLET ORAL at 04:01

## 2023-11-16 NOTE — PAYOR COMM NOTE
"Imani Branch (83 y.o. Female) [id number]   cont stay    11/16     will fax again 11/17       No notes available at this time  for 11/16     [facility] phone    Fax          Date of Birth   1940    Social Security Number       Address   [address] [address]    Home Phone   [phone]    MRN   [id number]       [employer]    Marital Status                               Admission Date   11/11/23    Admission Type   Emergency    Admitting Provider   Alvarez Lam MD    Attending Provider   Alvarez Lam MD    Department, Room/Bed   [facility] 3C, 361/1       Discharge Date       Discharge Disposition       Discharge Destination                                 Attending Provider: Alvarez Lam MD    Allergies: Not on File    Isolation: None   Infection: None   Code Status: CPR    Ht: 162.6 cm (64.02\")   Wt: 71.2 kg (156 lb 14.4 oz)    Admission Cmt: None   Principal Problem: Hypercalcemia [E83.52]                   Active Insurance as of 11/11/2023       Primary Coverage       Payor Plan Insurance Group Employer/Plan Group    ANTHEM MEDICARE REPLACEMENT ANTHEM MEDICARE ADVANTAGE [id number]       Payor Plan Address Payor Plan Phone Number Payor Plan Fax Number Effective Dates    [address] [phone]  1/1/2015 - None Entered    [facility] [id number]         Subscriber Name Subscriber Birth Date Member ID       IMANI BRANCH 1940 [id number]                     Emergency Contacts        (Rel.) Home Phone Work Phone Mobile Phone    TeinRosamaria ritchie (Relative) [phone] -- --    Mary Rivas (Grandchild) -- -- [phone]              Vital Signs (last day)       Date/Time Temp Temp src Pulse Resp BP Patient Position SpO2    11/16/23 1159 98.1 (36.7) -- 93 16 152/79 -- 94    11/16/23 0950 -- -- 95 20 160/85 -- 97    11/16/23 09:45:15 -- -- 95 19 174/75 -- 97    " 11/16/23 0940 -- -- 96 20 171/77 -- 97    11/16/23 09:34:21 -- -- 98 20 161/77 -- 98    11/16/23 09:19:29 -- -- 94 19 166/87 -- 96    11/16/23 0804 98.1 (36.7) -- 90 20 152/58 Lying 94    11/16/23 0342 98 (36.7) Oral 107 20 179/85 Lying 94    11/15/23 2324 98.1 (36.7) Oral 103 20 166/79 Lying 94    11/15/23 1923 98 (36.7) Oral 89 20 154/66 Lying 92    11/15/23 1525 97.7 (36.5) -- 89 20 152/70 Lying 91    11/15/23 1226 98.3 (36.8) -- 88 20 155/68 Lying 91    11/15/23 0849 98.4 (36.9) -- 88 20 153/78 Lying 94    11/15/23 0351 98.5 (36.9) Oral 83 20 131/64 Lying 94    11/15/23 0150 -- -- 100 20 158/70 Lying 94    11/15/23 0056 -- -- -- -- 184/74 Lying --          Current Facility-Administered Medications   Medication Dose Route Frequency Provider Last Rate Last Admin    acetaminophen (TYLENOL) tablet 650 mg  650 mg Oral Q4H PRN Adenike Ponce MD   650 mg at 11/12/23 0027    atorvastatin (LIPITOR) tablet 10 mg  10 mg Oral Nightly Adenike Ponce MD   10 mg at 11/15/23 2012    bisacodyl (DULCOLAX) EC tablet 10 mg  10 mg Oral Daily Alvarez Lam MD        bisacodyl (DULCOLAX) suppository 10 mg  10 mg Rectal Daily PRN Adenike Ponce MD        cloNIDine (CATAPRES) tablet 0.1 mg  0.1 mg Oral BID PRN Adenike Ponce MD   0.1 mg at 11/15/23 0058    dextrose (D50W) (25 g/50 mL) IV injection 25 g  25 g Intravenous Q15 Min PRN Adenike Ponce MD        dextrose (GLUTOSE) oral gel 15 g  15 g Oral Q15 Min PRN Adenike Ponce MD        empagliflozin (JARDIANCE) tablet 10 mg  10 mg Oral Daily Adenike Ponce MD   10 mg at 11/16/23 0837    furosemide (LASIX) tablet 20 mg  20 mg Oral Daily PRN Adenike Ponce MD        glucagon (GLUCAGEN) injection 1 mg  1 mg Intramuscular Q15 Min PRN Adenike Ponce MD        hydrALAZINE (APRESOLINE) injection 10 mg  10 mg Intravenous Q4H PRN Adenike Ponce MD   10 mg at 11/14/23 9770    HYDROcodone-acetaminophen (NORCO) 7.5-325 MG per tablet 1 tablet  1 tablet Oral Q6H PRN Genaro,  Alvarez TERAN MD   1 tablet at 11/16/23 0401    Insulin Lispro (humaLOG) injection 2-7 Units  2-7 Units Subcutaneous 4x Daily AC & at Bedtime Adenike Ponce MD        labetalol (NORMODYNE,TRANDATE) injection 10 mg  10 mg Intravenous Q6H PRN Adenike Ponce MD   10 mg at 11/16/23 0358    levothyroxine (SYNTHROID, LEVOTHROID) tablet 37.5 mcg  37.5 mcg Oral Q AM Adenike Ponce MD   37.5 mcg at 11/16/23 0406    LORazepam (ATIVAN) tablet 0.5 mg  0.5 mg Oral Q8H PRN Adenike Ponce MD        multivitamin with minerals 1 tablet  1 tablet Oral Daily Adenike Ponce MD   1 tablet at 11/16/23 0837    NIFEdipine XL (PROCARDIA XL) 24 hr tablet 30 mg  30 mg Oral Q24H Adenike Ponce MD   30 mg at 11/16/23 0837    ondansetron (ZOFRAN) injection 4 mg  4 mg Intravenous Q6H PRN Alvarez Lam MD   4 mg at 11/14/23 2307    ondansetron (ZOFRAN) tablet 4 mg  4 mg Oral Q6H PRN Adenike Ponce MD        oxyCODONE-acetaminophen (PERCOCET) 5-325 MG per tablet 1 tablet  1 tablet Oral Q4H PRN Adenike Ponce MD        sodium chloride 0.9 % flush 10 mL  10 mL Intravenous PRN Adenike Ponce MD        sodium chloride 0.9 % flush 10 mL  10 mL Intravenous Q12H Adenike Ponce MD   10 mL at 11/16/23 0838    sodium chloride 0.9 % flush 10 mL  10 mL Intravenous PRN Adenike Ponce MD        sodium chloride 0.9 % infusion 40 mL  40 mL Intravenous PRN Adenike Ponce MD        [Held by provider] spironolactone (ALDACTONE) tablet 25 mg  25 mg Oral Daily Adenike Ponce MD        temazepam (RESTORIL) capsule 15 mg  15 mg Oral Nightly PRN Adenike Ponce MD            Physician Progress Notes (last 24 hours)        Alvarez Lam MD at 11/15/23 1816              [facility] Medicine Services  INPATIENT PROGRESS NOTE    Patient Name: Imani Chauhan  Date of Admission: 11/11/2023  Today's Date: 11/15/23  Length of Stay: 4  Primary Care Physician: Светлана Loyd MD    Subjective     Patient was down  for surgery when I attempted to examine her.        Objective    Temp:  [97.6 °F (36.4 °C)-98.7 °F (37.1 °C)] 97.7 °F (36.5 °C)  Heart Rate:  [] 89  Resp:  [18-20] 20  BP: (131-186)/(64-78) 152/70    General: No acute distress  HEENT: normocephalic, atraumatic  Neck: Supple  CV: RRR  Lung: Clear to auscultation bilaterally.  No wheeze, rales, or rhonchi noted.  Abdominal exam: Positive bowel sounds, nontender, non distended  Extremity: No edema noted  Neurological exam: AAO x3. Cranial nerve II to XII grossly intact  Skin exam: Dry and warm  Psychiatric exam: Calm, cooperative, and normal affect       Results Review:  I have reviewed the labs, radiology results, and diagnostic studies.    Laboratory Data:   Results from last 7 days   Lab Units 11/15/23  0645 11/13/23 0449 11/12/23  0336   WBC 10*3/mm3 14.02* 7.90 8.88   HEMOGLOBIN g/dL 10.6* 10.7* 11.3*   HEMATOCRIT % 33.8* 34.6 35.9   PLATELETS 10*3/mm3 362 399 452*        Results from last 7 days   Lab Units 11/15/23  0645 11/13/23 0449 11/12/23 0336 11/11/23  1758   SODIUM mmol/L 133* 131* 129* 129*   POTASSIUM mmol/L 4.2 4.4 4.5 4.8   CHLORIDE mmol/L 102 99 92* 88*   CO2 mmol/L 19.0* 24.0 25.0 27.0   BUN mg/dL 24* 25* 23 22   CREATININE mg/dL 1.55* 1.80* 1.71* 1.58*   CALCIUM mg/dL 9.9 12.1* 13.4* 14.6*   BILIRUBIN mg/dL 0.3  --  0.4 0.5   ALK PHOS U/L 127*  --  148* 185*   ALT (SGPT) U/L 20  --  24 30   AST (SGOT) U/L 43*  --  39* 47*   GLUCOSE mg/dL 103* 91 86 92       Culture Data:   Urine Culture   Date Value Ref Range Status   11/11/2023 No growth  Final       Radiology Data:   Imaging Results (Last 24 Hours)       ** No results found for the last 24 hours. **            I have reviewed the patient's current medications.     Assessment/Plan   Assessment  Active Hospital Problems    Diagnosis     **Hypercalcemia     Lymphadenopathy        Assessment and Plan:    Hypercalcemia likely secondary to undiagnosed metastatic cancer:  -She received 1 doses  zoledronic acid on 11/11 per oncology  -hold IVF for now  -recheck CMP in am   -lasix 20 IV x1 for LE edema     Abdominal lymphadenopathy likely secondary to metastatic cancer:  -pathology results pending     RICHA vs CKD: unknown Cr baseline  - x1 L  -recheck labs in am    Hypothyroidism:  -cont home Synthroid         Dispo: pathology results are pending        Alvarez Lam MD 11/15/23, 18:16 CST.                  Treatment Plan      Medical Decision Making  Number and Complexity of problems:   Differential Diagnosis:     Conditions and Status             MDM Data  External documents reviewed:   Cardiac tracing (EKG, telemetry) interpretation:   Radiology interpretation:   Labs reviewed:   Any tests that were considered but not ordered:      Decision rules/scores evaluated (example [id number], Wells, etc):      Discussed with:      Care Planning  Shared decision making:   Code status and discussions:     Disposition  Social Determinants of Health that impact treatment or disposition:   I expect the patient to be discharged to  in  days.     Electronically signed by Alvarez Lam MD, 11/15/23, 18:16 CST.\    Electronically signed by Alvarez Lam MD at 11/15/23 1838       Consult Notes (last 24 hours)  Notes from 11/15/23 1449 through 11/16/23 1449   No notes of this type exist for this encounter.

## 2023-11-16 NOTE — PLAN OF CARE
Goal Outcome Evaluation:  Plan of Care Reviewed With: patient        Progress: no change  Outcome Evaluation: IID; no request for pain medication; Up with assist; dose of lasix with good urine output; soa and audible wheezes with exertion; NPO for biopsy today; resting between care; safety maintained

## 2023-11-16 NOTE — PROGRESS NOTES
[facility] Medicine Services  INPATIENT PROGRESS NOTE    Patient Name: Imani Chauhan  Date of Admission: 11/11/2023  Today's Date: 11/15/23  Length of Stay: 4  Primary Care Physician: Светлана Loyd MD    Subjective     Patient was down for surgery when I attempted to examine her.        Objective    Temp:  [97.6 °F (36.4 °C)-98.7 °F (37.1 °C)] 97.7 °F (36.5 °C)  Heart Rate:  [] 89  Resp:  [18-20] 20  BP: (131-186)/(64-78) 152/70    General: No acute distress  HEENT: normocephalic, atraumatic  Neck: Supple  CV: RRR  Lung: Clear to auscultation bilaterally.  No wheeze, rales, or rhonchi noted.  Abdominal exam: Positive bowel sounds, nontender, non distended  Extremity: No edema noted  Neurological exam: AAO x3. Cranial nerve II to XII grossly intact  Skin exam: Dry and warm  Psychiatric exam: Calm, cooperative, and normal affect       Results Review:  I have reviewed the labs, radiology results, and diagnostic studies.    Laboratory Data:   Results from last 7 days   Lab Units 11/15/23  0645 11/13/23 0449 11/12/23 0336   WBC 10*3/mm3 14.02* 7.90 8.88   HEMOGLOBIN g/dL 10.6* 10.7* 11.3*   HEMATOCRIT % 33.8* 34.6 35.9   PLATELETS 10*3/mm3 362 399 452*        Results from last 7 days   Lab Units 11/15/23  0645 11/13/23 0449 11/12/23  0336 11/11/23  1758   SODIUM mmol/L 133* 131* 129* 129*   POTASSIUM mmol/L 4.2 4.4 4.5 4.8   CHLORIDE mmol/L 102 99 92* 88*   CO2 mmol/L 19.0* 24.0 25.0 27.0   BUN mg/dL 24* 25* 23 22   CREATININE mg/dL 1.55* 1.80* 1.71* 1.58*   CALCIUM mg/dL 9.9 12.1* 13.4* 14.6*   BILIRUBIN mg/dL 0.3  --  0.4 0.5   ALK PHOS U/L 127*  --  148* 185*   ALT (SGPT) U/L 20  --  24 30   AST (SGOT) U/L 43*  --  39* 47*   GLUCOSE mg/dL 103* 91 86 92       Culture Data:   Urine Culture   Date Value Ref Range Status   11/11/2023 No growth  Final       Radiology Data:   Imaging Results (Last 24 Hours)       ** No results found for the last 24 hours. **             I have reviewed the patient's current medications.     Assessment/Plan   Assessment  Active Hospital Problems    Diagnosis     **Hypercalcemia     Lymphadenopathy        Assessment and Plan:    Hypercalcemia likely secondary to undiagnosed metastatic cancer:  -She received 1 doses zoledronic acid on 11/11 per oncology  -hold IVF for now  -recheck CMP in am   -lasix 20 IV x1 for LE edema     Abdominal lymphadenopathy likely secondary to metastatic cancer:  -pathology results pending     IRCHA vs CKD: unknown Cr baseline  - x1 L  -recheck labs in am    Hypothyroidism:  -cont home Synthroid         Dispo: pathology results are pending        Alvarez Lam MD 11/15/23, 18:16 CST.                  Treatment Plan      Medical Decision Making  Number and Complexity of problems:   Differential Diagnosis:     Conditions and Status             MDM Data  External documents reviewed:   Cardiac tracing (EKG, telemetry) interpretation:   Radiology interpretation:   Labs reviewed:   Any tests that were considered but not ordered:      Decision rules/scores evaluated (example [id number], Wells, etc):      Discussed with:      Care Planning  Shared decision making:   Code status and discussions:     Disposition  Social Determinants of Health that impact treatment or disposition:   I expect the patient to be discharged to  in  days.     Electronically signed by Alvarez Lam MD, 11/15/23, 18:16 CST.\

## 2023-11-16 NOTE — PLAN OF CARE
Problem: Adult Inpatient Plan of Care  Goal: Plan of Care Review  11/16/2023 1605 by Nisreen Ray, RN  Outcome: Ongoing, Progressing  Flowsheets (Taken 11/16/2023 1605)  Outcome Evaluation: Patient IV CDI. NPO at midnight for CT guided retroperitoneal biopsy. Tolerating Clear liquid, C/O pain, see MAR. Up x1 with walker. VSS, safety maintained.   Goal Outcome Evaluation:

## 2023-11-17 ENCOUNTER — APPOINTMENT (OUTPATIENT)
Dept: ULTRASOUND IMAGING | Facility: HOSPITAL | Age: 83
DRG: 821 | End: 2023-11-17
Payer: MEDICARE

## 2023-11-17 LAB
ALBUMIN SERPL-MCNC: 2.6 G/DL (ref 3.5–5.2)
ALBUMIN/GLOB SERPL: 0.9 G/DL
ALP SERPL-CCNC: 942 U/L (ref 39–117)
ALT SERPL W P-5'-P-CCNC: 90 U/L (ref 1–33)
ANION GAP SERPL CALCULATED.3IONS-SCNC: 14 MMOL/L (ref 5–15)
AST SERPL-CCNC: 280 U/L (ref 1–32)
BASOPHILS # BLD AUTO: 0.04 10*3/MM3 (ref 0–0.2)
BASOPHILS NFR BLD AUTO: 0.3 % (ref 0–1.5)
BILIRUB SERPL-MCNC: 0.7 MG/DL (ref 0–1.2)
BUN SERPL-MCNC: 23 MG/DL (ref 8–23)
BUN/CREAT SERPL: 15 (ref 7–25)
CALCIUM SPEC-SCNC: 10.1 MG/DL (ref 8.6–10.5)
CHLORIDE SERPL-SCNC: 101 MMOL/L (ref 98–107)
CO2 SERPL-SCNC: 21 MMOL/L (ref 22–29)
CREAT SERPL-MCNC: 1.53 MG/DL (ref 0.57–1)
DEPRECATED RDW RBC AUTO: 48.9 FL (ref 37–54)
EGFRCR SERPLBLD CKD-EPI 2021: 33.8 ML/MIN/1.73
EOSINOPHIL # BLD AUTO: 0.31 10*3/MM3 (ref 0–0.4)
EOSINOPHIL NFR BLD AUTO: 2.6 % (ref 0.3–6.2)
ERYTHROCYTE [DISTWIDTH] IN BLOOD BY AUTOMATED COUNT: 14.7 % (ref 12.3–15.4)
GLOBULIN UR ELPH-MCNC: 2.9 GM/DL
GLUCOSE BLDC GLUCOMTR-MCNC: 100 MG/DL (ref 70–130)
GLUCOSE BLDC GLUCOMTR-MCNC: 107 MG/DL (ref 70–130)
GLUCOSE BLDC GLUCOMTR-MCNC: 108 MG/DL (ref 70–130)
GLUCOSE SERPL-MCNC: 92 MG/DL (ref 65–99)
HCT VFR BLD AUTO: 31.3 % (ref 34–46.6)
HGB BLD-MCNC: 10.1 G/DL (ref 12–15.9)
IMM GRANULOCYTES # BLD AUTO: 0.06 10*3/MM3 (ref 0–0.05)
IMM GRANULOCYTES NFR BLD AUTO: 0.5 % (ref 0–0.5)
LYMPHOCYTES # BLD AUTO: 0.47 10*3/MM3 (ref 0.7–3.1)
LYMPHOCYTES NFR BLD AUTO: 4 % (ref 19.6–45.3)
MCH RBC QN AUTO: 29.3 PG (ref 26.6–33)
MCHC RBC AUTO-ENTMCNC: 32.3 G/DL (ref 31.5–35.7)
MCV RBC AUTO: 90.7 FL (ref 79–97)
MONOCYTES # BLD AUTO: 0.49 10*3/MM3 (ref 0.1–0.9)
MONOCYTES NFR BLD AUTO: 4.2 % (ref 5–12)
NEUTROPHILS NFR BLD AUTO: 10.43 10*3/MM3 (ref 1.7–7)
NEUTROPHILS NFR BLD AUTO: 88.4 % (ref 42.7–76)
NRBC BLD AUTO-RTO: 0 /100 WBC (ref 0–0.2)
PLATELET # BLD AUTO: 369 10*3/MM3 (ref 140–450)
PMV BLD AUTO: 9.7 FL (ref 6–12)
POTASSIUM SERPL-SCNC: 3.6 MMOL/L (ref 3.5–5.2)
PROT SERPL-MCNC: 5.5 G/DL (ref 6–8.5)
RBC # BLD AUTO: 3.45 10*6/MM3 (ref 3.77–5.28)
SODIUM SERPL-SCNC: 136 MMOL/L (ref 136–145)
WBC NRBC COR # BLD AUTO: 11.8 10*3/MM3 (ref 3.4–10.8)

## 2023-11-17 PROCEDURE — 80053 COMPREHEN METABOLIC PANEL: CPT | Performed by: INTERNAL MEDICINE

## 2023-11-17 PROCEDURE — 76705 ECHO EXAM OF ABDOMEN: CPT

## 2023-11-17 PROCEDURE — 85025 COMPLETE CBC W/AUTO DIFF WBC: CPT | Performed by: INTERNAL MEDICINE

## 2023-11-17 PROCEDURE — 82948 REAGENT STRIP/BLOOD GLUCOSE: CPT

## 2023-11-17 PROCEDURE — 25010000002 LABETALOL 5 MG/ML SOLUTION: Performed by: SURGERY

## 2023-11-17 RX ORDER — INSULIN LISPRO 100 [IU]/ML
2-7 INJECTION, SOLUTION INTRAVENOUS; SUBCUTANEOUS
Status: DISCONTINUED | OUTPATIENT
Start: 2023-11-18 | End: 2023-11-20 | Stop reason: HOSPADM

## 2023-11-17 RX ADMIN — BISACODYL 10 MG: 5 TABLET ORAL at 08:07

## 2023-11-17 RX ADMIN — NIFEDIPINE 30 MG: 30 TABLET, FILM COATED, EXTENDED RELEASE ORAL at 08:07

## 2023-11-17 RX ADMIN — Medication 1 TABLET: at 08:07

## 2023-11-17 RX ADMIN — LABETALOL HYDROCHLORIDE 10 MG: 5 INJECTION INTRAVENOUS at 08:09

## 2023-11-17 RX ADMIN — HYDROCODONE BITARTRATE AND ACETAMINOPHEN 1 TABLET: 7.5; 325 TABLET ORAL at 00:23

## 2023-11-17 RX ADMIN — EMPAGLIFLOZIN 10 MG: 10 TABLET, FILM COATED ORAL at 08:07

## 2023-11-17 RX ADMIN — Medication 10 ML: at 20:39

## 2023-11-17 RX ADMIN — Medication 10 ML: at 08:08

## 2023-11-17 RX ADMIN — ATORVASTATIN CALCIUM 10 MG: 10 TABLET, FILM COATED ORAL at 20:38

## 2023-11-17 RX ADMIN — LEVOTHYROXINE SODIUM 37.5 MCG: 75 TABLET ORAL at 05:54

## 2023-11-17 NOTE — PROGRESS NOTES
[facility] Medicine Services  INPATIENT PROGRESS NOTE    Patient Name: Imani Chauhan  Date of Admission: 11/11/2023  Today's Date: 11/16/23  Length of Stay: 5  Primary Care Physician: Свелтана Loyd MD    Subjective     Patient complains of poor appetite and has not had a bowel movement in over 3 days.        Objective    Temp:  [98 °F (36.7 °C)-98.2 °F (36.8 °C)] 98.2 °F (36.8 °C)  Heart Rate:  [] 100  Resp:  [16-20] 18  BP: (152-179)/(58-87) 174/70    General: No acute distress  HEENT: normocephalic, atraumatic  Neck: Supple  CV: RRR  Lung: Clear to auscultation bilaterally.  No wheeze, rales, or rhonchi noted.  Abdominal exam: Positive bowel sounds, nontender, non distended  Extremity: No edema noted  Neurological exam: AAO x3. Cranial nerve II to XII grossly intact  Skin exam: Dry and warm  Psychiatric exam: Calm, cooperative, and normal affect       Results Review:  I have reviewed the labs, radiology results, and diagnostic studies.    Laboratory Data:   Results from last 7 days   Lab Units 11/16/23  0528 11/15/23  0645 11/13/23 0449   WBC 10*3/mm3 14.18* 14.02* 7.90   HEMOGLOBIN g/dL 10.9* 10.6* 10.7*   HEMATOCRIT % 34.3 33.8* 34.6   PLATELETS 10*3/mm3 377 362 399        Results from last 7 days   Lab Units 11/16/23  0528 11/15/23  0645 11/13/23  0449 11/12/23  0336   SODIUM mmol/L 136 133* 131* 129*   POTASSIUM mmol/L 3.8 4.2 4.4 4.5   CHLORIDE mmol/L 102 102 99 92*   CO2 mmol/L 20.0* 19.0* 24.0 25.0   BUN mg/dL 23 24* 25* 23   CREATININE mg/dL 1.60* 1.55* 1.80* 1.71*   CALCIUM mg/dL 10.3 9.9 12.1* 13.4*   BILIRUBIN mg/dL 0.5 0.3  --  0.4   ALK PHOS U/L 430* 127*  --  148*   ALT (SGPT) U/L 27 20  --  24   AST (SGOT) U/L 95* 43*  --  39*   GLUCOSE mg/dL 91 103* 91 86       Culture Data:   Urine Culture   Date Value Ref Range Status   11/11/2023 No growth  Final       Radiology Data:   Imaging Results (Last 24 Hours)       Procedure Component Value Units  Date/Time    CT Biopsy Abdomen Retroperitoneal [[id number]] Collected: 11/16/23 1012     Updated: 11/16/23 1020    Narrative:      EXAM: CT BIOPSY ABDOMEN RETROPERITONEAL- - 11/16/2023 9:00 AM CST     HISTORY: retroperitoneal lymphadenopathy; E83.52-Hypercalcemia;  E87.2-Kgxx-nbxlrwihjz and hyponatremia; N17.9-Acute kidney failure,  unspecified; R59.1-Generalized enlarged lymph nodes; R59.0-Localized  enlarged lymph nodes       COMPARISON: CT abdomen pelvis 11/12/2023.      DOSE LENGTH PRODUCT: 1341 mGy cm. Automated exposure control was also  utilized to decrease patient radiation dose.     TECHNIQUE/FINDINGS:   Risks, benefits and alternatives to the procedure were discussed with  the patient. Specifically the the risk of a retroperitoneal hematoma and  bleeding was discussed. Written and verbal informed consent was  obtained.     Immediately prior to the procedure, a time out was performed including  the patient's name, date of birth, procedure and laterality.       The entry site was marked with an X. Site was prepped in the usual  sterile fashion.      Approximately 5 mL of 1 percent lidocaine was infused in the  subcutaneous and deeper tissues for local anesthesia. Using CT guidance,  4 18-gauge core biopsies samples were obtained from the left  retroperitoneal lymphadenopathy a left posterior percutaneous. The  samples were sent to surgical pathology for analysis. Pathology  confirmed neoplastic cells.     The patient tolerated the procedure well, and was transferred to the  holding area in stable condition for post procedure monitoring. There  were no apparent periprocedural complications.           Impression:      CT-guided left retroperitoneal lymphadenopathy biopsy  1. Technically successful CT-guided left retroperitoneal lymphadenopathy  biopsy.  2. No periprocedural complication.        This report was signed and finalized on 11/16/2023 10:17 AM CST by  Navneet Hoang.               I have reviewed the  patient's current medications.     Assessment/Plan   Assessment  Active Hospital Problems    Diagnosis     **Hypercalcemia     Lymphadenopathy        Assessment and Plan:    Hypercalcemia likely secondary to undiagnosed metastatic cancer:  -She received 1 doses zoledronic acid on 11/11 per oncology  -hold IVF for now  -recheck CMP in am   -lasix 20 IV x1 for LE edema     Abdominal lymphadenopathy likely secondary to metastatic cancer:  -pathology results pending     CKD 3: Cr 1.5-1.6 appears to be her baseline  -recheck labs in am    Hypothyroidism:  -cont home Synthroid     Constipation: last BM ? 3 days ago  -dulcolax 10 po qd    Hypomagnesemia 2/2 PPI use and/or poor appetite:  -replace Mg prn          Dispo: pathology results are pending        Alvarez Lam MD 11/16/23, 18:55 CST.                  Treatment Plan      Medical Decision Making  Number and Complexity of problems:   Differential Diagnosis:     Conditions and Status             MDM Data  External documents reviewed:   Cardiac tracing (EKG, telemetry) interpretation:   Radiology interpretation:   Labs reviewed:   Any tests that were considered but not ordered:      Decision rules/scores evaluated (example [id number], Wells, etc):      Discussed with:      Care Planning  Shared decision making:   Code status and discussions:     Disposition  Social Determinants of Health that impact treatment or disposition:   I expect the patient to be discharged to  in  days.     Electronically signed by Alvarez Lam MD, 11/16/23, 18:55 CST.\

## 2023-11-17 NOTE — PLAN OF CARE
Problem: Adult Inpatient Plan of Care  Goal: Plan of Care Review  Outcome: Ongoing, Progressing  Flowsheets (Taken 11/17/2023 1603)  Outcome Evaluation: Patient RA. IV CDI, IID. IV BP meds given in AM. Up x1 with walker. Voiding per bathroom. No c.o pain. Abdominal US, see Results. Tolerating diet. No bm thus far. VSS, safety maintained.

## 2023-11-17 NOTE — CASE MANAGEMENT/SOCIAL WORK
Continued Stay Note  ADELSO Zabala     Patient Name: Imani Chauhan  MRN: [id number]  Today's Date: 11/17/2023    Admit Date: 11/11/2023    Plan: Home   Discharge Plan       Row Name 11/17/23 1331       Plan    Plan Home    Patient/Family in Agreement with Plan yes    Plan Comments Pt has been up with a walker still. She may benefit from one for home at d/c. Will follow.    Final Discharge Disposition Code 01 - home or self-care                   Discharge Codes    No documentation.                 Expected Discharge Date and Time       Expected Discharge Date Expected Discharge Time    Nov 16, 2023               JADE Ibarra

## 2023-11-17 NOTE — PLAN OF CARE
Goal Outcome Evaluation:  Plan of Care Reviewed With: patient        Progress: no change  Outcome Evaluation: Medicated for c/o pain x1, rm air, A&O, voiding, tolerating clears, amb in elder w stand by and walker, passing gas.

## 2023-11-17 NOTE — PAYOR COMM NOTE
"11/16 CLINICAL  [id number]    [phone]    Imani Branch (83 y.o. Female)       Date of Birth   1940    Social Security Number       Address   [address] [id number]    Home Phone   [phone]    MRN   [id number]       [employer]    Marital Status                               Admission Date   11/11/23    Admission Type   Emergency    Admitting Provider   Alvarez Lam MD    Attending Provider   Alvarez Lam MD    Department, Room/Bed   [facility] 3C, 361/1       Discharge Date       Discharge Disposition       Discharge Destination                                 Attending Provider: Alvarez Lam MD    Allergies: Not on File    Isolation: None   Infection: None   Code Status: CPR    Ht: 162.6 cm (64.02\")   Wt: 71.2 kg (156 lb 14.4 oz)    Admission Cmt: None   Principal Problem: Hypercalcemia [E83.52]                   Active Insurance as of 11/11/2023       Primary Coverage       Payor Plan Insurance Group Employer/Plan Group    ANTHEM MEDICARE REPLACEMENT ANTHEM MEDICARE ADVANTAGE [id number]       Payor Plan Address Payor Plan Phone Number Payor Plan Fax Number Effective Dates    [address] [phone]  1/1/2015 - None Entered    [facility] [id number]         Subscriber Name Subscriber Birth Date Member ID       IMANI BRANCH 1940 [id number]                     Emergency Contacts        (Rel.) Home Phone Work Phone Mobile Phone    Rosamaria Branch (Relative) [phone] -- --    Mary Rivas (Grandchild) -- -- [phone]              Current Facility-Administered Medications   Medication Dose Route Frequency Provider Last Rate Last Admin    acetaminophen (TYLENOL) tablet 650 mg  650 mg Oral Q4H PRN Adenike Ponce MD   650 mg at 11/12/23 0027    atorvastatin (LIPITOR) tablet 10 mg  10 mg Oral Nightly Adenike Ponce MD   10 mg at 11/16/23 2050    bisacodyl (DULCOLAX) EC tablet 10 mg  10 mg " Oral Daily Alvarez Lam MD   10 mg at 11/16/23 1524    bisacodyl (DULCOLAX) suppository 10 mg  10 mg Rectal Daily PRN Adenike Ponce MD        cloNIDine (CATAPRES) tablet 0.1 mg  0.1 mg Oral BID PRN Adenike Ponce MD   0.1 mg at 11/15/23 0058    dextrose (D50W) (25 g/50 mL) IV injection 25 g  25 g Intravenous Q15 Min PRN Adenike Ponce MD        dextrose (GLUTOSE) oral gel 15 g  15 g Oral Q15 Min PRN Adenike Ponce MD        empagliflozin (JARDIANCE) tablet 10 mg  10 mg Oral Daily Adenike Ponce MD   10 mg at 11/16/23 0837    furosemide (LASIX) tablet 20 mg  20 mg Oral Daily PRN Adenike Ponce MD        glucagon (GLUCAGEN) injection 1 mg  1 mg Intramuscular Q15 Min PRN Adenike Ponce MD        hydrALAZINE (APRESOLINE) injection 10 mg  10 mg Intravenous Q4H PRN Adenike Ponce MD   10 mg at 11/14/23 2350    HYDROcodone-acetaminophen (NORCO) 7.5-325 MG per tablet 1 tablet  1 tablet Oral Q6H PRN Alvarez Lam MD   1 tablet at 11/17/23 0023    Insulin Lispro (humaLOG) injection 2-7 Units  2-7 Units Subcutaneous 4x Daily AC & at Bedtime Adenike Ponce MD        labetalol (NORMODYNE,TRANDATE) injection 10 mg  10 mg Intravenous Q6H PRN Adenike Ponce MD   10 mg at 11/16/23 0358    levothyroxine (SYNTHROID, LEVOTHROID) tablet 37.5 mcg  37.5 mcg Oral Q AM Adenike Ponce MD   37.5 mcg at 11/17/23 0554    LORazepam (ATIVAN) tablet 0.5 mg  0.5 mg Oral Q8H PRN Adenike Ponce MD        multivitamin with minerals 1 tablet  1 tablet Oral Daily Adenike Ponce MD   1 tablet at 11/16/23 0837    NIFEdipine XL (PROCARDIA XL) 24 hr tablet 30 mg  30 mg Oral Q24H Adenike Ponce MD   30 mg at 11/16/23 0837    ondansetron (ZOFRAN) injection 4 mg  4 mg Intravenous Q6H PRN Alvarez Lam MD   4 mg at 11/14/23 2307    ondansetron (ZOFRAN) tablet 4 mg  4 mg Oral Q6H PRN Adenike Ponce MD        oxyCODONE-acetaminophen (PERCOCET) 5-325 MG per tablet 1 tablet  1 tablet Oral Q4H PRN Adenike Ponce MD         sodium chloride 0.9 % flush 10 mL  10 mL Intravenous PRN Adenike Ponce MD        sodium chloride 0.9 % flush 10 mL  10 mL Intravenous Q12H Adenike Ponce MD   10 mL at 11/16/23 2050    sodium chloride 0.9 % flush 10 mL  10 mL Intravenous PRN Adenike Ponce MD        sodium chloride 0.9 % infusion 40 mL  40 mL Intravenous PRN Adenike Ponce MD        [Held by provider] spironolactone (ALDACTONE) tablet 25 mg  25 mg Oral Daily Adenike Ponce MD        temazepam (RESTORIL) capsule 15 mg  15 mg Oral Nightly PRN Adenike Ponce MD         Orders (last 24 hrs)        Start     Ordered    11/17/23 0600  CBC Auto Differential  PROCEDURE ONCE         11/16/23 2201 11/16/23 2132  POC Glucose Once  PROCEDURE ONCE        Comments: Complete no more than 45 minutes prior to patient eating      11/16/23 2119    11/16/23 1800  POC Glucose Daily With Dinner  Daily With Dinner      Comments: Complete no more than 45 minutes prior to patient eating      11/16/23 1453    11/16/23 1752  POC Glucose Once  PROCEDURE ONCE        Comments: Complete no more than 45 minutes prior to patient eating      11/16/23 1740    11/16/23 1453  furosemide (LASIX) injection 20 mg  Once         11/16/23 1453    11/16/23 1231  POC Glucose Once  PROCEDURE ONCE        Comments: Complete no more than 45 minutes prior to patient eating      11/16/23 1219    11/16/23 1215  bisacodyl (DULCOLAX) EC tablet 10 mg  Daily         11/16/23 1122    11/16/23 1145  dronabinol (MARINOL) capsule 2.5 mg  2 Times Daily,   Status:  Discontinued         11/16/23 1055    11/16/23 1135  Diet: Liquid Diets; Clear Liquid; Fluid Consistency: Thin (IDDSI 0)  Diet Effective Now         11/16/23 1134    11/16/23 1123  Advance Diet As Tolerated -  Until Discontinued         11/16/23 1123    11/16/23 1037  Magnesium  Once         11/16/23 1036    11/16/23 0936  lidocaine (XYLOCAINE) 1 % injection  As Needed         11/16/23 0936    11/16/23 0901  Fine Needle Aspiration   Once         11/16/23 0902    11/16/23 0752  POC Glucose Once  PROCEDURE ONCE        Comments: Complete no more than 45 minutes prior to patient eating      11/16/23 0740    11/15/23 0600  Comprehensive Metabolic Panel  Daily       11/14/23 1745    11/15/23 0600  CBC & Differential  Daily       11/14/23 1745    11/14/23 1730  [Held by provider]  spironolactone (ALDACTONE) tablet 25 mg  Daily        (On hold since Tue 11/14/2023 at 1746 until manually unheld; held by Alvarez Lam MDHold Reason: Abnormal Labs)    11/14/23 1644    11/14/23 1645  HYDROcodone-acetaminophen (NORCO) 7.5-325 MG per tablet 1 tablet  Every 6 Hours PRN         11/14/23 1645    11/14/23 1642  ondansetron (ZOFRAN) injection 4 mg  Every 6 Hours PRN         11/14/23 1643    11/14/23 1642  Morphine sulfate (PF) injection 2 mg  Every 4 Hours PRN,   Status:  Discontinued         11/14/23 1643    11/14/23 1634  ondansetron (ZOFRAN) tablet 4 mg  Every 6 Hours PRN         11/14/23 1634    11/14/23 1634  oxyCODONE-acetaminophen (PERCOCET) 5-325 MG per tablet 1 tablet  Every 4 Hours PRN         11/14/23 1634    11/13/23 1715  NIFEdipine XL (PROCARDIA XL) 24 hr tablet 30 mg  Every 24 Hours Scheduled         11/13/23 1620    11/13/23 1620  hydrALAZINE (APRESOLINE) injection 10 mg  Every 4 Hours PRN         11/13/23 1620    11/13/23 1618  temazepam (RESTORIL) capsule 15 mg  Nightly PRN         11/13/23 1618    11/13/23 1618  LORazepam (ATIVAN) tablet 0.5 mg  Every 8 Hours PRN         11/13/23 1618    11/12/23 0900  multivitamin with minerals 1 tablet  Daily         11/11/23 2107 11/12/23 0800  Oral Care  2 Times Daily       11/11/23 2107 11/12/23 0600  levothyroxine (SYNTHROID, LEVOTHROID) tablet 37.5 mcg  Every Early Morning         11/11/23 2107 11/12/23 0000  Vital Signs  Every 4 Hours       11/11/23 2107 11/11/23 2200  atorvastatin (LIPITOR) tablet 10 mg  Nightly         11/11/23 2107 11/11/23 2200  sodium chloride 0.9 % flush 10 mL   Every 12 Hours Scheduled         11/11/23 2107 11/11/23 2200  sennosides-docusate (PERICOLACE) 8.6-50 MG per tablet 2 tablet  2 Times Daily,   Status:  Discontinued        See Hyperspace for full Linked Orders Report.    11/11/23 2107 11/11/23 2200  POC Glucose 4x Daily Before Meals & at Bedtime  4 Times Daily Before Meals & at Bedtime,   Status:  Canceled      Comments: Complete no more than 45 minutes prior to patient eating      11/11/23 2107 11/11/23 2200  Insulin Lispro (humaLOG) injection 2-7 Units  4 Times Daily Before Meals & Nightly         11/11/23 2107 11/11/23 2108  Intake & Output  Every Shift       11/11/23 2107 11/11/23 2107  cloNIDine (CATAPRES) tablet 0.1 mg  2 Times Daily PRN         11/11/23 2107 11/11/23 2107  furosemide (LASIX) tablet 20 mg  Daily PRN         11/11/23 2107 11/11/23 2107  sodium chloride 0.9 % flush 10 mL  As Needed         11/11/23 2107 11/11/23 2107  sodium chloride 0.9 % infusion 40 mL  As Needed         11/11/23 2107 11/11/23 2107  polyethylene glycol (MIRALAX) packet 17 g  Daily PRN,   Status:  Discontinued        See Hyperspace for full Linked Orders Report.    11/11/23 2107 11/11/23 2107  bisacodyl (DULCOLAX) EC tablet 5 mg  Daily PRN,   Status:  Discontinued        See Hyperspace for full Linked Orders Report.    11/11/23 2107 11/11/23 2107  bisacodyl (DULCOLAX) suppository 10 mg  Daily PRN        See Hyperspace for full Linked Orders Report.    11/11/23 2107 11/11/23 2107  labetalol (NORMODYNE,TRANDATE) injection 10 mg  Every 6 Hours PRN         11/11/23 2107 11/11/23 2107  dextrose (GLUTOSE) oral gel 15 g  Every 15 Minutes PRN         11/11/23 2107    11/11/23 2107  dextrose (D50W) (25 g/50 mL) IV injection 25 g  Every 15 Minutes PRN         11/11/23 2107 11/11/23 2107  glucagon (GLUCAGEN) injection 1 mg  Every 15 Minutes PRN         11/11/23 2107 11/11/23 2107  acetaminophen (TYLENOL) tablet 650 mg  Every 4 Hours PRN          11/11/23 2107 11/11/23 1937  empagliflozin (JARDIANCE) tablet 10 mg  Daily         11/11/23 1921 11/11/23 1753  sodium chloride 0.9 % flush 10 mL  As Needed        See Hyperspace for full Linked Orders Report.    11/11/23 1753    Unscheduled  Up With Assistance  As Needed       11/11/23 2107    Unscheduled  Follow Hypoglycemia Standing Orders For Blood Glucose <70 & Notify Provider of Treatment  As Needed      Comments: Follow Hypoglycemia Orders As Outlined in Process Instructions (Open Order Report to View Full Instructions)  Notify Provider Any Time Hypoglycemia Treatment is Administered    11/11/23 2107    --  SCANNED - TELEMETRY           11/11/23 0000    --  HYDROcodone-acetaminophen (NORCO) 7.5-325 MG per tablet  Every 6 Hours PRN         11/12/23 0326    --  spironolactone (ALDACTONE) 25 MG tablet  Daily         11/12/23 1002    Signed and Held  Obtain Informed Consent  Once         Signed and Held    Signed and Held  Tissue Pathology Exam  Once         Signed and Held                     Physician Progress Notes (last 24 hours)        Alvarez Lam MD at 11/16/23 1855              [facility] Medicine Services  INPATIENT PROGRESS NOTE    Patient Name: Imani Chauhan  Date of Admission: 11/11/2023  Today's Date: 11/16/23  Length of Stay: 5  Primary Care Physician: Светлана Loyd MD    Subjective     Patient complains of poor appetite and has not had a bowel movement in over 3 days.        Objective    Temp:  [98 °F (36.7 °C)-98.2 °F (36.8 °C)] 98.2 °F (36.8 °C)  Heart Rate:  [] 100  Resp:  [16-20] 18  BP: (152-179)/(58-87) 174/70    General: No acute distress  HEENT: normocephalic, atraumatic  Neck: Supple  CV: RRR  Lung: Clear to auscultation bilaterally.  No wheeze, rales, or rhonchi noted.  Abdominal exam: Positive bowel sounds, nontender, non distended  Extremity: No edema noted  Neurological exam: AAO x3. Cranial nerve II to XII grossly intact  Skin  exam: Dry and warm  Psychiatric exam: Calm, cooperative, and normal affect       Results Review:  I have reviewed the labs, radiology results, and diagnostic studies.    Laboratory Data:   Results from last 7 days   Lab Units 11/16/23  0528 11/15/23  0645 11/13/23 0449   WBC 10*3/mm3 14.18* 14.02* 7.90   HEMOGLOBIN g/dL 10.9* 10.6* 10.7*   HEMATOCRIT % 34.3 33.8* 34.6   PLATELETS 10*3/mm3 377 362 399        Results from last 7 days   Lab Units 11/16/23  0528 11/15/23  0645 11/13/23 0449 11/12/23  0336   SODIUM mmol/L 136 133* 131* 129*   POTASSIUM mmol/L 3.8 4.2 4.4 4.5   CHLORIDE mmol/L 102 102 99 92*   CO2 mmol/L 20.0* 19.0* 24.0 25.0   BUN mg/dL 23 24* 25* 23   CREATININE mg/dL 1.60* 1.55* 1.80* 1.71*   CALCIUM mg/dL 10.3 9.9 12.1* 13.4*   BILIRUBIN mg/dL 0.5 0.3  --  0.4   ALK PHOS U/L 430* 127*  --  148*   ALT (SGPT) U/L 27 20  --  24   AST (SGOT) U/L 95* 43*  --  39*   GLUCOSE mg/dL 91 103* 91 86       Culture Data:   Urine Culture   Date Value Ref Range Status   11/11/2023 No growth  Final       Radiology Data:   Imaging Results (Last 24 Hours)       Procedure Component Value Units Date/Time    CT Biopsy Abdomen Retroperitoneal [[id number]] Collected: 11/16/23 1012     Updated: 11/16/23 1020    Narrative:      EXAM: CT BIOPSY ABDOMEN RETROPERITONEAL- - 11/16/2023 9:00 AM CST     HISTORY: retroperitoneal lymphadenopathy; E83.52-Hypercalcemia;  E87.6-Getx-psetvagrwy and hyponatremia; N17.9-Acute kidney failure,  unspecified; R59.1-Generalized enlarged lymph nodes; R59.0-Localized  enlarged lymph nodes       COMPARISON: CT abdomen pelvis 11/12/2023.      DOSE LENGTH PRODUCT: 1341 mGy cm. Automated exposure control was also  utilized to decrease patient radiation dose.     TECHNIQUE/FINDINGS:   Risks, benefits and alternatives to the procedure were discussed with  the patient. Specifically the the risk of a retroperitoneal hematoma and  bleeding was discussed. Written and verbal informed consent was  obtained.      Immediately prior to the procedure, a time out was performed including  the patient's name, date of birth, procedure and laterality.       The entry site was marked with an X. Site was prepped in the usual  sterile fashion.      Approximately 5 mL of 1 percent lidocaine was infused in the  subcutaneous and deeper tissues for local anesthesia. Using CT guidance,  4 18-gauge core biopsies samples were obtained from the left  retroperitoneal lymphadenopathy a left posterior percutaneous. The  samples were sent to surgical pathology for analysis. Pathology  confirmed neoplastic cells.     The patient tolerated the procedure well, and was transferred to the  holding area in stable condition for post procedure monitoring. There  were no apparent periprocedural complications.           Impression:      CT-guided left retroperitoneal lymphadenopathy biopsy  1. Technically successful CT-guided left retroperitoneal lymphadenopathy  biopsy.  2. No periprocedural complication.        This report was signed and finalized on 11/16/2023 10:17 AM CST by  Navneet Hoang.               I have reviewed the patient's current medications.     Assessment/Plan   Assessment  Active Hospital Problems    Diagnosis     **Hypercalcemia     Lymphadenopathy        Assessment and Plan:    Hypercalcemia likely secondary to undiagnosed metastatic cancer:  -She received 1 doses zoledronic acid on 11/11 per oncology  -hold IVF for now  -recheck CMP in am   -lasix 20 IV x1 for LE edema     Abdominal lymphadenopathy likely secondary to metastatic cancer:  -pathology results pending     CKD 3: Cr 1.5-1.6 appears to be her baseline  -recheck labs in am    Hypothyroidism:  -cont home Synthroid     Constipation: last BM ? 3 days ago  -dulcolax 10 po qd    Hypomagnesemia 2/2 PPI use and/or poor appetite:  -replace Mg prn          Dispo: pathology results are pending        Alvarez Lam MD 11/16/23, 18:55 CST.                  Treatment  Plan      Medical Decision Making  Number and Complexity of problems:   Differential Diagnosis:     Conditions and Status             MDM Data  External documents reviewed:   Cardiac tracing (EKG, telemetry) interpretation:   Radiology interpretation:   Labs reviewed:   Any tests that were considered but not ordered:      Decision rules/scores evaluated (example [id number], Wells, etc):      Discussed with:      Care Planning  Shared decision making:   Code status and discussions:     Disposition  Social Determinants of Health that impact treatment or disposition:   I expect the patient to be discharged to  in  days.     Electronically signed by Alvarez Lam MD, 11/16/23, 18:55 CST.\    Electronically signed by Alvarez Lam MD at 11/16/23 1857       Consult Notes (last 24 hours)  Notes from 11/16/23 0645 through 11/17/23 0645   No notes of this type exist for this encounter.

## 2023-11-17 NOTE — PROGRESS NOTES
[facility]   Progress Note    Patient Name: Imani Chauhan  : 1940  MRN: [id number]  Primary Care Physician:  Светлана Loyd MD  Date of admission: 2023    Subjective   Subjective   Tolerating diet. Minimal abdominal pain        Objective     Vitals:   Temp:  [98.1 °F (36.7 °C)-98.7 °F (37.1 °C)] 98.6 °F (37 °C)  Heart Rate:  [] 89  Resp:  [16-20] 18  BP: (152-174)/(58-87) 155/69  Flow (L/min):  [2] 2  Physical Exam    Constitutional: Awake, alert   Eyes: PERRLA, sclerae anicteric, no conjunctival injection   HENT: NCAT, mucous membranes moist   Neck: Supple, no thyromegaly, no lymphadenopathy, trachea midline   Respiratory: Clear to auscultation bilaterally, nonlabored respirations    Cardiovascular: RRR, no murmurs, rubs, or gallops, palpable pedal pulses bilaterally   Gastrointestinal: Positive bowel sounds, soft, nontender. +distention   Musculoskeletal: No bilateral ankle edema, no clubbing or cyanosis to extremities   Psychiatric: Appropriate affect, cooperative   Neurologic: Oriented x 3, strength symmetric in all extremities, Cranial Nerves grossly intact to confrontation, speech clear   Skin: No rashes     Result Review    Result Review:  I have personally reviewed the results from the time of this admission to 2023 07:36 CST and agree with these findings:  [x]  Laboratory list / accordion  []  Microbiology  []  Radiology  []  EKG/Telemetry   []  Cardiology/Vascular   []  Pathology  []  Old records  []  Other:      Assessment & Plan   Assessment / Plan     Brief Patient Summary:  Imani Chauhan is a 83 y.o. female who presents with distention and lymphadenopathy    Active Hospital Problems:  Active Hospital Problems    Diagnosis     **Hypercalcemia     Lymphadenopathy      Plan:   Discussed with patient that ascitic biopsy revealed malignant cells. Awaiting final CT biopsy pathology results.  Ok to d/c from a general surgery perspective, may f/u as outpt for wound  evaluation    DVT prophylaxis:  Mechanical DVT prophylaxis orders are present.    CODE STATUS:   Level Of Support Discussed With: Patient  Code Status (Patient has no pulse and is not breathing): CPR (Attempt to Resuscitate)  Medical Interventions (Patient has pulse or is breathing): Full Support        MD Adenike Aranda MD  General Surgery  11/17/23  08:53 CST

## 2023-11-17 NOTE — PLAN OF CARE
Goal Outcome Evaluation:  Plan of Care Reviewed With: patient        Progress: no change  Outcome Evaluation: LOS nutrition assesment. Pt with lymphadenopathy likely secondary to metastatic CA. She is s/p diagnostic laparascopy and liver biopsy on 11/14. She is on a clear liquid diet. She is tolerating diet per nursing. No po % intake recorded. No weight loss noted. Will try Boost Breeze TID while on clear liquids for more kcal and protein. Cont to follow per protocol.

## 2023-11-18 ENCOUNTER — APPOINTMENT (OUTPATIENT)
Dept: GENERAL RADIOLOGY | Facility: HOSPITAL | Age: 83
DRG: 821 | End: 2023-11-18
Payer: MEDICARE

## 2023-11-18 LAB
ALBUMIN SERPL-MCNC: 2.9 G/DL (ref 3.5–5.2)
ALBUMIN/GLOB SERPL: 0.9 G/DL
ALP SERPL-CCNC: 1183 U/L (ref 39–117)
ALT SERPL W P-5'-P-CCNC: 101 U/L (ref 1–33)
AMYLASE SERPL-CCNC: 54 U/L (ref 28–100)
ANION GAP SERPL CALCULATED.3IONS-SCNC: 12 MMOL/L (ref 5–15)
AST SERPL-CCNC: 249 U/L (ref 1–32)
BASOPHILS # BLD AUTO: 0.05 10*3/MM3 (ref 0–0.2)
BASOPHILS NFR BLD AUTO: 0.5 % (ref 0–1.5)
BILIRUB SERPL-MCNC: 0.6 MG/DL (ref 0–1.2)
BUN SERPL-MCNC: 20 MG/DL (ref 8–23)
BUN/CREAT SERPL: 14.5 (ref 7–25)
CALCIUM SPEC-SCNC: 10.6 MG/DL (ref 8.6–10.5)
CHLORIDE SERPL-SCNC: 99 MMOL/L (ref 98–107)
CO2 SERPL-SCNC: 24 MMOL/L (ref 22–29)
CREAT SERPL-MCNC: 1.38 MG/DL (ref 0.57–1)
DEPRECATED RDW RBC AUTO: 48 FL (ref 37–54)
EGFRCR SERPLBLD CKD-EPI 2021: 38.3 ML/MIN/1.73
EOSINOPHIL # BLD AUTO: 0.21 10*3/MM3 (ref 0–0.4)
EOSINOPHIL NFR BLD AUTO: 2.1 % (ref 0.3–6.2)
ERYTHROCYTE [DISTWIDTH] IN BLOOD BY AUTOMATED COUNT: 14.7 % (ref 12.3–15.4)
GLOBULIN UR ELPH-MCNC: 3.2 GM/DL
GLUCOSE BLDC GLUCOMTR-MCNC: 114 MG/DL (ref 70–130)
GLUCOSE SERPL-MCNC: 111 MG/DL (ref 65–99)
HBA1C MFR BLD: 6.1 % (ref 4.8–5.6)
HCT VFR BLD AUTO: 35.5 % (ref 34–46.6)
HGB BLD-MCNC: 11.6 G/DL (ref 12–15.9)
IMM GRANULOCYTES # BLD AUTO: 0.13 10*3/MM3 (ref 0–0.05)
IMM GRANULOCYTES NFR BLD AUTO: 1.3 % (ref 0–0.5)
LIPASE SERPL-CCNC: 65 U/L (ref 13–60)
LYMPHOCYTES # BLD AUTO: 0.48 10*3/MM3 (ref 0.7–3.1)
LYMPHOCYTES NFR BLD AUTO: 4.8 % (ref 19.6–45.3)
MAGNESIUM SERPL-MCNC: 1.5 MG/DL (ref 1.6–2.4)
MCH RBC QN AUTO: 28.9 PG (ref 26.6–33)
MCHC RBC AUTO-ENTMCNC: 32.7 G/DL (ref 31.5–35.7)
MCV RBC AUTO: 88.5 FL (ref 79–97)
MONOCYTES # BLD AUTO: 0.59 10*3/MM3 (ref 0.1–0.9)
MONOCYTES NFR BLD AUTO: 5.9 % (ref 5–12)
NEUTROPHILS NFR BLD AUTO: 8.49 10*3/MM3 (ref 1.7–7)
NEUTROPHILS NFR BLD AUTO: 85.4 % (ref 42.7–76)
NRBC BLD AUTO-RTO: 0 /100 WBC (ref 0–0.2)
PHOSPHATE SERPL-MCNC: 2.2 MG/DL (ref 2.5–4.5)
PLATELET # BLD AUTO: 426 10*3/MM3 (ref 140–450)
PMV BLD AUTO: 9.3 FL (ref 6–12)
POTASSIUM SERPL-SCNC: 3.4 MMOL/L (ref 3.5–5.2)
PROT SERPL-MCNC: 6.1 G/DL (ref 6–8.5)
RBC # BLD AUTO: 4.01 10*6/MM3 (ref 3.77–5.28)
SODIUM SERPL-SCNC: 135 MMOL/L (ref 136–145)
WBC NRBC COR # BLD AUTO: 9.95 10*3/MM3 (ref 3.4–10.8)

## 2023-11-18 PROCEDURE — 82948 REAGENT STRIP/BLOOD GLUCOSE: CPT

## 2023-11-18 PROCEDURE — 82150 ASSAY OF AMYLASE: CPT | Performed by: INTERNAL MEDICINE

## 2023-11-18 PROCEDURE — 83690 ASSAY OF LIPASE: CPT | Performed by: INTERNAL MEDICINE

## 2023-11-18 PROCEDURE — 83036 HEMOGLOBIN GLYCOSYLATED A1C: CPT | Performed by: INTERNAL MEDICINE

## 2023-11-18 PROCEDURE — 25010000002 MAGNESIUM SULFATE 2 GM/50ML SOLUTION: Performed by: INTERNAL MEDICINE

## 2023-11-18 PROCEDURE — 80053 COMPREHEN METABOLIC PANEL: CPT | Performed by: INTERNAL MEDICINE

## 2023-11-18 PROCEDURE — 84100 ASSAY OF PHOSPHORUS: CPT | Performed by: INTERNAL MEDICINE

## 2023-11-18 PROCEDURE — 83735 ASSAY OF MAGNESIUM: CPT | Performed by: INTERNAL MEDICINE

## 2023-11-18 PROCEDURE — 85025 COMPLETE CBC W/AUTO DIFF WBC: CPT | Performed by: INTERNAL MEDICINE

## 2023-11-18 PROCEDURE — 74019 RADEX ABDOMEN 2 VIEWS: CPT

## 2023-11-18 PROCEDURE — 99024 POSTOP FOLLOW-UP VISIT: CPT | Performed by: STUDENT IN AN ORGANIZED HEALTH CARE EDUCATION/TRAINING PROGRAM

## 2023-11-18 RX ORDER — POTASSIUM CHLORIDE 750 MG/1
20 CAPSULE, EXTENDED RELEASE ORAL ONCE
Status: COMPLETED | OUTPATIENT
Start: 2023-11-18 | End: 2023-11-18

## 2023-11-18 RX ORDER — FAMOTIDINE 20 MG/1
20 TABLET, FILM COATED ORAL
Status: DISCONTINUED | OUTPATIENT
Start: 2023-11-18 | End: 2023-11-18

## 2023-11-18 RX ORDER — SPIRONOLACTONE 25 MG/1
25 TABLET ORAL DAILY
Status: DISCONTINUED | OUTPATIENT
Start: 2023-11-18 | End: 2023-11-20 | Stop reason: HOSPADM

## 2023-11-18 RX ORDER — FAMOTIDINE 20 MG/1
20 TABLET, FILM COATED ORAL DAILY
Status: DISCONTINUED | OUTPATIENT
Start: 2023-11-19 | End: 2023-11-20 | Stop reason: HOSPADM

## 2023-11-18 RX ORDER — MAGNESIUM SULFATE HEPTAHYDRATE 40 MG/ML
2 INJECTION, SOLUTION INTRAVENOUS
Status: COMPLETED | OUTPATIENT
Start: 2023-11-18 | End: 2023-11-18

## 2023-11-18 RX ADMIN — MAGNESIUM SULFATE HEPTAHYDRATE 2 G: 2 INJECTION, SOLUTION INTRAVENOUS at 10:56

## 2023-11-18 RX ADMIN — SPIRONOLACTONE 25 MG: 25 TABLET ORAL at 13:06

## 2023-11-18 RX ADMIN — EMPAGLIFLOZIN 10 MG: 10 TABLET, FILM COATED ORAL at 09:18

## 2023-11-18 RX ADMIN — Medication 10 ML: at 09:19

## 2023-11-18 RX ADMIN — POTASSIUM & SODIUM PHOSPHATES POWDER PACK 280-160-250 MG 1 PACKET: 280-160-250 PACK at 10:56

## 2023-11-18 RX ADMIN — LEVOTHYROXINE SODIUM 37.5 MCG: 75 TABLET ORAL at 07:37

## 2023-11-18 RX ADMIN — MAGNESIUM SULFATE HEPTAHYDRATE 2 G: 2 INJECTION, SOLUTION INTRAVENOUS at 11:44

## 2023-11-18 RX ADMIN — POTASSIUM & SODIUM PHOSPHATES POWDER PACK 280-160-250 MG 1 PACKET: 280-160-250 PACK at 17:15

## 2023-11-18 RX ADMIN — Medication 10 ML: at 20:42

## 2023-11-18 RX ADMIN — POTASSIUM CHLORIDE 20 MEQ: 750 CAPSULE, EXTENDED RELEASE ORAL at 10:56

## 2023-11-18 RX ADMIN — ATORVASTATIN CALCIUM 10 MG: 10 TABLET, FILM COATED ORAL at 20:42

## 2023-11-18 RX ADMIN — FAMOTIDINE 20 MG: 20 TABLET, FILM COATED ORAL at 13:06

## 2023-11-18 RX ADMIN — NIFEDIPINE 30 MG: 30 TABLET, FILM COATED, EXTENDED RELEASE ORAL at 09:18

## 2023-11-18 RX ADMIN — Medication 1 TABLET: at 09:18

## 2023-11-18 NOTE — PROGRESS NOTES
[facility] Medicine Services  INPATIENT PROGRESS NOTE    Patient Name: Imani Chauhan  Date of Admission: 11/11/2023  Today's Date: 11/17/23  Length of Stay: 6  Primary Care Physician: Светлана Loyd MD    Subjective     Patient complains of poor appetite and has not had a bowel movement in over 3 days.        Objective    Temp:  [98.2 °F (36.8 °C)-98.7 °F (37.1 °C)] 98.2 °F (36.8 °C)  Heart Rate:  [] 107  Resp:  [18] 18  BP: (134-164)/(64-87) 158/70    General: No acute distress  HEENT: normocephalic, atraumatic  Neck: Supple  CV: RRR  Lung: Clear to auscultation bilaterally.  No wheeze, rales, or rhonchi noted.  Abdominal exam: Positive bowel sounds, nontender, non distended  Extremity: No edema noted  Neurological exam: AAO x3. Cranial nerve II to XII grossly intact  Skin exam: Dry and warm  Psychiatric exam: Calm, cooperative, and normal affect       Results Review:  I have reviewed the labs, radiology results, and diagnostic studies.    Laboratory Data:   Results from last 7 days   Lab Units 11/17/23  0549 11/16/23  0528 11/15/23  0645   WBC 10*3/mm3 11.80* 14.18* 14.02*   HEMOGLOBIN g/dL 10.1* 10.9* 10.6*   HEMATOCRIT % 31.3* 34.3 33.8*   PLATELETS 10*3/mm3 369 377 362        Results from last 7 days   Lab Units 11/17/23  0549 11/16/23  0528 11/15/23  0645   SODIUM mmol/L 136 136 133*   POTASSIUM mmol/L 3.6 3.8 4.2   CHLORIDE mmol/L 101 102 102   CO2 mmol/L 21.0* 20.0* 19.0*   BUN mg/dL 23 23 24*   CREATININE mg/dL 1.53* 1.60* 1.55*   CALCIUM mg/dL 10.1 10.3 9.9   BILIRUBIN mg/dL 0.7 0.5 0.3   ALK PHOS U/L 942* 430* 127*   ALT (SGPT) U/L 90* 27 20   AST (SGOT) U/L 280* 95* 43*   GLUCOSE mg/dL 92 91 103*       Culture Data:   Urine Culture   Date Value Ref Range Status   11/11/2023 No growth  Final       Radiology Data:   Imaging Results (Last 24 Hours)       Procedure Component Value Units Date/Time    US Abdomen Limited [[id number]] Collected: 11/17/23 1559      Updated: 11/17/23 1602    Narrative:      EXAM/TECHNIQUE: US ABDOMEN LIMITED-     INDICATION: elevated alkaline phosphatase s/p liver biopsy on 11/14,  evaluate portal blood flow; E83.52-Hypercalcemia; E87.3-Ydst-dylvhpljar  and hyponatremia; N17.9-Acute kidney failure, unspecified;  R59.1-Generalized enlarged lymph nodes; R59.0-Localized enlarged lymph  nodes     COMPARISON: 11/12/2023     FINDINGS:     Limited abdominal ultrasound of the liver and portal vasculature.     Liver echogenicity and echotexture are within normal limits. No solid  liver lesion. Patent portal vein with appropriate directional flow. Left  portal vein is patent with appropriate directional flow. Right portal  vein is patent with appropriate directional flow.     Postoperative changes cholecystectomy. No intrahepatic biliary ductal  dilatation.       Impression:      1.  Unremarkable sonographic appearance of the liver.  2.  Main portal vein, left portal vein, and right portal vein appear  patent with appropriate directional flow. However, of note, there is  diminutive appearance of the left portal vein on recent CT abdomen with  soft tissue attenuation encasing and narrowing the left portal vein.     This report was signed and finalized on 11/17/2023 3:59 PM CST by Dr. Alvaro Blake MD.               I have reviewed the patient's current medications.     Assessment/Plan   Assessment  Active Hospital Problems    Diagnosis     **Hypercalcemia     Lymphadenopathy        Assessment and Plan:    Hypercalcemia likely secondary to undiagnosed metastatic cancer:  -She received 1 doses zoledronic acid on 11/11 per oncology  -hold IVF for now  -recheck CMP in am      Abdominal lymphadenopathy likely secondary to metastatic cancer: preliminary results show malignant cells unknown type.  -pathology results pending     CKD 3: Cr 1.5-1.6 appears to be her baseline  -recheck labs daily    DM2: A1c pending  -goal glucose < 180 while inpatient  -Hold  home Jardiance for poor appetite  -low ISS bid with breakfast and supper    Hypothyroidism:  -cont home Synthroid     Constipation: last BM ? 3 days ago  -dulcolax 10 po qd  -she declined extra medication at this time    Hypomagnesemia 2/2 PPI use and/or poor appetite:  -replace Mg prn          Dispo: pathology results are pending, alkaline phosphatase continues to rise however right upper quadrant ultrasound was negative today, will continue to monitor with daily CMP's for now. Pt would         Alvarez Lam MD 11/17/23, 19:02 CST.                  Treatment Plan      Medical Decision Making  Number and Complexity of problems:   Differential Diagnosis:     Conditions and Status             MDM Data  External documents reviewed:   Cardiac tracing (EKG, telemetry) interpretation:   Radiology interpretation:   Labs reviewed:   Any tests that were considered but not ordered:      Decision rules/scores evaluated (example [id number], [place], etc):      Discussed with:      Care Planning  Shared decision making:   Code status and discussions:     Disposition  Social Determinants of Health that impact treatment or disposition:   I expect the patient to be discharged to  in  days.     Electronically signed by Alvarez Lam MD, 11/17/23, 19:02 CST.\

## 2023-11-18 NOTE — PROGRESS NOTES
[facility] Medicine Services  INPATIENT PROGRESS NOTE    Patient Name: Imani Chauhan  Date of Admission: 11/11/2023  Today's Date: 11/18/23  Length of Stay: 7  Primary Care Physician: Светлана Loyd MD    Subjective     Patient reports eating solid food for the first time last night, a sandwich.  She now has epigastric pain.  She also reports that she has moved her bowels once this morning and feels like she needs to go again.        Objective    Temp:  [97.7 °F (36.5 °C)-98.2 °F (36.8 °C)] 97.7 °F (36.5 °C)  Heart Rate:  [] 101  Resp:  [16-18] 16  BP: (154-185)/(66-77) 185/77    General: No acute distress  HEENT: normocephalic, atraumatic  Neck: Supple  CV: RRR  Lung: Clear to auscultation bilaterally.  No wheeze, rales, or rhonchi noted.  Abdominal exam: Positive bowel sounds, nontender, non distended  Extremity: No edema noted  Neurological exam: AAO x3. Cranial nerve II to XII grossly intact  Skin exam: Dry and warm  Psychiatric exam: Calm, cooperative, and normal affect       Results Review:  I have reviewed the labs, radiology results, and diagnostic studies.    Laboratory Data:   Results from last 7 days   Lab Units 11/18/23  0711 11/17/23  0549 11/16/23  0528   WBC 10*3/mm3 9.95 11.80* 14.18*   HEMOGLOBIN g/dL 11.6* 10.1* 10.9*   HEMATOCRIT % 35.5 31.3* 34.3   PLATELETS 10*3/mm3 426 369 377        Results from last 7 days   Lab Units 11/18/23  0711 11/17/23  0549 11/16/23  0528   SODIUM mmol/L 135* 136 136   POTASSIUM mmol/L 3.4* 3.6 3.8   CHLORIDE mmol/L 99 101 102   CO2 mmol/L 24.0 21.0* 20.0*   BUN mg/dL 20 23 23   CREATININE mg/dL 1.38* 1.53* 1.60*   CALCIUM mg/dL 10.6* 10.1 10.3   BILIRUBIN mg/dL 0.6 0.7 0.5   ALK PHOS U/L 1,183* 942* 430*   ALT (SGPT) U/L 101* 90* 27   AST (SGOT) U/L 249* 280* 95*   GLUCOSE mg/dL 111* 92 91       Culture Data:   Urine Culture   Date Value Ref Range Status   11/11/2023 No growth  Final       Radiology Data:   Imaging  Results (Last 24 Hours)       Procedure Component Value Units Date/Time    US Abdomen Limited [[id number]] Collected: 11/17/23 1556     Updated: 11/17/23 1602    Narrative:      EXAM/TECHNIQUE: US ABDOMEN LIMITED-     INDICATION: elevated alkaline phosphatase s/p liver biopsy on 11/14,  evaluate portal blood flow; E83.52-Hypercalcemia; E87.2-Ysqj-qmxtlpfyxi  and hyponatremia; N17.9-Acute kidney failure, unspecified;  R59.1-Generalized enlarged lymph nodes; R59.0-Localized enlarged lymph  nodes     COMPARISON: 11/12/2023     FINDINGS:     Limited abdominal ultrasound of the liver and portal vasculature.     Liver echogenicity and echotexture are within normal limits. No solid  liver lesion. Patent portal vein with appropriate directional flow. Left  portal vein is patent with appropriate directional flow. Right portal  vein is patent with appropriate directional flow.     Postoperative changes cholecystectomy. No intrahepatic biliary ductal  dilatation.       Impression:      1.  Unremarkable sonographic appearance of the liver.  2.  Main portal vein, left portal vein, and right portal vein appear  patent with appropriate directional flow. However, of note, there is  diminutive appearance of the left portal vein on recent CT abdomen with  soft tissue attenuation encasing and narrowing the left portal vein.     This report was signed and finalized on 11/17/2023 3:59 PM CST by Dr. Alvaro Blake MD.               I have reviewed the patient's current medications.     Assessment/Plan   Assessment  Active Hospital Problems    Diagnosis     **Hypercalcemia     Lymphadenopathy        Assessment and Plan:    Hypercalcemia likely secondary to undiagnosed metastatic cancer: corrected Ca 11.4, uptrending slightly  -She received 1 doses zoledronic acid on 11/11 per oncology  -hold IVF for now  -recheck CMP in am     Elevated alkaline phosphatase: no bilirubin elevation, AST elevated but downtrending. RUQ ultrasound 11/17  showed patent main portal vein, left portal vein, right portal vein.  -AFU pending    Epigastric pain: amylase and lipase upper limit of normal  -start pepcid 20 po bid  -holding home PPI for recurrent hypomagnesemia, will plan to restart tomorrow if pepcid ineffective     Abdominal lymphadenopathy likely secondary to metastatic cancer: preliminary results show malignant cells unknown type.  -pathology results pending     CKD 3: Cr 1.5-1.6 appears to be her baseline  -recheck labs daily    DM2: A1c pending  -goal glucose < 180 while inpatient  -Hold home Jardiance for poor appetite  -low ISS bid with breakfast and supper    Hypothyroidism:  -cont home Synthroid     Hypokalemia:  -replace K  -resume home aldactone now that she is eating solid food    Hypophosphatemia:  -replace with po phos nak x2 packets    Hypomagnesemia 2/2 PPI use and/or poor appetite:  -replace Mg prn    Constipation, resolved:  -cont to monitor          Dispo: pathology results are pending, alkaline phosphatase continues to rise however right upper quadrant ultrasound was negative yesterday, will continue to monitor with daily CMP's for now. New epigastric pain with eating regular diet.  I have started Pepcid 20 p.o. twice daily and encouraged her to eat more slowly.  PT consult placed for deconditioning while in the hospital.  Surgery recommending that she can go home from their standpoint.  I would like her to be tolerating a full diet prior to discharge. Replaced all electrolyte abnormalities.        Alvarez Lam MD 11/18/23, 11:43 CST.                  Treatment Plan      Medical Decision Making  Number and Complexity of problems:   Differential Diagnosis:     Conditions and Status             MDM Data  External documents reviewed:   Cardiac tracing (EKG, telemetry) interpretation:   Radiology interpretation:   Labs reviewed:   Any tests that were considered but not ordered:      Decision rules/scores evaluated (example [id number],  [place], etc):      Discussed with:      Care Planning  Shared decision making:   Code status and discussions:     Disposition  Social Determinants of Health that impact treatment or disposition:   I expect the patient to be discharged to  in  days.     Electronically signed by Alvarez Lam MD, 11/18/23, 11:43 CST.\

## 2023-11-18 NOTE — PROGRESS NOTES
"Pharmacy Dosing Service  Automatic Renal Adjustment  Famotidine    Assessment/Action/Plan:  Based on prescribing information, Famotidine 20mg PO BID has been adjusted to Famotidine 20mg PO daily. Pharmacy will continue to monitor and make further adjustment(s) accordingly.     Subjective:  Imani Chauhan is a 83 y.o. female     Objective:  Ht: 162.6 cm (64.02\"); Wt: 71.2 kg (156 lb 14.4 oz)  Estimated Creatinine Clearance: 30.4 mL/min (A) (by C-G formula based on SCr of 1.38 mg/dL (H)).     Creatinine   Date Value Ref Range Status   11/18/2023 1.38 (H) 0.57 - 1.00 mg/dL Final   11/17/2023 1.53 (H) 0.57 - 1.00 mg/dL Final   11/16/2023 1.60 (H) 0.57 - 1.00 mg/dL Final   05/15/2019 1 (H) 0.5 - 0.9 mg/dL Final   06/27/2018 1.2 (H) 0.5 - 0.9 mg/dL Final       Monster Burleson, PharmD  11/18/23 14:58 CST'    "

## 2023-11-18 NOTE — PROGRESS NOTES
"Patient Name:  Imani Chauhan  YOB: 1940  [id number]    Surgery Progress Note    Date of visit: 11/18/2023    Subjective   Subjective:   Status post diagnostic laparoscopy with liver biopsy and biopsy of peritoneal fluid on 11/14/2023.  Patient states she is doing very well.  Minimal pain issues are tolerating p.o. intake and passing stool.  She states that she is ready to go home       Objective     Objective:     /67 (BP Location: Left arm, Patient Position: Lying)   Pulse 94   Temp 97.8 °F (36.6 °C) (Oral)   Resp 16   Ht 162.6 cm (64.02\")   Wt 71.2 kg (156 lb 14.4 oz)   SpO2 91%   BMI 26.92 kg/m²     Intake/Output Summary (Last 24 hours) at 11/18/2023 0812  Last data filed at 11/18/2023 0626  Gross per 24 hour   Intake 960 ml   Output 1300 ml   Net -340 ml       CV:  Rhythm regular and rate regular   Pulm: Symmetrical chest rise without respiratory distress  Abd:  Soft, appropriately tender to palpation, incisions clean dry and intact  Ext:  No cyanosis, clubbing, edema    Recent labs that are back at this time have been reviewed.   Lab Results (last 48 hours)       Procedure Component Value Units Date/Time    Phosphorus [[id number]]  (Abnormal) Collected: 11/18/23 0711    Specimen: Blood Updated: 11/18/23 0747     Phosphorus 2.2 mg/dL     Magnesium [[id number]]  (Abnormal) Collected: 11/18/23 0711    Specimen: Blood Updated: 11/18/23 0741     Magnesium 1.5 mg/dL     Comprehensive Metabolic Panel [[id number]]  (Abnormal) Collected: 11/18/23 0711    Specimen: Blood Updated: 11/18/23 0741     Glucose 111 mg/dL      BUN 20 mg/dL      Creatinine 1.38 mg/dL      Sodium 135 mmol/L      Potassium 3.4 mmol/L      Comment: Slight hemolysis detected by analyzer. Result may be falsely elevated.        Chloride 99 mmol/L      CO2 24.0 mmol/L      Calcium 10.6 mg/dL      Total Protein 6.1 g/dL      Albumin 2.9 g/dL      ALT (SGPT) 101 U/L      AST (SGOT) 249 U/L      Alkaline Phosphatase 1,183 U/L  "     Total Bilirubin 0.6 mg/dL      Globulin 3.2 gm/dL      A/G Ratio 0.9 g/dL      BUN/Creatinine Ratio 14.5     Anion Gap 12.0 mmol/L      eGFR 38.3 mL/min/1.73     Narrative:      GFR Normal >60  Chronic Kidney Disease <60  Kidney Failure <15    The GFR formula is only valid for adults with stable renal function between ages 18 and 70.    CBC & Differential [[id number]]  (Abnormal) Collected: 11/18/23 0711    Specimen: Blood Updated: 11/18/23 0723    Narrative:      The following orders were created for panel order CBC & Differential.  Procedure                               Abnormality         Status                     ---------                               -----------         ------                     CBC Auto Differential[[id number]]        Abnormal            Final result                 Please view results for these tests on the individual orders.    CBC Auto Differential [[id number]]  (Abnormal) Collected: 11/18/23 0711    Specimen: Blood Updated: 11/18/23 0723     WBC 9.95 10*3/mm3      RBC 4.01 10*6/mm3      Hemoglobin 11.6 g/dL      Hematocrit 35.5 %      MCV 88.5 fL      MCH 28.9 pg      MCHC 32.7 g/dL      RDW 14.7 %      RDW-SD 48.0 fl      MPV 9.3 fL      Platelets 426 10*3/mm3      Neutrophil % 85.4 %      Lymphocyte % 4.8 %      Monocyte % 5.9 %      Eosinophil % 2.1 %      Basophil % 0.5 %      Immature Grans % 1.3 %      Neutrophils, Absolute 8.49 10*3/mm3      Lymphocytes, Absolute 0.48 10*3/mm3      Monocytes, Absolute 0.59 10*3/mm3      Eosinophils, Absolute 0.21 10*3/mm3      Basophils, Absolute 0.05 10*3/mm3      Immature Grans, Absolute 0.13 10*3/mm3      nRBC 0.0 /100 WBC     Hemoglobin A1c [[id number]] Collected: 11/18/23 0711    Specimen: Blood Updated: 11/18/23 0718    POC Glucose Once [[id number]]  (Normal) Collected: 11/17/23 1728    Specimen: Blood Updated: 11/17/23 1739     Glucose 107 mg/dL      Comment: : [id number] JonesahMeter ID: [id number]       Fine Needle Aspiration  [[id number]] Collected: 11/16/23 0955    Specimen: Aspirate from Retroperitoneum Updated: 11/17/23 1543     Note to Patients --     This report may contain a detailed description of human tissue sent by a health care provider to the laboratory for pathologic evaluation. The content of this report is essential for diagnosis and may provide important critical findings. This information may be unfamiliar to patients to review without a medical professional present. It is advised that the patient review this report in the presence of a health care provider who can answer questions and explain the results.       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      COMMENT:  The history of bulky lymphadenopathy and prior fine-needle aspiration (CP  ) showing malignant cells is noted.  The current sample shows broad areas of fibrosis adjacent to lymphoid tissue.  The lymphoid tissue is characterized by clusters of large atypical cells in a background of small lymphocytes.  The large atypical cells and the smaller background lymphocytes stain with CD20 and CD10, supporting B cell origin, possibly of germinal center origin.   LCA is positive, whereas pankeratin stain is negative, all with appropriate controls.  Flow cytometry has been ordered and is pending at this time.  The specimen is sent to a reference laboratory for a hematopathology consultation.  The  report will be updated upon receipt of the consult opinion.         Clinical Information --     Hx: Abdominal lymphadenopathy       Gross Description --     1. Retroperitoneum.   Received in a formalin filled container labeled with the patient's name, date of birth, and designated left retroperitoneal lymph node.  The specimen consists of 3 yellow to gray, stringy core biopsies ranging from 1.1 cm in length to 1.7 cm in length by less than 0.1 cm in diameter.  The cores are wrapped in lens paper and totally submitted in blocks 1A and 1B.    Two smears fixed in 95% alcohol and two smears air dried. Flow Cytometry sent       Intraoperative Consultation --     1. Retroperitoneum.  CT Guided Needle Biopsy, Left Retroperitoneal:         Pass 1: Hypocellular       Pass 2: Adequate       Pass 3: Formalin       Pass 4: Flow         MITCH Ley(ASCP)              11/16/2023        POC Glucose Once [[id number]]  (Normal) Collected: 11/17/23 1220    Specimen: Blood Updated: 11/17/23 1231     Glucose 108 mg/dL      Comment: : [id number] Genii TechnologiesMeter ID: [id number]       POC Glucose Once [[id number]]  (Normal) Collected: 11/17/23 0806    Specimen: Blood Updated: 11/17/23 0816     Glucose 100 mg/dL      Comment: : [id number] Genii TechnologiesMeter ID: [id number]       Comprehensive Metabolic Panel [[id number]]  (Abnormal) Collected: 11/17/23 0549    Specimen: Blood Updated: 11/17/23 0706     Glucose 92 mg/dL      BUN 23 mg/dL      Creatinine 1.53 mg/dL      Sodium 136 mmol/L      Potassium 3.6 mmol/L      Chloride 101 mmol/L      CO2 21.0 mmol/L      Calcium 10.1 mg/dL      Total Protein 5.5 g/dL      Albumin 2.6 g/dL      ALT (SGPT) 90 U/L      AST (SGOT) 280 U/L      Alkaline Phosphatase 942 U/L      Total Bilirubin 0.7 mg/dL      Globulin 2.9 gm/dL      A/G Ratio 0.9 g/dL      BUN/Creatinine Ratio 15.0     Anion Gap 14.0 mmol/L      eGFR 33.8 mL/min/1.73     Narrative:      GFR Normal >60  Chronic Kidney Disease  <60  Kidney Failure <15    The GFR formula is only valid for adults with stable renal function between ages 18 and 70.    CBC & Differential [[id number]]  (Abnormal) Collected: 11/17/23 0549    Specimen: Blood Updated: 11/17/23 0610    Narrative:      The following orders were created for panel order CBC & Differential.  Procedure                               Abnormality         Status                     ---------                               -----------         ------                     CBC Auto Differential[[id number]]        Abnormal            Final result                 Please view results for these tests on the individual orders.    CBC Auto Differential [[id number]]  (Abnormal) Collected: 11/17/23 0549    Specimen: Blood Updated: 11/17/23 0610     WBC 11.80 10*3/mm3      RBC 3.45 10*6/mm3      Hemoglobin 10.1 g/dL      Hematocrit 31.3 %      MCV 90.7 fL      MCH 29.3 pg      MCHC 32.3 g/dL      RDW 14.7 %      RDW-SD 48.9 fl      MPV 9.7 fL      Platelets 369 10*3/mm3      Neutrophil % 88.4 %      Lymphocyte % 4.0 %      Monocyte % 4.2 %      Eosinophil % 2.6 %      Basophil % 0.3 %      Immature Grans % 0.5 %      Neutrophils, Absolute 10.43 10*3/mm3      Lymphocytes, Absolute 0.47 10*3/mm3      Monocytes, Absolute 0.49 10*3/mm3      Eosinophils, Absolute 0.31 10*3/mm3      Basophils, Absolute 0.04 10*3/mm3      Immature Grans, Absolute 0.06 10*3/mm3      nRBC 0.0 /100 WBC     POC Glucose Once [[id number]]  (Normal) Collected: 11/16/23 2119    Specimen: Blood Updated: 11/16/23 2131     Glucose 106 mg/dL      Comment: : [id number] Vlad JonesahMeter ID: [id number]       POC Glucose Once [[id number]]  (Normal) Collected: 11/16/23 1740    Specimen: Blood Updated: 11/16/23 1751     Glucose 93 mg/dL      Comment: : [id number] Hill IrvinMeter ID: [id number]       POC Glucose Once [[id number]]  (Normal) Collected: 11/16/23 1219    Specimen: Blood Updated: 11/16/23 1230     Glucose 93 mg/dL      Comment:  : [id number] Flor NielsonMeter ID: [id number]       Magnesium [[id number]]  (Normal) Collected: 11/16/23 0528    Specimen: Blood Updated: 11/16/23 1053     Magnesium 1.8 mg/dL     Tissue Pathology Exam [[id number]] Collected: 11/14/23 1223    Specimen: Tissue from Liver Updated: 11/16/23 1045     Note to Patients --     This report may contain a detailed description of human tissue sent by a health care provider to the laboratory for pathologic evaluation. The content of this report is essential for diagnosis and may provide important critical findings. This information may be unfamiliar to patients to review without a medical professional present. It is advised that the patient review this report in the presence of a health care provider who can answer questions and explain the results.       Case Report --     Surgical Pathology Report                         Case: [id number]                                  Authorizing Provider:  Adenike Ponce MD        Collected:           11/14/2023 12:23 PM          Ordering Location:     [facility] [place]  Received:            11/14/2023 12:57 PM          Pathologist:           Kehr, Elizabeth L, MD                                                        Specimen:    Liver, liver biopy                                                                          Final Diagnosis --     Liver, biopsy:  -Liver parenchyma with mixed portal inflammation and mild steatosis, see COMMENT.  -No diagnostic malignancy.  -Additional histologic levels examined.    COMMENT:  The history of bulky lymphadenopathy with imaging evidence of  infiltration into the liver is noted.  The current biopsy demonstrates mixed portal inflammation and mild steatosis, but no diagnostic malignancy is present.  The changes may represent changes seen in tissue neighboring a mass.  Correlation with clinical and imaging studies are recommended to evaluate for sampling error.    Special stains, with  "appropriate controls are performed. A trichrome highlights portal tracts and an iron stain is negative for increased iron stores       Gross Description --     1. Liver.   Received in a formalin filled container labeled with the patient's name, date of birth, and \"liver biopsy\".  The specimen consists of a red-brown soft tissue core biopsy measuring 1.1 cm in length by less than 0.1 cm in diameter.  The core is totally submitted in block 1A.         Microscopic Description --     Focal degenerative nuclear changes are noted (1A-8).                   Assessment & Plan     Assessment/ Plan:    Problem List Items Addressed This Visit          Genitourinary and Reproductive     * (Principal) Hypercalcemia - Primary       Symptoms and Signs    Lymphadenopathy    Relevant Orders    Case request (Completed)    Non-gynecologic Cytology (Completed)    Tissue Pathology Exam (Completed)     Other Visit Diagnoses       Hyponatremia        Acute kidney injury        Relevant Medications    furosemide (LASIX) tablet 20 mg    spironolactone (ALDACTONE) 25 MG tablet    spironolactone (ALDACTONE) tablet 25 mg    furosemide (LASIX) injection 20 mg (Completed)    furosemide (LASIX) injection 20 mg (Completed)    Abdominal lymphadenopathy        Relevant Medications    ceFAZolin 2000 mg IVPB in 100 mL NS (MBP) (Completed)    heparin (porcine) 5000 UNIT/ML injection 5,000 Units (Completed)             Active Hospital Problems    Diagnosis  POA    **Hypercalcemia [E83.52]  Yes    Lymphadenopathy [R59.1]  Unknown      Resolved Hospital Problems   No resolved problems to display.      83-year-old lady presented with lymphadenopathy with intrathoracic cavity and intra-abdominal cavity.  Status post diagnostic laparoscopy with liver biopsy as well as peritoneal fluid biopsy.  IR guided biopsy is pending    Okay from general surgery standpoint to discharge when medically feasible  Follow-up as needed from intra-abdominal standpoint  Pending " CT guided IR biopsy, patient to follow-up with general surgery down the line as an outpatient for consideration of port placement as needed  F/u with medical oncology in light of her lymphadenopathy - lymphoma suspected given her advanced age and CT scan findings    General surgery will be peripherally available.  Please contact us should further issues or concerns arise.    Luis Eduardo Armstrong Jr, MD  11/18/2023  08:12 CST

## 2023-11-18 NOTE — PLAN OF CARE
Goal Outcome Evaluation:  Plan of Care Reviewed With: patient        Progress: no change     VSS. Safety maintained.

## 2023-11-18 NOTE — PLAN OF CARE
Goal Outcome Evaluation:  Plan of Care Reviewed With: patient        Progress: no change  Outcome Evaluation: IID; Ambulating in elder with assistance of staff and walker; RA; A&O; Voiding; No requests for pain or nausea medication; resting between care; safety maintained

## 2023-11-19 LAB
ALBUMIN SERPL-MCNC: 2.6 G/DL (ref 3.5–5.2)
ALBUMIN/GLOB SERPL: 0.9 G/DL
ALP SERPL-CCNC: 1029 U/L (ref 39–117)
ALT SERPL W P-5'-P-CCNC: 77 U/L (ref 1–33)
ANION GAP SERPL CALCULATED.3IONS-SCNC: 15 MMOL/L (ref 5–15)
AST SERPL-CCNC: 152 U/L (ref 1–32)
BASOPHILS # BLD AUTO: 0.08 10*3/MM3 (ref 0–0.2)
BASOPHILS NFR BLD AUTO: 0.8 % (ref 0–1.5)
BILIRUB SERPL-MCNC: 0.4 MG/DL (ref 0–1.2)
BUN SERPL-MCNC: 18 MG/DL (ref 8–23)
BUN/CREAT SERPL: 14.1 (ref 7–25)
CALCIUM SPEC-SCNC: 10.2 MG/DL (ref 8.6–10.5)
CHLORIDE SERPL-SCNC: 98 MMOL/L (ref 98–107)
CO2 SERPL-SCNC: 22 MMOL/L (ref 22–29)
CREAT SERPL-MCNC: 1.28 MG/DL (ref 0.57–1)
DEPRECATED RDW RBC AUTO: 48.1 FL (ref 37–54)
EGFRCR SERPLBLD CKD-EPI 2021: 41.9 ML/MIN/1.73
EOSINOPHIL # BLD AUTO: 0.27 10*3/MM3 (ref 0–0.4)
EOSINOPHIL NFR BLD AUTO: 2.5 % (ref 0.3–6.2)
ERYTHROCYTE [DISTWIDTH] IN BLOOD BY AUTOMATED COUNT: 14.9 % (ref 12.3–15.4)
GLOBULIN UR ELPH-MCNC: 3 GM/DL
GLUCOSE BLDC GLUCOMTR-MCNC: 112 MG/DL (ref 70–130)
GLUCOSE BLDC GLUCOMTR-MCNC: 83 MG/DL (ref 70–130)
GLUCOSE SERPL-MCNC: 81 MG/DL (ref 65–99)
HCT VFR BLD AUTO: 32.4 % (ref 34–46.6)
HGB BLD-MCNC: 10.6 G/DL (ref 12–15.9)
IMM GRANULOCYTES # BLD AUTO: 0.22 10*3/MM3 (ref 0–0.05)
IMM GRANULOCYTES NFR BLD AUTO: 2.1 % (ref 0–0.5)
LYMPHOCYTES # BLD AUTO: 0.57 10*3/MM3 (ref 0.7–3.1)
LYMPHOCYTES NFR BLD AUTO: 5.4 % (ref 19.6–45.3)
MAGNESIUM SERPL-MCNC: 2 MG/DL (ref 1.6–2.4)
MCH RBC QN AUTO: 28.7 PG (ref 26.6–33)
MCHC RBC AUTO-ENTMCNC: 32.7 G/DL (ref 31.5–35.7)
MCV RBC AUTO: 87.8 FL (ref 79–97)
MONOCYTES # BLD AUTO: 0.8 10*3/MM3 (ref 0.1–0.9)
MONOCYTES NFR BLD AUTO: 7.5 % (ref 5–12)
NEUTROPHILS NFR BLD AUTO: 8.71 10*3/MM3 (ref 1.7–7)
NEUTROPHILS NFR BLD AUTO: 81.7 % (ref 42.7–76)
NRBC BLD AUTO-RTO: 0 /100 WBC (ref 0–0.2)
PHOSPHATE SERPL-MCNC: 2.4 MG/DL (ref 2.5–4.5)
PLATELET # BLD AUTO: 455 10*3/MM3 (ref 140–450)
PMV BLD AUTO: 9.8 FL (ref 6–12)
POTASSIUM SERPL-SCNC: 3.3 MMOL/L (ref 3.5–5.2)
PROT SERPL-MCNC: 5.6 G/DL (ref 6–8.5)
RBC # BLD AUTO: 3.69 10*6/MM3 (ref 3.77–5.28)
SODIUM SERPL-SCNC: 135 MMOL/L (ref 136–145)
WBC NRBC COR # BLD AUTO: 10.65 10*3/MM3 (ref 3.4–10.8)

## 2023-11-19 PROCEDURE — 25010000002 MAGNESIUM SULFATE 2 GM/50ML SOLUTION: Performed by: INTERNAL MEDICINE

## 2023-11-19 PROCEDURE — 84100 ASSAY OF PHOSPHORUS: CPT | Performed by: INTERNAL MEDICINE

## 2023-11-19 PROCEDURE — 85025 COMPLETE CBC W/AUTO DIFF WBC: CPT | Performed by: INTERNAL MEDICINE

## 2023-11-19 PROCEDURE — 80053 COMPREHEN METABOLIC PANEL: CPT | Performed by: INTERNAL MEDICINE

## 2023-11-19 PROCEDURE — 83735 ASSAY OF MAGNESIUM: CPT | Performed by: INTERNAL MEDICINE

## 2023-11-19 PROCEDURE — 82948 REAGENT STRIP/BLOOD GLUCOSE: CPT

## 2023-11-19 RX ORDER — MAGNESIUM SULFATE HEPTAHYDRATE 40 MG/ML
2 INJECTION, SOLUTION INTRAVENOUS
Status: COMPLETED | OUTPATIENT
Start: 2023-11-19 | End: 2023-11-19

## 2023-11-19 RX ORDER — POTASSIUM CHLORIDE 750 MG/1
40 CAPSULE, EXTENDED RELEASE ORAL ONCE
Status: COMPLETED | OUTPATIENT
Start: 2023-11-19 | End: 2023-11-19

## 2023-11-19 RX ADMIN — OXYCODONE HYDROCHLORIDE AND ACETAMINOPHEN 1 TABLET: 5; 325 TABLET ORAL at 20:57

## 2023-11-19 RX ADMIN — Medication 10 ML: at 09:11

## 2023-11-19 RX ADMIN — POTASSIUM & SODIUM PHOSPHATES POWDER PACK 280-160-250 MG 1 PACKET: 280-160-250 PACK at 18:09

## 2023-11-19 RX ADMIN — ATORVASTATIN CALCIUM 10 MG: 10 TABLET, FILM COATED ORAL at 20:55

## 2023-11-19 RX ADMIN — FAMOTIDINE 20 MG: 20 TABLET, FILM COATED ORAL at 09:11

## 2023-11-19 RX ADMIN — HYDROCODONE BITARTRATE AND ACETAMINOPHEN 1 TABLET: 7.5; 325 TABLET ORAL at 05:08

## 2023-11-19 RX ADMIN — SPIRONOLACTONE 25 MG: 25 TABLET ORAL at 09:09

## 2023-11-19 RX ADMIN — EMPAGLIFLOZIN 10 MG: 10 TABLET, FILM COATED ORAL at 09:10

## 2023-11-19 RX ADMIN — Medication 10 ML: at 20:58

## 2023-11-19 RX ADMIN — POTASSIUM CHLORIDE 40 MEQ: 10 CAPSULE, COATED, EXTENDED RELEASE ORAL at 15:58

## 2023-11-19 RX ADMIN — POTASSIUM & SODIUM PHOSPHATES POWDER PACK 280-160-250 MG 1 PACKET: 280-160-250 PACK at 14:25

## 2023-11-19 RX ADMIN — NIFEDIPINE 30 MG: 30 TABLET, FILM COATED, EXTENDED RELEASE ORAL at 09:10

## 2023-11-19 RX ADMIN — MAGNESIUM SULFATE HEPTAHYDRATE 2 G: 2 INJECTION, SOLUTION INTRAVENOUS at 13:37

## 2023-11-19 RX ADMIN — LEVOTHYROXINE SODIUM 37.5 MCG: 75 TABLET ORAL at 05:08

## 2023-11-19 RX ADMIN — Medication 1 TABLET: at 09:11

## 2023-11-19 RX ADMIN — MAGNESIUM SULFATE HEPTAHYDRATE 2 G: 2 INJECTION, SOLUTION INTRAVENOUS at 14:25

## 2023-11-20 ENCOUNTER — READMISSION MANAGEMENT (OUTPATIENT)
Dept: CALL CENTER | Facility: HOSPITAL | Age: 83
End: 2023-11-20
Payer: MEDICARE

## 2023-11-20 ENCOUNTER — APPOINTMENT (OUTPATIENT)
Dept: CARDIOLOGY | Facility: HOSPITAL | Age: 83
DRG: 821 | End: 2023-11-20
Payer: MEDICARE

## 2023-11-20 VITALS
RESPIRATION RATE: 16 BRPM | SYSTOLIC BLOOD PRESSURE: 171 MMHG | TEMPERATURE: 98.3 F | HEIGHT: 64 IN | HEART RATE: 86 BPM | BODY MASS INDEX: 26.63 KG/M2 | OXYGEN SATURATION: 95 % | DIASTOLIC BLOOD PRESSURE: 81 MMHG | WEIGHT: 156 LBS

## 2023-11-20 LAB
ALBUMIN SERPL-MCNC: 2.8 G/DL (ref 3.5–5.2)
ALBUMIN/GLOB SERPL: 0.9 G/DL
ALP SERPL-CCNC: 958 U/L (ref 39–117)
ALT SERPL W P-5'-P-CCNC: 63 U/L (ref 1–33)
ANION GAP SERPL CALCULATED.3IONS-SCNC: 10 MMOL/L (ref 5–15)
AST SERPL-CCNC: 101 U/L (ref 1–32)
BASOPHILS # BLD AUTO: 0.1 10*3/MM3 (ref 0–0.2)
BASOPHILS NFR BLD AUTO: 1.1 % (ref 0–1.5)
BILIRUB SERPL-MCNC: 0.4 MG/DL (ref 0–1.2)
BUN SERPL-MCNC: 16 MG/DL (ref 8–23)
BUN/CREAT SERPL: 11.2 (ref 7–25)
CALCIUM SPEC-SCNC: 10.8 MG/DL (ref 8.6–10.5)
CHLORIDE SERPL-SCNC: 100 MMOL/L (ref 98–107)
CO2 SERPL-SCNC: 26 MMOL/L (ref 22–29)
CREAT SERPL-MCNC: 1.43 MG/DL (ref 0.57–1)
DEPRECATED RDW RBC AUTO: 49.7 FL (ref 37–54)
EGFRCR SERPLBLD CKD-EPI 2021: 36.7 ML/MIN/1.73
EOSINOPHIL # BLD AUTO: 0.24 10*3/MM3 (ref 0–0.4)
EOSINOPHIL NFR BLD AUTO: 2.5 % (ref 0.3–6.2)
ERYTHROCYTE [DISTWIDTH] IN BLOOD BY AUTOMATED COUNT: 15.2 % (ref 12.3–15.4)
GLOBULIN UR ELPH-MCNC: 3.2 GM/DL
GLUCOSE BLDC GLUCOMTR-MCNC: 120 MG/DL (ref 70–130)
GLUCOSE SERPL-MCNC: 91 MG/DL (ref 65–99)
HAV IGM SERPL QL IA: NORMAL
HBV CORE IGM SERPL QL IA: NORMAL
HBV SURFACE AG SERPL QL IA: NORMAL
HCT VFR BLD AUTO: 35.2 % (ref 34–46.6)
HCV AB SER DONR QL: NORMAL
HGB BLD-MCNC: 11.5 G/DL (ref 12–15.9)
IMM GRANULOCYTES # BLD AUTO: 0.22 10*3/MM3 (ref 0–0.05)
IMM GRANULOCYTES NFR BLD AUTO: 2.3 % (ref 0–0.5)
LYMPHOCYTES # BLD AUTO: 0.48 10*3/MM3 (ref 0.7–3.1)
LYMPHOCYTES NFR BLD AUTO: 5 % (ref 19.6–45.3)
MAGNESIUM SERPL-MCNC: 2.1 MG/DL (ref 1.6–2.4)
MCH RBC QN AUTO: 29 PG (ref 26.6–33)
MCHC RBC AUTO-ENTMCNC: 32.7 G/DL (ref 31.5–35.7)
MCV RBC AUTO: 88.9 FL (ref 79–97)
MONOCYTES # BLD AUTO: 0.77 10*3/MM3 (ref 0.1–0.9)
MONOCYTES NFR BLD AUTO: 8.1 % (ref 5–12)
NEUTROPHILS NFR BLD AUTO: 7.71 10*3/MM3 (ref 1.7–7)
NEUTROPHILS NFR BLD AUTO: 81 % (ref 42.7–76)
NRBC BLD AUTO-RTO: 0 /100 WBC (ref 0–0.2)
PHOSPHATE SERPL-MCNC: 2.6 MG/DL (ref 2.5–4.5)
PLATELET # BLD AUTO: 475 10*3/MM3 (ref 140–450)
PMV BLD AUTO: 9.4 FL (ref 6–12)
POTASSIUM SERPL-SCNC: 3.9 MMOL/L (ref 3.5–5.2)
PROT SERPL-MCNC: 6 G/DL (ref 6–8.5)
RBC # BLD AUTO: 3.96 10*6/MM3 (ref 3.77–5.28)
SODIUM SERPL-SCNC: 136 MMOL/L (ref 136–145)
WBC NRBC COR # BLD AUTO: 9.52 10*3/MM3 (ref 3.4–10.8)

## 2023-11-20 PROCEDURE — 80074 ACUTE HEPATITIS PANEL: CPT | Performed by: INTERNAL MEDICINE

## 2023-11-20 PROCEDURE — 93356 MYOCRD STRAIN IMG SPCKL TRCK: CPT | Performed by: INTERNAL MEDICINE

## 2023-11-20 PROCEDURE — 82948 REAGENT STRIP/BLOOD GLUCOSE: CPT

## 2023-11-20 PROCEDURE — 97161 PT EVAL LOW COMPLEX 20 MIN: CPT

## 2023-11-20 PROCEDURE — 84100 ASSAY OF PHOSPHORUS: CPT | Performed by: INTERNAL MEDICINE

## 2023-11-20 PROCEDURE — 93356 MYOCRD STRAIN IMG SPCKL TRCK: CPT

## 2023-11-20 PROCEDURE — 85025 COMPLETE CBC W/AUTO DIFF WBC: CPT | Performed by: INTERNAL MEDICINE

## 2023-11-20 PROCEDURE — 93306 TTE W/DOPPLER COMPLETE: CPT | Performed by: INTERNAL MEDICINE

## 2023-11-20 PROCEDURE — 83735 ASSAY OF MAGNESIUM: CPT | Performed by: INTERNAL MEDICINE

## 2023-11-20 PROCEDURE — 93306 TTE W/DOPPLER COMPLETE: CPT

## 2023-11-20 PROCEDURE — 80053 COMPREHEN METABOLIC PANEL: CPT | Performed by: INTERNAL MEDICINE

## 2023-11-20 RX ORDER — FAMOTIDINE 20 MG/1
20 TABLET, FILM COATED ORAL DAILY
Qty: 180 TABLET | Refills: 0 | Status: ON HOLD | OUTPATIENT
Start: 2023-11-21

## 2023-11-20 RX ORDER — NIFEDIPINE 30 MG/1
30 TABLET, FILM COATED, EXTENDED RELEASE ORAL
Qty: 90 TABLET | Refills: 0 | Status: ON HOLD | OUTPATIENT
Start: 2023-11-21

## 2023-11-20 RX ORDER — ONDANSETRON 4 MG/1
4 TABLET, FILM COATED ORAL EVERY 6 HOURS PRN
Qty: 24 TABLET | Refills: 0 | Status: ON HOLD | OUTPATIENT
Start: 2023-11-20

## 2023-11-20 RX ADMIN — Medication 1 TABLET: at 08:40

## 2023-11-20 RX ADMIN — Medication 10 ML: at 08:44

## 2023-11-20 RX ADMIN — LEVOTHYROXINE SODIUM 37.5 MCG: 75 TABLET ORAL at 06:00

## 2023-11-20 RX ADMIN — SPIRONOLACTONE 25 MG: 25 TABLET ORAL at 08:40

## 2023-11-20 RX ADMIN — EMPAGLIFLOZIN 10 MG: 10 TABLET, FILM COATED ORAL at 08:41

## 2023-11-20 RX ADMIN — NIFEDIPINE 30 MG: 30 TABLET, FILM COATED, EXTENDED RELEASE ORAL at 08:40

## 2023-11-20 RX ADMIN — FAMOTIDINE 20 MG: 20 TABLET, FILM COATED ORAL at 08:40

## 2023-11-20 RX ADMIN — POTASSIUM & SODIUM PHOSPHATES POWDER PACK 280-160-250 MG 1 PACKET: 280-160-250 PACK at 08:41

## 2023-11-20 NOTE — CASE MANAGEMENT/SOCIAL WORK
Continued Stay Note   [facility]     Patient Name: Imani Chauhan  MRN: [id number]  Today's Date: 11/20/2023    Admit Date: 11/11/2023    Plan: Home Health   Discharge Plan       Row Name 11/20/23 1421       Plan    Plan Home Health    Patient/Family in Agreement with Plan yes    Final Discharge Disposition Code 06 - home with home health care    Final Note Pt is going home today. She has orders for home health and chose [facility]. Referral faxed to [phone] and informed Saray [phone].                   Discharge Codes    No documentation.                 Expected Discharge Date and Time       Expected Discharge Date Expected Discharge Time    Nov 20, 2023               JADE Ibarra

## 2023-11-20 NOTE — PLAN OF CARE
Problem: Adult Inpatient Plan of Care  Goal: Plan of Care Review  Outcome: Ongoing, Progressing  Flowsheets (Taken 11/20/2023 9934)  Progress: no change  Plan of Care Reviewed With: patient  Outcome Evaluation: Pt resting well through the night. PRN pain med given x1. Up w/ standby and walker to bathroom. Abd incisions c/d/i. VSS. Safety maintained.

## 2023-11-20 NOTE — CONSULTS
Nutrition Services    Patient Name:  Imani Chauhan  YOB: 1940  MRN: [id number]  Admit Date:  11/11/2023    Ntn consult for diet education. Pt with hypomagnesemia secondary to PPI use and possibly also d/t poor appetite. She is ordered a C.CHO diet. She has consumed 33% of the last 3 meals. She reports her appetite is slowly improving.     She reports that she takes magnesium glycinate supplements at home. Encouraged continued supplementation. Also discussed foods that are high in Mg++ and provided pt a list of high Mg++ foods to try to increase in her diet. Discussed with pt that she may absorb Mg++ from food better than the pills she takes. Also discussed switching to a chewable Mg++ supplement for better absorption. At this time she plans to continue with current supplements she has been taking. Advised her to call RD office if she has further questions s/p d/c.     Electronically signed by:  Lea Pagan RDN, ANGELIKA  11/20/23 14:12 CST

## 2023-11-20 NOTE — DISCHARGE SUMMARY
[facility] Medicine Services  DISCHARGE SUMMARY       Date of Admission: 11/11/2023  Date of Discharge:  11/20/2023  Primary Care Physician: Светлана Loyd MD    Presenting Problem/History of Present Illness:      Final Discharge Diagnoses:  Active Hospital Problems    Diagnosis     **Hypercalcemia     Lymphadenopathy      Consults: Oncology    Procedures Performed:   Diagnostic laparoscope 11/14/2023    Pertinent Test Results:   Results for orders placed during the hospital encounter of 11/11/23    Adult Transthoracic Echo Complete W/ Cont if Necessary Per Protocol    Interpretation Summary    Left ventricular systolic function is normal. Left ventricular ejection fraction appears to be 66 - 70%.    Left ventricular wall thickness is consistent with mild concentric hypertrophy.    Left ventricular diastolic function is consistent with (grade I) impaired relaxation.    Normal right ventricular cavity size and systolic function noted.    There is no significant (greater than mild) valvular dysfunction.    Estimated right ventricular systolic pressure from tricuspid regurgitation is normal (<35 mmHg).      Imaging Results (All)       Procedure Component Value Units Date/Time    XR Abdomen Flat & Upright [[id number]] Collected: 11/18/23 1213     Updated: 11/18/23 1217    Narrative:      EXAM/TECHNIQUE: XR ABDOMEN FLAT AND UPRIGHT-     INDICATION: increasing epigastric pain, isolated alk phos elevation;  E83.52-Hypercalcemia; E87.0-Neue-mfwqwgylyy and hyponatremia;  N17.9-Acute kidney failure, unspecified; R59.1-Generalized enlarged  lymph nodes; R59.0-Localized enlarged lymph nodes     COMPARISON: None     FINDINGS:     Nonobstructive bowel gas pattern. A few scattered air-filled bowel loops  are noted. No mass effect or suspicious calcifications. Advanced  degenerative change in the lumbar spine. No acute osseous finding.  Partially imaged pleural/parenchymal opacities in  the lower chest.       Impression:      Nonobstructive bowel gas pattern.     This report was signed and finalized on 11/18/2023 12:14 PM CST by Dr. Alvaro Blake MD.       US Abdomen Limited [[id number]] Collected: 11/17/23 1556     Updated: 11/17/23 1602    Narrative:      EXAM/TECHNIQUE: US ABDOMEN LIMITED-     INDICATION: elevated alkaline phosphatase s/p liver biopsy on 11/14,  evaluate portal blood flow; E83.52-Hypercalcemia; E87.4-Dplk-ayvpryztke  and hyponatremia; N17.9-Acute kidney failure, unspecified;  R59.1-Generalized enlarged lymph nodes; R59.0-Localized enlarged lymph  nodes     COMPARISON: 11/12/2023     FINDINGS:     Limited abdominal ultrasound of the liver and portal vasculature.     Liver echogenicity and echotexture are within normal limits. No solid  liver lesion. Patent portal vein with appropriate directional flow. Left  portal vein is patent with appropriate directional flow. Right portal  vein is patent with appropriate directional flow.     Postoperative changes cholecystectomy. No intrahepatic biliary ductal  dilatation.       Impression:      1.  Unremarkable sonographic appearance of the liver.  2.  Main portal vein, left portal vein, and right portal vein appear  patent with appropriate directional flow. However, of note, there is  diminutive appearance of the left portal vein on recent CT abdomen with  soft tissue attenuation encasing and narrowing the left portal vein.     This report was signed and finalized on 11/17/2023 3:59 PM CST by Dr. Alvaro Blake MD.       CT Biopsy Abdomen Retroperitoneal [[id number]] Collected: 11/16/23 1012     Updated: 11/16/23 1020    Narrative:      EXAM: CT BIOPSY ABDOMEN RETROPERITONEAL- - 11/16/2023 9:00 AM CST     HISTORY: retroperitoneal lymphadenopathy; E83.52-Hypercalcemia;  E87.6-Poat-hqdchnviub and hyponatremia; N17.9-Acute kidney failure,  unspecified; R59.1-Generalized enlarged lymph nodes; R59.0-Localized  enlarged lymph nodes        COMPARISON: CT abdomen pelvis 11/12/2023.      DOSE LENGTH PRODUCT: 1341 mGy cm. Automated exposure control was also  utilized to decrease patient radiation dose.     TECHNIQUE/FINDINGS:   Risks, benefits and alternatives to the procedure were discussed with  the patient. Specifically the the risk of a retroperitoneal hematoma and  bleeding was discussed. Written and verbal informed consent was  obtained.     Immediately prior to the procedure, a time out was performed including  the patient's name, date of birth, procedure and laterality.       The entry site was marked with an X. Site was prepped in the usual  sterile fashion.      Approximately 5 mL of 1 percent lidocaine was infused in the  subcutaneous and deeper tissues for local anesthesia. Using CT guidance,  4 18-gauge core biopsies samples were obtained from the left  retroperitoneal lymphadenopathy a left posterior percutaneous. The  samples were sent to surgical pathology for analysis. Pathology  confirmed neoplastic cells.     The patient tolerated the procedure well, and was transferred to the  holding area in stable condition for post procedure monitoring. There  were no apparent periprocedural complications.           Impression:      CT-guided left retroperitoneal lymphadenopathy biopsy  1. Technically successful CT-guided left retroperitoneal lymphadenopathy  biopsy.  2. No periprocedural complication.        This report was signed and finalized on 11/16/2023 10:17 AM CST by  Navneet Hoang.       CT Abdomen Pelvis With & Without Contrast [[id number]] Collected: 11/12/23 1724     Updated: 11/12/23 3318    Narrative:      EXAM: CT ABDOMEN PELVIS W WO CONTRAST-     INDICATION: Abdominal pain, acute, nonlocalized; E83.52-Hypercalcemia;  E87.2-Axie-dcblezpglu and hyponatremia; N17.9-Acute kidney failure,  unspecified        TECHNIQUE: Helically acquired CT images were obtained of the abdomen and  pelvis prior to and after the administration of  intravenous contrast.  Coronal and sagittal reformations were performed.        CONTRAST:  Isovue-300      DOSE LENGTH PRODUCT: 675 mGy cm. Automated exposure control was also  utilized to decrease patient radiation dose.     COMPARISON: PET/CT 4/13/2023     FINDINGS:     Lower Chest: Small left and trace right pleural effusions with basilar  passive atelectasis.     Liver: Soft tissue attenuation tracks into the felisa hepatis and lies  along the left portal vein causing severe narrowing and near occlusion  (series 4 image 21).     Biliary Tree: Status post cholecystectomy. Minimal intrahepatic biliary  ductal dilation involving segments 2, 3, 4, and 8.     Spleen: Unremarkable.     Pancreas: Unremarkable.     Adrenal Glands: Unremarkable.     Kidneys/Ureters/Bladder: Small left renal cyst. Mild left proximal  urothelial enhancement.     Reproductive Organs: Status post hysterectomy.     Gastrointestinal Tract: Colonic diverticulosis.     Lymphatics: Progressed bulky lymphadenopathy, including: Lower  mediastinal, retrocrural, perigastric, retroperitoneal, right common  iliac, periportal, and mesenteric lymphadenopathy, some of which are  centrally necrotic. Representative reproducible examples below. The  retroperitoneal and mesenteric lymphadenopathy causes encasement of the  celiac, SMA, bilateral renal arteries, and portions of the aorta.  *  Retrocrural: 4.2 x 2.9 cm, previously nonexistent.  *  Confluent mesenteric: 14.5 x 8.5 cm (series 4 image 40).  *  Periportal: 2.6 x 1.9 cm (series 4 image 25).     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     Severe narrowing and near occlusion of the left portal vein. Severe  narrowing of the right renal artery. Moderate atherosclerosis.     Peritoneum/Retroperitoneum: Small volume ascites.     Abdominal Wall/Soft Tissues: Tiny fat-containing umbilical hernia.     Osseous Structures: Diffuse osteopenia.        Impression:            Markedly progressed bulky lymphadenopathy (lower mediastinal,  retrocrural, perigastric, retroperitoneal, right common iliac,  periportal, mesenteric) as detailed above, compatible with metastatic  disease. The bulky lymphadenopathy causes severe narrowing of the right  renal artery and severe narrowing/near occlusion of the IVC.     Soft tissue infiltration of the felisa hepatis as well as along the left  portal vein with associated mild intrahepatic biliary ductal dilation,  compatible with malignancy. 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 This causes severe narrowing and near  occlusion of the left portal vein.     Small left and trace right pleural effusions with mild passive  atelectasis.     Mild left proximal urothelial enhancement, correlate with urinalysis for  urinary tract infection.     Small volume ascites.        This report was signed and finalized on 11/12/2023 5:55 PM CST by Navneet Hoang.             LAB RESULTS:      Lab 11/20/23  0652 11/19/23  0354 11/18/23  0711 11/17/23  0549 11/16/23  0528   WBC 9.52 10.65 9.95 11.80* 14.18*   HEMOGLOBIN 11.5* 10.6* 11.6* 10.1* 10.9*   HEMATOCRIT 35.2 32.4* 35.5 31.3* 34.3   PLATELETS 475* 455* 426 369 377   NEUTROS ABS 7.71* 8.71* 8.49* 10.43* 12.92*   IMMATURE GRANS (ABS) 0.22* 0.22* 0.13* 0.06* 0.08*   LYMPHS ABS 0.48* 0.57* 0.48* 0.47* 0.40*   MONOS ABS 0.77 0.80 0.59 0.49 0.56   EOS ABS 0.24 0.27 0.21 0.31 0.18   MCV 88.9 87.8 88.5 90.7 93.0   PROTIME  --   --   --   --  14.1   APTT  --   --   --   --  38.5*         Lab 11/20/23  0652 11/19/23  0354 11/18/23  0711 11/17/23  0549 11/16/23  0528 11/15/23  0645   SODIUM 136 135* 135* 136 136 133*   POTASSIUM 3.9 3.3* 3.4* 3.6 3.8 4.2   CHLORIDE 100 98 99 101 102 102   CO2 26.0 22.0 24.0 21.0* 20.0* 19.0*   ANION GAP 10.0 15.0 12.0 14.0 14.0 12.0   BUN 16 18 20 23 23 24*   CREATININE 1.43* 1.28* 1.38* 1.53* 1.60* 1.55*   EGFR 36.7* 41.9* 38.3* 33.8*  32.1* 33.3*   GLUCOSE 91 81 111* 92 91 103*   CALCIUM 10.8* 10.2 10.6* 10.1 10.3 9.9   MAGNESIUM 2.1 2.0 1.5*  --  1.8 1.3*   PHOSPHORUS 2.6 2.4* 2.2*  --   --  3.1   HEMOGLOBIN A1C  --   --  6.10*  --   --   --          Lab 11/20/23  0652 11/19/23  0354 11/18/23  0711 11/17/23  0549 11/16/23  0528   TOTAL PROTEIN 6.0 5.6* 6.1 5.5* 5.7*   ALBUMIN 2.8* 2.6* 2.9* 2.6* 2.8*   GLOBULIN 3.2 3.0 3.2 2.9 2.9   ALT (SGPT) 63* 77* 101* 90* 27   AST (SGOT) 101* 152* 249* 280* 95*   BILIRUBIN 0.4 0.4 0.6 0.7 0.5   ALK PHOS 958* 1,029* 1,183* 942* 430*   AMYLASE  --   --  54  --   --    LIPASE  --   --  65*  --   --          Lab 11/16/23  0528   PROTIME 14.1   INR 1.08             Lab 11/14/23  1057   ABO TYPING A   RH TYPING Negative   ANTIBODY SCREEN Negative         Brief Urine Lab Results  (Last result in the past 365 days)        Color   Clarity   Blood   Leuk Est   Nitrite   Protein   CREAT   Urine HCG        11/11/23 2016 Yellow   Clear   Negative   Trace   Negative   Negative                 Microbiology Results (last 10 days)       Procedure Component Value - Date/Time    Urine Culture - Urine, Urine, Clean Catch [[id number]]  (Normal) Collected: 11/11/23 2016    Lab Status: Final result Specimen: Urine, Clean Catch Updated: 11/12/23 2226     Urine Culture No growth        HPI .  Abdominal Pain  Associated symptoms include constipation, myalgias and vomiting. Pertinent negatives include no fever.   Vomiting   Associated symptoms include abdominal pain and myalgias. Pertinent negatives include no chills or fever.   83-year-old female who presents emergency department with complaints of generalized malaise.  Decreased p.o. intake.  Nausea and vomiting.  The patient has abdominal lymphadenopathy that she has a follow-up to see Dr. Restrepo next week.  She is supposed to have an incisional biopsy performed.  She was at [facility] 2 weeks ago for similar complaints, and was sent home.  She states that she has chest pain and  "abdominal pain that radiates into her right side.  When she has a deep breath it catches in her epigastric region secondary to her abdominal distention.  She has no dysuria.  She has chronic constipation, but she did have a bowel movement yesterday.  She is hypertensive in the emergency department on my exam.  The patient's work-up demonstrated multiple electrolyte abnormalities.    Hospital Course:   Nausea/vomiting.  Pepcid . Zofran.     Abdomen pain-myalgia/abdomen lymphadenopathy/elevated alkaline phosphatase/epigastric pain.  Oncology consult.      Likely secondary to metastatic cancer preliminary results shows malignant cells of unknown type-pathology still pending.  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     Hypercalcemia secondary to metastatic cancer.  Status post zoledronic acid x 1 11/11.  Calcium stable.  Hold IV fluid for now.     Elevated liver enzyme.    Ultrasound of the liver 11/17-showed patent main portal veins and left portal vein and right portal vein.     Chronic stage III renal failure.  Creatinine baseline 1.5-1.6.     Diabetes.  Hemoglobin C 6.1.  Hold Jardiance due to poor appetite.  Low sliding scale.     Hypothyroidism..  Synthroid.     Hypokalemia.     Hypophosphatemia.  Hypomagnesia and .     Constipation.  Resolved.     Signs stable, afebrile.  Plan to discharge patient home today.  Follow-up with oncology outpatient. Follow-up PCP 1 week.  Patient go home with home health.    Physical Exam on Discharge:  /81 (BP Location: Left arm, Patient Position: Lying)   Pulse 86   Temp 98.3 °F (36.8 °C) (Oral)   Resp 16   Ht 162.6 cm (64.02\")   Wt 71.2 kg (156 lb 14.4 oz)   SpO2 95%   BMI 26.92 kg/m²   Physical Exam  Vitals and nursing note reviewed.   Constitutional:       Comments: Advanced age .   HENT:      Head: Normocephalic.   Eyes:      Conjunctiva/sclera: Conjunctivae normal.      Pupils: Pupils are equal, round, and reactive to light. "   Cardiovascular:      Rate and Rhythm: Normal rate and regular rhythm.      Heart sounds: Normal heart sounds.   Pulmonary:      Effort: No respiratory distress.      Comments: Diminished breath sound bilateral, clear, on room air.  Abdominal:      General: Bowel sounds are normal. There is no distension.      Palpations: Abdomen is soft.      Tenderness: There is no abdominal tenderness.   Musculoskeletal:         General: No swelling.      Cervical back: Neck supple.   Skin:     General: Skin is warm and dry.      Capillary Refill: Capillary refill takes 2 to 3 seconds.      Findings: No rash.   Neurological:      General: No focal deficit present.      Mental Status: She is alert and oriented to person, place, and time.      Motor: Weakness present.      Coordination: Coordination abnormal.      Gait: Gait abnormal.   Psychiatric:         Mood and Affect: Mood normal.         Behavior: Behavior normal.           Condition on Discharge: Stable.    Discharge Disposition: Discharge home with family with home health      Discharge Medications:     Discharge Medications        New Medications        Instructions Start Date   famotidine 20 MG tablet  Commonly known as: PEPCID   20 mg, Oral, Daily   Start Date: November 21, 2023     NIFEdipine CC 30 MG 24 hr tablet  Commonly known as: ADALAT CC   30 mg, Oral, Every 24 Hours Scheduled   Start Date: November 21, 2023     ondansetron 4 MG tablet  Commonly known as: ZOFRAN   4 mg, Oral, Every 6 Hours PRN             Continue These Medications        Instructions Start Date   acetaminophen 500 MG tablet  Commonly known as: TYLENOL   500 mg, Oral, Every 6 Hours PRN, prn      aspirin 81 MG EC tablet   81 mg, Oral, Daily      atorvastatin 10 MG tablet  Commonly known as: LIPITOR   10 mg, Oral, Nightly      cloNIDine 0.1 MG tablet  Commonly known as: CATAPRES   0.1 mg, Oral, 2 Times Daily PRN      furosemide 20 MG tablet  Commonly known as: LASIX   20 mg, Oral, Daily PRN       Gabapentin (Once-Daily) 300 MG tablet   300-600 mg, Oral, Daily Before Supper, Sometimes takes more than one but mostly takes one      HYDROcodone-acetaminophen 7.5-325 MG per tablet  Commonly known as: NORCO   1 tablet, Oral, Every 6 Hours PRN      Jardiance 10 MG tablet tablet  Generic drug: empagliflozin   10 mg, Oral, Daily      lansoprazole 15 MG capsule  Commonly known as: PREVACID   15 mg, Oral, Daily PRN      levothyroxine 75 MCG tablet  Commonly known as: SYNTHROID, LEVOTHROID   37.5-75 mcg, Oral, Daily, Takes 1/2 tab M, W, & F and 1 tab on S, Tu, Th, & Sa      lisinopril 20 MG tablet  Commonly known as: PRINIVIL,ZESTRIL   20 mg, Oral, 2 Times Daily      MAGNESIUM GLYCINATE PO   1,300 mg, Oral, 2 Times Daily      metFORMIN  MG 24 hr tablet  Commonly known as: GLUCOPHAGE-XR   500 mg, Oral, 2 Times Daily      ROLAIDS PO   1 tablet, Oral, Daily PRN      spironolactone 25 MG tablet  Commonly known as: ALDACTONE   25 mg, Oral, Daily      SUPER-C 1000 PO   1 tablet, Oral, Daily, Super c 900 mg, zinc 11 mg, vitamin D3 1,000 units, Vit A 450 mcg, Vitamin E 15 mg OTC      therapeutic multivitamin-minerals tablet  Generic drug: multivitamin with minerals   1 tablet, Oral, Daily               Discharge Diet:   Diet Instructions       Advance Diet As Tolerated -Target Diet: Regular .      Target Diet: Regular .            Activity at Discharge:   Activity Instructions       Activity as Tolerated              Follow-up Appointments:   Future Appointments   Date Time Provider Department Center   2/22/2024  1:00 PM Gargus, Meghan Vannessa, APRN [facility]     Follow-up PCP 1 week.  Follow-up with oncology outpatient.    Electronically signed by Charles Mcnair MD, 11/20/23, 13:51 CST.    Time: Greater than 30 minutes.

## 2023-11-20 NOTE — PROGRESS NOTES
[facility] Medicine Services  INPATIENT PROGRESS NOTE    Patient Name: Imani Chauhan  Date of Admission: 11/11/2023  Today's Date: 11/19/23  Length of Stay: 8  Primary Care Physician: Светлана Loyd MD    Subjective     Pt reports increased appetite and willingness to participate with physical therapy.        Objective    Temp:  [97.7 °F (36.5 °C)-98.7 °F (37.1 °C)] 97.7 °F (36.5 °C)  Heart Rate:  [77-95] 92  Resp:  [16] 16  BP: (138-176)/(58-80) 171/80    General: No acute distress  HEENT: normocephalic, atraumatic  Neck: Supple  CV: RRR  Lung: Clear to auscultation bilaterally.  No wheeze, rales, or rhonchi noted.  Abdominal exam: Positive bowel sounds, nontender, non distended  Extremity: No edema noted  Neurological exam: AAO x3. Cranial nerve II to XII grossly intact  Skin exam: Dry and warm  Psychiatric exam: Calm, cooperative, and normal affect       Results Review:  I have reviewed the labs, radiology results, and diagnostic studies.    Laboratory Data:   Results from last 7 days   Lab Units 11/19/23  0354 11/18/23  0711 11/17/23  0549   WBC 10*3/mm3 10.65 9.95 11.80*   HEMOGLOBIN g/dL 10.6* 11.6* 10.1*   HEMATOCRIT % 32.4* 35.5 31.3*   PLATELETS 10*3/mm3 455* 426 369        Results from last 7 days   Lab Units 11/19/23  0354 11/18/23  0711 11/17/23  0549   SODIUM mmol/L 135* 135* 136   POTASSIUM mmol/L 3.3* 3.4* 3.6   CHLORIDE mmol/L 98 99 101   CO2 mmol/L 22.0 24.0 21.0*   BUN mg/dL 18 20 23   CREATININE mg/dL 1.28* 1.38* 1.53*   CALCIUM mg/dL 10.2 10.6* 10.1   BILIRUBIN mg/dL 0.4 0.6 0.7   ALK PHOS U/L 1,029* 1,183* 942*   ALT (SGPT) U/L 77* 101* 90*   AST (SGOT) U/L 152* 249* 280*   GLUCOSE mg/dL 81 111* 92       Culture Data:   Urine Culture   Date Value Ref Range Status   11/11/2023 No growth  Final       Radiology Data:   Imaging Results (Last 24 Hours)       ** No results found for the last 24 hours. **            I have reviewed the patient's current  Telephone Encounter by Vianey Day RN at 02/06/18 09:02 AM     Author:  Vianey Day RN Service:  (none) Author Type:  Registered Nurse     Filed:  02/06/18 09:04 AM Encounter Date:  2/5/2018 Status:  Signed     :  Vianey Day RN (Registered Nurse)            Will route to [facility] for advice per DR Santo's response.[JF1.1M]      Revision History        User Key Date/Time User Provider Type Action    > JF1.1 02/06/18 09:04 AM Vianey Day RN Registered Nurse Sign    M - Manual             medications.     Assessment/Plan   Assessment  Active Hospital Problems    Diagnosis     **Hypercalcemia     Lymphadenopathy        Assessment and Plan:    Hypercalcemia likely secondary to undiagnosed metastatic cancer: corrected Ca 11.3, stable  -She received 1 doses zoledronic acid on 11/11 per oncology  -hold IVF for now  -recheck CMP in am     Elevated alkaline phosphatase, downtrending: no bilirubin elevation, AST elevated but downtrending. RUQ ultrasound 11/17 showed patent main portal vein, left portal vein, right portal vein.   -cont to monitor for now    Epigastric pain: amylase and lipase upper limit of normal  -cont pepcid 20 po bid  -holding home PPI for recurrent hypomagnesemia, will plan to restart tomorrow if pepcid ineffective     Abdominal lymphadenopathy likely secondary to metastatic cancer: preliminary results show malignant cells unknown type.  -pathology results pending     CKD 3: Cr 1.5-1.6 appears to be her baseline  -recheck labs daily    DM2: A1c 6.1  -goal glucose < 180 while inpatient  -Hold home Jardiance for poor appetite  -low ISS bid with breakfast and supper    Hypothyroidism:  -cont home Synthroid     Hypokalemia:  -replace K  -resume home aldactone now that she is eating solid food    Hypophosphatemia:  -replace with po phos nak x2 packets    Hypomagnesemia 2/2 PPI use and/or poor appetite:  -replace Mg prn  -placed consult to dietitian    Constipation, resolved:  -cont to monitor          Dispo: pathology results from 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 Aleman placed, alkaline phosphatase downtrending, will continue to monitor with daily CMP's for now. PT consult placed for deconditioning while in the hospital.  Surgery recommending that she can go home from their standpoint.  I would like her to be tolerating a full diet prior to discharge. Replaced all electrolyte abnormalities and ordered dietitian consult.  I spoke  with the family for at least 30 minutes going over her medical history since the positive PET scan in April.        Alvarez Lam MD 11/19/23, 18:28 CST.                  Treatment Plan      Medical Decision Making  Number and Complexity of problems:   Differential Diagnosis:     Conditions and Status             MDM Data  External documents reviewed:   Cardiac tracing (EKG, telemetry) interpretation:   Radiology interpretation:   Labs reviewed:   Any tests that were considered but not ordered:      Decision rules/scores evaluated (example [id number], [place], etc):      Discussed with:      Care Planning  Shared decision making:   Code status and discussions:     Disposition  Social Determinants of Health that impact treatment or disposition:   I expect the patient to be discharged to  in  days.     Electronically signed by Alvarez Lam MD, 11/19/23, 18:28 CST.\

## 2023-11-20 NOTE — THERAPY EVALUATION
Patient Name: Imani Chauhan  : 1940    MRN: [id number]                              Today's Date: 2023       Admit Date: 2023    Visit Dx:     ICD-10-CM ICD-9-CM   1. Hypercalcemia  E83.52 275.42   2. Hyponatremia  E87.1 276.1   3. Acute kidney injury  N17.9 584.9   4. Lymphadenopathy  R59.1 785.6   5. Abdominal lymphadenopathy  R59.0 785.6   6. Impaired functional mobility and activity tolerance [Z74.09]  Z74.09 V49.89     Patient Active Problem List   Diagnosis    CAD in native artery    Hypertension    Hyperlipidemia    Hypothyroidism    GERD (gastroesophageal reflux disease)    SOB (shortness of breath)    S/P CABG x 2    Localized edema    Dizziness    Hypomagnesemia    Vitamin D insufficiency    Pre-diabetes    Numbness and tingling of both feet    Hypercalcemia    Lymphadenopathy     Past Medical History:   Diagnosis Date    Aortic regurgitation     mild 2018    CAD in native artery     COVID-19 2022    mild case, not hospitalized, no MAB    GERD (gastroesophageal reflux disease)     Heart murmur     Hyperlipidemia     Hypertension     Hypothyroidism     Mitral regurgitation     mild per  echo, no significant per 2018    Myalgia     Near syncope     Pre-diabetes     SOB (shortness of breath)     Squamous cell cancer of skin of right forearm     Syncope     Tricuspid regurgitation     mild per  echo, trivial per 2018     Past Surgical History:   Procedure Laterality Date    BACK SURGERY      BIOPSY / EXCISION / DISSECTION AXILLARY NODE      had bx w/ Jennifer Restrepo MD at [facility]    CARDIAC CATHETERIZATION      COLONOSCOPY      CORONARY ARTERY BYPASS GRAFT  10/11/2010    Dr. Karina Lora, [facility]    DIAGNOSTIC LAPAROSCOPY N/A 2023    Procedure: DIAGNOSTIC LAPAROSCOPY;  Surgeon: Adenike Ponce MD;  Location: [facility] OR;  Service: General;  Laterality: N/A;    HYSTERECTOMY      LAPAROSCOPIC CHOLECYSTECTOMY      MEDIAN STERNOTOMY      Thymus cyst resection at same time as CABG     OTHER SURGICAL HISTORY      Rectal surgery    PARTIAL THYMECTOMY  10/11/2010    Dr. Lora    REPLACEMENT TOTAL KNEE Left 2005    TOTAL KNEE ARTHROPLASTY Left 06/2019    TOTAL KNEE ARTHROPLASTY Right 11/04/2020    Dr. Santamaria      General Information       [facility] Name 11/20/23 1000          Physical Therapy Time and Intention    Document Type evaluation;other (see comments)  see MAR  -     Mode of Treatment physical therapy  -       Row Name 11/20/23 1000          General Information    Patient Profile Reviewed yes  -     Prior Level of Function independent:;all household mobility;community mobility;ADL's;home management;driving;shopping;yard work;using stairs  I w/out a device prior to halloween, however increased abdominal pain limiting mobility causing pt to use a straight cane prior to admission to this facility  -     Existing Precautions/Restrictions fall;other (see comments)  abdominal incision  -     Barriers to Rehab medically complex  -       Row Name 11/20/23 1000          Living Environment    People in Home alone  -[facility] Name 11/20/23 1000          Home Main Entrance    Number of Stairs, Main Entrance two  -     Stair Railings, Main Entrance railing on right side (ascending)  -       Row Name 11/20/23 1000          Stairs Within Home, Primary    Number of Stairs, Within Home, Primary none  -       Row Name 11/20/23 1000          Cognition    Orientation Status (Cognition) oriented x 4  -               User Key  (r) = Recorded By, (t) = Taken By, (c) = Cosigned By      Initials Name Provider Type    Liliya Perea, PT Physical Therapist                   Mobility       Row Name 11/20/23 1042          Bed Mobility    Comment, (Bed Mobility) up in chair and returned to chair at end of visit  -[facility] Name 11/20/23 1042          Sit-Stand Transfer    Sit-Stand [place] (Transfers) contact guard;verbal cues  -[facility] Name 11/20/23 1042          Gait/Stairs  (Locomotion)    [place] Level (Gait) contact guard;verbal cues  -     Assistive Device (Gait) walker, front-wheeled  -     Distance in Feet (Gait) ` 90 ft  -     Deviations/Abnormal Patterns (Gait) gait speed decreased  -     Bilateral Gait Deviations forward flexed posture  -               User Key  (r) = Recorded By, (t) = Taken By, (c) = Cosigned By      Initials Name Provider Type    Liliya Perea, PT Physical Therapist                   Obj/Interventions       Row Name 11/20/23 1000          Balance    Balance Assessment sitting static balance  -     Static Sitting Balance independent  -     Position, Sitting Balance supported  -       Row Name 11/20/23 1000          Sensory Assessment (Somatosensory)    Sensory Assessment (Somatosensory) other (see comments)  reports a burning pain that comes and goes in the R lateral thigh; reports this has been going on even before this pain in her abdomen started  -               User Key  (r) = Recorded By, (t) = Taken By, (c) = Cosigned By      Initials Name Provider Type    Liliya Perea, PT Physical Therapist                   Goals/Plan       Row Name 11/20/23 1000          Bed Mobility Goal 1 (PT)    Activity/Assistive Device (Bed Mobility Goal 1, PT) rolling to left;rolling to right;scooting;sit to supine/supine to sit;sidelying to sit/sit to sidelying  -     [place] Level/Cues Needed (Bed Mobility Goal 1, PT) standby assist;independent  -     Time Frame (Bed Mobility Goal 1, PT) long term goal (LTG);10 days  -     Progress/Outcomes (Bed Mobility Goal 1, PT) goal ongoing  -       Row Name 11/20/23 1000          Transfer Goal 1 (PT)    Activity/Assistive Device (Transfer Goal 1, PT) sit-to-stand/stand-to-sit;bed-to-chair/chair-to-bed;other (see comments)  bed to/from chair w/ rwx  -     [place] Level/Cues Needed (Transfer Goal 1, PT) standby assist;independent  -     Time Frame (Transfer Goal 1, PT) long term  goal (LTG);10 days  -JE     Progress/Outcome (Transfer Goal 1, PT) goal ongoing  -       Row Name 11/20/23 1000          Gait Training Goal 1 (PT)    Activity/Assistive Device (Gait Training Goal 1, PT) gait (walking locomotion);assistive device use;decrease fall risk;diminish gait deviation;improve balance and speed;increase endurance/gait distance;walker, rolling  -JE     [place] Level (Gait Training Goal 1, PT) standby assist;modified independence  -JE     Distance (Gait Training Goal 1, PT) 150-200 ft  -JE     Time Frame (Gait Training Goal 1, PT) long term goal (LTG);10 days  -JE     Progress/Outcome (Gait Training Goal 1, PT) goal ongoing  -       Row Name 11/20/23 1000          Stairs Goal 1 (PT)    Activity/Assistive Device (Stairs Goal 1, PT) ascending stairs;descending stairs;using handrail, right;step-to-step;decrease fall risk;improve balance and speed;cane, straight  -JE     [place] Level/Cues Needed (Stairs Goal 1, PT) contact guard required  -     Number of Stairs (Stairs Goal 1, PT) 2  -JE     Time Frame (Stairs Goal 1, PT) long term goal (LTG);10 days  -JE     Progress/Outcome (Stairs Goal 1, PT) goal ongoing  -       Row Name 11/20/23 1000          Patient Education Goal (PT)    Activity (Patient Education Goal, PT) HEP for LE strengthening, postural ex  -JE     Jay/Cues/Accuracy (Memory Goal 2, PT) demonstrates adequately;independent;verbalizes understanding  -JE     Time Frame (Patient Education Goal, PT) long term goal (LTG);10 days  -JE     Progress/Outcome (Patient Education Goal, PT) goal ongoing  -[employer]       Row Name 11/20/23 1000          Therapy Assessment/Plan (PT)    Planned Therapy Interventions (PT) balance training;bed mobility training;gait training;home exercise program;postural re-education;patient/family education;ROM (range of motion);strengthening;stair training;transfer training;other (see comments)  safety/falls prevention, energy conservation  techniques  -               User Key  (r) = Recorded By, (t) = Taken By, (c) = Cosigned By      Initials Name Provider Type    Liliya Perea, PT Physical Therapist                   Clinical Impression       Row Name 11/20/23 1000          Pain    Pretreatment Pain Rating 0/10 - no pain  -     Posttreatment Pain Rating 0/10 - no pain  -     Pain Location - abdomen  -     Pre/Posttreatment Pain Comment increase pain when trying to get a deep breath when up walking; reports it hurts to cough  -     Pain Intervention(s) Repositioned;Rest  -       Row Name 11/20/23 1000          Plan of Care Review    Plan of Care Reviewed With patient  -     Progress improving  -     Outcome Evaluation PT eval completed.  Pt pleasant and agreeable to therapy.  Pt demonstrates decrease hip flexor strength and decrease balance requiring use of rwx at this time.  Prior to worsening pain around Halloween, pt did not use any AD, but states she started using a cane as her pain became worse.  Pt performed sit to/from standing w/ CGA, gait w/ rwx ~ 90 ft w/ CGA noting flexed posture pt was able to correct w/ cues.  Pt also w/ slowed gait speed. Pt will benefit from continued PT services to improve activity tolerance, I w/ functional mobility, and to reduce fall risk.  Recommend home w/ assist and HH at discharge.  Will follow for progress and needs.  Pt reports she has had HH previously and worked w/ a therapist named Denisa Bose and would like to work w/ her again.  However, pt doesn't remember if she works for [employer] or [employer].  -       Row Name 11/20/23 1000          Therapy Assessment/Plan (PT)    Patient/Family Therapy Goals Statement (PT) return to home  -     Rehab Potential (PT) good, to achieve stated therapy goals  -     Criteria for Skilled Interventions Met (PT) yes;meets criteria;skilled treatment is necessary  -     Therapy Frequency (PT) 2 times/day  -     Predicted Duration of Therapy  Intervention (PT) until discharge or goals achieved  -       Row Name 11/20/23 1000          Vital Signs    O2 Delivery Pre Treatment room air  -JE     O2 Delivery Intra Treatment room air  -JE     O2 Delivery Post Treatment room air  -JE     Pre Patient Position Sitting  -JE     Intra Patient Position Standing  -JE     Post Patient Position Sitting  -       Row Name 11/20/23 1000          Positioning and Restraints    Pre-Treatment Position sitting in chair/recliner  -JE     Post Treatment Position chair  -JE     In Chair reclined;call light within reach;encouraged to call for assist;legs elevated  -               User Key  (r) = Recorded By, (t) = Taken By, (c) = Cosigned By      Initials Name Provider Type    Liliya Perea, PT Physical Therapist                   Outcome Measures       Row Name 11/20/23 1000          How much help from another person do you currently need...    Turning from your back to your side while in flat bed without using bedrails? 3  -JE     Moving from lying on back to sitting on the side of a flat bed without bedrails? 3  -JE     Moving to and from a bed to a chair (including a wheelchair)? 3  -JE     Standing up from a chair using your arms (e.g., wheelchair, bedside chair)? 3  -JE     Climbing 3-5 steps with a railing? 3  -JE     To walk in hospital room? 3  -JE     AM-PAC 6 Clicks Score (PT) 18  -JE     Highest Level of Mobility Goal 6 --> Walk 10 steps or more  -MARITZA       Row Name 11/20/23 1000          Functional Assessment    Outcome Measure Options AM-PAC 6 Clicks Basic Mobility (PT)  -               User Key  (r) = Recorded By, (t) = Taken By, (c) = Cosigned By      Initials Name Provider Type    Liliya Perea, PT Physical Therapist                                 Physical Therapy Education       Title: PT OT SLP Therapies (Done)       Topic: Physical Therapy (Done)       Point: Mobility training (Done)       Learning Progress Summary             Patient  Acceptance, E,TB,D, VU,DU,NR by  at 11/20/2023 1114    Comment: education re: purpose of PT/importance of activity, for safety/falls prevention, improved tech w/ tfers and gait w/ emphasis on upright posture                         Point: Home exercise program (Done)       Learning Progress Summary             Patient Acceptance, E,TB,D, NADIA,AMY,NR by  at 11/20/2023 1114    Comment: education re: purpose of PT/importance of activity, for safety/falls prevention, improved tech w/ tfers and gait w/ emphasis on upright posture                         Point: Precautions (Done)       Learning Progress Summary             Patient Acceptance, E,TB,D, VU,DU,NR by MARITZA at 11/20/2023 1114    Comment: education re: purpose of PT/importance of activity, for safety/falls prevention, improved tech w/ tfers and gait w/ emphasis on upright posture                                         User Key       Initials Effective Dates Name Provider Type Discipline     08/02/18 -  Liliya Heath, PT Physical Therapist PT                  PT Recommendation and Plan  Planned Therapy Interventions (PT): balance training, bed mobility training, gait training, home exercise program, postural re-education, patient/family education, ROM (range of motion), strengthening, stair training, transfer training, other (see comments) (safety/falls prevention, energy conservation techniques)  Plan of Care Reviewed With: patient  Progress: improving  Outcome Evaluation: PT eval completed.  Pt pleasant and agreeable to therapy.  Pt demonstrates decrease hip flexor strength and decrease balance requiring use of rwx at this time.  Prior to worsening pain around Halloween, pt did not use any AD, but states she started using a cane as her pain became worse.  Pt performed sit to/from standing w/ CGA, gait w/ rwx ~ 90 ft w/ CGA noting flexed posture pt was able to correct w/ cues.  Pt also w/ slowed gait speed. Pt will benefit from continued PT services to  improve activity tolerance, I w/ functional mobility, and to reduce fall risk.  Recommend home w/ assist and HH at discharge.  Will follow for progress and needs.  Pt reports she has had HH previously and worked w/ a therapist named Denisarosalia Bose and would like to work w/ her again.  However, pt doesn't remember if she works for [employer] or [employer].     Time Calculation:         PT Charges       Row Name 11/20/23 1112             Time Calculation    Start Time 1000  -      Stop Time 1055  -      Time Calculation (min) 55 min  -      PT Received On 11/20/23  -      PT Goal Re-Cert Due Date 11/30/23  -                User Key  (r) = Recorded By, (t) = Taken By, (c) = Cosigned By      Initials Name Provider Type    Liliya Perea, PT Physical Therapist                  Therapy Charges for Today       Code Description Service Date Service Provider Modifiers Qty    [id number] HC PT EVAL LOW COMPLEXITY 4 11/20/2023 Liliya Heath, PT GP 1            PT G-Codes  Outcome Measure Options: AM-PAC 6 Clicks Basic Mobility (PT)  AM-PAC 6 Clicks Score (PT): 18  PT Discharge Summary  Anticipated Discharge Disposition (PT): home with 24/7 care, home with home health    Liliya Heath, PT  11/20/2023

## 2023-11-20 NOTE — PAYOR COMM NOTE
"11/19 CLINICAL  [id number]    [phone]    Imani Branch (83 y.o. Female)       Date of Birth   1940    Social Security Number       Address   [address] [id number]    Home Phone   [phone]    MRN   [id number]       [employer]astrian   [place] [employer] [place]    Marital Status                               Admission Date   11/11/23    Admission Type   Emergency    Admitting Provider   Charles Mcnair MD    Attending Provider   Charles Mcnair MD    Department, Room/Bed   [facility] 3C, 361/1       Discharge Date       Discharge Disposition       Discharge Destination                                 Attending Provider: Charles Mcnair MD    Allergies: Not on File    Isolation: None   Infection: None   Code Status: CPR    Ht: 162.6 cm (64.02\")   Wt: 71.2 kg (156 lb 14.4 oz)    Admission Cmt: None   Principal Problem: Hypercalcemia [E83.52]                   Active Insurance as of 11/11/2023       Primary Coverage       Payor Plan Insurance Group Employer/Plan Group    ANTHEM MEDICARE REPLACEMENT ANTHEM MEDICARE ADVANTAGE [id number]       Payor Plan Address Payor Plan Phone Number Payor Plan Fax Number Effective Dates    [address] [phone]  1/1/2015 - None Entered    [facility] [id number]         Subscriber Name Subscriber Birth Date Member ID       IMANI BRANCH 1940 [id number]                     Emergency Contacts        (Rel.) Home Phone Work Phone Mobile Phone    Rosamaria Branch (Relative) [phone] -- --    Mary Rivas (Grandchild) -- -- [phone]              Current Facility-Administered Medications   Medication Dose Route Frequency Provider Last Rate Last Admin    acetaminophen (TYLENOL) tablet 650 mg  650 mg Oral Q4H PRN Adenike Ponce MD   650 mg at 11/12/23 0027    atorvastatin (LIPITOR) tablet 10 mg  10 mg Oral Nightly Adenike Ponce MD   10 mg at 11/19/23 2055    bisacodyl (DULCOLAX) suppository 10 mg  10 mg Rectal " Daily PRN Adenike Ponce MD        cloNIDine (CATAPRES) tablet 0.1 mg  0.1 mg Oral BID PRN Adenike Ponce MD   0.1 mg at 11/15/23 0058    dextrose (D50W) (25 g/50 mL) IV injection 25 g  25 g Intravenous Q15 Min PRN Adenike Ponce MD        dextrose (GLUTOSE) oral gel 15 g  15 g Oral Q15 Min PRN Adenike Ponce MD        empagliflozin (JARDIANCE) tablet 10 mg  10 mg Oral Daily Adenike Ponce MD   10 mg at 11/19/23 0910    famotidine (PEPCID) tablet 20 mg  20 mg Oral Daily Alvarez Lam MD   20 mg at 11/19/23 0911    furosemide (LASIX) tablet 20 mg  20 mg Oral Daily PRN Adenike Ponce MD        glucagon (GLUCAGEN) injection 1 mg  1 mg Intramuscular Q15 Min PRN Adenike Ponce MD        hydrALAZINE (APRESOLINE) injection 10 mg  10 mg Intravenous Q4H PRN Adenike Ponce MD   10 mg at 11/14/23 2350    HYDROcodone-acetaminophen (NORCO) 7.5-325 MG per tablet 1 tablet  1 tablet Oral Q6H PRN Alvarez Lam MD   1 tablet at 11/19/23 0508    Insulin Lispro (humaLOG) injection 2-7 Units  2-7 Units Subcutaneous Daily With Breakfast & Dinner Alvarez Lam MD        labetalol (NORMODYNE,TRANDATE) injection 10 mg  10 mg Intravenous Q6H PRN Adenike Ponce MD   10 mg at 11/17/23 0809    levothyroxine (SYNTHROID, LEVOTHROID) tablet 37.5 mcg  37.5 mcg Oral Q AM Adenike Ponce MD   37.5 mcg at 11/19/23 0508    LORazepam (ATIVAN) tablet 0.5 mg  0.5 mg Oral Q8H PRN Adenike Ponce MD        multivitamin with minerals 1 tablet  1 tablet Oral Daily Adenike Ponce MD   1 tablet at 11/19/23 0911    NIFEdipine XL (PROCARDIA XL) 24 hr tablet 30 mg  30 mg Oral Q24H Adenike Ponce MD   30 mg at 11/19/23 0910    ondansetron (ZOFRAN) injection 4 mg  4 mg Intravenous Q6H PRN Alvarez Lam MD   4 mg at 11/14/23 2307    ondansetron (ZOFRAN) tablet 4 mg  4 mg Oral Q6H PRN Adenike Ponce MD        oxyCODONE-acetaminophen (PERCOCET) 5-325 MG per tablet 1 tablet  1 tablet Oral Q4H PRN Adenike Ponce MD   1 tablet  at 11/19/23 2057    potassium & sodium phosphates (PHOS-NAK) 280-160-250 MG packet 1 packet  1 packet Oral BID AC Alvarez Lam MD   1 packet at 11/19/23 1809    sodium chloride 0.9 % flush 10 mL  10 mL Intravenous PRN Adenike Ponce MD        sodium chloride 0.9 % flush 10 mL  10 mL Intravenous Q12H Adenike Ponce MD   10 mL at 11/19/23 2058    sodium chloride 0.9 % flush 10 mL  10 mL Intravenous PRN Adenike Ponce MD        sodium chloride 0.9 % infusion 40 mL  40 mL Intravenous PRN Adenike Ponce MD        spironolactone (ALDACTONE) tablet 25 mg  25 mg Oral Daily Alvarez Lam MD   25 mg at 11/19/23 0909    temazepam (RESTORIL) capsule 15 mg  15 mg Oral Nightly PRN Adenike Ponce MD         Orders (last 24 hrs)        Start     Ordered    11/20/23 0600  CBC Auto Differential  PROCEDURE ONCE         11/19/23 2201    11/19/23 1830  Inpatient Nutrition Consult  Once        Provider:  (Not yet assigned)    11/19/23 1830    11/19/23 1748  POC Glucose Once  PROCEDURE ONCE        Comments: Complete no more than 45 minutes prior to patient eating      11/19/23 1737    11/19/23 1600  potassium chloride (MICRO-K/KLOR-CON) CR capsule  Once         11/19/23 1211    11/19/23 1517  Inpatient Oncology Consult  Once        Specialty:  Oncology  Provider:  Chet Boyer MD    11/19/23 1516    11/19/23 1514  Inpatient Oncology Consult  Once,   Status:  Canceled        Specialty:  Oncology  Provider:  (Not yet assigned)    11/19/23 1514    11/19/23 1300  magnesium sulfate 2g/50 mL (PREMIX) infusion  Every 1 Hour         11/19/23 1210    11/19/23 1300  potassium & sodium phosphates (PHOS-NAK) 280-160-250 MG packet 1 packet  2 Times Daily Before Meals         11/19/23 1210    11/19/23 0900  famotidine (PEPCID) tablet 20 mg  Daily         11/18/23 1458    11/19/23 0748  POC Glucose Once  PROCEDURE ONCE        Comments: Complete no more than 45 minutes prior to patient eating      11/19/23 0736    11/19/23 0600   Magnesium  Daily       11/18/23 1153    11/19/23 0600  Phosphorus  Daily       11/18/23 1153    11/18/23 1245  spironolactone (ALDACTONE) tablet 25 mg  Daily         11/18/23 1151    11/18/23 0800  Insulin Lispro (humaLOG) injection 2-7 Units  Daily With Breakfast & Dinner         11/17/23 1905    11/18/23 0600  Comprehensive Metabolic Panel  Daily       11/17/23 1901    11/18/23 0600  CBC & Differential  Daily       11/17/23 1901    11/16/23 1800  POC Glucose Daily With Dinner  Daily With Dinner      Comments: Complete no more than 45 minutes prior to patient eating      11/16/23 1453    11/14/23 1645  HYDROcodone-acetaminophen (NORCO) 7.5-325 MG per tablet 1 tablet  Every 6 Hours PRN         11/14/23 1645    11/14/23 1642  ondansetron (ZOFRAN) injection 4 mg  Every 6 Hours PRN         11/14/23 1643    11/14/23 1634  ondansetron (ZOFRAN) tablet 4 mg  Every 6 Hours PRN         11/14/23 1634    11/14/23 1634  oxyCODONE-acetaminophen (PERCOCET) 5-325 MG per tablet 1 tablet  Every 4 Hours PRN         11/14/23 1634    11/13/23 1715  NIFEdipine XL (PROCARDIA XL) 24 hr tablet 30 mg  Every 24 Hours Scheduled         11/13/23 1620    11/13/23 1620  hydrALAZINE (APRESOLINE) injection 10 mg  Every 4 Hours PRN         11/13/23 1620    11/13/23 1618  temazepam (RESTORIL) capsule 15 mg  Nightly PRN         11/13/23 1618    11/13/23 1618  LORazepam (ATIVAN) tablet 0.5 mg  Every 8 Hours PRN         11/13/23 1618    11/12/23 0900  multivitamin with minerals 1 tablet  Daily         11/11/23 2107 11/12/23 0800  Oral Care  2 Times Daily       11/11/23 2107 11/12/23 0600  levothyroxine (SYNTHROID, LEVOTHROID) tablet 37.5 mcg  Every Early Morning         11/11/23 2107 11/12/23 0000  Vital Signs  Every 4 Hours       11/11/23 2107 11/11/23 2200  atorvastatin (LIPITOR) tablet 10 mg  Nightly         11/11/23 2107 11/11/23 2200  sodium chloride 0.9 % flush 10 mL  Every 12 Hours Scheduled         11/11/23 2107 11/11/23 2108   Intake & Output  Every Shift       11/11/23 2107 11/11/23 2107  cloNIDine (CATAPRES) tablet 0.1 mg  2 Times Daily PRN         11/11/23 2107 11/11/23 2107  furosemide (LASIX) tablet 20 mg  Daily PRN         11/11/23 2107 11/11/23 2107  sodium chloride 0.9 % flush 10 mL  As Needed         11/11/23 2107 11/11/23 2107  sodium chloride 0.9 % infusion 40 mL  As Needed         11/11/23 2107 11/11/23 2107  bisacodyl (DULCOLAX) suppository 10 mg  Daily PRN        See Hyperspace for full Linked Orders Report.    11/11/23 2107 11/11/23 2107  labetalol (NORMODYNE,TRANDATE) injection 10 mg  Every 6 Hours PRN         11/11/23 2107 11/11/23 2107  dextrose (GLUTOSE) oral gel 15 g  Every 15 Minutes PRN         11/11/23 2107 11/11/23 2107  dextrose (D50W) (25 g/50 mL) IV injection 25 g  Every 15 Minutes PRN         11/11/23 2107 11/11/23 2107  glucagon (GLUCAGEN) injection 1 mg  Every 15 Minutes PRN         11/11/23 2107 11/11/23 2107  acetaminophen (TYLENOL) tablet 650 mg  Every 4 Hours PRN         11/11/23 2107 11/11/23 1937  empagliflozin (JARDIANCE) tablet 10 mg  Daily         11/11/23 1921 11/11/23 1753  sodium chloride 0.9 % flush 10 mL  As Needed        See Hyperspace for full Linked Orders Report.    11/11/23 1753    Unscheduled  Up With Assistance  As Needed       11/11/23 2107    Unscheduled  Follow Hypoglycemia Standing Orders For Blood Glucose <70 & Notify Provider of Treatment  As Needed      Comments: Follow Hypoglycemia Orders As Outlined in Process Instructions (Open Order Report to View Full Instructions)  Notify Provider Any Time Hypoglycemia Treatment is Administered    11/11/23 2107    --  SCANNED - TELEMETRY           11/11/23 0000    --  HYDROcodone-acetaminophen (NORCO) 7.5-325 MG per tablet  Every 6 Hours PRN         11/12/23 0326    --  spironolactone (ALDACTONE) 25 MG tablet  Daily         11/12/23 1002    Signed and Held  Obtain Informed Consent  Once         Signed and  Held    Signed and Held  Tissue Pathology Exam  Once         Signed and Held                     Physician Progress Notes (last 48 hours)        Alvarez Lam MD at 11/19/23 1828              [facility] Medicine Services  INPATIENT PROGRESS NOTE    Patient Name: Imani Chauhan  Date of Admission: 11/11/2023  Today's Date: 11/19/23  Length of Stay: 8  Primary Care Physician: Светлана Loyd MD    Subjective     Pt reports increased appetite and willingness to participate with physical therapy.        Objective    Temp:  [97.7 °F (36.5 °C)-98.7 °F (37.1 °C)] 97.7 °F (36.5 °C)  Heart Rate:  [77-95] 92  Resp:  [16] 16  BP: (138-176)/(58-80) 171/80    General: No acute distress  HEENT: normocephalic, atraumatic  Neck: Supple  CV: RRR  Lung: Clear to auscultation bilaterally.  No wheeze, rales, or rhonchi noted.  Abdominal exam: Positive bowel sounds, nontender, non distended  Extremity: No edema noted  Neurological exam: AAO x3. Cranial nerve II to XII grossly intact  Skin exam: Dry and warm  Psychiatric exam: Calm, cooperative, and normal affect       Results Review:  I have reviewed the labs, radiology results, and diagnostic studies.    Laboratory Data:   Results from last 7 days   Lab Units 11/19/23  0354 11/18/23  0711 11/17/23  0549   WBC 10*3/mm3 10.65 9.95 11.80*   HEMOGLOBIN g/dL 10.6* 11.6* 10.1*   HEMATOCRIT % 32.4* 35.5 31.3*   PLATELETS 10*3/mm3 455* 426 369        Results from last 7 days   Lab Units 11/19/23  0354 11/18/23  0711 11/17/23  0549   SODIUM mmol/L 135* 135* 136   POTASSIUM mmol/L 3.3* 3.4* 3.6   CHLORIDE mmol/L 98 99 101   CO2 mmol/L 22.0 24.0 21.0*   BUN mg/dL 18 20 23   CREATININE mg/dL 1.28* 1.38* 1.53*   CALCIUM mg/dL 10.2 10.6* 10.1   BILIRUBIN mg/dL 0.4 0.6 0.7   ALK PHOS U/L 1,029* 1,183* 942*   ALT (SGPT) U/L 77* 101* 90*   AST (SGOT) U/L 152* 249* 280*   GLUCOSE mg/dL 81 111* 92       Culture Data:   Urine Culture   Date Value Ref Range Status    11/11/2023 No growth  Final       Radiology Data:   Imaging Results (Last 24 Hours)       ** No results found for the last 24 hours. **            I have reviewed the patient's current medications.     Assessment/Plan   Assessment  Active Hospital Problems    Diagnosis     **Hypercalcemia     Lymphadenopathy        Assessment and Plan:    Hypercalcemia likely secondary to undiagnosed metastatic cancer: corrected Ca 11.3, stable  -She received 1 doses zoledronic acid on 11/11 per oncology  -hold IVF for now  -recheck CMP in am     Elevated alkaline phosphatase, downtrending: no bilirubin elevation, AST elevated but downtrending. RUQ ultrasound 11/17 showed patent main portal vein, left portal vein, right portal vein.   -cont to monitor for now    Epigastric pain: amylase and lipase upper limit of normal  -cont pepcid 20 po bid  -holding home PPI for recurrent hypomagnesemia, will plan to restart tomorrow if pepcid ineffective     Abdominal lymphadenopathy likely secondary to metastatic cancer: preliminary results show malignant cells unknown type.  -pathology results pending     CKD 3: Cr 1.5-1.6 appears to be her baseline  -recheck labs daily    DM2: A1c 6.1  -goal glucose < 180 while inpatient  -Hold home Jardiance for poor appetite  -low ISS bid with breakfast and supper    Hypothyroidism:  -cont home Synthroid     Hypokalemia:  -replace K  -resume home aldactone now that she is eating solid food    Hypophosphatemia:  -replace with po phos nak x2 packets    Hypomagnesemia 2/2 PPI use and/or poor appetite:  -replace Mg prn  -placed consult to dietitian    Constipation, resolved:  -cont to monitor          Dispo: pathology results from 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 Aleman placed, alkaline phosphatase downtrending, will continue to monitor with daily CMP's for now. PT consult placed for deconditioning while in the hospital.  Surgery recommending that  she can go home from their standpoint.  I would like her to be tolerating a full diet prior to discharge. Replaced all electrolyte abnormalities and ordered dietitian consult.  I spoke with the family for at least 30 minutes going over her medical history since the positive PET scan in April.        Alvarez Lam MD 11/19/23, 18:28 CST.                  Treatment Plan      Medical Decision Making  Number and Complexity of problems:   Differential Diagnosis:     Conditions and Status             MDM Data  External documents reviewed:   Cardiac tracing (EKG, telemetry) interpretation:   Radiology interpretation:   Labs reviewed:   Any tests that were considered but not ordered:      Decision rules/scores evaluated (example [id number], Wells, etc):      Discussed with:      Care Planning  Shared decision making:   Code status and discussions:     Disposition  Social Determinants of Health that impact treatment or disposition:   I expect the patient to be discharged to  in  days.     Electronically signed by Alvarez Lam MD, 11/19/23, 18:28 CST.\    Electronically signed by Alvarez Lam MD at 11/19/23 1834       Alvarez Lam MD at 11/18/23 1143              [facility] Medicine Services  INPATIENT PROGRESS NOTE    Patient Name: Imani Chauhan  Date of Admission: 11/11/2023  Today's Date: 11/18/23  Length of Stay: 7  Primary Care Physician: Светлана Loyd MD    Subjective     Patient reports eating solid food for the first time last night, a sandwich.  She now has epigastric pain.  She also reports that she has moved her bowels once this morning and feels like she needs to go again.        Objective    Temp:  [97.7 °F (36.5 °C)-98.2 °F (36.8 °C)] 97.7 °F (36.5 °C)  Heart Rate:  [] 101  Resp:  [16-18] 16  BP: (154-185)/(66-77) 185/77    General: No acute distress  HEENT: normocephalic, atraumatic  Neck: Supple  CV: RRR  Lung: Clear to auscultation  bilaterally.  No wheeze, rales, or rhonchi noted.  Abdominal exam: Positive bowel sounds, nontender, non distended  Extremity: No edema noted  Neurological exam: AAO x3. Cranial nerve II to XII grossly intact  Skin exam: Dry and warm  Psychiatric exam: Calm, cooperative, and normal affect       Results Review:  I have reviewed the labs, radiology results, and diagnostic studies.    Laboratory Data:   Results from last 7 days   Lab Units 11/18/23  0711 11/17/23  0549 11/16/23  0528   WBC 10*3/mm3 9.95 11.80* 14.18*   HEMOGLOBIN g/dL 11.6* 10.1* 10.9*   HEMATOCRIT % 35.5 31.3* 34.3   PLATELETS 10*3/mm3 426 369 377        Results from last 7 days   Lab Units 11/18/23  0711 11/17/23  0549 11/16/23  0528   SODIUM mmol/L 135* 136 136   POTASSIUM mmol/L 3.4* 3.6 3.8   CHLORIDE mmol/L 99 101 102   CO2 mmol/L 24.0 21.0* 20.0*   BUN mg/dL 20 23 23   CREATININE mg/dL 1.38* 1.53* 1.60*   CALCIUM mg/dL 10.6* 10.1 10.3   BILIRUBIN mg/dL 0.6 0.7 0.5   ALK PHOS U/L 1,183* 942* 430*   ALT (SGPT) U/L 101* 90* 27   AST (SGOT) U/L 249* 280* 95*   GLUCOSE mg/dL 111* 92 91       Culture Data:   Urine Culture   Date Value Ref Range Status   11/11/2023 No growth  Final       Radiology Data:   Imaging Results (Last 24 Hours)       Procedure Component Value Units Date/Time    US Abdomen Limited [[id number]] Collected: 11/17/23 1556     Updated: 11/17/23 1602    Narrative:      EXAM/TECHNIQUE: US ABDOMEN LIMITED-     INDICATION: elevated alkaline phosphatase s/p liver biopsy on 11/14,  evaluate portal blood flow; E83.52-Hypercalcemia; E87.5-Wzfd-ldfxijspvy  and hyponatremia; N17.9-Acute kidney failure, unspecified;  R59.1-Generalized enlarged lymph nodes; R59.0-Localized enlarged lymph  nodes     COMPARISON: 11/12/2023     FINDINGS:     Limited abdominal ultrasound of the liver and portal vasculature.     Liver echogenicity and echotexture are within normal limits. No solid  liver lesion. Patent portal vein with appropriate directional flow.  Left  portal vein is patent with appropriate directional flow. Right portal  vein is patent with appropriate directional flow.     Postoperative changes cholecystectomy. No intrahepatic biliary ductal  dilatation.       Impression:      1.  Unremarkable sonographic appearance of the liver.  2.  Main portal vein, left portal vein, and right portal vein appear  patent with appropriate directional flow. However, of note, there is  diminutive appearance of the left portal vein on recent CT abdomen with  soft tissue attenuation encasing and narrowing the left portal vein.     This report was signed and finalized on 11/17/2023 3:59 PM CST by Dr. Alvaro Blake MD.               I have reviewed the patient's current medications.     Assessment/Plan   Assessment  Active Hospital Problems    Diagnosis     **Hypercalcemia     Lymphadenopathy        Assessment and Plan:    Hypercalcemia likely secondary to undiagnosed metastatic cancer: corrected Ca 11.4, uptrending slightly  -She received 1 doses zoledronic acid on 11/11 per oncology  -hold IVF for now  -recheck CMP in am     Elevated alkaline phosphatase: no bilirubin elevation, AST elevated but downtrending. RUQ ultrasound 11/17 showed patent main portal vein, left portal vein, right portal vein.  -AFU pending    Epigastric pain: amylase and lipase upper limit of normal  -start pepcid 20 po bid  -holding home PPI for recurrent hypomagnesemia, will plan to restart tomorrow if pepcid ineffective     Abdominal lymphadenopathy likely secondary to metastatic cancer: preliminary results show malignant cells unknown type.  -pathology results pending     CKD 3: Cr 1.5-1.6 appears to be her baseline  -recheck labs daily    DM2: A1c pending  -goal glucose < 180 while inpatient  -Hold home Jardiance for poor appetite  -low ISS bid with breakfast and supper    Hypothyroidism:  -cont home Synthroid     Hypokalemia:  -replace K  -resume home aldactone now that she is eating solid  food    Hypophosphatemia:  -replace with po phos nak x2 packets    Hypomagnesemia 2/2 PPI use and/or poor appetite:  -replace Mg prn    Constipation, resolved:  -cont to monitor          Dispo: pathology results are pending, alkaline phosphatase continues to rise however right upper quadrant ultrasound was negative yesterday, will continue to monitor with daily CMP's for now. New epigastric pain with eating regular diet.  I have started Pepcid 20 p.o. twice daily and encouraged her to eat more slowly.  PT consult placed for deconditioning while in the hospital.  Surgery recommending that she can go home from their standpoint.  I would like her to be tolerating a full diet prior to discharge. Replaced all electrolyte abnormalities.        Alvarez Lam MD 11/18/23, 11:43 CST.                  Treatment Plan      Medical Decision Making  Number and Complexity of problems:   Differential Diagnosis:     Conditions and Status             MDM Data  External documents reviewed:   Cardiac tracing (EKG, telemetry) interpretation:   Radiology interpretation:   Labs reviewed:   Any tests that were considered but not ordered:      Decision rules/scores evaluated (example [id number], Wells, etc):      Discussed with:      Care Planning  Shared decision making:   Code status and discussions:     Disposition  Social Determinants of Health that impact treatment or disposition:   I expect the patient to be discharged to  in  days.     Electronically signed by Alvarez Lam MD, 11/18/23, 11:43 CST.\    Electronically signed by Alvarez Lam MD at 11/18/23 1153       Luis Eduardo Armstrong Jr., MD at 11/18/23 0811          Patient Name:  Imani Chauhan  YOB: 1940  [id number]    Surgery Progress Note    Date of visit: 11/18/2023    Subjective   Subjective:   Status post diagnostic laparoscopy with liver biopsy and biopsy of peritoneal fluid on 11/14/2023.  Patient states she is doing very well.  Minimal  "pain issues are tolerating p.o. intake and passing stool.  She states that she is ready to go home      Objective     Objective:     /67 (BP Location: Left arm, Patient Position: Lying)   Pulse 94   Temp 97.8 °F (36.6 °C) (Oral)   Resp 16   Ht 162.6 cm (64.02\")   Wt 71.2 kg (156 lb 14.4 oz)   SpO2 91%   BMI 26.92 kg/m²     Intake/Output Summary (Last 24 hours) at 11/18/2023 0812  Last data filed at 11/18/2023 0626  Gross per 24 hour   Intake 960 ml   Output 1300 ml   Net -340 ml       CV:  Rhythm regular and rate regular   Pulm: Symmetrical chest rise without respiratory distress  Abd:  Soft, appropriately tender to palpation, incisions clean dry and intact  Ext:  No cyanosis, clubbing, edema    Recent labs that are back at this time have been reviewed.   Lab Results (last 48 hours)       Procedure Component Value Units Date/Time    Phosphorus [[id number]]  (Abnormal) Collected: 11/18/23 0711    Specimen: Blood Updated: 11/18/23 0747     Phosphorus 2.2 mg/dL     Magnesium [[id number]]  (Abnormal) Collected: 11/18/23 0711    Specimen: Blood Updated: 11/18/23 0741     Magnesium 1.5 mg/dL     Comprehensive Metabolic Panel [[id number]]  (Abnormal) Collected: 11/18/23 0711    Specimen: Blood Updated: 11/18/23 0741     Glucose 111 mg/dL      BUN 20 mg/dL      Creatinine 1.38 mg/dL      Sodium 135 mmol/L      Potassium 3.4 mmol/L      Comment: Slight hemolysis detected by analyzer. Result may be falsely elevated.        Chloride 99 mmol/L      CO2 24.0 mmol/L      Calcium 10.6 mg/dL      Total Protein 6.1 g/dL      Albumin 2.9 g/dL      ALT (SGPT) 101 U/L      AST (SGOT) 249 U/L      Alkaline Phosphatase 1,183 U/L      Total Bilirubin 0.6 mg/dL      Globulin 3.2 gm/dL      A/G Ratio 0.9 g/dL      BUN/Creatinine Ratio 14.5     Anion Gap 12.0 mmol/L      eGFR 38.3 mL/min/1.73     Narrative:      GFR Normal >60  Chronic Kidney Disease <60  Kidney Failure <15    The GFR formula is only valid for adults with stable " renal function between ages 18 and 70.    CBC & Differential [[id number]]  (Abnormal) Collected: 11/18/23 0711    Specimen: Blood Updated: 11/18/23 0723    Narrative:      The following orders were created for panel order CBC & Differential.  Procedure                               Abnormality         Status                     ---------                               -----------         ------                     CBC Auto Differential[[id number]]        Abnormal            Final result                 Please view results for these tests on the individual orders.    CBC Auto Differential [[id number]]  (Abnormal) Collected: 11/18/23 0711    Specimen: Blood Updated: 11/18/23 0723     WBC 9.95 10*3/mm3      RBC 4.01 10*6/mm3      Hemoglobin 11.6 g/dL      Hematocrit 35.5 %      MCV 88.5 fL      MCH 28.9 pg      MCHC 32.7 g/dL      RDW 14.7 %      RDW-SD 48.0 fl      MPV 9.3 fL      Platelets 426 10*3/mm3      Neutrophil % 85.4 %      Lymphocyte % 4.8 %      Monocyte % 5.9 %      Eosinophil % 2.1 %      Basophil % 0.5 %      Immature Grans % 1.3 %      Neutrophils, Absolute 8.49 10*3/mm3      Lymphocytes, Absolute 0.48 10*3/mm3      Monocytes, Absolute 0.59 10*3/mm3      Eosinophils, Absolute 0.21 10*3/mm3      Basophils, Absolute 0.05 10*3/mm3      Immature Grans, Absolute 0.13 10*3/mm3      nRBC 0.0 /100 WBC     Hemoglobin A1c [[id number]] Collected: 11/18/23 0711    Specimen: Blood Updated: 11/18/23 0718    POC Glucose Once [[id number]]  (Normal) Collected: 11/17/23 1728    Specimen: Blood Updated: 11/17/23 1739     Glucose 107 mg/dL      Comment: : [id number] Flor SarahMeter ID: [id number]       Fine Needle Aspiration [[id number]] Collected: 11/16/23 0955    Specimen: Aspirate from Retroperitoneum Updated: 11/17/23 1547     Note to Patients --     This report may contain a detailed description of human tissue sent by a health care provider to the laboratory for pathologic evaluation. The content of this report is  essential for diagnosis and may provide important critical findings. This information may be unfamiliar to patients to review without a medical professional present. It is advised that the patient review this report in the presence of a health care provider who can answer questions and explain the results.       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      COMMENT:  The history of bulky lymphadenopathy and prior fine-needle aspiration (CP  ) showing malignant cells is noted.  The current sample shows broad areas of fibrosis adjacent to lymphoid tissue.  The lymphoid tissue is characterized by clusters of large atypical cells in a background of small lymphocytes.  The large atypical cells and the smaller background lymphocytes stain with CD20 and CD10, supporting B cell origin, possibly of germinal center origin.   LCA is positive, whereas pankeratin stain is negative, all with appropriate controls.  Flow cytometry has been ordered and is pending at this time.  The specimen is sent to a reference laboratory for a hematopathology consultation.  The report will be updated upon receipt of the consult opinion.         Clinical Information --     Hx: Abdominal lymphadenopathy       Gross Description --     1. Retroperitoneum.   Received in a formalin filled container labeled with the patient's name, date of birth, and designated left  retroperitoneal lymph node.  The specimen consists of 3 yellow to gray, stringy core biopsies ranging from 1.1 cm in length to 1.7 cm in length by less than 0.1 cm in diameter.  The cores are wrapped in lens paper and totally submitted in blocks 1A and 1B.    Two smears fixed in 95% alcohol and two smears air dried. Flow Cytometry sent       Intraoperative Consultation --     1. Retroperitoneum.  CT Guided Needle Biopsy, Left Retroperitoneal:         Pass 1: Hypocellular       Pass 2: Adequate       Pass 3: Formalin       Pass 4: Flow         MITCH Ley(ASCP)              11/16/2023        POC Glucose Once [[id number]]  (Normal) Collected: 11/17/23 1220    Specimen: Blood Updated: 11/17/23 1231     Glucose 108 mg/dL      Comment: : [id number] ThirdSpaceLearningMeter ID: [id number]       POC Glucose Once [[id number]]  (Normal) Collected: 11/17/23 0806    Specimen: Blood Updated: 11/17/23 0816     Glucose 100 mg/dL      Comment: : [id number] [employer]Space[employer]Meter ID: [id number]       Comprehensive Metabolic Panel [[id number]]  (Abnormal) Collected: 11/17/23 0549    Specimen: Blood Updated: 11/17/23 0706     Glucose 92 mg/dL      BUN 23 mg/dL      Creatinine 1.53 mg/dL      Sodium 136 mmol/L      Potassium 3.6 mmol/L      Chloride 101 mmol/L      CO2 21.0 mmol/L      Calcium 10.1 mg/dL      Total Protein 5.5 g/dL      Albumin 2.6 g/dL      ALT (SGPT) 90 U/L      AST (SGOT) 280 U/L      Alkaline Phosphatase 942 U/L      Total Bilirubin 0.7 mg/dL      Globulin 2.9 gm/dL      A/G Ratio 0.9 g/dL      BUN/Creatinine Ratio 15.0     Anion Gap 14.0 mmol/L      eGFR 33.8 mL/min/1.73     Narrative:      GFR Normal >60  Chronic Kidney Disease <60  Kidney Failure <15    The GFR formula is only valid for adults with stable renal function between ages 18 and 70.    CBC & Differential [[id number]]  (Abnormal) Collected: 11/17/23 0549    Specimen: Blood Updated: 11/17/23 0610    Narrative:      The following orders were created for  panel order CBC & Differential.  Procedure                               Abnormality         Status                     ---------                               -----------         ------                     CBC Auto Differential[[id number]]        Abnormal            Final result                 Please view results for these tests on the individual orders.    CBC Auto Differential [[id number]]  (Abnormal) Collected: 11/17/23 0549    Specimen: Blood Updated: 11/17/23 0610     WBC 11.80 10*3/mm3      RBC 3.45 10*6/mm3      Hemoglobin 10.1 g/dL      Hematocrit 31.3 %      MCV 90.7 fL      MCH 29.3 pg      MCHC 32.3 g/dL      RDW 14.7 %      RDW-SD 48.9 fl      MPV 9.7 fL      Platelets 369 10*3/mm3      Neutrophil % 88.4 %      Lymphocyte % 4.0 %      Monocyte % 4.2 %      Eosinophil % 2.6 %      Basophil % 0.3 %      Immature Grans % 0.5 %      Neutrophils, Absolute 10.43 10*3/mm3      Lymphocytes, Absolute 0.47 10*3/mm3      Monocytes, Absolute 0.49 10*3/mm3      Eosinophils, Absolute 0.31 10*3/mm3      Basophils, Absolute 0.04 10*3/mm3      Immature Grans, Absolute 0.06 10*3/mm3      nRBC 0.0 /100 WBC     POC Glucose Once [[id number]]  (Normal) Collected: 11/16/23 2119    Specimen: Blood Updated: 11/16/23 2131     Glucose 106 mg/dL      Comment: : [id number] Vlad SarahMeter ID: [id number]       POC Glucose Once [[id number]]  (Normal) Collected: 11/16/23 1740    Specimen: Blood Updated: 11/16/23 1751     Glucose 93 mg/dL      Comment: : [id number] Hill GuerreroodMeter ID: [id number]       POC Glucose Once [[id number]]  (Normal) Collected: 11/16/23 1219    Specimen: Blood Updated: 11/16/23 1230     Glucose 93 mg/dL      Comment: : [id number] Flor SarahMeter ID: [id number]       Magnesium [[id number]]  (Normal) Collected: 11/16/23 0528    Specimen: Blood Updated: 11/16/23 1053     Magnesium 1.8 mg/dL     Tissue Pathology Exam [[id number]] Collected: 11/14/23 1223    Specimen: Tissue from Liver Updated: 11/16/23  "1045     Note to Patients --     This report may contain a detailed description of human tissue sent by a health care provider to the laboratory for pathologic evaluation. The content of this report is essential for diagnosis and may provide important critical findings. This information may be unfamiliar to patients to review without a medical professional present. It is advised that the patient review this report in the presence of a health care provider who can answer questions and explain the results.       Case Report --     Surgical Pathology Report                         Case: [id number]                                  Authorizing Provider:  Adenike Ponce MD        Collected:           11/14/2023 12:23 PM          Ordering Location:     [facility] OR  Received:            11/14/2023 12:57 PM          Pathologist:           Kehr, Elizabeth L, MD                                                        Specimen:    Liver, liver biopy                                                                          Final Diagnosis --     Liver, biopsy:  -Liver parenchyma with mixed portal inflammation and mild steatosis, see COMMENT.  -No diagnostic malignancy.  -Additional histologic levels examined.    COMMENT:  The history of bulky lymphadenopathy with imaging evidence of  infiltration into the liver is noted.  The current biopsy demonstrates mixed portal inflammation and mild steatosis, but no diagnostic malignancy is present.  The changes may represent changes seen in tissue neighboring a mass.  Correlation with clinical and imaging studies are recommended to evaluate for sampling error.    Special stains, with appropriate controls are performed. A trichrome highlights portal tracts and an iron stain is negative for increased iron stores       Gross Description --     1. Liver.   Received in a formalin filled container labeled with the patient's name, date of birth, and \"liver biopsy\".  The specimen " consists of a red-brown soft tissue core biopsy measuring 1.1 cm in length by less than 0.1 cm in diameter.  The core is totally submitted in block 1A.         Microscopic Description --     Focal degenerative nuclear changes are noted (1A-8).                  Assessment & Plan     Assessment/ Plan:    Problem List Items Addressed This Visit          Genitourinary and Reproductive     * (Principal) Hypercalcemia - Primary       Symptoms and Signs    Lymphadenopathy    Relevant Orders    Case request (Completed)    Non-gynecologic Cytology (Completed)    Tissue Pathology Exam (Completed)     Other Visit Diagnoses       Hyponatremia        Acute kidney injury        Relevant Medications    furosemide (LASIX) tablet 20 mg    spironolactone (ALDACTONE) 25 MG tablet    spironolactone (ALDACTONE) tablet 25 mg    furosemide (LASIX) injection 20 mg (Completed)    furosemide (LASIX) injection 20 mg (Completed)    Abdominal lymphadenopathy        Relevant Medications    ceFAZolin 2000 mg IVPB in 100 mL NS (MBP) (Completed)    heparin (porcine) 5000 UNIT/ML injection 5,000 Units (Completed)             Active Hospital Problems    Diagnosis  POA    **Hypercalcemia [E83.52]  Yes    Lymphadenopathy [R59.1]  Unknown      Resolved Hospital Problems   No resolved problems to display.      83-year-old lady presented with lymphadenopathy with intrathoracic cavity and intra-abdominal cavity.  Status post diagnostic laparoscopy with liver biopsy as well as peritoneal fluid biopsy.  IR guided biopsy is pending    Okay from general surgery standpoint to discharge when medically feasible  Follow-up as needed from intra-abdominal standpoint  Pending CT guided IR biopsy, patient to follow-up with general surgery down the line as an outpatient for consideration of port placement as needed  F/u with medical oncology in light of her lymphadenopathy - lymphoma suspected given her advanced age and CT scan findings    General surgery will be  peripherally available.  Please contact us should further issues or concerns arise.    Luis Eduardo Armstrong Jr, MD  11/18/2023  08:12 CST        Electronically signed by Luis Eduardo Armstrong Jr., MD at 11/18/23 0815       Consult Notes (last 24 hours)  Notes from 11/19/23 0600 through 11/20/23 0600   No notes of this type exist for this encounter.

## 2023-11-20 NOTE — PLAN OF CARE
Goal Outcome Evaluation:  Plan of Care Reviewed With: patient        Progress: improving  Outcome Evaluation: PT eval completed.  Pt pleasant and agreeable to therapy.  Pt demonstrates decrease hip flexor strength and decrease balance requiring use of rwx at this time.  Prior to worsening pain around Halloween, pt did not use any AD, but states she started using a cane as her pain became worse.  Pt performed sit to/from standing w/ CGA, gait w/ rwx ~ 90 ft w/ CGA noting flexed posture pt was able to correct w/ cues.  Pt also w/ slowed gait speed. Pt will benefit from continued PT services to improve activity tolerance, I w/ functional mobility, and to reduce fall risk.  Recommend home w/ assist and HH at discharge.  Will follow for progress and needs.  Pt reports she has had HH previously and worked w/ a therapist named Denisa Bose and would like to work w/ her again.  However, pt doesn't remember if she works for [employer] or [employer].      Anticipated Discharge Disposition (PT): home with 24/7 care, home with home health

## 2023-11-21 LAB
BEAKER LAB AP INTRAOPERATIVE CONSULTATION: NORMAL
LAB AP CASE REPORT: NORMAL
LAB AP CLINICAL INFORMATION: NORMAL
Lab: NORMAL
PATH REPORT.FINAL DX SPEC: NORMAL
PATH REPORT.GROSS SPEC: NORMAL

## 2023-11-21 NOTE — OUTREACH NOTE
Prep Survey      Flowsheet Row Responses   Gnosticist facility patient discharged from? [facility]   Is LACE score < 7 ? No   Eligibility Readm Mgmt   Discharge diagnosis hypercalcemia, abdominal pain, lymphadenopathy likely d/t metastatic CA   Does the patient have one of the following disease processes/diagnoses(primary or secondary)? Other   Does the patient have Home health ordered? Yes   What is the Home health agency?  [facility]   Is there a DME ordered? No   Prep survey completed? Yes            Gabrielle GILBERT - Registered Nurse

## 2023-11-21 NOTE — THERAPY DISCHARGE NOTE
Acute Care - Physical Therapy Discharge Summary  [facility]       Patient Name: Imani Chauhan  : 1940  MRN: [id number]    Today's Date: 2023                 Admit Date: 2023      PT Recommendation and Plan    Visit Dx:    ICD-10-CM ICD-9-CM   1. Hypercalcemia  E83.52 275.42   2. Hyponatremia  E87.1 276.1   3. Acute kidney injury  N17.9 584.9   4. Lymphadenopathy  R59.1 785.6   5. Abdominal lymphadenopathy  R59.0 785.6   6. Impaired functional mobility and activity tolerance [Z74.09]  Z74.09 V49.89   7. Numbness and tingling of both feet  R20.0 782.0    R20.2    8. Pre-diabetes  R73.03 790.29   9. Vitamin D insufficiency  E55.9 268.9   10. Hypomagnesemia  E83.42 275.2   11. Dizziness  R42 780.4   12. Localized edema  R60.0 782.3   13. 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 SOB (shortness of breath)  R06.02 786.05   15. Gastroesophageal reflux disease, unspecified whether esophagitis present  K21.9 530.81   16. Hypothyroidism, unspecified type  E03.9 244.9   17. Mixed hyperlipidemia  E78.2 272.2   18. Primary hypertension  I10 401.9   19. CAD in native artery  I25.10 414.01                PT Rehab Goals       Row Name 23 1054             Bed Mobility Goal 1 (PT)    Activity/Assistive Device (Bed Mobility Goal 1, PT) rolling to left;rolling to right;scooting;sit to supine/supine to sit;sidelying to sit/sit to sidelying  -NW      [place] Level/Cues Needed (Bed Mobility Goal 1, PT) standby assist;independent  -NW      Time Frame (Bed Mobility Goal 1, PT) long term goal (LTG);10 days  -NW      Progress/Outcomes (Bed Mobility Goal 1, PT) goal not met  -NW         Transfer Goal 1 (PT)    Activity/Assistive Device (Transfer Goal 1, PT) sit-to-stand/stand-to-sit;bed-to-chair/chair-to-bed;other (see comments)  bed to/from chair w/ rwx  -NW      [place] Level/Cues Needed (Transfer Goal 1, PT) standby assist;independent  -NW      Time Frame (Transfer Goal 1, PT) long term goal (LTG);10 days  -NW       Progress/Outcome (Transfer Goal 1, PT) goal not met  -NW         Gait Training Goal 1 (PT)    Activity/Assistive Device (Gait Training Goal 1, PT) gait (walking locomotion);assistive device use;decrease fall risk;diminish gait deviation;improve balance and speed;increase endurance/gait distance;walker, rolling  -NW      [place] Level (Gait Training Goal 1, PT) standby assist;modified independence  -NW      Distance (Gait Training Goal 1, PT) 150-200 ft  -NW      Time Frame (Gait Training Goal 1, PT) long term goal (LTG);10 days  -NW      Progress/Outcome (Gait Training Goal 1, PT) goal not met  -NW         Stairs Goal 1 (PT)    Activity/Assistive Device (Stairs Goal 1, PT) ascending stairs;descending stairs;using handrail, right;step-to-step;decrease fall risk;improve balance and speed;cane, straight  -NW      [place] Level/Cues Needed (Stairs Goal 1, PT) contact guard required  -NW      Number of Stairs (Stairs Goal 1, PT) 2  -NW      Time Frame (Stairs Goal 1, PT) long term goal (LTG);10 days  -NW      Progress/Outcome (Stairs Goal 1, PT) goal not met  -NW         Patient Education Goal (PT)    Activity (Patient Education Goal, PT) HEP for LE strengthening, postural ex  -NW      [place]/Cues/Accuracy (Memory Goal 2, PT) demonstrates adequately;independent;verbalizes understanding  -NW      Time Frame (Patient Education Goal, PT) long term goal (LTG);10 days  -NW      Progress/Outcome (Patient Education Goal, PT) goal not met  -NW                User Key  (r) = Recorded By, (t) = Taken By, (c) = Cosigned By      Initials Name Provider Type Discipline    Rox Crockett, PTA Physical Therapist Assistant PT                        PT Discharge Summary  Anticipated Discharge Disposition (PT): home, home with home health  Reason for Discharge: Discharge from facility  Outcomes Achieved: Discharge from facility occurred on same date as evluation  Discharge Destination: Home with home  health      Roxchau Guallpa, PTA   11/21/2023

## 2023-11-21 NOTE — PAYOR COMM NOTE
"HI 11-20-23    Imani Branch (83 y.o. Female)       Date of Birth   1940    Social Security Number       Address   [address] [facility] [address]    Home Phone   [phone]    MRN   [id number]       L[employer] day   [employer]    Marital Status                               Admission Date   11/11/23    Admission Type   Emergency    Admitting Provider   Charles Mcnair MD    Attending Provider       Department, Room/Bed   [facility] 3C, 361/1       Discharge Date   11/20/2023    Discharge Disposition   [place] [facility]    Discharge Destination                                 Attending Provider: (none)   Allergies: Not on File    Isolation: None   Infection: None   Code Status: Prior    Ht: 162.6 cm (64\")   Wt: 70.8 kg (156 lb)    Admission Cmt: None   Principal Problem: Hypercalcemia [E83.52]                   Active Insurance as of 11/11/2023       Primary Coverage       Payor Plan Insurance Group Employer/Plan Group    ANTHEM MEDICARE REPLACEMENT ANTHEM MEDICARE ADVANTAGE [id number]       Payor Plan Address Payor Plan Phone Number Payor Plan Fax Number Effective Dates    [address] [phone]  1/1/2015 - None Entered    [facility] [address]         Subscriber Name Subscriber Birth Date Member ID       IMANI BRANCH 1940 [id number]                     Emergency Contacts        (Rel.) Home Phone Work Phone Mobile Phone    Rosamaria Branch (Relative) [phone] -- --    Mary Rivas (Grandchild) -- -- [phone]                 Discharge Summary        Charles Mcnair MD at 11/20/23 [address] [place] [address] Medicine Services  DISCHARGE SUMMARY       Date of Admission: 11/11/2023  Date of Discharge:  11/20/2023  Primary Care Physician: Светлана Loyd MD    Presenting Problem/History of Present Illness:      Final Discharge Diagnoses:  Active Hospital Problems    Diagnosis     **Hypercalcemia  "    Lymphadenopathy      Consults: Oncology    Procedures Performed:   Diagnostic laparoscope 11/14/2023    Pertinent Test Results:   Results for orders placed during the hospital encounter of 11/11/23    Adult Transthoracic Echo Complete W/ Cont if Necessary Per Protocol    Interpretation Summary    Left ventricular systolic function is normal. Left ventricular ejection fraction appears to be 66 - 70%.    Left ventricular wall thickness is consistent with mild concentric hypertrophy.    Left ventricular diastolic function is consistent with (grade I) impaired relaxation.    Normal right ventricular cavity size and systolic function noted.    There is no significant (greater than mild) valvular dysfunction.    Estimated right ventricular systolic pressure from tricuspid regurgitation is normal (<35 mmHg).      Imaging Results (All)       Procedure Component Value Units Date/Time    XR Abdomen Flat & Upright [[id number]] Collected: 11/18/23 1213     Updated: 11/18/23 1217    Narrative:      EXAM/TECHNIQUE: XR ABDOMEN FLAT AND UPRIGHT-     INDICATION: increasing epigastric pain, isolated alk phos elevation;  E83.52-Hypercalcemia; E87.1-Yrsq-ryfihayoev and hyponatremia;  N17.9-Acute kidney failure, unspecified; R59.1-Generalized enlarged  lymph nodes; R59.0-Localized enlarged lymph nodes     COMPARISON: None     FINDINGS:     Nonobstructive bowel gas pattern. A few scattered air-filled bowel loops  are noted. No mass effect or suspicious calcifications. Advanced  degenerative change in the lumbar spine. No acute osseous finding.  Partially imaged pleural/parenchymal opacities in the lower chest.       Impression:      Nonobstructive bowel gas pattern.     This report was signed and finalized on 11/18/2023 12:14 PM CST by Dr. Alvaro Blake MD.       US Abdomen Limited [[id number]] Collected: 11/17/23 1556     Updated: 11/17/23 1602    Narrative:      EXAM/TECHNIQUE: US ABDOMEN LIMITED-     INDICATION: elevated alkaline  phosphatase s/p liver biopsy on 11/14,  evaluate portal blood flow; E83.52-Hypercalcemia; E87.4-Dqsr-npdzsuzkku  and hyponatremia; N17.9-Acute kidney failure, unspecified;  R59.1-Generalized enlarged lymph nodes; R59.0-Localized enlarged lymph  nodes     COMPARISON: 11/12/2023     FINDINGS:     Limited abdominal ultrasound of the liver and portal vasculature.     Liver echogenicity and echotexture are within normal limits. No solid  liver lesion. Patent portal vein with appropriate directional flow. Left  portal vein is patent with appropriate directional flow. Right portal  vein is patent with appropriate directional flow.     Postoperative changes cholecystectomy. No intrahepatic biliary ductal  dilatation.       Impression:      1.  Unremarkable sonographic appearance of the liver.  2.  Main portal vein, left portal vein, and right portal vein appear  patent with appropriate directional flow. However, of note, there is  diminutive appearance of the left portal vein on recent CT abdomen with  soft tissue attenuation encasing and narrowing the left portal vein.     This report was signed and finalized on 11/17/2023 3:59 PM CST by Dr. Alvaro Blake MD.       CT Biopsy Abdomen Retroperitoneal [[id number]] Collected: 11/16/23 1012     Updated: 11/16/23 1020    Narrative:      EXAM: CT BIOPSY ABDOMEN RETROPERITONEAL- - 11/16/2023 9:00 AM CST     HISTORY: retroperitoneal lymphadenopathy; E83.52-Hypercalcemia;  E87.7-Luow-kqtdluswza and hyponatremia; N17.9-Acute kidney failure,  unspecified; R59.1-Generalized enlarged lymph nodes; R59.0-Localized  enlarged lymph nodes       COMPARISON: CT abdomen pelvis 11/12/2023.      DOSE LENGTH PRODUCT: 1341 mGy cm. Automated exposure control was also  utilized to decrease patient radiation dose.     TECHNIQUE/FINDINGS:   Risks, benefits and alternatives to the procedure were discussed with  the patient. Specifically the the risk of a retroperitoneal hematoma and  bleeding was  discussed. Written and verbal informed consent was  obtained.     Immediately prior to the procedure, a time out was performed including  the patient's name, date of birth, procedure and laterality.       The entry site was marked with an X. Site was prepped in the usual  sterile fashion.      Approximately 5 mL of 1 percent lidocaine was infused in the  subcutaneous and deeper tissues for local anesthesia. Using CT guidance,  4 18-gauge core biopsies samples were obtained from the left  retroperitoneal lymphadenopathy a left posterior percutaneous. The  samples were sent to surgical pathology for analysis. Pathology  confirmed neoplastic cells.     The patient tolerated the procedure well, and was transferred to the  holding area in stable condition for post procedure monitoring. There  were no apparent periprocedural complications.           Impression:      CT-guided left retroperitoneal lymphadenopathy biopsy  1. Technically successful CT-guided left retroperitoneal lymphadenopathy  biopsy.  2. No periprocedural complication.        This report was signed and finalized on 11/16/2023 10:17 AM CST by  Navenet Hoang.       CT Abdomen Pelvis With & Without Contrast [[id number]] Collected: 11/12/23 1724     Updated: 11/12/23 1758    Narrative:      EXAM: CT ABDOMEN PELVIS W WO CONTRAST-     INDICATION: Abdominal pain, acute, nonlocalized; E83.52-Hypercalcemia;  E87.3-Gwdj-jfnbbpauly and hyponatremia; N17.9-Acute kidney failure,  unspecified        TECHNIQUE: Helically acquired CT images were obtained of the abdomen and  pelvis prior to and after the administration of intravenous contrast.  Coronal and sagittal reformations were performed.        CONTRAST:  Isovue-300      DOSE LENGTH PRODUCT: 675 mGy cm. Automated exposure control was also  utilized to decrease patient radiation dose.     COMPARISON: PET/CT 4/13/2023     FINDINGS:     Lower Chest: Small left and trace right pleural effusions with basilar  passive  atelectasis.     Liver: Soft tissue attenuation tracks into the felisa hepatis and lies  along the left portal vein causing severe narrowing and near occlusion  (series 4 image 21).     Biliary Tree: Status post cholecystectomy. Minimal intrahepatic biliary  ductal dilation involving segments 2, 3, 4, and 8.     Spleen: Unremarkable.     Pancreas: Unremarkable.     Adrenal Glands: Unremarkable.     Kidneys/Ureters/Bladder: Small left renal cyst. Mild left proximal  urothelial enhancement.     Reproductive Organs: Status post hysterectomy.     Gastrointestinal Tract: Colonic diverticulosis.     Lymphatics: Progressed bulky lymphadenopathy, including: Lower  mediastinal, retrocrural, perigastric, retroperitoneal, right common  iliac, periportal, and mesenteric lymphadenopathy, some of which are  centrally necrotic. Representative reproducible examples below. The  retroperitoneal and mesenteric lymphadenopathy causes encasement of the  celiac, SMA, bilateral renal arteries, and portions of the aorta.  *  Retrocrural: 4.2 x 2.9 cm, previously nonexistent.  *  Confluent mesenteric: 14.5 x 8.5 cm (series 4 image 40).  *  Periportal: 2.6 x 1.9 cm (series 4 image 25).     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     Severe narrowing and near occlusion of the left portal vein. Severe  narrowing of the right renal artery. Moderate atherosclerosis.     Peritoneum/Retroperitoneum: Small volume ascites.     Abdominal Wall/Soft Tissues: Tiny fat-containing umbilical hernia.     Osseous Structures: Diffuse osteopenia.       Impression:            Markedly progressed bulky lymphadenopathy (lower mediastinal,  retrocrural, perigastric, retroperitoneal, right common iliac,  periportal, mesenteric) as detailed above, compatible with metastatic  disease. The bulky lymphadenopathy causes severe narrowing of the right  renal artery and severe narrowing/near occlusion of the IVC.      Soft tissue infiltration of the felisa hepatis as well as along the left  portal vein with associated mild intrahepatic biliary ductal dilation,  compatible with malignancy. 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 This causes severe narrowing and near  occlusion of the left portal vein.     Small left and trace right pleural effusions with mild passive  atelectasis.     Mild left proximal urothelial enhancement, correlate with urinalysis for  urinary tract infection.     Small volume ascites.        This report was signed and finalized on 11/12/2023 5:55 PM CST by Navneet Hoang.             LAB RESULTS:      Lab 11/20/23  0652 11/19/23  0354 11/18/23  0711 11/17/23  0549 11/16/23  0528   WBC 9.52 10.65 9.95 11.80* 14.18*   HEMOGLOBIN 11.5* 10.6* 11.6* 10.1* 10.9*   HEMATOCRIT 35.2 32.4* 35.5 31.3* 34.3   PLATELETS 475* 455* 426 369 377   NEUTROS ABS 7.71* 8.71* 8.49* 10.43* 12.92*   IMMATURE GRANS (ABS) 0.22* 0.22* 0.13* 0.06* 0.08*   LYMPHS ABS 0.48* 0.57* 0.48* 0.47* 0.40*   MONOS ABS 0.77 0.80 0.59 0.49 0.56   EOS ABS 0.24 0.27 0.21 0.31 0.18   MCV 88.9 87.8 88.5 90.7 93.0   PROTIME  --   --   --   --  14.1   APTT  --   --   --   --  38.5*         Lab 11/20/23  0652 11/19/23  0354 11/18/23  0711 11/17/23  0549 11/16/23  0528 11/15/23  0645   SODIUM 136 135* 135* 136 136 133*   POTASSIUM 3.9 3.3* 3.4* 3.6 3.8 4.2   CHLORIDE 100 98 99 101 102 102   CO2 26.0 22.0 24.0 21.0* 20.0* 19.0*   ANION GAP 10.0 15.0 12.0 14.0 14.0 12.0   BUN 16 18 20 23 23 24*   CREATININE 1.43* 1.28* 1.38* 1.53* 1.60* 1.55*   EGFR 36.7* 41.9* 38.3* 33.8* 32.1* 33.3*   GLUCOSE 91 81 111* 92 91 103*   CALCIUM 10.8* 10.2 10.6* 10.1 10.3 9.9   MAGNESIUM 2.1 2.0 1.5*  --  1.8 1.3*   PHOSPHORUS 2.6 2.4* 2.2*  --   --  3.1   HEMOGLOBIN A1C  --   --  6.10*  --   --   --          Lab 11/20/23  0652 11/19/23  0354 11/18/23  0711 11/17/23  0549 11/16/23  0528   TOTAL PROTEIN 6.0 5.6* 6.1 5.5* 5.7*   ALBUMIN 2.8* 2.6*  2.9* 2.6* 2.8*   GLOBULIN 3.2 3.0 3.2 2.9 2.9   ALT (SGPT) 63* 77* 101* 90* 27   AST (SGOT) 101* 152* 249* 280* 95*   BILIRUBIN 0.4 0.4 0.6 0.7 0.5   ALK PHOS 958* 1,029* 1,183* 942* 430*   AMYLASE  --   --  54  --   --    LIPASE  --   --  65*  --   --          Lab 11/16/23  0528   PROTIME 14.1   INR 1.08             Lab 11/14/23  1057   ABO TYPING A   RH TYPING Negative   ANTIBODY SCREEN Negative         Brief Urine Lab Results  (Last result in the past 365 days)        Color   Clarity   Blood   Leuk Est   Nitrite   Protein   CREAT   Urine HCG        11/11/23 2016 Yellow   Clear   Negative   Trace   Negative   Negative                 Microbiology Results (last 10 days)       Procedure Component Value - Date/Time    Urine Culture - Urine, Urine, Clean Catch [[id number]]  (Normal) Collected: 11/11/23 2016    Lab Status: Final result Specimen: Urine, Clean Catch Updated: 11/12/23 2226     Urine Culture No growth        HPI .  Abdominal Pain  Associated symptoms include constipation, myalgias and vomiting. Pertinent negatives include no fever.   Vomiting   Associated symptoms include abdominal pain and myalgias. Pertinent negatives include no chills or fever.   83-year-old female who presents emergency department with complaints of generalized malaise.  Decreased p.o. intake.  Nausea and vomiting.  The patient has abdominal lymphadenopathy that she has a follow-up to see Dr. Restrepo next week.  She is supposed to have an incisional biopsy performed.  She was at [facility] 2 weeks ago for similar complaints, and was sent home.  She states that she has chest pain and abdominal pain that radiates into her right side.  When she has a deep breath it catches in her epigastric region secondary to her abdominal distention.  She has no dysuria.  She has chronic constipation, but she did have a bowel movement yesterday.  She is hypertensive in the emergency department on my exam.  The patient's work-up demonstrated multiple  "electrolyte abnormalities.    Hospital Course:   Nausea/vomiting.  Pepcid . Zofran.     Abdomen pain-myalgia/abdomen lymphadenopathy/elevated alkaline phosphatase/epigastric pain.  Oncology consult.      Likely secondary to metastatic cancer preliminary results shows malignant cells of unknown type-pathology still pending.  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     Hypercalcemia secondary to metastatic cancer.  Status post zoledronic acid x 1 11/11.  Calcium stable.  Hold IV fluid for now.     Elevated liver enzyme.    Ultrasound of the liver 11/17-showed patent main portal veins and left portal vein and right portal vein.     Chronic stage III renal failure.  Creatinine baseline 1.5-1.6.     Diabetes.  Hemoglobin C 6.1.  Hold Jardiance due to poor appetite.  Low sliding scale.     Hypothyroidism..  Synthroid.     Hypokalemia.     Hypophosphatemia.  Hypomagnesia and .     Constipation.  Resolved.     Signs stable, afebrile.  Plan to discharge patient home today.  Follow-up with oncology outpatient. Follow-up PCP 1 week.  Patient go home with home health.    Physical Exam on Discharge:  /81 (BP Location: Left arm, Patient Position: Lying)   Pulse 86   Temp 98.3 °F (36.8 °C) (Oral)   Resp 16   Ht 162.6 cm (64.02\")   Wt 71.2 kg (156 lb 14.4 oz)   SpO2 95%   BMI 26.92 kg/m²   Physical Exam  Vitals and nursing note reviewed.   Constitutional:       Comments: Advanced age .   HENT:      Head: Normocephalic.   Eyes:      Conjunctiva/sclera: Conjunctivae normal.      Pupils: Pupils are equal, round, and reactive to light.   Cardiovascular:      Rate and Rhythm: Normal rate and regular rhythm.      Heart sounds: Normal heart sounds.   Pulmonary:      Effort: No respiratory distress.      Comments: Diminished breath sound bilateral, clear, on room air.  Abdominal:      General: Bowel sounds are normal. There is no distension.      Palpations: Abdomen is soft.      " Tenderness: There is no abdominal tenderness.   Musculoskeletal:         General: No swelling.      Cervical back: Neck supple.   Skin:     General: Skin is warm and dry.      Capillary Refill: Capillary refill takes 2 to 3 seconds.      Findings: No rash.   Neurological:      General: No focal deficit present.      Mental Status: She is alert and oriented to person, place, and time.      Motor: Weakness present.      Coordination: Coordination abnormal.      Gait: Gait abnormal.   Psychiatric:         Mood and Affect: Mood normal.         Behavior: Behavior normal.           Condition on Discharge: Stable.    Discharge Disposition: Discharge home with family with home health      Discharge Medications:     Discharge Medications        New Medications        Instructions Start Date   famotidine 20 MG tablet  Commonly known as: PEPCID   20 mg, Oral, Daily   Start Date: November 21, 2023     NIFEdipine CC 30 MG 24 hr tablet  Commonly known as: ADALAT CC   30 mg, Oral, Every 24 Hours Scheduled   Start Date: November 21, 2023     ondansetron 4 MG tablet  Commonly known as: ZOFRAN   4 mg, Oral, Every 6 Hours PRN             Continue These Medications        Instructions Start Date   acetaminophen 500 MG tablet  Commonly known as: TYLENOL   500 mg, Oral, Every 6 Hours PRN, prn      aspirin 81 MG EC tablet   81 mg, Oral, Daily      atorvastatin 10 MG tablet  Commonly known as: LIPITOR   10 mg, Oral, Nightly      cloNIDine 0.1 MG tablet  Commonly known as: CATAPRES   0.1 mg, Oral, 2 Times Daily PRN      furosemide 20 MG tablet  Commonly known as: LASIX   20 mg, Oral, Daily PRN      Gabapentin (Once-Daily) 300 MG tablet   300-600 mg, Oral, Daily Before Supper, Sometimes takes more than one but mostly takes one      HYDROcodone-acetaminophen 7.5-325 MG per tablet  Commonly known as: NORCO   1 tablet, Oral, Every 6 Hours PRN      Jardiance 10 MG tablet tablet  Generic drug: empagliflozin   10 mg, Oral, Daily      lansoprazole  15 MG capsule  Commonly known as: PREVACID   15 mg, Oral, Daily PRN      levothyroxine 75 MCG tablet  Commonly known as: SYNTHROID, LEVOTHROID   37.5-75 mcg, Oral, Daily, Takes 1/2 tab M, W, & F and 1 tab on S, Tu, Th, & Sa      lisinopril 20 MG tablet  Commonly known as: PRINIVIL,ZESTRIL   20 mg, Oral, 2 Times Daily      MAGNESIUM GLYCINATE PO   1,300 mg, Oral, 2 Times Daily      metFORMIN  MG 24 hr tablet  Commonly known as: GLUCOPHAGE-XR   500 mg, Oral, 2 Times Daily      ROLAIDS PO   1 tablet, Oral, Daily PRN      spironolactone 25 MG tablet  Commonly known as: ALDACTONE   25 mg, Oral, Daily      SUPER-C 1000 PO   1 tablet, Oral, Daily, Super c 900 mg, zinc 11 mg, vitamin D3 1,000 units, Vit A 450 mcg, Vitamin E 15 mg OTC      therapeutic multivitamin-minerals tablet  Generic drug: multivitamin with minerals   1 tablet, Oral, Daily               Discharge Diet:   Diet Instructions       Advance Diet As Tolerated -Target Diet: Regular .      Target Diet: Regular .            Activity at Discharge:   Activity Instructions       Activity as Tolerated              Follow-up Appointments:   Future Appointments   Date Time Provider Department Center   2/22/2024  1:00 PM Meghan Freeman APRN [facility]     Follow-up PCP 1 week.  Follow-up with oncology outpatient.    Electronically signed by Charles Mcnair MD, 11/20/23, 13:51 CST.    Time: Greater than 30 minutes.         Electronically signed by Charles Mcnair MD at 11/20/23 0656

## 2023-11-24 ENCOUNTER — NURSE TRIAGE (OUTPATIENT)
Dept: CALL CENTER | Facility: HOSPITAL | Age: 83
End: 2023-11-24
Payer: MEDICARE

## 2023-11-24 LAB
BH CV ECHO LEFT VENTRICLE GLOBAL LONGITUDINAL STRAIN: -14.5 %
BH CV ECHO MEAS - AO MAX PG: 10.2 MMHG
BH CV ECHO MEAS - AO MEAN PG: 6 MMHG
BH CV ECHO MEAS - AO ROOT DIAM: 2.8 CM
BH CV ECHO MEAS - AO V2 MAX: 160 CM/SEC
BH CV ECHO MEAS - AO V2 VTI: 30 CM
BH CV ECHO MEAS - AVA(I,D): 2.06 CM2
BH CV ECHO MEAS - EDV(CUBED): 56.2 ML
BH CV ECHO MEAS - EDV(MOD-SP2): 42.7 ML
BH CV ECHO MEAS - EDV(MOD-SP4): 44.4 ML
BH CV ECHO MEAS - EF(MOD-BP): 62.4 %
BH CV ECHO MEAS - EF(MOD-SP2): 61.8 %
BH CV ECHO MEAS - EF(MOD-SP4): 57.9 %
BH CV ECHO MEAS - ESV(CUBED): 12.2 ML
BH CV ECHO MEAS - ESV(MOD-SP2): 16.3 ML
BH CV ECHO MEAS - ESV(MOD-SP4): 18.7 ML
BH CV ECHO MEAS - FS: 39.9 %
BH CV ECHO MEAS - IVS/LVPW: 1.1 CM
BH CV ECHO MEAS - IVSD: 1.25 CM
BH CV ECHO MEAS - LA DIMENSION: 3.5 CM
BH CV ECHO MEAS - LAT PEAK E' VEL: 5.6 CM/SEC
BH CV ECHO MEAS - LV DIASTOLIC VOL/BSA (35-75): 25.2 CM2
BH CV ECHO MEAS - LV MASS(C)D: 154.1 GRAMS
BH CV ECHO MEAS - LV MAX PG: 7.5 MMHG
BH CV ECHO MEAS - LV MEAN PG: 3 MMHG
BH CV ECHO MEAS - LV SYSTOLIC VOL/BSA (12-30): 10.6 CM2
BH CV ECHO MEAS - LV V1 MAX: 137 CM/SEC
BH CV ECHO MEAS - LV V1 VTI: 21.8 CM
BH CV ECHO MEAS - LVIDD: 3.8 CM
BH CV ECHO MEAS - LVIDS: 2.3 CM
BH CV ECHO MEAS - LVOT AREA: 2.8 CM2
BH CV ECHO MEAS - LVOT DIAM: 1.9 CM
BH CV ECHO MEAS - LVPWD: 1.14 CM
BH CV ECHO MEAS - MED PEAK E' VEL: 4.6 CM/SEC
BH CV ECHO MEAS - MV A MAX VEL: 129 CM/SEC
BH CV ECHO MEAS - MV DEC SLOPE: 371 CM/SEC2
BH CV ECHO MEAS - MV DEC TIME: 0.19 SEC
BH CV ECHO MEAS - MV E MAX VEL: 70.6 CM/SEC
BH CV ECHO MEAS - MV E/A: 0.55
BH CV ECHO MEAS - MV MAX PG: 7.4 MMHG
BH CV ECHO MEAS - MV MEAN PG: 3 MMHG
BH CV ECHO MEAS - MV V2 VTI: 28.8 CM
BH CV ECHO MEAS - MVA(VTI): 2.15 CM2
BH CV ECHO MEAS - PA V2 MAX: 121 CM/SEC
BH CV ECHO MEAS - PI END-D VEL: 152 CM/SEC
BH CV ECHO MEAS - PULM A REVS DUR: 0.09 SEC
BH CV ECHO MEAS - PULM A REVS VEL: 73.2 CM/SEC
BH CV ECHO MEAS - PULM DIAS VEL: 49.7 CM/SEC
BH CV ECHO MEAS - PULM S/D: 1.79
BH CV ECHO MEAS - PULM SYS VEL: 88.9 CM/SEC
BH CV ECHO MEAS - RAP SYSTOLE: 5 MMHG
BH CV ECHO MEAS - RV MAX PG: 4.5 MMHG
BH CV ECHO MEAS - RV V1 MAX: 106 CM/SEC
BH CV ECHO MEAS - RV V1 VTI: 17.9 CM
BH CV ECHO MEAS - RVDD: 3.3 CM
BH CV ECHO MEAS - RVSP: 32.7 MMHG
BH CV ECHO MEAS - SI(MOD-SP2): 15 ML/M2
BH CV ECHO MEAS - SI(MOD-SP4): 14.6 ML/M2
BH CV ECHO MEAS - SV(LVOT): 61.8 ML
BH CV ECHO MEAS - SV(MOD-SP2): 26.4 ML
BH CV ECHO MEAS - SV(MOD-SP4): 25.7 ML
BH CV ECHO MEAS - TR MAX PG: 27.7 MMHG
BH CV ECHO MEAS - TR MAX VEL: 263 CM/SEC
BH CV ECHO MEASUREMENTS AVERAGE E/E' RATIO: 13.84
BH CV XLRA - TDI S': 7.3 CM/SEC
LEFT ATRIUM VOLUME INDEX: 23.2 ML/M2
LEFT ATRIUM VOLUME: 45.4 ML

## 2023-11-24 NOTE — TELEPHONE ENCOUNTER
Wednesday night one of puncture wounds started draining  Milky drainage above belly button. Keeps a hand towel on it and when she gets up the towel will be soaked.    Biopsy on stomach Dr Ponce 11/14/2023    Advised to call Home Health RN or surgeon on call.  Reason for Disposition   Dressing soaked with blood or body fluid (e.g., drainage)    Additional Information   Negative: [1] Major abdominal surgical incision AND [2] wound gaping open AND [3] visible internal organs   Negative: Sounds like a life-threatening emergency to the triager   Negative: Patient has a Negative Pressure Wound Therapy device   Negative: Patient is followed by a wound clinic or wound specialist for this wound   Negative: [1] Bleeding from incision AND [2] won't stop after 10 minutes of direct pressure   Negative: [1] Bleeding (more than a few drops) from incision AND [2] tracheostomy or blood vessel surgery (e.g., carotid endarterectomy, femoral bypass graft, kidney dialysis fistula)   Negative: [1] Widespread rash AND [2] bright red, sunburn-like   Negative: Severe pain in the incision   Negative: [1] Incision gaping open AND [2] < 48 hours since wound re-opened   Negative: [1] Incision gaping open AND [2] length of opening > 2 inches (5 cm)   Negative: Patient sounds very sick or weak to the triager   Negative: Sounds like a serious complication to the triager   Negative: Fever > 100.4 F (38.0 C)   Negative: [1] Incision looks infected (spreading redness, pain) AND [2] fever > 99.5 F (37.5 C)   Negative: [1] Incision looks infected (spreading redness, pain) AND [2] large red area (> 2 in. or 5 cm)   Negative: [1] Incision looks infected (spreading redness, pain) AND [2] face wound   Negative: [1] Red streak runs from the incision AND [2] longer than 1 inch (2.5 cm)   Negative: [1] Pus or bad-smelling fluid draining from incision AND [2] no fever   Negative: [1] Post-op pain AND [2] not controlled with pain medications    Answer  "Assessment - Initial Assessment Questions  1. SYMPTOM: \"What's the main symptom you're concerned about?\" (e.g., drainage, incision opening up, pain, redness)      drainage  2. ONSET: \"When did *No Answer*  start?\"     Wednesday  3. SURGERY: \"What surgery did you have?\"     Diagnostic Lap  4. DATE of SURGERY: \"When was the surgery?\"       *No Answer*  5. INCISION SITE: \"Where is the incision located?\"       *No Answer*  6. REDNESS: \"Is there any redness at the incision site?\" If Yes, ask: \"How wide across is the redness?\" (Inches, centimeters)       Maybe a little red but does not look infected.  7. PAIN: \"Is there any pain?\" If Yes, ask: \"How bad is it?\"  (Scale 1-10; or mild, moderate, severe)    - NONE (0): no pain    - MILD (1-3): doesn't interfere with normal activities     - MODERATE (4-7): interferes with normal activities or awakens from sleep     - SEVERE (8-10): excruciating pain, unable to do any normal activities  She says it does not hurt  8. BLEEDING: \"Is there any bleeding?\" If Yes, ask: \"How much?\" and \"Where?\"   denies  9. DRAINAGE: \"Is there any drainage from the incision site?\" If Yes, ask: \"What color and how much?\" (e.g., red, cloudy, pus; drops, teaspoon)      Milky   10. FEVER: \"Do you have a fever?\" If Yes, ask: \"What is your temperature, how was it measured, and when did it start?\"     denies  11. OTHER SYMPTOMS: \"Do you have any other symptoms?\" (e.g., dizziness, rash elsewhere on body, shaking chills, weakness)    denies    Protocols used: Post-Op Incision Symptoms and Questions-ADULT-    "

## 2023-11-27 ENCOUNTER — TELEPHONE (OUTPATIENT)
Dept: ONCOLOGY | Facility: CLINIC | Age: 83
End: 2023-11-27
Payer: MEDICARE

## 2023-11-27 NOTE — TELEPHONE ENCOUNTER
Dr Peralta and patient had discussed while in hospital, she is to follow with Dr Diaz.  She has appt on Nov 30, 2023.

## 2023-11-27 NOTE — TELEPHONE ENCOUNTER
----- Message from Velma Reyes MD sent at 11/25/2023  8:28 AM CST -----  Hi.  This patient had seen Dr. Peralta as an inpatient and was discharged last week; would you call her to make sure that she is seeing an oncologist (she may have a coming up appointment in [place]), and if not, could you schedule her with Dr. Peralta soon  . Thank you

## 2023-11-30 ENCOUNTER — TELEPHONE (OUTPATIENT)
Dept: MAMMOGRAPHY | Facility: HOSPITAL | Age: 83
End: 2023-11-30
Payer: MEDICARE

## 2023-11-30 ENCOUNTER — READMISSION MANAGEMENT (OUTPATIENT)
Dept: CALL CENTER | Facility: HOSPITAL | Age: 83
End: 2023-11-30
Payer: MEDICARE

## 2023-11-30 NOTE — TELEPHONE ENCOUNTER
Called to follow up with patient post biopsy. Her daughter-in-law answered and stated that she had no complications or concerns post biopsy.

## 2023-11-30 NOTE — OUTREACH NOTE
Medical Week 2 Survey      Flowsheet Row Responses   [facility] patient discharged from? [facility]   Does the patient have one of the following disease processes/diagnoses(primary or secondary)? Other   Week 2 attempt successful? Yes   Call start time 1334   Discharge diagnosis hypercalcemia, abdominal pain, lymphadenopathy likely d/t metastatic CA   Call end time 1338   Does the patient have all medications ordered at discharge? Yes   Is the patient taking all medications as directed (includes completed medication regime)? Yes   Does the patient have a primary care provider?  Yes   Does the patient have an appointment with their PCP within 7 days of discharge? Yes   Comments regarding PCP seen PCP yesterday (11/29)   Has the patient kept scheduled appointments due by today? Yes   What is the Home health agency?  [facility]    Has home health visited the patient within 72 hours of discharge? Yes   DME comments walks with a walker   Psychosocial issues? No   Did the patient receive a copy of their discharge instructions? Yes   Nursing interventions Reviewed instructions with patient   What is the patient's perception of their health status since discharge? Improving   Is the patient/caregiver able to teach back signs and symptoms related to disease process for when to call PCP? Yes   Is the patient/caregiver able to teach back signs and symptoms related to disease process for when to call 911? Yes   Is the patient/caregiver able to teach back the hierarchy of who to call/visit for symptoms/problems? PCP, Specialist, Home health nurse, Urgent Care, ED, 911 Yes   Additional teach back comments Fall precautions discussed.   Week 2 Call Completed? Yes   Is the patient interested in additional calls from an ambulatory ? No   Would this patient benefit from a Referral to Amb Social Work? No   Wrap up additional comments Doing well, seen PCP yesterday, has fallen a few times but states she has help at home, HH  is still coming.   Call end time [phone]            Whitney TERAN - Registered Nurse

## 2023-12-03 ENCOUNTER — HOSPITAL ENCOUNTER (INPATIENT)
Facility: HOSPITAL | Age: 83
LOS: 1 days | Discharge: HOSPICE | End: 2023-12-06
Attending: INTERNAL MEDICINE | Admitting: INTERNAL MEDICINE
Payer: MEDICARE

## 2023-12-03 ENCOUNTER — APPOINTMENT (OUTPATIENT)
Dept: GENERAL RADIOLOGY | Facility: HOSPITAL | Age: 83
End: 2023-12-03
Payer: MEDICARE

## 2023-12-03 ENCOUNTER — APPOINTMENT (OUTPATIENT)
Dept: CT IMAGING | Facility: HOSPITAL | Age: 83
End: 2023-12-03
Payer: MEDICARE

## 2023-12-03 ENCOUNTER — READMISSION MANAGEMENT (OUTPATIENT)
Dept: CALL CENTER | Facility: HOSPITAL | Age: 83
End: 2023-12-03
Payer: MEDICARE

## 2023-12-03 DIAGNOSIS — C82.30 FOLLICULAR LYMPHOMA GRADE IIIA, UNSPECIFIED BODY REGION: ICD-10-CM

## 2023-12-03 DIAGNOSIS — C82.32 FOLLICULAR LYMPHOMA GRADE IIIA OF INTRATHORACIC LYMPH NODES: ICD-10-CM

## 2023-12-03 DIAGNOSIS — R59.1 LYMPHADENOPATHY: ICD-10-CM

## 2023-12-03 DIAGNOSIS — J18.9 HCAP (HEALTHCARE-ASSOCIATED PNEUMONIA): Primary | ICD-10-CM

## 2023-12-03 LAB
ALBUMIN SERPL-MCNC: 3 G/DL (ref 3.5–5.2)
ALBUMIN/GLOB SERPL: 0.9 G/DL
ALP SERPL-CCNC: 610 U/L (ref 39–117)
ALT SERPL W P-5'-P-CCNC: 20 U/L (ref 1–33)
ANION GAP SERPL CALCULATED.3IONS-SCNC: 14 MMOL/L (ref 5–15)
AST SERPL-CCNC: 35 U/L (ref 1–32)
BASOPHILS # BLD AUTO: 0.08 10*3/MM3 (ref 0–0.2)
BASOPHILS NFR BLD AUTO: 0.8 % (ref 0–1.5)
BILIRUB SERPL-MCNC: 0.3 MG/DL (ref 0–1.2)
BUN SERPL-MCNC: 29 MG/DL (ref 8–23)
BUN/CREAT SERPL: 13.9 (ref 7–25)
CALCIUM SPEC-SCNC: 10.8 MG/DL (ref 8.6–10.5)
CHLORIDE SERPL-SCNC: 93 MMOL/L (ref 98–107)
CO2 SERPL-SCNC: 26 MMOL/L (ref 22–29)
CREAT SERPL-MCNC: 2.08 MG/DL (ref 0.57–1)
D-LACTATE SERPL-SCNC: 2.1 MMOL/L (ref 0.5–2)
D-LACTATE SERPL-SCNC: 2.6 MMOL/L (ref 0.5–2)
DEPRECATED RDW RBC AUTO: 53.6 FL (ref 37–54)
EGFRCR SERPLBLD CKD-EPI 2021: 23.4 ML/MIN/1.73
EOSINOPHIL # BLD AUTO: 0.11 10*3/MM3 (ref 0–0.4)
EOSINOPHIL NFR BLD AUTO: 1.1 % (ref 0.3–6.2)
ERYTHROCYTE [DISTWIDTH] IN BLOOD BY AUTOMATED COUNT: 16.2 % (ref 12.3–15.4)
FLUAV RNA RESP QL NAA+PROBE: NOT DETECTED
FLUBV RNA RESP QL NAA+PROBE: NOT DETECTED
GLOBULIN UR ELPH-MCNC: 3.4 GM/DL
GLUCOSE SERPL-MCNC: 93 MG/DL (ref 65–99)
HCT VFR BLD AUTO: 33.1 % (ref 34–46.6)
HGB BLD-MCNC: 10.2 G/DL (ref 12–15.9)
IMM GRANULOCYTES # BLD AUTO: 0.09 10*3/MM3 (ref 0–0.05)
IMM GRANULOCYTES NFR BLD AUTO: 0.9 % (ref 0–0.5)
LYMPHOCYTES # BLD AUTO: 0.43 10*3/MM3 (ref 0.7–3.1)
LYMPHOCYTES NFR BLD AUTO: 4.3 % (ref 19.6–45.3)
MCH RBC QN AUTO: 28.6 PG (ref 26.6–33)
MCHC RBC AUTO-ENTMCNC: 30.8 G/DL (ref 31.5–35.7)
MCV RBC AUTO: 92.7 FL (ref 79–97)
MONOCYTES # BLD AUTO: 0.54 10*3/MM3 (ref 0.1–0.9)
MONOCYTES NFR BLD AUTO: 5.4 % (ref 5–12)
NEUTROPHILS NFR BLD AUTO: 8.76 10*3/MM3 (ref 1.7–7)
NEUTROPHILS NFR BLD AUTO: 87.5 % (ref 42.7–76)
NRBC BLD AUTO-RTO: 0.2 /100 WBC (ref 0–0.2)
NT-PROBNP SERPL-MCNC: 648.2 PG/ML (ref 0–1800)
PLATELET # BLD AUTO: 492 10*3/MM3 (ref 140–450)
PMV BLD AUTO: 9.6 FL (ref 6–12)
POTASSIUM SERPL-SCNC: 4.4 MMOL/L (ref 3.5–5.2)
PROT SERPL-MCNC: 6.4 G/DL (ref 6–8.5)
RBC # BLD AUTO: 3.57 10*6/MM3 (ref 3.77–5.28)
SARS-COV-2 RNA RESP QL NAA+PROBE: NOT DETECTED
SODIUM SERPL-SCNC: 133 MMOL/L (ref 136–145)
WBC NRBC COR # BLD AUTO: 10.01 10*3/MM3 (ref 3.4–10.8)

## 2023-12-03 PROCEDURE — 25010000002 FUROSEMIDE PER 20 MG: Performed by: INTERNAL MEDICINE

## 2023-12-03 PROCEDURE — 87636 SARSCOV2 & INF A&B AMP PRB: CPT | Performed by: INTERNAL MEDICINE

## 2023-12-03 PROCEDURE — 83605 ASSAY OF LACTIC ACID: CPT | Performed by: INTERNAL MEDICINE

## 2023-12-03 PROCEDURE — 36415 COLL VENOUS BLD VENIPUNCTURE: CPT

## 2023-12-03 PROCEDURE — 83880 ASSAY OF NATRIURETIC PEPTIDE: CPT | Performed by: INTERNAL MEDICINE

## 2023-12-03 PROCEDURE — 71045 X-RAY EXAM CHEST 1 VIEW: CPT

## 2023-12-03 PROCEDURE — 25010000002 ONDANSETRON PER 1 MG: Performed by: INTERNAL MEDICINE

## 2023-12-03 PROCEDURE — G0378 HOSPITAL OBSERVATION PER HR: HCPCS

## 2023-12-03 PROCEDURE — 87040 BLOOD CULTURE FOR BACTERIA: CPT | Performed by: INTERNAL MEDICINE

## 2023-12-03 PROCEDURE — 85025 COMPLETE CBC W/AUTO DIFF WBC: CPT | Performed by: INTERNAL MEDICINE

## 2023-12-03 PROCEDURE — 25810000003 LACTATED RINGERS PER 1000 ML: Performed by: INTERNAL MEDICINE

## 2023-12-03 PROCEDURE — 94640 AIRWAY INHALATION TREATMENT: CPT

## 2023-12-03 PROCEDURE — 25010000002 ENOXAPARIN PER 10 MG: Performed by: INTERNAL MEDICINE

## 2023-12-03 PROCEDURE — 74176 CT ABD & PELVIS W/O CONTRAST: CPT

## 2023-12-03 PROCEDURE — 25810000003 LACTATED RINGERS SOLUTION: Performed by: INTERNAL MEDICINE

## 2023-12-03 PROCEDURE — 99285 EMERGENCY DEPT VISIT HI MDM: CPT

## 2023-12-03 PROCEDURE — 80053 COMPREHEN METABOLIC PANEL: CPT | Performed by: INTERNAL MEDICINE

## 2023-12-03 PROCEDURE — 25010000002 PIPERACILLIN SOD-TAZOBACTAM PER 1 G: Performed by: INTERNAL MEDICINE

## 2023-12-03 RX ORDER — FAMOTIDINE 20 MG/1
20 TABLET, FILM COATED ORAL DAILY
Status: DISCONTINUED | OUTPATIENT
Start: 2023-12-03 | End: 2023-12-06

## 2023-12-03 RX ORDER — FAMOTIDINE 20 MG/1
20 TABLET, FILM COATED ORAL 2 TIMES DAILY
Status: ON HOLD | COMMUNITY
End: 2023-12-04

## 2023-12-03 RX ORDER — BISACODYL 10 MG
10 SUPPOSITORY, RECTAL RECTAL DAILY PRN
Status: DISCONTINUED | OUTPATIENT
Start: 2023-12-03 | End: 2023-12-06

## 2023-12-03 RX ORDER — NICOTINE POLACRILEX 4 MG
15 LOZENGE BUCCAL
Status: DISCONTINUED | OUTPATIENT
Start: 2023-12-03 | End: 2023-12-06 | Stop reason: HOSPADM

## 2023-12-03 RX ORDER — ATORVASTATIN CALCIUM 10 MG/1
10 TABLET, FILM COATED ORAL NIGHTLY
Status: DISCONTINUED | OUTPATIENT
Start: 2023-12-03 | End: 2023-12-06

## 2023-12-03 RX ORDER — MEGESTROL ACETATE 40 MG/ML
800 SUSPENSION ORAL DAILY
Status: DISCONTINUED | OUTPATIENT
Start: 2023-12-03 | End: 2023-12-04

## 2023-12-03 RX ORDER — INSULIN LISPRO 100 [IU]/ML
2-7 INJECTION, SOLUTION INTRAVENOUS; SUBCUTANEOUS
Status: DISCONTINUED | OUTPATIENT
Start: 2023-12-04 | End: 2023-12-06

## 2023-12-03 RX ORDER — ONDANSETRON 2 MG/ML
4 INJECTION INTRAMUSCULAR; INTRAVENOUS ONCE
Status: COMPLETED | OUTPATIENT
Start: 2023-12-03 | End: 2023-12-03

## 2023-12-03 RX ORDER — ACETAMINOPHEN 325 MG/1
650 TABLET ORAL EVERY 4 HOURS PRN
Status: DISCONTINUED | OUTPATIENT
Start: 2023-12-03 | End: 2023-12-06 | Stop reason: HOSPADM

## 2023-12-03 RX ORDER — POLYETHYLENE GLYCOL 3350 17 G/17G
17 POWDER, FOR SOLUTION ORAL DAILY PRN
Status: DISCONTINUED | OUTPATIENT
Start: 2023-12-03 | End: 2023-12-06

## 2023-12-03 RX ORDER — SODIUM CHLORIDE 0.9 % (FLUSH) 0.9 %
10 SYRINGE (ML) INJECTION AS NEEDED
Status: DISCONTINUED | OUTPATIENT
Start: 2023-12-03 | End: 2023-12-06 | Stop reason: HOSPADM

## 2023-12-03 RX ORDER — CHOLECALCIFEROL (VITAMIN D3) 125 MCG
5 CAPSULE ORAL NIGHTLY PRN
Status: DISCONTINUED | OUTPATIENT
Start: 2023-12-03 | End: 2023-12-06 | Stop reason: HOSPADM

## 2023-12-03 RX ORDER — IBUPROFEN 600 MG/1
1 TABLET ORAL
Status: DISCONTINUED | OUTPATIENT
Start: 2023-12-03 | End: 2023-12-06 | Stop reason: HOSPADM

## 2023-12-03 RX ORDER — HYDROCODONE BITARTRATE AND ACETAMINOPHEN 7.5; 325 MG/1; MG/1
1 TABLET ORAL EVERY 6 HOURS PRN
Status: DISCONTINUED | OUTPATIENT
Start: 2023-12-03 | End: 2023-12-06 | Stop reason: HOSPADM

## 2023-12-03 RX ORDER — CALCIUM CARBONATE 500 MG/1
2 TABLET, CHEWABLE ORAL 2 TIMES DAILY PRN
Status: DISCONTINUED | OUTPATIENT
Start: 2023-12-03 | End: 2023-12-06 | Stop reason: HOSPADM

## 2023-12-03 RX ORDER — ONDANSETRON 2 MG/ML
4 INJECTION INTRAMUSCULAR; INTRAVENOUS EVERY 6 HOURS PRN
Status: DISCONTINUED | OUTPATIENT
Start: 2023-12-03 | End: 2023-12-06 | Stop reason: HOSPADM

## 2023-12-03 RX ORDER — IPRATROPIUM BROMIDE AND ALBUTEROL SULFATE 2.5; .5 MG/3ML; MG/3ML
3 SOLUTION RESPIRATORY (INHALATION) ONCE
Status: COMPLETED | OUTPATIENT
Start: 2023-12-03 | End: 2023-12-03

## 2023-12-03 RX ORDER — FUROSEMIDE 10 MG/ML
40 INJECTION INTRAMUSCULAR; INTRAVENOUS ONCE
Status: COMPLETED | OUTPATIENT
Start: 2023-12-03 | End: 2023-12-03

## 2023-12-03 RX ORDER — BISACODYL 5 MG/1
5 TABLET, DELAYED RELEASE ORAL DAILY PRN
Status: DISCONTINUED | OUTPATIENT
Start: 2023-12-03 | End: 2023-12-06

## 2023-12-03 RX ORDER — DEXTROSE MONOHYDRATE 25 G/50ML
25 INJECTION, SOLUTION INTRAVENOUS
Status: DISCONTINUED | OUTPATIENT
Start: 2023-12-03 | End: 2023-12-06 | Stop reason: HOSPADM

## 2023-12-03 RX ORDER — SODIUM CHLORIDE 9 MG/ML
40 INJECTION, SOLUTION INTRAVENOUS AS NEEDED
Status: DISCONTINUED | OUTPATIENT
Start: 2023-12-03 | End: 2023-12-06 | Stop reason: HOSPADM

## 2023-12-03 RX ORDER — SODIUM CHLORIDE, SODIUM LACTATE, POTASSIUM CHLORIDE, CALCIUM CHLORIDE 600; 310; 30; 20 MG/100ML; MG/100ML; MG/100ML; MG/100ML
100 INJECTION, SOLUTION INTRAVENOUS CONTINUOUS
Status: DISPENSED | OUTPATIENT
Start: 2023-12-03 | End: 2023-12-04

## 2023-12-03 RX ORDER — LORAZEPAM 0.5 MG/1
0.5 TABLET ORAL EVERY 8 HOURS PRN
Status: DISCONTINUED | OUTPATIENT
Start: 2023-12-03 | End: 2023-12-06 | Stop reason: HOSPADM

## 2023-12-03 RX ORDER — AMOXICILLIN 250 MG
2 CAPSULE ORAL 2 TIMES DAILY
Status: DISCONTINUED | OUTPATIENT
Start: 2023-12-03 | End: 2023-12-06

## 2023-12-03 RX ORDER — SODIUM CHLORIDE 0.9 % (FLUSH) 0.9 %
10 SYRINGE (ML) INJECTION EVERY 12 HOURS SCHEDULED
Status: DISCONTINUED | OUTPATIENT
Start: 2023-12-03 | End: 2023-12-06

## 2023-12-03 RX ORDER — ENOXAPARIN SODIUM 100 MG/ML
30 INJECTION SUBCUTANEOUS DAILY
Status: DISCONTINUED | OUTPATIENT
Start: 2023-12-03 | End: 2023-12-06

## 2023-12-03 RX ADMIN — IPRATROPIUM BROMIDE AND ALBUTEROL SULFATE 3 ML: 2.5; .5 SOLUTION RESPIRATORY (INHALATION) at 23:24

## 2023-12-03 RX ADMIN — Medication 10 ML: at 20:36

## 2023-12-03 RX ADMIN — SODIUM CHLORIDE, POTASSIUM CHLORIDE, SODIUM LACTATE AND CALCIUM CHLORIDE 250 ML: 600; 310; 30; 20 INJECTION, SOLUTION INTRAVENOUS at 15:52

## 2023-12-03 RX ADMIN — ATORVASTATIN CALCIUM 10 MG: 10 TABLET, FILM COATED ORAL at 20:35

## 2023-12-03 RX ADMIN — DOCUSATE SODIUM 50 MG AND SENNOSIDES 8.6 MG 2 TABLET: 8.6; 5 TABLET, FILM COATED ORAL at 20:35

## 2023-12-03 RX ADMIN — PIPERACILLIN SODIUM AND TAZOBACTAM SODIUM 3.38 G: 3; .375 INJECTION, POWDER, LYOPHILIZED, FOR SOLUTION INTRAVENOUS at 14:05

## 2023-12-03 RX ADMIN — FUROSEMIDE 40 MG: 10 INJECTION, SOLUTION INTRAVENOUS at 12:09

## 2023-12-03 RX ADMIN — ONDANSETRON 4 MG: 2 INJECTION INTRAMUSCULAR; INTRAVENOUS at 12:07

## 2023-12-03 RX ADMIN — ENOXAPARIN SODIUM 30 MG: 100 INJECTION SUBCUTANEOUS at 18:59

## 2023-12-03 RX ADMIN — MEGESTROL ACETATE 800 MG: 40 SUSPENSION ORAL at 20:35

## 2023-12-03 RX ADMIN — FAMOTIDINE 20 MG: 20 TABLET, FILM COATED ORAL at 20:35

## 2023-12-03 RX ADMIN — SODIUM CHLORIDE, POTASSIUM CHLORIDE, SODIUM LACTATE AND CALCIUM CHLORIDE 100 ML/HR: 600; 310; 30; 20 INJECTION, SOLUTION INTRAVENOUS at 20:36

## 2023-12-03 NOTE — ED NOTES
"Nursing report ED to floor  Imani Chauhan  83 y.o.  female    HPI:   Chief Complaint   Patient presents with    Weakness - Generalized       Admitting doctor:   Alvarez Lam MD    Consulting provider(s):  Consults       Date and Time Order Name Status Description    11/12/2023 10:31 AM Inpatient Hematology & Oncology Consult Completed     11/11/2023  9:07 PM Inpatient General Surgery Consult Completed              Admitting diagnosis:   The encounter diagnosis was HCAP (healthcare-associated pneumonia).    Code status:   Current Code Status       Date Active Code Status Order ID Comments User Context       Prior            Allergies:   Patient has no known allergies.    Intake and Output  No intake or output data in the 24 hours ending 12/03/23 1413    Weight:       12/03/23  1128   Weight: 70.8 kg (156 lb)       Most recent vitals:   Vitals:    12/03/23 1128 12/03/23 1231 12/03/23 1408   BP: 114/60 123/59    Pulse: 86 83 85   Resp: 16     Temp: 97.9 °F (36.6 °C)     SpO2: 94% 93% 93%   Weight: 70.8 kg (156 lb)     Height: 162.6 cm (64\")       Oxygen Therapy: .    Active LDAs/IV Access:   Lines, Drains & Airways       Active LDAs       Name Placement date Placement time Site Days    Peripheral IV 12/03/23 1200 Anterior;Distal;Right Forearm 12/03/23  1200  Forearm  less than 1                    Labs (abnormal labs have a star):   Labs Reviewed   COMPREHENSIVE METABOLIC PANEL - Abnormal; Notable for the following components:       Result Value    BUN 29 (*)     Creatinine 2.08 (*)     Sodium 133 (*)     Chloride 93 (*)     Calcium 10.8 (*)     Albumin 3.0 (*)     AST (SGOT) 35 (*)     Alkaline Phosphatase 610 (*)     eGFR 23.4 (*)     All other components within normal limits    Narrative:     GFR Normal >60  Chronic Kidney Disease <60  Kidney Failure <15    The GFR formula is only valid for adults with stable renal function between ages 18 and 70.   LACTIC ACID, PLASMA - Abnormal; Notable for the following " components:    Lactate 2.6 (*)     All other components within normal limits   CBC WITH AUTO DIFFERENTIAL - Abnormal; Notable for the following components:    RBC 3.57 (*)     Hemoglobin 10.2 (*)     Hematocrit 33.1 (*)     MCHC 30.8 (*)     RDW 16.2 (*)     Platelets 492 (*)     Neutrophil % 87.5 (*)     Lymphocyte % 4.3 (*)     Immature Grans % 0.9 (*)     Neutrophils, Absolute 8.76 (*)     Lymphocytes, Absolute 0.43 (*)     Immature Grans, Absolute 0.09 (*)     All other components within normal limits   COVID-19 AND FLU A/B, NP SWAB IN TRANSPORT MEDIA 1 HR TAT - Normal    Narrative:     Fact sheet for providers: https://www.fda.gov/media/535462/download    Fact sheet for patients: https://www.fda.gov/media/734369/download    Test performed by PCR.   BNP (IN-HOUSE) - Normal    Narrative:     This assay is used as an aid in the diagnosis of individuals suspected of having heart failure. It can be used as an aid in the diagnosis of acute decompensated heart failure (ADHF) in patients presenting with signs and symptoms of ADHF to the emergency department (ED). In addition, NT-proBNP of <300 pg/mL indicates ADHF is not likely.    Age Range Result Interpretation  NT-proBNP Concentration (pg/mL:      <50             Positive            >450                   Gray                 300-450                    Negative             <300    50-75           Positive            >900                  Gray                300-900                  Negative            <300      >75             Positive            >1800                  Gray                300-1800                  Negative            <300   COVID PRE-OP / PRE-PROCEDURE SCREENING ORDER (NO ISOLATION)    Narrative:     The following orders were created for panel order COVID PRE-OP / PRE-PROCEDURE SCREENING ORDER (NO ISOLATION) - Swab, Nasopharynx.  Procedure                               Abnormality         Status                     ---------                                -----------         ------                     COVID-19 and FLU A/B PCR...[[id number]]  Normal              Final result                 Please view results for these tests on the individual orders.   BLOOD CULTURE   BLOOD CULTURE   OCCULT BLOOD X 1, STOOL   LACTIC ACID, REFLEX   CBC AND DIFFERENTIAL    Narrative:     The following orders were created for panel order CBC & Differential.  Procedure                               Abnormality         Status                     ---------                               -----------         ------                     CBC Auto Differential[[id number]]        Abnormal            Final result                 Please view results for these tests on the individual orders.       Meds given in ED:   Medications   lactated ringers bolus 250 mL (has no administration in time range)   piperacillin-tazobactam (ZOSYN) IVPB 3.375 g in 100 mL NS (CD) (3.375 g Intravenous New Bag 12/3/23 1405)   furosemide (LASIX) injection 40 mg (40 mg Intravenous Given 12/3/23 1209)   ondansetron (ZOFRAN) injection 4 mg (4 mg Intravenous Given 12/3/23 1207)           NIH Stroke Scale:       Isolation/Infection(s):  No active isolations   No active infections     COVID Testing  Collected .  Resulted .    Nursing report ED to floor:  Mental status: .  Ambulatory status: .  Precautions: .    ED nurse phone extentsion- ..

## 2023-12-03 NOTE — ED PROVIDER NOTES
Subjective   History of Present Illness  83-year-old female who presents emergency department with fluid on her abdomen and nausea.  She has noticed an increase in swelling in her stomach.  She was recently at our facility with notes noted below.  She was discharged on the 20th.  She states for the last 3 to 4 days she has not been able to walk with her walker due to generalized weakness.  She will give out and have to sit down.  Her left leg has generalized weakness.  She is not eating, and notes that everything that she tries to eat sours on her stomach.  She did vomit black this morning, and has had some dark stool.  She has some bruising on her forearms.  She has had cough with some mild phlegm production.    Date of Admission: 11/11/2023  Date of Discharge:  11/20/2023  Primary Care Physician: Светлана Loyd MD     Presenting Problem/History of Present Illness:  Final Discharge Diagnoses:       Active Hospital Problems     Diagnosis      **Hypercalcemia      Lymphadenopathy        Consults: Oncology    PET 4/13/23  Impression:              Markedly progressed bulky lymphadenopathy (lower mediastinal,  retrocrural, perigastric, retroperitoneal, right common iliac,  periportal, mesenteric) as detailed above, compatible with metastatic  disease. The bulky lymphadenopathy causes severe narrowing of the right  renal artery and severe narrowing/near occlusion of the IVC.     Soft tissue infiltration of the felisa hepatis as well as along the left  portal vein with associated mild intrahepatic biliary ductal dilation,  compatible with malignancy. 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 This causes severe narrowing and near  occlusion of the left portal vein.     Small left and trace right pleural effusions with mild passive  atelectasis.     Mild left proximal urothelial enhancement, correlate with urinalysis for  urinary tract infection.     Small volume ascites.     Likely  secondary to metastatic cancer preliminary results shows malignant cells of unknown type-pathology still pending.  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     Review of Systems   Constitutional:  Negative for chills and fever.   Respiratory:  Positive for cough.    Gastrointestinal:  Positive for abdominal pain, nausea and vomiting.   Neurological:  Positive for weakness.       Past Medical History:   Diagnosis Date    Aortic regurgitation     mild 2018    CAD in native artery     COVID-19 06/01/2022    mild case, not hospitalized, no MAB    GERD (gastroesophageal reflux disease)     Heart murmur     Hyperlipidemia     Hypertension     Hypothyroidism     Mitral regurgitation     mild per 2013 echo, no significant per 2018    Myalgia     Near syncope     Pre-diabetes     SOB (shortness of breath)     Squamous cell cancer of skin of right forearm     Syncope     Tricuspid regurgitation     mild per 2013 echo, trivial per 2018       No Known Allergies    Past Surgical History:   Procedure Laterality Date    BACK SURGERY      BIOPSY / EXCISION / DISSECTION AXILLARY NODE  2023    had bx w/ Jennifer Restrepo MD at [facility]    CARDIAC CATHETERIZATION      COLONOSCOPY      CORONARY ARTERY BYPASS GRAFT  10/11/2010    Dr. Karina Lora, [facility]    DIAGNOSTIC LAPAROSCOPY N/A 11/14/2023    Procedure: DIAGNOSTIC LAPAROSCOPY;  Surgeon: Adenike Ponce MD;  Location: [facility] OR;  Service: General;  Laterality: N/A;    HYSTERECTOMY      LAPAROSCOPIC CHOLECYSTECTOMY      MEDIAN STERNOTOMY      Thymus cyst resection at same time as CABG    OTHER SURGICAL HISTORY      Rectal surgery    PARTIAL THYMECTOMY  10/11/2010    Dr. Lora    REPLACEMENT TOTAL KNEE Left 2005    TOTAL KNEE ARTHROPLASTY Left 06/2019    TOTAL KNEE ARTHROPLASTY Right 11/04/2020    Dr. Santamaria       Family History   Problem Relation Age of Onset    Coronary artery disease Father     Alzheimer's disease Father     Hypertension Father      Alzheimer's disease Mother     Hypertension Mother     Hyperlipidemia Mother        Social History     Socioeconomic History    Marital status:    Tobacco Use    Smoking status: Never     Passive exposure: Yes    Smokeless tobacco: Never    Tobacco comments:     father smoked pipe in the house,  smoked   Vaping Use    Vaping Use: Never used   Substance and Sexual Activity    Alcohol use: Never    Drug use: Never    Sexual activity: Defer           Objective   Physical Exam  Vitals reviewed.   Constitutional:       Appearance: She is ill-appearing.   HENT:      Head: Normocephalic and atraumatic.      Right Ear: External ear normal.      Left Ear: External ear normal.      Nose: Nose normal.   Eyes:      General: No scleral icterus.     Conjunctiva/sclera: Conjunctivae normal.   Cardiovascular:      Rate and Rhythm: Normal rate and regular rhythm.      Heart sounds: Normal heart sounds.   Pulmonary:      Effort: Pulmonary effort is normal.      Breath sounds: Normal breath sounds.   Abdominal:      General: There is distension.      Palpations: Abdomen is soft.      Tenderness: There is abdominal tenderness.   Musculoskeletal:         General: No swelling or tenderness.      Cervical back: Normal range of motion and neck supple.   Skin:     General: Skin is warm and dry.   Neurological:      Mental Status: She is alert and oriented to person, place, and time.      Cranial Nerves: No cranial nerve deficit.   Psychiatric:         Mood and Affect: Mood normal.         Behavior: Behavior normal.         Procedures           ED Course                                 Lab Results (last 24 hours)       Procedure Component Value Units Date/Time    CBC & Differential [[id number]]  (Abnormal) Collected: 12/03/23 1200    Specimen: Blood Updated: 12/03/23 1209    Narrative:      The following orders were created for panel order CBC & Differential.  Procedure                               Abnormality         Status                      ---------                               -----------         ------                     CBC Auto Differential[[id number]]        Abnormal            Final result                 Please view results for these tests on the individual orders.    Comprehensive Metabolic Panel [[id number]]  (Abnormal) Collected: 12/03/23 1200    Specimen: Blood Updated: 12/03/23 1227     Glucose 93 mg/dL      BUN 29 mg/dL      Creatinine 2.08 mg/dL      Sodium 133 mmol/L      Potassium 4.4 mmol/L      Chloride 93 mmol/L      CO2 26.0 mmol/L      Calcium 10.8 mg/dL      Total Protein 6.4 g/dL      Albumin 3.0 g/dL      ALT (SGPT) 20 U/L      AST (SGOT) 35 U/L      Alkaline Phosphatase 610 U/L      Total Bilirubin 0.3 mg/dL      Globulin 3.4 gm/dL      A/G Ratio 0.9 g/dL      BUN/Creatinine Ratio 13.9     Anion Gap 14.0 mmol/L      eGFR 23.4 mL/min/1.73     Narrative:      GFR Normal >60  Chronic Kidney Disease <60  Kidney Failure <15    The GFR formula is only valid for adults with stable renal function between ages 18 and 70.    Lactic Acid, Plasma [[id number]]  (Abnormal) Collected: 12/03/23 1200    Specimen: Blood Updated: 12/03/23 1225     Lactate 2.6 mmol/L     BNP [[id number]]  (Normal) Collected: 12/03/23 1200    Specimen: Blood Updated: 12/03/23 1229     proBNP 648.2 pg/mL     Narrative:      This assay is used as an aid in the diagnosis of individuals suspected of having heart failure. It can be used as an aid in the diagnosis of acute decompensated heart failure (ADHF) in patients presenting with signs and symptoms of ADHF to the emergency department (ED). In addition, NT-proBNP of <300 pg/mL indicates ADHF is not likely.    Age Range Result Interpretation  NT-proBNP Concentration (pg/mL:      <50             Positive            >450                   Gray                 300-450                    Negative             <300    50-75           Positive            >900                  Gray                300-900                   Negative            <300      >75             Positive            >1800                  Gray                300-1800                  Negative            <300    COVID PRE-OP / PRE-PROCEDURE SCREENING ORDER (NO ISOLATION) - Swab, Nasopharynx [[id number]]  (Normal) Collected: 12/03/23 1200    Specimen: Swab from Nasopharynx Updated: 12/03/23 1300    Narrative:      The following orders were created for panel order COVID PRE-OP / PRE-PROCEDURE SCREENING ORDER (NO ISOLATION) - Swab, Nasopharynx.  Procedure                               Abnormality         Status                     ---------                               -----------         ------                     COVID-19 and FLU A/B PCR...[[id number]]  Normal              Final result                 Please view results for these tests on the individual orders.    CBC Auto Differential [[id number]]  (Abnormal) Collected: 12/03/23 1200    Specimen: Blood Updated: 12/03/23 1209     WBC 10.01 10*3/mm3      RBC 3.57 10*6/mm3      Hemoglobin 10.2 g/dL      Hematocrit 33.1 %      MCV 92.7 fL      MCH 28.6 pg      MCHC 30.8 g/dL      RDW 16.2 %      RDW-SD 53.6 fl      MPV 9.6 fL      Platelets 492 10*3/mm3      Neutrophil % 87.5 %      Lymphocyte % 4.3 %      Monocyte % 5.4 %      Eosinophil % 1.1 %      Basophil % 0.8 %      Immature Grans % 0.9 %      Neutrophils, Absolute 8.76 10*3/mm3      Lymphocytes, Absolute 0.43 10*3/mm3      Monocytes, Absolute 0.54 10*3/mm3      Eosinophils, Absolute 0.11 10*3/mm3      Basophils, Absolute 0.08 10*3/mm3      Immature Grans, Absolute 0.09 10*3/mm3      nRBC 0.2 /100 WBC     COVID-19 and FLU A/B PCR, 1 HR TAT - Swab, Nasopharynx [[id number]]  (Normal) Collected: 12/03/23 1200    Specimen: Swab from Nasopharynx Updated: 12/03/23 1300     COVID19 Not Detected     Influenza A PCR Not Detected     Influenza B PCR Not Detected    Narrative:      Fact sheet for providers: https://www.fda.gov/media/691364/download    Fact sheet  for patients: https://www.fda.gov/media/589821/download    Test performed by PCR.          XR Chest 1 View   Final Result   1. Left lower lobe opacities concerning for pneumonia with probable   small pleural effusion. Vascular crowding versus mild venous congestion.   Prior mediastinal surgery with elevated right hemidiaphragm.           This report was signed and finalized on 12/3/2023 12:46 PM by Dr. Evelyne Dickson MD.          CT Abdomen Pelvis Without Contrast    (Results Pending)                 Medical Decision Making  Problems Addressed:  HCAP (healthcare-associated pneumonia): complicated acute illness or injury    Amount and/or Complexity of Data Reviewed  Labs: ordered.     Details: CBC CMP  Radiology: ordered.     Details: CT abdomen and pelvis    Risk  Prescription drug management.  Decision regarding hospitalization.        Final diagnoses:   HCAP (healthcare-associated pneumonia)       ED Disposition  ED Disposition       ED Disposition   Decision to Admit    Condition   --    Comment   Level of Care: Med/Surg [1]   Diagnosis: HCAP (healthcare-associated pneumonia) [057228]   Admitting Physician: BARB NATION [[id number]]                 No follow-up provider specified.       Medication List      No changes were made to your prescriptions during this visit.            Lei Baker, DO  12/03/23 1201       Lei Baker, DO  12/03/23 1353

## 2023-12-04 ENCOUNTER — PREP FOR SURGERY (OUTPATIENT)
Dept: OTHER | Facility: HOSPITAL | Age: 83
End: 2023-12-04
Payer: MEDICARE

## 2023-12-04 ENCOUNTER — TELEPHONE (OUTPATIENT)
Dept: SURGERY | Facility: CLINIC | Age: 83
End: 2023-12-04
Payer: MEDICARE

## 2023-12-04 ENCOUNTER — APPOINTMENT (OUTPATIENT)
Dept: ULTRASOUND IMAGING | Facility: HOSPITAL | Age: 83
End: 2023-12-04
Payer: MEDICARE

## 2023-12-04 DIAGNOSIS — Z45.2 ENCOUNTER FOR CARE RELATED TO VASCULAR ACCESS PORT: Primary | ICD-10-CM

## 2023-12-04 DIAGNOSIS — C82.30 FOLLICULAR LYMPHOMA GRADE IIIA, UNSPECIFIED BODY REGION: ICD-10-CM

## 2023-12-04 PROBLEM — R74.8 ELEVATED ALKALINE PHOSPHATASE LEVEL: Status: ACTIVE | Noted: 2023-12-04

## 2023-12-04 PROBLEM — E83.52 HYPERCALCEMIA: Status: ACTIVE | Noted: 2023-12-04

## 2023-12-04 PROBLEM — N18.9 ACUTE KIDNEY INJURY SUPERIMPOSED ON CHRONIC KIDNEY DISEASE: Status: ACTIVE | Noted: 2023-12-04

## 2023-12-04 PROBLEM — E11.9 TYPE 2 DIABETES MELLITUS, WITHOUT LONG-TERM CURRENT USE OF INSULIN: Status: ACTIVE | Noted: 2023-12-04

## 2023-12-04 PROBLEM — N17.9 ACUTE KIDNEY INJURY SUPERIMPOSED ON CHRONIC KIDNEY DISEASE: Status: ACTIVE | Noted: 2023-12-04

## 2023-12-04 LAB
ANION GAP SERPL CALCULATED.3IONS-SCNC: 16 MMOL/L (ref 5–15)
B PARAPERT DNA SPEC QL NAA+PROBE: NOT DETECTED
B PERT DNA SPEC QL NAA+PROBE: NOT DETECTED
B2 MICROGLOB SERPL-MCNC: 13.2 MG/L (ref 0.8–2.2)
BASOPHILS # BLD AUTO: 0.07 10*3/MM3 (ref 0–0.2)
BASOPHILS NFR BLD AUTO: 0.8 % (ref 0–1.5)
BUN SERPL-MCNC: 32 MG/DL (ref 8–23)
BUN/CREAT SERPL: 13.6 (ref 7–25)
C PNEUM DNA NPH QL NAA+NON-PROBE: NOT DETECTED
CALCIUM SPEC-SCNC: 10 MG/DL (ref 8.6–10.5)
CHLORIDE SERPL-SCNC: 94 MMOL/L (ref 98–107)
CO2 SERPL-SCNC: 23 MMOL/L (ref 22–29)
CREAT SERPL-MCNC: 2.35 MG/DL (ref 0.57–1)
DEPRECATED RDW RBC AUTO: 55.9 FL (ref 37–54)
EGFRCR SERPLBLD CKD-EPI 2021: 20.2 ML/MIN/1.73
EOSINOPHIL # BLD AUTO: 0.1 10*3/MM3 (ref 0–0.4)
EOSINOPHIL NFR BLD AUTO: 1.1 % (ref 0.3–6.2)
ERYTHROCYTE [DISTWIDTH] IN BLOOD BY AUTOMATED COUNT: 16.6 % (ref 12.3–15.4)
FLUAV SUBTYP SPEC NAA+PROBE: NOT DETECTED
FLUBV RNA ISLT QL NAA+PROBE: NOT DETECTED
GLUCOSE BLDC GLUCOMTR-MCNC: 88 MG/DL (ref 70–130)
GLUCOSE BLDC GLUCOMTR-MCNC: 93 MG/DL (ref 70–130)
GLUCOSE BLDC GLUCOMTR-MCNC: 98 MG/DL (ref 70–130)
GLUCOSE SERPL-MCNC: 93 MG/DL (ref 65–99)
HADV DNA SPEC NAA+PROBE: NOT DETECTED
HCOV 229E RNA SPEC QL NAA+PROBE: NOT DETECTED
HCOV HKU1 RNA SPEC QL NAA+PROBE: NOT DETECTED
HCOV NL63 RNA SPEC QL NAA+PROBE: NOT DETECTED
HCOV OC43 RNA SPEC QL NAA+PROBE: NOT DETECTED
HCT VFR BLD AUTO: 31.6 % (ref 34–46.6)
HGB BLD-MCNC: 9.3 G/DL (ref 12–15.9)
HMPV RNA NPH QL NAA+NON-PROBE: NOT DETECTED
HPIV1 RNA ISLT QL NAA+PROBE: NOT DETECTED
HPIV2 RNA SPEC QL NAA+PROBE: NOT DETECTED
HPIV3 RNA NPH QL NAA+PROBE: NOT DETECTED
HPIV4 P GENE NPH QL NAA+PROBE: NOT DETECTED
IMM GRANULOCYTES # BLD AUTO: 0.06 10*3/MM3 (ref 0–0.05)
IMM GRANULOCYTES NFR BLD AUTO: 0.7 % (ref 0–0.5)
LDH SERPL-CCNC: 1180 U/L (ref 135–214)
LYMPHOCYTES # BLD AUTO: 0.51 10*3/MM3 (ref 0.7–3.1)
LYMPHOCYTES NFR BLD AUTO: 5.7 % (ref 19.6–45.3)
M PNEUMO IGG SER IA-ACNC: NOT DETECTED
MAGNESIUM SERPL-MCNC: 1.8 MG/DL (ref 1.6–2.4)
MCH RBC QN AUTO: 27.8 PG (ref 26.6–33)
MCHC RBC AUTO-ENTMCNC: 29.4 G/DL (ref 31.5–35.7)
MCV RBC AUTO: 94.3 FL (ref 79–97)
MONOCYTES # BLD AUTO: 0.5 10*3/MM3 (ref 0.1–0.9)
MONOCYTES NFR BLD AUTO: 5.6 % (ref 5–12)
NEUTROPHILS NFR BLD AUTO: 7.63 10*3/MM3 (ref 1.7–7)
NEUTROPHILS NFR BLD AUTO: 86.1 % (ref 42.7–76)
NRBC BLD AUTO-RTO: 0 /100 WBC (ref 0–0.2)
PHOSPHATE SERPL-MCNC: 1.6 MG/DL (ref 2.5–4.5)
PLATELET # BLD AUTO: 464 10*3/MM3 (ref 140–450)
PMV BLD AUTO: 9.9 FL (ref 6–12)
POTASSIUM SERPL-SCNC: 4.3 MMOL/L (ref 3.5–5.2)
RBC # BLD AUTO: 3.35 10*6/MM3 (ref 3.77–5.28)
RHINOVIRUS RNA SPEC NAA+PROBE: NOT DETECTED
RSV RNA NPH QL NAA+NON-PROBE: NOT DETECTED
SARS-COV-2 RNA NPH QL NAA+NON-PROBE: NOT DETECTED
SODIUM SERPL-SCNC: 133 MMOL/L (ref 136–145)
WBC NRBC COR # BLD AUTO: 8.87 10*3/MM3 (ref 3.4–10.8)

## 2023-12-04 PROCEDURE — 82948 REAGENT STRIP/BLOOD GLUCOSE: CPT

## 2023-12-04 PROCEDURE — 25810000003 LACTATED RINGERS PER 1000 ML: Performed by: NURSE PRACTITIONER

## 2023-12-04 PROCEDURE — 99214 OFFICE O/P EST MOD 30 MIN: CPT | Performed by: INTERNAL MEDICINE

## 2023-12-04 PROCEDURE — 0202U NFCT DS 22 TRGT SARS-COV-2: CPT | Performed by: NURSE PRACTITIONER

## 2023-12-04 PROCEDURE — 99221 1ST HOSP IP/OBS SF/LOW 40: CPT

## 2023-12-04 PROCEDURE — 63710000001 DRONABINOL PER 2.5 MG: Performed by: INTERNAL MEDICINE

## 2023-12-04 PROCEDURE — 83735 ASSAY OF MAGNESIUM: CPT | Performed by: INTERNAL MEDICINE

## 2023-12-04 PROCEDURE — G0378 HOSPITAL OBSERVATION PER HR: HCPCS

## 2023-12-04 PROCEDURE — 82232 ASSAY OF BETA-2 PROTEIN: CPT | Performed by: INTERNAL MEDICINE

## 2023-12-04 PROCEDURE — 85025 COMPLETE CBC W/AUTO DIFF WBC: CPT | Performed by: INTERNAL MEDICINE

## 2023-12-04 PROCEDURE — 84550 ASSAY OF BLOOD/URIC ACID: CPT | Performed by: INTERNAL MEDICINE

## 2023-12-04 PROCEDURE — 25010000002 ONDANSETRON PER 1 MG: Performed by: INTERNAL MEDICINE

## 2023-12-04 PROCEDURE — 76705 ECHO EXAM OF ABDOMEN: CPT

## 2023-12-04 PROCEDURE — 84100 ASSAY OF PHOSPHORUS: CPT | Performed by: INTERNAL MEDICINE

## 2023-12-04 PROCEDURE — 83615 LACTATE (LD) (LDH) ENZYME: CPT | Performed by: INTERNAL MEDICINE

## 2023-12-04 PROCEDURE — 80048 BASIC METABOLIC PNL TOTAL CA: CPT | Performed by: INTERNAL MEDICINE

## 2023-12-04 PROCEDURE — 25010000002 ENOXAPARIN PER 10 MG: Performed by: INTERNAL MEDICINE

## 2023-12-04 RX ORDER — SODIUM CHLORIDE, SODIUM LACTATE, POTASSIUM CHLORIDE, CALCIUM CHLORIDE 600; 310; 30; 20 MG/100ML; MG/100ML; MG/100ML; MG/100ML
75 INJECTION, SOLUTION INTRAVENOUS CONTINUOUS
Status: DISCONTINUED | OUTPATIENT
Start: 2023-12-04 | End: 2023-12-06

## 2023-12-04 RX ORDER — DRONABINOL 2.5 MG/1
2.5 CAPSULE ORAL 2 TIMES DAILY
Status: DISCONTINUED | OUTPATIENT
Start: 2023-12-04 | End: 2023-12-06 | Stop reason: HOSPADM

## 2023-12-04 RX ADMIN — FAMOTIDINE 20 MG: 20 TABLET, FILM COATED ORAL at 11:27

## 2023-12-04 RX ADMIN — DRONABINOL 2.5 MG: 2.5 CAPSULE ORAL at 16:42

## 2023-12-04 RX ADMIN — ENOXAPARIN SODIUM 30 MG: 100 INJECTION SUBCUTANEOUS at 11:27

## 2023-12-04 RX ADMIN — Medication 10 ML: at 11:27

## 2023-12-04 RX ADMIN — MEGESTROL ACETATE 800 MG: 40 SUSPENSION ORAL at 11:27

## 2023-12-04 RX ADMIN — ONDANSETRON 4 MG: 2 INJECTION INTRAMUSCULAR; INTRAVENOUS at 14:24

## 2023-12-04 RX ADMIN — SODIUM CHLORIDE, POTASSIUM CHLORIDE, SODIUM LACTATE AND CALCIUM CHLORIDE 75 ML/HR: 600; 310; 30; 20 INJECTION, SOLUTION INTRAVENOUS at 14:35

## 2023-12-04 RX ADMIN — DOCUSATE SODIUM 50 MG AND SENNOSIDES 8.6 MG 2 TABLET: 8.6; 5 TABLET, FILM COATED ORAL at 11:27

## 2023-12-04 RX ADMIN — ONDANSETRON 4 MG: 2 INJECTION INTRAMUSCULAR; INTRAVENOUS at 21:49

## 2023-12-04 NOTE — TELEPHONE ENCOUNTER
Attempted to call patient to tell her of her surgery date and time but patient did not answer and I left a voicemail.

## 2023-12-04 NOTE — CASE MANAGEMENT/SOCIAL WORK
Discharge Planning Assessment  [facility]     Patient Name: Imani Chauhan  MRN: [id number]  Today's Date: 12/4/2023    Admit Date: 12/3/2023        Discharge Needs Assessment       Row Name 12/04/23 1112       Living Environment    People in Home alone    Current Living Arrangements home    Potentially Unsafe Housing Conditions none    Primary Care Provided by self    Provides Primary Care For no one    Quality of Family Relationships helpful;involved;supportive    Able to Return to Prior Arrangements yes       Resource/Environmental Concerns    Resource/Environmental Concerns none       Transition Planning    Patient/Family Anticipates Transition to home with family;home with help/services    Patient/Family Anticipated Services at Transition home health care    Transportation Anticipated family or friend will provide       Discharge Needs Assessment    Readmission Within the Last 30 Days previous discharge plan unsuccessful    Current Outpatient/Agency/Support Group homecare agency    Equipment Currently Used at Home wheelchair;commode;walker, rolling    Concerns to be Addressed denies needs/concerns at this time    Anticipated Changes Related to Illness none    Outpatient/Agency/Support Group Needs homecare agency    Discharge Facility/Level of Care Needs home with home health    Current Discharge Risk lives alone    Discharge Coordination/Progress Pt lives at home alone and plans to return home at d/c. She is followed by [facility]. Verified with Autumn [phone]. Rec'd a referral about pt not having an oncology appt at last d/c. Checked documentation and she did have an appt with Dr. Diaz in [place] on Nov 30. Spoke with pt and she said she canceled that appt because she does not want to see him. She wants to see Dr. Boyer. She has talked to Dr. Peralta about it and she said he is arranging her an appt.                   Discharge Plan    No documentation.                 Continued Care and Services - Admitted  Since 12/3/2023    Coordination has not been started for this encounter.       Selected Continued Care - Prior Encounters Includes continued care and service providers with selected services from prior encounters from 9/4/2023 to 12/4/2023      Discharged on 11/20/2023 Admission date: 11/11/2023 - Discharge disposition: Home-Health Care [facility]      Home Medical Care       Service Provider Selected Services Address Phone Fax Patient Preferred    [facility] ,  Home Rehabilitation [address] [place] [address] [address], [facility] [address] [phone] --                             Demographic Summary    No documentation.                  Functional Status    No documentation.                  Psychosocial    No documentation.                  Abuse/Neglect    No documentation.                  Legal    No documentation.                  Substance Abuse    No documentation.                  Patient Forms    No documentation.                     JADE Ibarra

## 2023-12-04 NOTE — H&P
[facility] Medicine Services  HISTORY AND PHYSICAL    Date of Admission: 12/3/2023  Primary Care Physician: Светлана Loyd MD    Subjective   Primary Historian: Pt and/or family    Chief Complaint: see H&P    History and Physical:          ROS:   Otherwise complete ROS reviewed and negative except as mentioned in the HPI.    Past Medical History:   Past Medical History:   Diagnosis Date    Aortic regurgitation     mild 2018    CAD in native artery     COVID-19 06/01/2022    mild case, not hospitalized, no MAB    GERD (gastroesophageal reflux disease)     Heart murmur     Hyperlipidemia     Hypertension     Hypothyroidism     Mitral regurgitation     mild per 2013 echo, no significant per 2018    Myalgia     Near syncope     Pre-diabetes     SOB (shortness of breath)     Squamous cell cancer of skin of right forearm     Syncope     Tricuspid regurgitation     mild per 2013 echo, trivial per 2018     Past Surgical History:  Past Surgical History:   Procedure Laterality Date    BACK SURGERY      BIOPSY / EXCISION / DISSECTION AXILLARY NODE  2023    had bx w/ Jennifer Restrepo MD at [facility]    CARDIAC CATHETERIZATION      COLONOSCOPY      CORONARY ARTERY BYPASS GRAFT  10/11/2010    Dr. Karina Lora, [facility]    DIAGNOSTIC LAPAROSCOPY N/A 11/14/2023    Procedure: DIAGNOSTIC LAPAROSCOPY;  Surgeon: Adenike Ponce MD;  Location: [facility];  Service: General;  Laterality: N/A;    HYSTERECTOMY      LAPAROSCOPIC CHOLECYSTECTOMY      MEDIAN STERNOTOMY      Thymus cyst resection at same time as CABG    OTHER SURGICAL HISTORY      Rectal surgery    PARTIAL THYMECTOMY  10/11/2010    Dr. Lora    REPLACEMENT TOTAL KNEE Left 2005    TOTAL KNEE ARTHROPLASTY Left 06/2019    TOTAL KNEE ARTHROPLASTY Right 11/04/2020    Dr. Santamaria     Social History:  reports that she has never smoked. She has been exposed to tobacco smoke. She has never used smokeless tobacco. She reports that she does not drink alcohol  and does not use drugs.    Family History: family history includes Alzheimer's disease in her father and mother; Coronary artery disease in her father; Hyperlipidemia in her mother; Hypertension in her father and mother.       Allergies:  No Known Allergies    Medications:  Prior to Admission medications    Medication Sig Start Date End Date Taking? Authorizing Provider   acetaminophen (TYLENOL) 500 MG tablet Take 1 tablet by mouth Every 6 (Six) Hours As Needed for Mild Pain. prn    Saige Cervantes MD   aspirin 81 MG EC tablet Take 1 tablet by mouth Daily.    Saige Cervantes MD   atorvastatin (LIPITOR) 10 MG tablet Take 1 tablet by mouth Every Night. 8/16/16   Saige Cervantes MD   Bioflavonoid Products (SUPER-C 1000 PO) Take 1 tablet by mouth Daily. Super c 900 mg, zinc 11 mg, vitamin D3 1,000 units, Vit A 450 mcg, Vitamin E 15 mg OTC    Saige Cervantes MD   Ca Carbonate-Mag Hydroxide (ROLAIDS PO) Take 1 tablet by mouth Daily As Needed (heart burn).    Saige Cervantes MD   cloNIDine (CATAPRES) 0.1 MG tablet Take 1 tablet by mouth 2 (Two) Times a Day As Needed for High Blood Pressure (SBP greater than 180 or DBP greater than 100). 8/24/23 8/23/24  Meghan Freeman APRN   empagliflozin (Jardiance) 10 MG tablet tablet Take 1 tablet by mouth Daily.    Saige Cervantes MD   famotidine (PEPCID) 20 MG tablet Take 1 tablet by mouth Daily. 11/21/23   Charles Mcnair MD   famotidine (PEPCID) 20 MG tablet Take 1 tablet by mouth 2 (Two) Times a Day.    Saige Cervantes MD   furosemide (LASIX) 20 MG tablet Take 1 tablet by mouth Daily As Needed (swelling). 8/24/23 8/23/24  Meghan Freeman APRN   Gabapentin, Once-Daily, 300 MG tablet Take 300 mg by mouth Every Night. 10/17/16   Saige Cervantes MD   HYDROcodone-acetaminophen (NORCO) 7.5-325 MG per tablet Take 1 tablet by mouth Every 6 (Six) Hours As Needed for Moderate Pain or Severe Pain.    Saige Cervantes MD  "  levothyroxine (SYNTHROID, LEVOTHROID) 75 MCG tablet Take 0.5-1 tablets by mouth Daily. Takes 1/2 tab M, W, & F and 1 tab on S, Tu, Th, & Sa 10/13/16   Saige Cervantes MD   lisinopril (PRINIVIL,ZESTRIL) 20 MG tablet Take 1 tablet by mouth 2 (Two) Times a Day. 1/11/18   Saige Cervantes MD   MAGNESIUM GLYCINATE PO Take 1,300 mg by mouth 2 (Two) Times a Day.    Saige Cervantes MD   metFORMIN ER (GLUCOPHAGE-XR) 500 MG 24 hr tablet Take 1 tablet by mouth 2 (Two) Times a Day.    Saige Cervantes MD   NIFEdipine XL (ADALAT CC) 30 MG 24 hr tablet Take 1 tablet by mouth Daily. 11/21/23   Charles Mcnair MD   ondansetron (ZOFRAN) 4 MG tablet Take 1 tablet by mouth Every 6 (Six) Hours As Needed for Nausea or Vomiting. 11/20/23   Charles Mcnair MD   spironolactone (ALDACTONE) 25 MG tablet Take 1 tablet by mouth Daily.    Saige Cervantes MD   therapeutic multivitamin-minerals (THERAGRAN-M) tablet Take 1 tablet by mouth Daily.    Saige Cervantes MD   lansoprazole (PREVACID) 15 MG capsule Take 1 capsule by mouth Daily As Needed.  12/3/23  Saige Cervantes MD     I have utilized all available immediate resources to obtain, update, or review the patient's current medications (including all prescriptions, over-the-counter products, herbals, cannabis/cannabidiol products, and vitamin/mineral/dietary (nutritional) supplements).    Objective     Vital Signs: /63 (BP Location: Left arm, Patient Position: Lying)   Pulse 90   Temp 97.8 °F (36.6 °C) (Oral)   Resp 18   Ht 162.6 cm (64\")   Wt 70.8 kg (156 lb)   SpO2 94%   BMI 26.78 kg/m²   Physical exam:  General: No acute distress  HEENT: normocephalic, atraumatic  Neck: Supple  CV: RRR  Lung: Clear to auscultation bilaterally.  No wheeze, rales, or rhonchi noted.  Abdominal exam: Positive bowel sounds, nontender, non distended  Extremity: No edema noted  Neurological exam: AAO x3. Cranial nerve II to XII grossly intact  Skin exam: Dry " and warm  Psychiatric exam: Calm, cooperative, and normal affect         Results Reviewed:  Lab Results (last 24 hours)       Procedure Component Value Units Date/Time    STAT Lactic Acid, Reflex [[id number]] Collected: 12/03/23 1859    Specimen: Blood Updated: 12/03/23 1931    Blood Culture - Blood, Wrist, Left [[id number]] Collected: 12/03/23 1357    Specimen: Blood from Wrist, Left Updated: 12/03/23 1459    Blood Culture - Blood, Arm, Left [[id number]] Collected: 12/03/23 1400    Specimen: Blood from Arm, Left Updated: 12/03/23 1459    COVID PRE-OP / PRE-PROCEDURE SCREENING ORDER (NO ISOLATION) - Swab, Nasopharynx [[id number]]  (Normal) Collected: 12/03/23 1200    Specimen: Swab from Nasopharynx Updated: 12/03/23 1300    Narrative:      The following orders were created for panel order COVID PRE-OP / PRE-PROCEDURE SCREENING ORDER (NO ISOLATION) - Swab, Nasopharynx.  Procedure                               Abnormality         Status                     ---------                               -----------         ------                     COVID-19 and FLU A/B PCR...[[id number]]  Normal              Final result                 Please view results for these tests on the individual orders.    COVID-19 and FLU A/B PCR, 1 HR TAT - Swab, Nasopharynx [[id number]]  (Normal) Collected: 12/03/23 1200    Specimen: Swab from Nasopharynx Updated: 12/03/23 1300     COVID19 Not Detected     Influenza A PCR Not Detected     Influenza B PCR Not Detected    Narrative:      Fact sheet for providers: https://www.fda.gov/media/262552/download    Fact sheet for patients: https://www.fda.gov/media/933435/download    Test performed by PCR.    BNP [[id number]]  (Normal) Collected: 12/03/23 1200    Specimen: Blood Updated: 12/03/23 1229     proBNP 648.2 pg/mL     Narrative:      This assay is used as an aid in the diagnosis of individuals suspected of having heart failure. It can be used as an aid in the diagnosis of acute decompensated heart  failure (ADHF) in patients presenting with signs and symptoms of ADHF to the emergency department (ED). In addition, NT-proBNP of <300 pg/mL indicates ADHF is not likely.    Age Range Result Interpretation  NT-proBNP Concentration (pg/mL:      <50             Positive            >450                   Gray                 300-450                    Negative             <300    50-75           Positive            >900                  Gray                300-900                  Negative            <300      >75             Positive            >1800                  Gray                300-1800                  Negative            <300    Comprehensive Metabolic Panel [[id number]]  (Abnormal) Collected: 12/03/23 1200    Specimen: Blood Updated: 12/03/23 1227     Glucose 93 mg/dL      BUN 29 mg/dL      Creatinine 2.08 mg/dL      Sodium 133 mmol/L      Potassium 4.4 mmol/L      Chloride 93 mmol/L      CO2 26.0 mmol/L      Calcium 10.8 mg/dL      Total Protein 6.4 g/dL      Albumin 3.0 g/dL      ALT (SGPT) 20 U/L      AST (SGOT) 35 U/L      Alkaline Phosphatase 610 U/L      Total Bilirubin 0.3 mg/dL      Globulin 3.4 gm/dL      A/G Ratio 0.9 g/dL      BUN/Creatinine Ratio 13.9     Anion Gap 14.0 mmol/L      eGFR 23.4 mL/min/1.73     Narrative:      GFR Normal >60  Chronic Kidney Disease <60  Kidney Failure <15    The GFR formula is only valid for adults with stable renal function between ages 18 and 70.    Lactic Acid, Plasma [[id number]]  (Abnormal) Collected: 12/03/23 1200    Specimen: Blood Updated: 12/03/23 1225     Lactate 2.6 mmol/L     CBC & Differential [[id number]]  (Abnormal) Collected: 12/03/23 1200    Specimen: Blood Updated: 12/03/23 1209    Narrative:      The following orders were created for panel order CBC & Differential.  Procedure                               Abnormality         Status                     ---------                               -----------         ------                     CBC Auto  Differential[[id number]]        Abnormal            Final result                 Please view results for these tests on the individual orders.    CBC Auto Differential [[id number]]  (Abnormal) Collected: 12/03/23 1200    Specimen: Blood Updated: 12/03/23 1209     WBC 10.01 10*3/mm3      RBC 3.57 10*6/mm3      Hemoglobin 10.2 g/dL      Hematocrit 33.1 %      MCV 92.7 fL      MCH 28.6 pg      MCHC 30.8 g/dL      RDW 16.2 %      RDW-SD 53.6 fl      MPV 9.6 fL      Platelets 492 10*3/mm3      Neutrophil % 87.5 %      Lymphocyte % 4.3 %      Monocyte % 5.4 %      Eosinophil % 1.1 %      Basophil % 0.8 %      Immature Grans % 0.9 %      Neutrophils, Absolute 8.76 10*3/mm3      Lymphocytes, Absolute 0.43 10*3/mm3      Monocytes, Absolute 0.54 10*3/mm3      Eosinophils, Absolute 0.11 10*3/mm3      Basophils, Absolute 0.08 10*3/mm3      Immature Grans, Absolute 0.09 10*3/mm3      nRBC 0.2 /100 WBC           Imaging Results (Last 24 Hours)       Procedure Component Value Units Date/Time    CT Abdomen Pelvis Without Contrast [[id number]] Collected: 12/03/23 1425     Updated: 12/03/23 1436    Narrative:      CT ABDOMEN PELVIS WO CONTRAST- 12/3/2023 1:58 PM     HISTORY: Abdominal pain and distention. Recently diagnosed B-cell  lymphoma.; J18.9-Pneumonia, unspecified organism; recently diagnosed  B-cell lymphoma     COMPARISON: 11/12/2023     DOSE LENGTH PRODUCT: 400 mGy cm. Automated exposure control was also  utilized to decrease patient radiation dose.     TECHNIQUE: Axial images of the abdomen and pelvis are performed without  IV contrast     FINDINGS: Similar small bilateral pleural effusions with bibasilar  atelectasis. Posterior inferior as well as retrocrural lymphadenopathy  remains similar to the previous study.     The nonenhanced liver, spleen, and adrenal glands are unremarkable.  Spleen is normal in size measuring 7.8 cm. Limited evaluation of the  pancreatic tissue without IV contrast. No abnormal perinephric  fluid  collection. No hydronephrosis. Mildly distended bladder unremarkable.     There is prominent bulky mesenteric and retroperitoneal lymphadenopathy.  Confluent lymphadenopathy at the root of the mesentery measuring up to  13 cm in width, similar to the prior study.  Retroperitoneal lymphadenopathy remains bulky with confluent periaortic  lymph nodes measuring up to 6 cm.  Iliac lymphadenopathy again visualized. Little interval change since  11/12/2023.      There is no free intraperitoneal air or loculated abscess collection  identified with nonenhanced imaging. Similar small volume ascites within  the lower pelvis. No evidence for bowel obstruction. Mild to moderate  vascular calcification.     Degenerative changes regional skeleton with a mild left convexity of the  thoracolumbar curvature. No focal aggressive regional bony lesions.       Impression:      1. Compared to 11/12/2023, overall similar bulky inferior mediastinal,  retrocrural, retroperitoneal, mesenteric, and pelvic iliac  lymphadenopathy related to patient's recently diagnosed B-cell lymphoma.  2. Similar small volume ascites within the lower pelvis. No evidence of  bowel obstruction. No free air or abscess.  3. Similar small bilateral pleural effusions with bibasilar atelectasis.     This report was signed and finalized on 12/3/2023 2:33 PM by Dr. Evelyne Dickson MD.       XR Chest 1 View [[id number]] Collected: 12/03/23 1245     Updated: 12/03/23 1249    Narrative:      XR CHEST 1 VW-     HISTORY: SOA RO fluid     COMPARISON: None     FINDINGS: Frontal view the chest obtained.     Prior mediastinal surgery. Mild to moderate elevation right  hemidiaphragm. Left lower lobe opacities questionable small left pleural  effusion. Vascular crowding versus mild venous congestion. No  pneumothorax. No acute regional bony pathology.       Impression:      1. Left lower lobe opacities concerning for pneumonia with probable  small pleural effusion. Vascular  crowding versus mild venous congestion.  Prior mediastinal surgery with elevated right hemidiaphragm.        This report was signed and finalized on 12/3/2023 12:46 PM by Dr. Eveylne Dickson MD.             I have personally reviewed and interpreted the radiology studies and ECG obtained at time of admission.     Assessment / Plan   Assessment:   Active Hospital Problems    Diagnosis     **HCAP (healthcare-associated pneumonia)      Hypercalcemia likely secondary to DLBCL (diffuse large B cell lymphoma) corrected Ca 11.3, stable  -She received 1 doses zoledronic acid on 11/11 per oncology  -hold IVF for now  -recheck CMP in am      Elevated alkaline phosphatase, downtrending: no bilirubin elevation, AST elevated but downtrending. RUQ ultrasound 11/17 showed patent main portal vein, left portal vein, right portal vein.   -cont to monitor for now     Epigastric pain 2/2 abdominal swelling:  -cont pepcid 20 po bid  -discontinued home PPI for recurrent hypomagnesemia last admission     Diffuse large B cell lymphoma:  -oncology consult  -social work consult since pt was never given oncology appointment after disharge     RICHA on CKD 3, 2/2 dehydration: Cr 1.5-1.6 appears to be her baseline  -recheck labs daily     DM2: A1c 6.1  -goal glucose < 180 while inpatient  -Hold home Jardiance for poor appetite  -low ISS bid with breakfast and supper     Hypothyroidism:  -cont home Synthroid         Dispo: We are treating for RICHA secondary to dehydration, patient was recently diagnosed with diffuse large B-cell lymphoma and was seen by Dr. Aleman however there was a mixup and she did not have any follow-up appointment scheduled after she left the hospital, oncology has been consulted.    Alvarez Lam MD, 12/03/23, 19:38 CST      Treatment Plan  The patient will be admitted to my service here at [facility].     Medical Decision Making  Number and Complexity of problems: see assessment and plan  Differential  Diagnosis: see assessment and plan    Conditions and Status             MDM Data  External documents reviewed: yes  Cardiac tracing (EKG, telemetry) interpretation: see cardiology read and/or assessment and plan  Radiology interpretation: see radiologist read  Labs reviewed: yes  Any tests that were considered but not ordered: n/a     Decision rules/scores evaluated (example [id number], Wells, etc): see assessment and plan     Discussed with: see assessment and plan     Care Planning  Shared decision making: see assessment and plan  Code status and discussions: see assessment and plan    Disposition  Social Determinants of Health that impact treatment or disposition: see dispo above.  Estimated length of stay is see dispo above.    I confirmed that the patient's advanced care plan is present, code status is documented, and a surrogate decision maker is listed in the patient's medical record.     The patient's surrogate decision maker is see chart    The patient was seen and examined by me is see admission order time.    Electronically signed by Alvarez Lam MD, 12/03/23, 19:38 CST.

## 2023-12-04 NOTE — PLAN OF CARE
Problem: Adult Inpatient Plan of Care  Goal: Plan of Care Review  Outcome: Ongoing, Progressing  Flowsheets (Taken 12/3/2023 1931)  Outcome Evaluation: RA. Patient IV CDI, IID. Lovenox. No c/o pain. Reg diet. CT, see Flowsheets. VSS, safety maintained.

## 2023-12-04 NOTE — PROGRESS NOTES
"    [facility] Medicine Services  INPATIENT PROGRESS NOTE    Patient Name: Imani Chauhan  Date of Admission: 12/3/2023  Today's Date: 12/04/23  Length of Stay: 0  Primary Care Physician: Светлана Loyd MD    Subjective   Chief Complaint: Nausea, fluid on her abdomen.  HPI   Ms. Chauhan presented to [facility] emergency room with nausea, \"fluid on her abdomen\" and increased abdominal swelling.  Patient reported the last 3 to 4 days she has had difficulty walking due to generalized weakness and had to sit down frequently.  She reports legs are weak, decreased appetite and everything she tries to eat sours on her stomach.  Patient did report vomiting the day of admission looked black.  Patient had recent admission 11/11/2023 - 11/20/2023 with hypercalcemia and lymphadenopathy.  Patient had left retroperitoneal lymph node biopsy 7/6/2023 with mixed lymphoid infiltrates and fibrous tissue.  Flow cytometry no detectable clonal B-cell population.  Liver biopsy 11/16/2023 mixed portal inflammation and mild steatosis with no diagnostic malignancy.  Additional histologic levels examined.  Retroperitoneal biopsy 11/20/2023 lymphoma favor large cell CD20 positive.  Follicular lymphoma grade 3A.  She presented to ER with abdominal pain and nausea.  WBC 10.01, hemoglobin 10.2, creatinine 2.08 (GFR 23.4).  Previous creatinine 1.43 on 11/20/2023 (GFR 36.7%), calcium 10.8, proBNP 648.  COVID-19 not detected.  Chest x-ray showed left lower lobe opacities concerning for pneumonia with probable small effusion.  Vascular crowding versus mild venous congestion.  CT abdomen pelvis 12/3/2023 compared to 11/12/2023 overall similar bulky mediastinal, retrocrural, retroperitoneal, mesenteric and pelvic iliac lymphadenopathy related to recent diagnosis of B-cell lymphoma.  Similar small volume ascites within the lower pelvis.  No bowel obstruction free air or abscess.  Similar small bilateral " pleural effusions with atelectasis.  Lasix, Zofran, Zosyn given in ER.    Today  Sitting up in bed.  Oxygen 1 L.  Multiple family members in room.  Have had a long discussion with family members including 2 sons and 2 daughter-in-law's for greater than 45 minutes today.  Discussed plan from Dr. Peralta to include treating acute kidney injury, consider bendamustine 6 cycles when GFR greater than 30 and outpatient PET and port placement.  Patient can follow-up with Dr. Aleman on 12/11/2023.  Patient reports decreased appetite and Megace added.  Ultrasound of the abdomen minimal ascites and no paracentesis needed.  Do not believe patient has pneumonia she is afebrile, white blood cell count normal and atelectasis essentially unchanged.  Okay for placement per Dr. Peralta.    Review of Systems   Constitutional:  Positive for activity change, appetite change and fatigue. Negative for chills and fever.   HENT:  Negative for congestion and trouble swallowing.    Eyes:  Negative for photophobia and visual disturbance.   Respiratory:  Negative for cough, shortness of breath and wheezing.    Cardiovascular:  Negative for chest pain, palpitations and leg swelling.   Gastrointestinal:  Positive for abdominal distention and nausea. Negative for constipation, diarrhea and vomiting.   Endocrine: Negative for cold intolerance, heat intolerance and polyuria.   Genitourinary:  Negative for dysuria, frequency and urgency.   Musculoskeletal:  Positive for gait problem.   Skin:  Negative for color change, pallor, rash and wound.   Allergic/Immunologic: Negative for immunocompromised state.   Neurological:  Positive for weakness. Negative for light-headedness.   Hematological:  Negative for adenopathy. Does not bruise/bleed easily.   Psychiatric/Behavioral:  Negative for agitation, behavioral problems and confusion.       All pertinent negatives and positives are as above. All other systems have been reviewed and are negative unless  otherwise stated.     Objective    Temp:  [97.5 °F (36.4 °C)-98 °F (36.7 °C)] 98 °F (36.7 °C)  Heart Rate:  [84-95] 95  Resp:  [16-18] 18  BP: (126-146)/(62-76) 139/67  Physical Exam  Vitals and nursing note reviewed.   Constitutional:       Comments: Sitting up in bed.  Oxygen at 1 L.  Multiple family members in room.   HENT:      Head: Normocephalic and atraumatic.      Nose: No congestion.      Mouth/Throat:      Pharynx: Oropharynx is clear. No oropharyngeal exudate or posterior oropharyngeal erythema.   Eyes:      Extraocular Movements: Extraocular movements intact.      Pupils: Pupils are equal, round, and reactive to light.   Cardiovascular:      Rate and Rhythm: Normal rate and regular rhythm.      Heart sounds: No murmur heard.  Pulmonary:      Breath sounds: No wheezing, rhonchi or rales.      Comments: Oxygen at 1 L and since been removed.  Abdominal:      General: There is distension (Minimal).   Genitourinary:     Comments: WIC in place  Musculoskeletal:         General: No swelling or tenderness.      Cervical back: Normal range of motion and neck supple.   Skin:     General: Skin is warm and dry.   Neurological:      General: No focal deficit present.      Mental Status: She is alert and oriented to person, place, and time.   Psychiatric:         Mood and Affect: Mood normal.         Behavior: Behavior normal.         Thought Content: Thought content normal.         Judgment: Judgment normal.       Results Review:  I have reviewed the labs, radiology results, and diagnostic studies.    Laboratory Data:   Results from last 7 days   Lab Units 12/04/23  0458 12/03/23  1200   WBC 10*3/mm3 8.87 10.01   HEMOGLOBIN g/dL 9.3* 10.2*   HEMATOCRIT % 31.6* 33.1*   PLATELETS 10*3/mm3 464* 492*        Results from last 7 days   Lab Units 12/04/23  0458 12/03/23  1200   SODIUM mmol/L 133* 133*   POTASSIUM mmol/L 4.3 4.4   CHLORIDE mmol/L 94* 93*   CO2 mmol/L 23.0 26.0   BUN mg/dL 32* 29*   CREATININE mg/dL 2.35*  2.08*   CALCIUM mg/dL 10.0 10.8*   BILIRUBIN mg/dL  --  0.3   ALK PHOS U/L  --  610*   ALT (SGPT) U/L  --  20   AST (SGOT) U/L  --  35*   GLUCOSE mg/dL 93 93       Culture Data:   Blood Culture   Date Value Ref Range Status   12/03/2023 No growth at 24 hours  Preliminary   12/03/2023 No growth at 24 hours  Preliminary     Imaging Results (All)       Procedure Component Value Units Date/Time    US Abdomen Limited [[id number]] Collected: 12/04/23 1007     Updated: 12/04/23 1011    Narrative:      EXAM: US ABDOMEN LIMITED-      DATE: 12/4/2023 9:29 AM     HISTORY: check for drainable ascitic fluid; J18.9-Pneumonia, unspecified  organism       COMPARISON: No existing relevant imaging studies available.      TECHNIQUE: Real-time ultrasound performed with representative images  saved to PACS.     FINDINGS:    Targeted ultrasound performed of the RIGHT upper, RIGHT lower, LEFT  upper and LEFT lower quadrants. Minimal ascites demonstrated adjacent to  the spleen, too small to safely drain.          Impression:      1. Minimal perisplenic ascites.        This report was signed and finalized on 12/4/2023 10:08 AM by Dr Lorelei Amor MD.       CT Abdomen Pelvis Without Contrast [[id number]] Collected: 12/03/23 1425     Updated: 12/03/23 1436    Narrative:      CT ABDOMEN PELVIS WO CONTRAST- 12/3/2023 1:58 PM     HISTORY: Abdominal pain and distention. Recently diagnosed B-cell  lymphoma.; J18.9-Pneumonia, unspecified organism; recently diagnosed  B-cell lymphoma     COMPARISON: 11/12/2023     DOSE LENGTH PRODUCT: 400 mGy cm. Automated exposure control was also  utilized to decrease patient radiation dose.     TECHNIQUE: Axial images of the abdomen and pelvis are performed without  IV contrast     FINDINGS: Similar small bilateral pleural effusions with bibasilar  atelectasis. Posterior inferior as well as retrocrural lymphadenopathy  remains similar to the previous study.     The nonenhanced liver, spleen, and adrenal glands  are unremarkable.  Spleen is normal in size measuring 7.8 cm. Limited evaluation of the  pancreatic tissue without IV contrast. No abnormal perinephric fluid  collection. No hydronephrosis. Mildly distended bladder unremarkable.     There is prominent bulky mesenteric and retroperitoneal lymphadenopathy.  Confluent lymphadenopathy at the root of the mesentery measuring up to  13 cm in width, similar to the prior study.  Retroperitoneal lymphadenopathy remains bulky with confluent periaortic  lymph nodes measuring up to 6 cm.  Iliac lymphadenopathy again visualized. Little interval change since  11/12/2023.      There is no free intraperitoneal air or loculated abscess collection  identified with nonenhanced imaging. Similar small volume ascites within  the lower pelvis. No evidence for bowel obstruction. Mild to moderate  vascular calcification.     Degenerative changes regional skeleton with a mild left convexity of the  thoracolumbar curvature. No focal aggressive regional bony lesions.       Impression:      1. Compared to 11/12/2023, overall similar bulky inferior mediastinal,  retrocrural, retroperitoneal, mesenteric, and pelvic iliac  lymphadenopathy related to patient's recently diagnosed B-cell lymphoma.  2. Similar small volume ascites within the lower pelvis. No evidence of  bowel obstruction. No free air or abscess.  3. Similar small bilateral pleural effusions with bibasilar atelectasis.     This report was signed and finalized on 12/3/2023 2:33 PM by Dr. Evelyne Dickson MD.       XR Chest 1 View [[id number]] Collected: 12/03/23 1245     Updated: 12/03/23 1249    Narrative:      XR CHEST 1 VW-     HISTORY: SOA RO fluid     COMPARISON: None     FINDINGS: Frontal view the chest obtained.     Prior mediastinal surgery. Mild to moderate elevation right  hemidiaphragm. Left lower lobe opacities questionable small left pleural  effusion. Vascular crowding versus mild venous congestion. No  pneumothorax. No  acute regional bony pathology.       Impression:      1. Left lower lobe opacities concerning for pneumonia with probable  small pleural effusion. Vascular crowding versus mild venous congestion.  Prior mediastinal surgery with elevated right hemidiaphragm.        This report was signed and finalized on 12/3/2023 12:46 PM by Dr. Evelyne Dickson MD.              Scheduled medications:  atorvastatin, 10 mg, Oral, Nightly  dronabinol, 2.5 mg, Oral, BID  enoxaparin, 30 mg, Subcutaneous, Daily  famotidine, 20 mg, Oral, Daily  insulin lispro, 2-7 Units, Subcutaneous, BID AC  senna-docusate sodium, 2 tablet, Oral, BID  sodium chloride, 10 mL, Intravenous, Q12H         I have reviewed the patient's current medications.     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  Acute kidney injury superimposed on CKD stage IIIb.  On admission, creatinine 2.08 (GFR 23.4).  Previous creatinine 1.43 on 11/20/2023 (GFR 36.7%).  Patient received IV fluids.  Creatinine 2.35 today.  Will continue lactated Ringer's at 75 mL/h.  Repeat BMP in AM.  Hold nephrotoxic agents.  Hold Lasix and lisinopril.    2.  Follicular lymphoma grade 3A.  Oncology, Dr. Peralta consulted and discussed with Dr. Peralta today.  Recommends hydration for acute kidney injury.  Consider Bendamustine if GFR at least 30% with rituxamab with anticipation of 6 cycles.  Will need outpatient PET scan.  Patient for port placement.  Follow-up with Dr. Aleman 12/11/2023 at 2 PM.  I discussed with family members that chemotherapy would not be started till GFR consistently greater than 30%.  Consult general surgery for port placement.  No concerns  for pneumonia as white blood cell count normal, WBC normal, no cough or congestion.    3.  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  Calcium 10.8 on admission.  Calcium 10 today.  Patient received Zometa 11/11 during previous admission.    4.  Diabetes mellitus type 2 without insulin.  Hemoglobin A1c 6.1.  Accu-Cheks with sliding scale insulin coverage.  glucose 98, 88, 93. Family reports metformin discontinued.  Hold Jardiance due to decreased appetite.    5.  Elevated alkaline phosphatase.  611 previously 958 on 11/20/2023.  Right upper quadrant ultrasound 11/17/2023 noted patent main portal vein, left portal vein, right portal vein.    6.  Acquired hypothyroidism.  TSH 2.95 on 11/12/2023.  Continue Synthroid.    7.  Decreased appetite with mild nausea likely contributing to acute kidney injury. Dronabinol 2.5 mg twice daily.  Famotidine daily.  Pepcid 20 mg orally twice daily    8.  Lovenox for deep vein thrombosis prophylaxis.  Consult physical therapy due to functional decline.    9.  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     [facility] Data  External documents reviewed: Reviewed hospitalization with discharge 11/20/2023  Cardiac tracing (EKG, telemetry)  interpretation: None  Radiology interpretation: Reviewed radiology interpretation ultrasound abdomen, CT abdomen and pelvis, chest x-ray  Labs reviewed:   BMP 12/4/23.  Repeat CMP in AM.  CBC 12/4/2023.  Repeat CBC in AM.  Respiratory PCR panel negative.  Blood cultures no growth    Any tests that were considered but not ordered: None     Decision rules/scores evaluated (example [id number], [place], etc): None     Discussed with: Dr. Lam, Dr. Peralta, oncology, patient, 2 sons and 2 daughters present in room.     Care Planning  Shared decision making: Dr. Lam, Dr. Peralta, oncology, patient, 2 sons and 2 daughters present in room.  Patient and son POA agrees to IV fluid hydration, repeat lab work, general surgery consult for port placement, recommendations per oncology pending lab work improvement.  Outpatient PET scan     Code status and discussions: Full code  Patient surrogate decision maker is her son, Keith Donato  Social Determinants of Health that impact treatment or disposition: Decreased activities of ADL  I expect the patient to be discharged to home with home health versus SNF in 2-4 days.     Electronically signed by SOFIYA Zuleta, 12/04/23, 16:37 CST.     The above documentation resulted from a face-to-face encounter by me Jaclyn ARRIAZA, [facility].

## 2023-12-04 NOTE — OUTREACH NOTE
Medical Week 3 Survey      Flowsheet Row Responses   [facility], [place], operated by [facility] patient discharged from? [place]   Does the patient have one of the following disease processes/diagnoses(primary or secondary)? Other   Week 3 attempt successful? No   Unsuccessful attempts Attempt 1   Revoke Readmitted            Gabrielle GILBERT - Registered Nurse

## 2023-12-04 NOTE — CONSULTS
Office New Patient History and Physical:     Referring Provider: Arnaldo Peralta MD    Chief Complaint   Patient presents with    Weakness - Generalized       Subjective .     History of present illness:  Imani Chauhan is a 83 y.o. female who was admitted for healthcare associated pneumonia and follicular lymphoma grade 3A.  Dr. Peralta was consulted and is planning to start the patient on treatment in the outpatient setting.  Our service was then consulted for outpatient port placement.  She has never had a port before.  She is not on blood thinners, other than 81mg of ASA.  She is a non-smoker.    History  Past Medical History:   Diagnosis Date    Acute kidney injury superimposed on chronic kidney disease stage IIIb 12/4/2023    Aortic regurgitation     mild 2018    CAD in native artery     COVID-19 06/01/2022    mild case, not hospitalized, no MAB    GERD (gastroesophageal reflux disease)     Heart murmur     Hyperlipidemia     Hypertension     Hypothyroidism     Mitral regurgitation     mild per 2013 echo, no significant per 2018    Myalgia     Near syncope     Pre-diabetes     SOB (shortness of breath)     Squamous cell cancer of skin of right forearm     Syncope     Tricuspid regurgitation     mild per 2013 echo, trivial per 2018   ,   Past Surgical History:   Procedure Laterality Date    BACK SURGERY      BIOPSY / EXCISION / DISSECTION AXILLARY NODE  2023    had bx w/ Jennifer Restrepo MD at [facility]    CARDIAC CATHETERIZATION      COLONOSCOPY      CORONARY ARTERY BYPASS GRAFT  10/11/2010    Dr. Karina Lora, [facility]    DIAGNOSTIC LAPAROSCOPY N/A 11/14/2023    Procedure: DIAGNOSTIC LAPAROSCOPY;  Surgeon: Adenike Ponce MD;  Location: [facility] OR;  Service: General;  Laterality: N/A;    HYSTERECTOMY      LAPAROSCOPIC CHOLECYSTECTOMY      MEDIAN STERNOTOMY      Thymus cyst resection at same time as CABG    OTHER SURGICAL HISTORY      Rectal surgery    PARTIAL THYMECTOMY  10/11/2010    Dr. Lora    REPLACEMENT TOTAL KNEE Left  2005    TOTAL KNEE ARTHROPLASTY Left 06/2019    TOTAL KNEE ARTHROPLASTY Right 11/04/2020    Dr. Santamaria   ,   Family History   Problem Relation Age of Onset    Coronary artery disease Father     Alzheimer's disease Father     Hypertension Father     Alzheimer's disease Mother     Hypertension Mother     Hyperlipidemia Mother    ,   Social History     Tobacco Use    Smoking status: Never     Passive exposure: Yes    Smokeless tobacco: Never    Tobacco comments:     father smoked pipe in the house,  smoked   Vaping Use    Vaping Use: Never used   Substance Use Topics    Alcohol use: Never    Drug use: Never   ,   Medications Prior to Admission   Medication Sig Dispense Refill Last Dose    acetaminophen (TYLENOL) 500 MG tablet Take 1 tablet by mouth Every 6 (Six) Hours As Needed for Mild Pain. prn       aspirin 81 MG EC tablet Take 1 tablet by mouth Daily.       atorvastatin (LIPITOR) 10 MG tablet Take 1 tablet by mouth Every Night.       Bioflavonoid Products (SUPER-C 1000 PO) Take 1 tablet by mouth Daily. Super c 900 mg, zinc 11 mg, vitamin D3 1,000 units, Vit A 450 mcg, Vitamin E 15 mg OTC       Ca Carbonate-Mag Hydroxide (ROLAIDS PO) Take 1 tablet by mouth Daily As Needed (heart burn).       cloNIDine (CATAPRES) 0.1 MG tablet Take 1 tablet by mouth 2 (Two) Times a Day As Needed for High Blood Pressure (SBP greater than 180 or DBP greater than 100). 60 tablet 11     empagliflozin (Jardiance) 10 MG tablet tablet Take 1 tablet by mouth Daily.       famotidine (PEPCID) 20 MG tablet Take 1 tablet by mouth Daily. 180 tablet 0     furosemide (LASIX) 20 MG tablet Take 1 tablet by mouth Daily As Needed (swelling). 30 tablet 11     Gabapentin, Once-Daily, 300 MG tablet Take 900 mg by mouth Every Night.       HYDROcodone-acetaminophen (NORCO) 7.5-325 MG per tablet Take 1 tablet by mouth Every 6 (Six) Hours As Needed for Moderate Pain or Severe Pain.       levothyroxine (SYNTHROID, LEVOTHROID) 75 MCG tablet Take 0.5-1  tablets by mouth Daily. Takes 1/2 tab M, W, & F and 1 tab on S, Tu, Th, & Sa       lisinopril (PRINIVIL,ZESTRIL) 20 MG tablet Take 1 tablet by mouth 2 (Two) Times a Day.       MAGNESIUM GLYCINATE PO Take 1,300 mg by mouth 2 (Two) Times a Day.       NIFEdipine XL (ADALAT CC) 30 MG 24 hr tablet Take 1 tablet by mouth Daily. 90 tablet 0     ondansetron (ZOFRAN) 4 MG tablet Take 1 tablet by mouth Every 6 (Six) Hours As Needed for Nausea or Vomiting. 24 tablet 0     therapeutic multivitamin-minerals (THERAGRAN-M) tablet Take 1 tablet by mouth Daily.       and Allergies:  Patient has no known allergies.    Current Facility-Administered Medications:     acetaminophen (TYLENOL) tablet 650 mg, 650 mg, Oral, Q4H PRN, Alvarez Lam MD    atorvastatin (LIPITOR) tablet 10 mg, 10 mg, Oral, Nightly, Alvarez Lam MD, 10 mg at 12/03/23 2035    sennosides-docusate (PERICOLACE) 8.6-50 MG per tablet 2 tablet, 2 tablet, Oral, BID, 2 tablet at 12/04/23 1127 **AND** polyethylene glycol (MIRALAX) packet 17 g, 17 g, Oral, Daily PRN **AND** bisacodyl (DULCOLAX) EC tablet 5 mg, 5 mg, Oral, Daily PRN **AND** bisacodyl (DULCOLAX) suppository 10 mg, 10 mg, Rectal, Daily PRN, Alvarez Lam MD    calcium carbonate (TUMS) chewable tablet 500 mg (200 mg elemental), 2 tablet, Oral, BID PRN, Alavrez Lam MD    dextrose (D50W) (25 g/50 mL) IV injection 25 g, 25 g, Intravenous, Q15 Min PRN, Alvarez Lam MD    dextrose (GLUTOSE) oral gel 15 g, 15 g, Oral, Q15 Min PRN, Alvarez Lam MD    dronabinol (MARINOL) capsule 2.5 mg, 2.5 mg, Oral, BID, Alvarez Lam MD, 2.5 mg at 12/04/23 1642    Enoxaparin Sodium (LOVENOX) syringe 30 mg, 30 mg, Subcutaneous, Daily, Alvarez Lam MD, 30 mg at 12/04/23 1127    famotidine (PEPCID) tablet 20 mg, 20 mg, Oral, Daily, Alvarez Lam MD, 20 mg at 12/04/23 1127    glucagon (GLUCAGEN) injection 1 mg, 1 mg, Intramuscular, Q15 Min PRN, Alvarez Lam MD    HYDROcodone-acetaminophen  "(NORCO) 7.5-325 MG per tablet 1 tablet, 1 tablet, Oral, Q6H PRN, Alvarez Lam MD    Insulin Lispro (humaLOG) injection 2-7 Units, 2-7 Units, Subcutaneous, BID AC, Alvarez Lam MD    lactated ringers infusion, 75 mL/hr, Intravenous, Continuous, Alvarez Lam MD, Last Rate: 75 mL/hr at 12/04/23 1435, 75 mL/hr at 12/04/23 1435    LORazepam (ATIVAN) tablet 0.5 mg, 0.5 mg, Oral, Q8H PRN, Alvarez Lam MD    melatonin tablet 5 mg, 5 mg, Oral, Nightly PRN, Alvarez Lam MD    ondansetron (ZOFRAN) injection 4 mg, 4 mg, Intravenous, Q6H PRN, Alvarez Lam MD, 4 mg at 12/04/23 1424    sodium chloride 0.9 % flush 10 mL, 10 mL, Intravenous, Q12H, Alvarez Lam MD, 10 mL at 12/04/23 1127    sodium chloride 0.9 % flush 10 mL, 10 mL, Intravenous, PRN, Alvarez Lam MD    sodium chloride 0.9 % infusion 40 mL, 40 mL, Intravenous, PRN, Alvarez Lam MD    Objective     Vital Signs   /67 (BP Location: Left arm, Patient Position: Lying)   Pulse 97   Temp 97.5 °F (36.4 °C) (Oral)   Resp 18   Ht 162.6 cm (64\")   Wt 70.8 kg (156 lb)   SpO2 94%   BMI 26.78 kg/m²      Physical Exam:  General appearance - alert, well appearing, and in no distress, oriented to person, place, and time, and ill-appearing  Mental status - alert, oriented to person, place, and time, normal mood, behavior, speech, dress, motor activity, and thought processes, drowsy  Eyes - sclera anicteric  Chest - no tachypnea, retractions or cyanosis  Heart - normal rate and regular rhythm  Neurological - alert, oriented, normal speech, no focal findings or movement disorder noted  Skin - normal coloration and turgor, no rashes, no suspicious skin lesions noted    Results Review:  Result Review :          Consults by Arnaldo Peralta MD (12/04/2023 08:55)    Plan for outpatient PET and port placement.     Assessment & Plan       Diagnoses and all orders for this visit:    1. HCAP (healthcare-associated pneumonia) (Primary)    2. " Follicular lymphoma grade IIIa, unspecified body region  -     Case request; Standing  -     Case request    Other orders  -     CBC & Differential; Standing  -     Comprehensive Metabolic Panel; Standing  -     Lactic Acid, Plasma; Standing  -     Cancel: CT Abdomen Pelvis With Contrast; Standing  -     BNP; Standing  -     furosemide (LASIX) injection 40 mg  -     ondansetron (ZOFRAN) injection 4 mg  -     COVID PRE-OP / PRE-PROCEDURE SCREENING ORDER (NO ISOLATION) - Swab, Nasopharynx; Standing  -     XR Chest 1 View; Standing  -     CBC & Differential  -     Comprehensive Metabolic Panel  -     Lactic Acid, Plasma  -     Cancel: CT Abdomen Pelvis With Contrast  -     BNP  -     COVID PRE-OP / PRE-PROCEDURE SCREENING ORDER (NO ISOLATION) - Swab, Nasopharynx  -     XR Chest 1 View  -     Occult Blood X 1, Stool - Stool, Per Rectum; Standing  -     Occult Blood X 1, Stool - Stool, Per Rectum  -     Cancel: STAT Lactic Acid, Reflex; Standing  -     Cancel: STAT Lactic Acid, Reflex  -     lactated ringers bolus 250 mL  -     CT Abdomen Pelvis Without Contrast; Standing  -     CT Abdomen Pelvis Without Contrast  -     piperacillin-tazobactam (ZOSYN) IVPB 3.375 g in 100 mL NS (CD)  -     Blood Culture - Blood,; Standing  -     Blood Culture - Blood,; Standing  -     Blood Culture - Blood, Arm, Left  -     Blood Culture - Blood, Wrist, Left  -     Initiate Observation Status; Standing  -     Initiate Observation Status  -     Code Status and Medical Interventions:; Standing  -     Vital Signs; Standing  -     Weigh Patient; Standing  -     Cancel: Basic Metabolic Panel; Standing  -     CBC & Differential; Standing  -     Magnesium; Standing  -     Phosphorus; Standing  -     Insert Peripheral IV; Standing  -     Saline Lock & Maintain IV Access; Standing  -     sodium chloride 0.9 % flush 10 mL  -     sodium chloride 0.9 % flush 10 mL  -     sodium chloride 0.9 % infusion 40 mL  -     acetaminophen (TYLENOL) tablet 650  mg  -     calcium carbonate (TUMS) chewable tablet 500 mg (200 mg elemental)  -     LORazepam (ATIVAN) tablet 0.5 mg  -     sennosides-docusate (PERICOLACE) 8.6-50 MG per tablet 2 tablet  -     polyethylene glycol (MIRALAX) packet 17 g  -     bisacodyl (DULCOLAX) EC tablet 5 mg  -     bisacodyl (DULCOLAX) suppository 10 mg  -     ondansetron (ZOFRAN) injection 4 mg  -     melatonin tablet 5 mg  -     Enoxaparin Sodium (LOVENOX) syringe 30 mg  -     Strict Intake & Output; Standing  -     Diet: Regular/House Diet; Texture: Regular Texture (IDDSI 7); Fluid Consistency: Thin (IDDSI 0); Standing  -     Code Status and Medical Interventions:  -     Vital Signs  -     Vital Signs  -     Weigh Patient  -     Insert Peripheral IV  -     Saline Lock & Maintain IV Access  -     Strict Intake & Output  -     Strict Intake & Output  -     Diet: Regular/House Diet; Texture: Regular Texture (IDDSI 7); Fluid Consistency: Thin (IDDSI 0)  -     Update home mar so I can restart her meds  Nursing Communication; Standing  -     Update home mar so I can restart her meds  Nursing Communication  -     STAT Lactic Acid, Reflex; Standing  -     STAT Lactic Acid, Reflex  -     lactated ringers infusion  -     Inpatient Oncology Consult; Standing  -     Inpatient Oncology Consult  -     US Abdomen Limited; Standing  -     US Abdomen Limited  -     Case Management  Consult; Standing  -     Case Management  Consult  -     Discontinue: megestrol (MEGACE) 40 MG/ML suspension 800 mg  -     atorvastatin (LIPITOR) tablet 10 mg  -     famotidine (PEPCID) tablet 20 mg  -     HYDROcodone-acetaminophen (NORCO) 7.5-325 MG per tablet 1 tablet  -     Follow Hypoglycemia Standing Orders For Blood Glucose <70 & Notify Provider of Treatment; Standing  -     dextrose (GLUTOSE) oral gel 15 g  -     dextrose (D50W) (25 g/50 mL) IV injection 25 g  -     glucagon (GLUCAGEN) injection 1 mg  -     POC Glucose BID AC; Standing  -      Insulin Lispro (humaLOG) injection 2-7 Units  -     POC Glucose BID AC  -     POC Glucose BID AC  -     Basic Metabolic Panel  -     CBC & Differential  -     Magnesium  -     Phosphorus  -     ipratropium-albuterol (DUO-NEB) nebulizer solution 3 mL  -     Vital Signs  -     Vital Signs  -     Vital Signs  -     Vital Signs  -     Vital Signs  -     Vital Signs  -     Strict Intake & Output  -     Strict Intake & Output  -     Strict Intake & Output  -     Strict Intake & Output  -     Strict Intake & Output  -     Strict Intake & Output  -     POC Glucose BID AC  -     Respiratory Panel PCR w/COVID-19(SARS-CoV-2) [facility] In-House, NP Swab in UTM/VTM, 2 HR TAT - Swab, Nasopharynx; Standing  -     Respiratory Panel PCR w/COVID-19(SARS-CoV-2) [facility] In-House, NP Swab in UTM/VTM, 2 HR TAT - Swab, Nasopharynx  -     POC Glucose Once; Standing  -     POC Glucose Once  -     Lactate Dehydrogenase; Standing  -     Beta 2 Microglobulin, Serum; Standing  -     Hepatitis B Surface Antigen; Standing  -     Hepatitis B Core Antibody, IgM; Standing  -     Lactate Dehydrogenase  -     Beta 2 Microglobulin, Serum  -     POC Glucose Once; Standing  -     POC Glucose Once  -     lactated ringers infusion  -     Inpatient General Surgery Consult; Standing  -     Inpatient General Surgery Consult  -     PT Consult: Eval & Treat Discharge Placement Assessment, Functional Mobility Below Baseline; Standing  -     OT Consult: Eval & Treat; Standing  -     PT Consult: Eval & Treat Discharge Placement Assessment, Functional Mobility Below Baseline  -     OT Consult: Eval & Treat  -     dronabinol (MARINOL) capsule 2.5 mg  -     POC Glucose Once; Standing  -     POC Glucose Once  -     POC Glucose BID AC  -     Comprehensive Metabolic Panel; Standing         Imani Chauhan is a 83 y.o. female with a need for port placement for Follicular lymphoma grade 3a. After a discussion of risks (including bleeding, port  infection with need for removal, damage to surrounding structures including the arteries, pneumothorax and possible port malfunction) and benefits, the patient wishes to proceed with single lumen port placement with fluoroscopy. The patient is currently scheduled for this procedure on 12/12/23 at 12:00pm, with arrival at 10:00am that morning.     I also discussed with the patient post-operative pain management including multimodal pain control utilizing Tylenol, ibuprofen, and tramadol for breakthrough pain. I will plan to given the patient 5 tabs of 50mg Ultram post-operatively for break through pain.       I have reviewed Dr. Peralta's note and agree with the plan for outpatient port placement. Orders have been placed.     Bernadette Garces PA-C  12/04/23  20:03 CST

## 2023-12-04 NOTE — PLAN OF CARE
Problem: Adult Inpatient Plan of Care  Goal: Plan of Care Review  Outcome: Ongoing, Progressing  Flowsheets (Taken 12/4/2023 1715)  Outcome Evaluation: Patient RA. IV CDI, IVF infusing per order. emesis, given Zofran PRN. [place]. BID accuchecks, No c/o pain. VSS, safety maintained.

## 2023-12-04 NOTE — CONSULTS
[facility] Oncology/Hematology Services  CONSULT NOTE    PATIENT NAME:  Imani Chauhan  YOB: 1940  PATIENT MRN:  [id number]    Date of Admission:  12/3/2023  Consultation Date:  12/4/2023  Referring Provider: Arnaldo Peralta MD    Subjective     Reason for Consultation: Follicular lymphoma grade 3a.    History of present illness: Imani Chauhan is a pleasant 83 y.o.  female who is seen on consult for adenopathies from follicular lymphoma grade 3a.  She is seen at the sonogram suite.  History from the chart and patient.    Previous left retroperitoneal lymph node biopsy 7/6/2023.Mixed lymphoid infiltrates and fibrous tissue .  Flow cytometry shows no detectable clonal B-cell population.    Liver biopsy report on 11/16/2023.Liver, biopsy: Liver parenchyma with mixed portal inflammation and mild steatosis, see COMMENT. No diagnostic malignancy. Additional histologic levels examined.    Retroperitoneum biopsy report 11/20/2023.Lymphoma, favor large cell, CD 20 positive.  Follicular lymphoma grade 3A.    She presented with abdominal pain and nausea.  She was evaluated at the emergency department 12/3/2023.  CBC remarkable for hemoglobin 10.2, hematocrit 33.1 and platelet 492. CMP remarkable for GFR 23.4, alkaline phosphatase 610, AST 35, and calcium 10.8.  LDH 2.6.  proBNP normal at 648.2.  COVID-19 not detected.  Chest x-ray showed left lower lobe opacities concerning for pneumonia with probable small pleural effusion.  Vascular crowding versus mild venous congestion.  CT abdomen pelvis 12/3/2023.Compared to 11/12/2023, overall similar bulky inferior mediastinal, retrocrural, retroperitoneal, mesenteric, and pelvic iliac lymphadenopathy related to patient's recently diagnosed B-cell lymphoma.  Similar small volume ascites within the lower pelvis. No evidence of bowel obstruction. No free air or abscess.  Similar small bilateral pleural effusions with bibasilar  atelectasis.    Abdominal sonogram 12/4/2023. Minimal perisplenic ascites.   CBC remarkable for platelet 464, hemoglobin 9.3 and hematocrit 31.6.  BMP revealed a GFR 20.2 from 23.4 and calcium 10 from 10.8.    With above background, she is seen.    Past Medical History:  Past Medical History:   Diagnosis Date    Aortic regurgitation     mild 2018    CAD in native artery     COVID-19 06/01/2022    mild case, not hospitalized, no MAB    GERD (gastroesophageal reflux disease)     Heart murmur     Hyperlipidemia     Hypertension     Hypothyroidism     Mitral regurgitation     mild per 2013 echo, no significant per 2018    Myalgia     Near syncope     Pre-diabetes     SOB (shortness of breath)     Squamous cell cancer of skin of right forearm     Syncope     Tricuspid regurgitation     mild per 2013 echo, trivial per 2018     Prior Surgeries:  Past Surgical History:   Procedure Laterality Date    BACK SURGERY      BIOPSY / EXCISION / DISSECTION AXILLARY NODE  2023    had bx w/ Jennifer Restrepo MD at [facility]    CARDIAC CATHETERIZATION      COLONOSCOPY      CORONARY ARTERY BYPASS GRAFT  10/11/2010    Dr. Karina Lora, [facility]    DIAGNOSTIC LAPAROSCOPY N/A 11/14/2023    Procedure: DIAGNOSTIC LAPAROSCOPY;  Surgeon: Adenike Ponce MD;  Location: [facility] OR;  Service: General;  Laterality: N/A;    HYSTERECTOMY      LAPAROSCOPIC CHOLECYSTECTOMY      MEDIAN STERNOTOMY      Thymus cyst resection at same time as CABG    OTHER SURGICAL HISTORY      Rectal surgery    PARTIAL THYMECTOMY  10/11/2010    Dr. Lora    REPLACEMENT TOTAL KNEE Left 2005    TOTAL KNEE ARTHROPLASTY Left 06/2019    TOTAL KNEE ARTHROPLASTY Right 11/04/2020    Dr. Santamaria        Allergies:Patient has no known allergies.  PTA Meds:  Medications Prior to Admission   Medication Sig Dispense Refill Last Dose    acetaminophen (TYLENOL) 500 MG tablet Take 1 tablet by mouth Every 6 (Six) Hours As Needed for Mild Pain. prn       aspirin 81 MG EC tablet Take 1 tablet by mouth Daily.        atorvastatin (LIPITOR) 10 MG tablet Take 1 tablet by mouth Every Night.       Bioflavonoid Products (SUPER-C 1000 PO) Take 1 tablet by mouth Daily. Super c 900 mg, zinc 11 mg, vitamin D3 1,000 units, Vit A 450 mcg, Vitamin E 15 mg OTC       Ca Carbonate-Mag Hydroxide (ROLAIDS PO) Take 1 tablet by mouth Daily As Needed (heart burn).       cloNIDine (CATAPRES) 0.1 MG tablet Take 1 tablet by mouth 2 (Two) Times a Day As Needed for High Blood Pressure (SBP greater than 180 or DBP greater than 100). 60 tablet 11     empagliflozin (Jardiance) 10 MG tablet tablet Take 1 tablet by mouth Daily.       famotidine (PEPCID) 20 MG tablet Take 1 tablet by mouth Daily. 180 tablet 0     famotidine (PEPCID) 20 MG tablet Take 1 tablet by mouth 2 (Two) Times a Day.       furosemide (LASIX) 20 MG tablet Take 1 tablet by mouth Daily As Needed (swelling). 30 tablet 11     Gabapentin, Once-Daily, 300 MG tablet Take 300 mg by mouth Every Night.       HYDROcodone-acetaminophen (NORCO) 7.5-325 MG per tablet Take 1 tablet by mouth Every 6 (Six) Hours As Needed for Moderate Pain or Severe Pain.       levothyroxine (SYNTHROID, LEVOTHROID) 75 MCG tablet Take 0.5-1 tablets by mouth Daily. Takes 1/2 tab M, W, & F and 1 tab on S, Tu, Th, & Sa       lisinopril (PRINIVIL,ZESTRIL) 20 MG tablet Take 1 tablet by mouth 2 (Two) Times a Day.       MAGNESIUM GLYCINATE PO Take 1,300 mg by mouth 2 (Two) Times a Day.       metFORMIN ER (GLUCOPHAGE-XR) 500 MG 24 hr tablet Take 1 tablet by mouth 2 (Two) Times a Day.       NIFEdipine XL (ADALAT CC) 30 MG 24 hr tablet Take 1 tablet by mouth Daily. 90 tablet 0     ondansetron (ZOFRAN) 4 MG tablet Take 1 tablet by mouth Every 6 (Six) Hours As Needed for Nausea or Vomiting. 24 tablet 0     spironolactone (ALDACTONE) 25 MG tablet Take 1 tablet by mouth Daily.       therapeutic multivitamin-minerals (THERAGRAN-M) tablet Take 1 tablet by mouth Daily.         Current Meds:   Current Facility-Administered Medications    Medication Dose Route Frequency Provider Last Rate Last Admin    acetaminophen (TYLENOL) tablet 650 mg  650 mg Oral Q4H PRN Alvarez Lam MD        atorvastatin (LIPITOR) tablet 10 mg  10 mg Oral Nightly Alvarez Lam MD   10 mg at 12/03/23 2035    sennosides-docusate (PERICOLACE) 8.6-50 MG per tablet 2 tablet  2 tablet Oral BID Alvarez Lam MD   2 tablet at 12/03/23 2035    And    polyethylene glycol (MIRALAX) packet 17 g  17 g Oral Daily PRN Alvarez Lam MD        And    bisacodyl (DULCOLAX) EC tablet 5 mg  5 mg Oral Daily PRN Alvarez Lam MD        And    bisacodyl (DULCOLAX) suppository 10 mg  10 mg Rectal Daily PRN Alvarez Lam MD        calcium carbonate (TUMS) chewable tablet 500 mg (200 mg elemental)  2 tablet Oral BID PRN Alvarez Lam MD        dextrose (D50W) (25 g/50 mL) IV injection 25 g  25 g Intravenous Q15 Min PRN Alvarez Lam MD        dextrose (GLUTOSE) oral gel 15 g  15 g Oral Q15 Min PRN Alvarez Lam MD        Enoxaparin Sodium (LOVENOX) syringe 30 mg  30 mg Subcutaneous Daily Alvarez Lam MD   30 mg at 12/03/23 1859    famotidine (PEPCID) tablet 20 mg  20 mg Oral Daily Alvarez Lam MD   20 mg at 12/03/23 2035    glucagon (GLUCAGEN) injection 1 mg  1 mg Intramuscular Q15 Min PRN Alvarez Lam MD        HYDROcodone-acetaminophen (NORCO) 7.5-325 MG per tablet 1 tablet  1 tablet Oral Q6H PRN Alvarez Lam MD        Insulin Lispro (humaLOG) injection 2-7 Units  2-7 Units Subcutaneous BID AC Alvarez Lam MD        LORazepam (ATIVAN) tablet 0.5 mg  0.5 mg Oral Q8H PRN Alvarez Lam MD        megestrol (MEGACE) 40 MG/ML suspension 800 mg  800 mg Oral Daily Alvarez Lam MD   800 mg at 12/03/23 2035    melatonin tablet 5 mg  5 mg Oral Nightly PRN Alvarez Lam MD        ondansetron (ZOFRAN) injection 4 mg  4 mg Intravenous Q6H PRN Alvarez Lam MD        sodium chloride 0.9 % flush 10 mL  10 mL Intravenous Q12H Genaro,  Alvarez TERAN MD   10 mL at 12/03/23 2036    sodium chloride 0.9 % flush 10 mL  10 mL Intravenous PRN Alvarez Lam MD        sodium chloride 0.9 % infusion 40 mL  40 mL Intravenous PRN Alvarez Lam MD           Family History:family history includes Alzheimer's disease in her father and mother; Coronary artery disease in her father; Hyperlipidemia in her mother; Hypertension in her father and mother.   Social History: reports that she has never smoked. She has been exposed to tobacco smoke. She has never used smokeless tobacco. She reports that she does not drink alcohol and does not use drugs.    Review of Systems  Review of Systems   Constitutional:  Positive for fatigue. Negative for chills and fever.   HENT:  Negative for congestion and facial swelling.    Eyes:  Negative for discharge and redness.   Respiratory:  Negative for shortness of breath and wheezing.    Cardiovascular:  Negative for chest pain and palpitations.   Gastrointestinal:  Positive for abdominal pain and nausea.   Endocrine: Negative for polydipsia and polyphagia.   Genitourinary:  Negative for dysuria and flank pain.   Musculoskeletal:  Negative for neck stiffness.   Skin:  Positive for pallor.   Neurological:  Negative for dizziness, facial asymmetry and speech difficulty.   Hematological:  Does not bruise/bleed easily.   Psychiatric/Behavioral:  Negative for agitation, confusion and hallucinations.          Objective      Vital Signs   Temp:  [97.5 °F (36.4 °C)-98 °F (36.7 °C)] 97.5 °F (36.4 °C)  Heart Rate:  [83-95] 86  Resp:  [16-18] 18  BP: (114-146)/(59-76) 146/66    Physical Exam  Vitals and nursing note reviewed.   Constitutional:       Appearance: She is ill-appearing.   HENT:      Head: Normocephalic and atraumatic.   Eyes:      General: No scleral icterus.  Cardiovascular:      Rate and Rhythm: Normal rate.   Pulmonary:      Effort: No respiratory distress.      Breath sounds: No wheezing.   Abdominal:      General: Bowel  "sounds are normal. There is distension.      Palpations: Abdomen is soft.      Tenderness: There is abdominal tenderness. There is no guarding or rebound.   Musculoskeletal:         General: Swelling present.      Cervical back: Neck supple.   Skin:     Coloration: Skin is pale.   Neurological:      Mental Status: She is oriented to person, place, and time.   Psychiatric:         Mood and Affect: Mood normal.         Behavior: Behavior normal.         Thought Content: Thought content normal.         Judgment: Judgment normal.          Results from last 7 days   Lab Units 12/04/23  0458 12/03/23  1200   WBC 10*3/mm3 8.87 10.01   HEMOGLOBIN g/dL 9.3* 10.2*   HEMATOCRIT % 31.6* 33.1*   PLATELETS 10*3/mm3 464* 492*        Results from last 7 days   Lab Units 12/04/23  0458 12/03/23  1200   SODIUM mmol/L 133* 133*   POTASSIUM mmol/L 4.3 4.4   CHLORIDE mmol/L 94* 93*   CO2 mmol/L 23.0 26.0   BUN mg/dL 32* 29*   CREATININE mg/dL 2.35* 2.08*   CALCIUM mg/dL 10.0 10.8*   BILIRUBIN mg/dL  --  0.3   ALK PHOS U/L  --  610*   ALT (SGPT) U/L  --  20   AST (SGOT) U/L  --  35*   GLUCOSE mg/dL 93 93       ABG:  No results found for: \"PHART\", \"PO2ART\", \"YPA2GIF\"    Culture Data:   No results found for: \"BLOODCX\", \"URINECX\", \"WOUNDCX\", \"MRSACX\", \"RESPCX\", \"STOOLCX\"    Radiology:   Imaging Results (Last 7 Days)       Procedure Component Value Units Date/Time    CT Abdomen Pelvis Without Contrast [[id number]] Collected: 12/03/23 1425     Updated: 12/03/23 1436    Narrative:      CT ABDOMEN PELVIS WO CONTRAST- 12/3/2023 1:58 PM     HISTORY: Abdominal pain and distention. Recently diagnosed B-cell  lymphoma.; J18.9-Pneumonia, unspecified organism; recently diagnosed  B-cell lymphoma     COMPARISON: 11/12/2023     DOSE LENGTH PRODUCT: 400 mGy cm. Automated exposure control was also  utilized to decrease patient radiation dose.     TECHNIQUE: Axial images of the abdomen and pelvis are performed without  IV contrast     FINDINGS: Similar " small bilateral pleural effusions with bibasilar  atelectasis. Posterior inferior as well as retrocrural lymphadenopathy  remains similar to the previous study.     The nonenhanced liver, spleen, and adrenal glands are unremarkable.  Spleen is normal in size measuring 7.8 cm. Limited evaluation of the  pancreatic tissue without IV contrast. No abnormal perinephric fluid  collection. No hydronephrosis. Mildly distended bladder unremarkable.     There is prominent bulky mesenteric and retroperitoneal lymphadenopathy.  Confluent lymphadenopathy at the root of the mesentery measuring up to  13 cm in width, similar to the prior study.  Retroperitoneal lymphadenopathy remains bulky with confluent periaortic  lymph nodes measuring up to 6 cm.  Iliac lymphadenopathy again visualized. Little interval change since  11/12/2023.      There is no free intraperitoneal air or loculated abscess collection  identified with nonenhanced imaging. Similar small volume ascites within  the lower pelvis. No evidence for bowel obstruction. Mild to moderate  vascular calcification.     Degenerative changes regional skeleton with a mild left convexity of the  thoracolumbar curvature. No focal aggressive regional bony lesions.       Impression:      1. Compared to 11/12/2023, overall similar bulky inferior mediastinal,  retrocrural, retroperitoneal, mesenteric, and pelvic iliac  lymphadenopathy related to patient's recently diagnosed B-cell lymphoma.  2. Similar small volume ascites within the lower pelvis. No evidence of  bowel obstruction. No free air or abscess.  3. Similar small bilateral pleural effusions with bibasilar atelectasis.     This report was signed and finalized on 12/3/2023 2:33 PM by Dr. Evelyne Dickson MD.       XR Chest 1 View [[id number]] Collected: 12/03/23 1245     Updated: 12/03/23 1249    Narrative:      XR CHEST 1 VW-     HISTORY: SOA RO fluid     COMPARISON: None     FINDINGS: Frontal view the chest obtained.      Prior mediastinal surgery. Mild to moderate elevation right  hemidiaphragm. Left lower lobe opacities questionable small left pleural  effusion. Vascular crowding versus mild venous congestion. No  pneumothorax. No acute regional bony pathology.       Impression:      1. Left lower lobe opacities concerning for pneumonia with probable  small pleural effusion. Vascular crowding versus mild venous congestion.  Prior mediastinal surgery with elevated right hemidiaphragm.        This report was signed and finalized on 12/3/2023 12:46 PM by Dr. Evelyne Dickson MD.                    Assessment/Plan        ASSESSMENT:   Follicular lymphoma grade 3a.  AJCC stage: III.  IPI score pending LDH.  Treatment status: Pending outpatient therapy.    2.  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 Normal calcium 12/4/2023. . Zometa 4 mg 11/11/2023.  3.  Performance status of 2.  4.  Left lower lobe opacities concerning for pneumonia.Given Zosyn.   5.  Acute kidney injury from dehydration.       PLAN:   regarding the reason for the consult  2.    regarding hydration per primary team due to acute kidney injury.  3.    regarding therapy for stage IIIa follicular lymphoma is controversial and treatment is individualized.Consider bendamustine if GFR at least 30 with rituxamab. Anticipate 6 cycles.    4.  Plan for outpatient PET and port placement.  5.  Order blood for hepatitis B surface antigen, hepatitis B core antibody prior to therapy with rituximab, LDH and B2M for staging.  6.  Follow-up with Dr. Boyer 12/11/2023 at 2 pm per patient request.   7.  Further recommendations pending.          I discussed the patients findings and my recommendations with patient and nurse Nisreen.    Arnaldo Peralta MD  12/04/23  08:55 CST        Thank you for allowing me to participate in the care of Ms. Imani Chauhan

## 2023-12-05 LAB
ALBUMIN SERPL-MCNC: 3.2 G/DL (ref 3.5–5.2)
ALBUMIN/GLOB SERPL: 0.9 G/DL
ALP SERPL-CCNC: 540 U/L (ref 39–117)
ALT SERPL W P-5'-P-CCNC: 15 U/L (ref 1–33)
ANION GAP SERPL CALCULATED.3IONS-SCNC: 18 MMOL/L (ref 5–15)
AST SERPL-CCNC: 32 U/L (ref 1–32)
BASOPHILS # BLD AUTO: 0.06 10*3/MM3 (ref 0–0.2)
BASOPHILS NFR BLD AUTO: 0.6 % (ref 0–1.5)
BILIRUB SERPL-MCNC: 0.3 MG/DL (ref 0–1.2)
BUN SERPL-MCNC: 35 MG/DL (ref 8–23)
BUN/CREAT SERPL: 14.8 (ref 7–25)
CALCIUM SPEC-SCNC: 10.8 MG/DL (ref 8.6–10.5)
CHLORIDE SERPL-SCNC: 92 MMOL/L (ref 98–107)
CO2 SERPL-SCNC: 25 MMOL/L (ref 22–29)
CREAT SERPL-MCNC: 2.37 MG/DL (ref 0.57–1)
DEPRECATED RDW RBC AUTO: 53.4 FL (ref 37–54)
EGFRCR SERPLBLD CKD-EPI 2021: 20 ML/MIN/1.73
EOSINOPHIL # BLD AUTO: 0.02 10*3/MM3 (ref 0–0.4)
EOSINOPHIL NFR BLD AUTO: 0.2 % (ref 0.3–6.2)
ERYTHROCYTE [DISTWIDTH] IN BLOOD BY AUTOMATED COUNT: 16.7 % (ref 12.3–15.4)
GLOBULIN UR ELPH-MCNC: 3.4 GM/DL
GLUCOSE BLDC GLUCOMTR-MCNC: 92 MG/DL (ref 70–130)
GLUCOSE BLDC GLUCOMTR-MCNC: 93 MG/DL (ref 70–130)
GLUCOSE SERPL-MCNC: 92 MG/DL (ref 65–99)
HBV CORE IGM SERPL QL IA: NORMAL
HBV SURFACE AG SERPL QL IA: NORMAL
HCT VFR BLD AUTO: 33.8 % (ref 34–46.6)
HGB BLD-MCNC: 10.4 G/DL (ref 12–15.9)
IMM GRANULOCYTES # BLD AUTO: 0.09 10*3/MM3 (ref 0–0.05)
IMM GRANULOCYTES NFR BLD AUTO: 0.9 % (ref 0–0.5)
LYMPHOCYTES # BLD AUTO: 0.49 10*3/MM3 (ref 0.7–3.1)
LYMPHOCYTES NFR BLD AUTO: 4.8 % (ref 19.6–45.3)
MCH RBC QN AUTO: 28.3 PG (ref 26.6–33)
MCHC RBC AUTO-ENTMCNC: 30.8 G/DL (ref 31.5–35.7)
MCV RBC AUTO: 91.8 FL (ref 79–97)
MONOCYTES # BLD AUTO: 0.47 10*3/MM3 (ref 0.1–0.9)
MONOCYTES NFR BLD AUTO: 4.6 % (ref 5–12)
NEUTROPHILS NFR BLD AUTO: 88.9 % (ref 42.7–76)
NEUTROPHILS NFR BLD AUTO: 9.11 10*3/MM3 (ref 1.7–7)
NRBC BLD AUTO-RTO: 0 /100 WBC (ref 0–0.2)
PLATELET # BLD AUTO: 516 10*3/MM3 (ref 140–450)
PMV BLD AUTO: 9.5 FL (ref 6–12)
POTASSIUM SERPL-SCNC: 4.3 MMOL/L (ref 3.5–5.2)
PROT SERPL-MCNC: 6.6 G/DL (ref 6–8.5)
RBC # BLD AUTO: 3.68 10*6/MM3 (ref 3.77–5.28)
SODIUM SERPL-SCNC: 135 MMOL/L (ref 136–145)
URATE SERPL-MCNC: 12 MG/DL (ref 2.4–5.7)
WBC NRBC COR # BLD AUTO: 10.24 10*3/MM3 (ref 3.4–10.8)

## 2023-12-05 PROCEDURE — 87340 HEPATITIS B SURFACE AG IA: CPT | Performed by: INTERNAL MEDICINE

## 2023-12-05 PROCEDURE — 85025 COMPLETE CBC W/AUTO DIFF WBC: CPT | Performed by: INTERNAL MEDICINE

## 2023-12-05 PROCEDURE — 25010000002 ENOXAPARIN PER 10 MG: Performed by: INTERNAL MEDICINE

## 2023-12-05 PROCEDURE — 99214 OFFICE O/P EST MOD 30 MIN: CPT

## 2023-12-05 PROCEDURE — 86705 HEP B CORE ANTIBODY IGM: CPT | Performed by: INTERNAL MEDICINE

## 2023-12-05 PROCEDURE — 82948 REAGENT STRIP/BLOOD GLUCOSE: CPT

## 2023-12-05 PROCEDURE — G0378 HOSPITAL OBSERVATION PER HR: HCPCS

## 2023-12-05 PROCEDURE — 63710000001 DRONABINOL PER 2.5 MG: Performed by: INTERNAL MEDICINE

## 2023-12-05 PROCEDURE — 80053 COMPREHEN METABOLIC PANEL: CPT | Performed by: NURSE PRACTITIONER

## 2023-12-05 PROCEDURE — 25010000002 PROCHLORPERAZINE 10 MG/2ML SOLUTION: Performed by: NURSE PRACTITIONER

## 2023-12-05 PROCEDURE — 25010000002 ONDANSETRON PER 1 MG: Performed by: INTERNAL MEDICINE

## 2023-12-05 PROCEDURE — 25010000002: Performed by: INTERNAL MEDICINE

## 2023-12-05 PROCEDURE — 25010000002 PROMETHAZINE PER 50 MG: Performed by: INTERNAL MEDICINE

## 2023-12-05 PROCEDURE — 25810000003 LACTATED RINGERS PER 1000 ML: Performed by: INTERNAL MEDICINE

## 2023-12-05 PROCEDURE — 25010000002 MORPHINE PER 10 MG

## 2023-12-05 PROCEDURE — 99215 OFFICE O/P EST HI 40 MIN: CPT | Performed by: INTERNAL MEDICINE

## 2023-12-05 RX ORDER — ALLOPURINOL 100 MG/1
50 TABLET ORAL EVERY OTHER DAY
Status: DISCONTINUED | OUTPATIENT
Start: 2023-12-07 | End: 2023-12-06

## 2023-12-05 RX ORDER — LEVOTHYROXINE SODIUM 0.07 MG/1
37.5 TABLET ORAL
Status: DISCONTINUED | OUTPATIENT
Start: 2023-12-06 | End: 2023-12-06 | Stop reason: HOSPADM

## 2023-12-05 RX ORDER — ASPIRIN 81 MG/1
81 TABLET ORAL DAILY
Status: DISCONTINUED | OUTPATIENT
Start: 2023-12-05 | End: 2023-12-06

## 2023-12-05 RX ORDER — PROCHLORPERAZINE EDISYLATE 5 MG/ML
5 INJECTION INTRAMUSCULAR; INTRAVENOUS EVERY 6 HOURS PRN
Status: DISCONTINUED | OUTPATIENT
Start: 2023-12-05 | End: 2023-12-06 | Stop reason: HOSPADM

## 2023-12-05 RX ORDER — LEVOTHYROXINE SODIUM 0.07 MG/1
75 TABLET ORAL
Status: DISCONTINUED | OUTPATIENT
Start: 2023-12-05 | End: 2023-12-06 | Stop reason: HOSPADM

## 2023-12-05 RX ORDER — MORPHINE SULFATE 2 MG/ML
1 INJECTION, SOLUTION INTRAMUSCULAR; INTRAVENOUS EVERY 4 HOURS PRN
Status: DISCONTINUED | OUTPATIENT
Start: 2023-12-05 | End: 2023-12-06 | Stop reason: HOSPADM

## 2023-12-05 RX ADMIN — MORPHINE SULFATE 1 MG: 2 INJECTION, SOLUTION INTRAMUSCULAR; INTRAVENOUS at 16:29

## 2023-12-05 RX ADMIN — DRONABINOL 2.5 MG: 2.5 CAPSULE ORAL at 20:21

## 2023-12-05 RX ADMIN — ATORVASTATIN CALCIUM 10 MG: 10 TABLET, FILM COATED ORAL at 20:21

## 2023-12-05 RX ADMIN — PROMETHAZINE HYDROCHLORIDE 12.5 MG: 25 INJECTION INTRAMUSCULAR; INTRAVENOUS at 20:21

## 2023-12-05 RX ADMIN — ONDANSETRON 4 MG: 2 INJECTION INTRAMUSCULAR; INTRAVENOUS at 12:54

## 2023-12-05 RX ADMIN — DRONABINOL 2.5 MG: 2.5 CAPSULE ORAL at 08:25

## 2023-12-05 RX ADMIN — SODIUM CHLORIDE 50 MG: 9 INJECTION, SOLUTION INTRAVENOUS at 10:05

## 2023-12-05 RX ADMIN — SODIUM CHLORIDE, POTASSIUM CHLORIDE, SODIUM LACTATE AND CALCIUM CHLORIDE 75 ML/HR: 600; 310; 30; 20 INJECTION, SOLUTION INTRAVENOUS at 12:54

## 2023-12-05 RX ADMIN — DOCUSATE SODIUM 50 MG AND SENNOSIDES 8.6 MG 2 TABLET: 8.6; 5 TABLET, FILM COATED ORAL at 08:25

## 2023-12-05 RX ADMIN — FAMOTIDINE 20 MG: 20 TABLET, FILM COATED ORAL at 08:25

## 2023-12-05 RX ADMIN — PROCHLORPERAZINE EDISYLATE 5 MG: 5 INJECTION INTRAMUSCULAR; INTRAVENOUS at 08:41

## 2023-12-05 RX ADMIN — Medication 10 ML: at 08:26

## 2023-12-05 RX ADMIN — PROCHLORPERAZINE EDISYLATE 5 MG: 5 INJECTION INTRAMUSCULAR; INTRAVENOUS at 15:26

## 2023-12-05 RX ADMIN — PROCHLORPERAZINE EDISYLATE 5 MG: 5 INJECTION INTRAMUSCULAR; INTRAVENOUS at 22:36

## 2023-12-05 RX ADMIN — DOCUSATE SODIUM 50 MG AND SENNOSIDES 8.6 MG 2 TABLET: 8.6; 5 TABLET, FILM COATED ORAL at 20:21

## 2023-12-05 RX ADMIN — ASPIRIN 81 MG: 81 TABLET, COATED ORAL at 16:42

## 2023-12-05 RX ADMIN — ENOXAPARIN SODIUM 30 MG: 100 INJECTION SUBCUTANEOUS at 08:25

## 2023-12-05 RX ADMIN — Medication 10 ML: at 20:21

## 2023-12-05 RX ADMIN — LEVOTHYROXINE SODIUM 75 MCG: 75 TABLET ORAL at 16:43

## 2023-12-05 NOTE — PLAN OF CARE
Problem: Adult Inpatient Plan of Care  Goal: Plan of Care Review  Outcome: Ongoing, Progressing  Flowsheets (Taken 12/5/2023 [id number])  Outcome Evaluation: Patient RA. IVF infusing per order. Mult emesis, see AVIS Lezama. CORIN accuchalsya. Palliative care consulted. VSS, Safety maintained

## 2023-12-05 NOTE — PROGRESS NOTES
"    [facility] Medicine Services  INPATIENT PROGRESS NOTE    Patient Name: Imani Chauhan  Date of Admission: 12/3/2023  Today's Date: 12/05/23  Length of Stay: 0  Primary Care Physician: Светлана Loyd MD    Subjective   Chief Complaint: Nausea, fluid on her abdomen.  HPI   Ms. Chauhan presented to [facility] emergency room with nausea, \"fluid on her abdomen\" and increased abdominal swelling.  Patient reported the last 3 to 4 days she has had difficulty walking due to generalized weakness and had to sit down frequently.  She reports legs are weak, decreased appetite and everything she tries to eat sours on her stomach.  Patient did report vomiting the day of admission looked black.  Patient had recent admission 11/11/2023 - 11/20/2023 with hypercalcemia and lymphadenopathy.  Patient had left retroperitoneal lymph node biopsy 7/6/2023 with mixed lymphoid infiltrates and fibrous tissue.  Flow cytometry no detectable clonal B-cell population.  Liver biopsy 11/16/2023 mixed portal inflammation and mild steatosis with no diagnostic malignancy.  Additional histologic levels examined.  Retroperitoneal biopsy 11/20/2023 lymphoma favor large cell CD20 positive.  Follicular lymphoma grade 3A.  She presented to ER with abdominal pain and nausea.  WBC 10.01, hemoglobin 10.2, creatinine 2.08 (GFR 23.4).  Previous creatinine 1.43 on 11/20/2023 (GFR 36.7%), calcium 10.8, proBNP 648.  COVID-19 not detected.  Chest x-ray showed left lower lobe opacities concerning for pneumonia with probable small effusion.  Vascular crowding versus mild venous congestion.  CT abdomen pelvis 12/3/2023 compared to 11/12/2023 overall similar bulky mediastinal, retrocrural, retroperitoneal, mesenteric and pelvic iliac lymphadenopathy related to recent diagnosis of B-cell lymphoma.  Similar small volume ascites within the lower pelvis.  No bowel obstruction free air or abscess.  Similar small bilateral " "pleural effusions with atelectasis.  Lasix, Zofran, Zosyn given in ER.    Today  Patient seen earlier with nurse Nisreen.  Son and daughter-in-law present in room.  Patient sleeping.  Son reports patient has no appetite and the smell of food makes her nauseated.  Dr. Peralta again to spoke with family members this morning regarding need for IV fluid hydration, unable to administer any chemotherapy until GFR greater than 30%.  Port placement 12/12, follow-up with oncology 12/11.  Creatinine remains elevated 2.37 despite IV fluid hydration.  Family members requesting palliative care consult as patient expressed earlier may be \"forgetting all of this\".  She declined to work with physical therapy.    Review of Systems   Constitutional:  Positive for activity change, appetite change and fatigue. Negative for chills and fever.   HENT:  Negative for congestion and trouble swallowing.    Eyes:  Negative for photophobia and visual disturbance.   Respiratory:  Negative for cough, shortness of breath and wheezing.    Cardiovascular:  Negative for chest pain, palpitations and leg swelling.   Gastrointestinal:  Positive for abdominal distention and nausea. Negative for constipation, diarrhea and vomiting.   Endocrine: Negative for cold intolerance, heat intolerance and polyuria.   Genitourinary:  Negative for dysuria, frequency and urgency.   Musculoskeletal:  Positive for gait problem.   Skin:  Negative for color change, pallor, rash and wound.   Allergic/Immunologic: Negative for immunocompromised state.   Neurological:  Positive for weakness. Negative for light-headedness.   Hematological:  Negative for adenopathy. Does not bruise/bleed easily.   Psychiatric/Behavioral:  Negative for agitation, behavioral problems and confusion.       All pertinent negatives and positives are as above. All other systems have been reviewed and are negative unless otherwise stated.     Objective    Temp:  [97 °F (36.1 °C)-98.6 °F (37 °C)] 97.7 °F " (36.5 °C)  Heart Rate:  [] 104  Resp:  [16-18] 16  BP: (123-155)/(61-81) 155/81  Physical Exam  Vitals and nursing note reviewed.   Constitutional:       Comments: Lying in bed.  Oxygen off.  Son and daughter-in-law in room.   HENT:      Head: Normocephalic and atraumatic.      Nose: No congestion.      Mouth/Throat:      Pharynx: Oropharynx is clear. No oropharyngeal exudate or posterior oropharyngeal erythema.   Eyes:      Extraocular Movements: Extraocular movements intact.      Pupils: Pupils are equal, round, and reactive to light.   Cardiovascular:      Rate and Rhythm: Normal rate and regular rhythm.      Heart sounds: No murmur heard.  Pulmonary:      Breath sounds: No wheezing, rhonchi or rales.      Comments: Oxygen off.  Saturation 96%.  Abdominal:      General: There is distension (Minimal).   Genitourinary:     Comments: WIC in place  Musculoskeletal:         General: No swelling or tenderness.      Cervical back: Normal range of motion and neck supple.   Skin:     General: Skin is warm and dry.   Neurological:      General: No focal deficit present.      Mental Status: She is alert and oriented to person, place, and time.   Psychiatric:         Mood and Affect: Mood normal.         Behavior: Behavior normal.         Thought Content: Thought content normal.         Judgment: Judgment normal.       Results Review:  I have reviewed the labs, radiology results, and diagnostic studies.    Laboratory Data:   Results from last 7 days   Lab Units 12/05/23  0720 12/04/23  0458 12/03/23  1200   WBC 10*3/mm3 10.24 8.87 10.01   HEMOGLOBIN g/dL 10.4* 9.3* 10.2*   HEMATOCRIT % 33.8* 31.6* 33.1*   PLATELETS 10*3/mm3 516* 464* 492*        Results from last 7 days   Lab Units 12/05/23  0720 12/04/23  0458 12/03/23  1200   SODIUM mmol/L 135* 133* 133*   POTASSIUM mmol/L 4.3 4.3 4.4   CHLORIDE mmol/L 92* 94* 93*   CO2 mmol/L 25.0 23.0 26.0   BUN mg/dL 35* 32* 29*   CREATININE mg/dL 2.37* 2.35* 2.08*   CALCIUM mg/dL  10.8* 10.0 10.8*   BILIRUBIN mg/dL 0.3  --  0.3   ALK PHOS U/L 540*  --  610*   ALT (SGPT) U/L 15  --  20   AST (SGOT) U/L 32  --  35*   GLUCOSE mg/dL 92 93 93       Culture Data:   Blood Culture   Date Value Ref Range Status   12/03/2023 No growth at 24 hours  Preliminary   12/03/2023 No growth at 24 hours  Preliminary     Imaging Results (All)       Procedure Component Value Units Date/Time    US Abdomen Limited [[id number]] Collected: 12/04/23 1007     Updated: 12/04/23 1011    Narrative:      EXAM: US ABDOMEN LIMITED-      DATE: 12/4/2023 9:29 AM     HISTORY: check for drainable ascitic fluid; J18.9-Pneumonia, unspecified  organism       COMPARISON: No existing relevant imaging studies available.      TECHNIQUE: Real-time ultrasound performed with representative images  saved to PACS.     FINDINGS:    Targeted ultrasound performed of the RIGHT upper, RIGHT lower, LEFT  upper and LEFT lower quadrants. Minimal ascites demonstrated adjacent to  the spleen, too small to safely drain.          Impression:      1. Minimal perisplenic ascites.        This report was signed and finalized on 12/4/2023 10:08 AM by Dr Lorelei Amor MD.       CT Abdomen Pelvis Without Contrast [[id number]] Collected: 12/03/23 1425     Updated: 12/03/23 1436    Narrative:      CT ABDOMEN PELVIS WO CONTRAST- 12/3/2023 1:58 PM     HISTORY: Abdominal pain and distention. Recently diagnosed B-cell  lymphoma.; J18.9-Pneumonia, unspecified organism; recently diagnosed  B-cell lymphoma     COMPARISON: 11/12/2023     DOSE LENGTH PRODUCT: 400 mGy cm. Automated exposure control was also  utilized to decrease patient radiation dose.     TECHNIQUE: Axial images of the abdomen and pelvis are performed without  IV contrast     FINDINGS: Similar small bilateral pleural effusions with bibasilar  atelectasis. Posterior inferior as well as retrocrural lymphadenopathy  remains similar to the previous study.     The nonenhanced liver, spleen, and adrenal  glands are unremarkable.  Spleen is normal in size measuring 7.8 cm. Limited evaluation of the  pancreatic tissue without IV contrast. No abnormal perinephric fluid  collection. No hydronephrosis. Mildly distended bladder unremarkable.     There is prominent bulky mesenteric and retroperitoneal lymphadenopathy.  Confluent lymphadenopathy at the root of the mesentery measuring up to  13 cm in width, similar to the prior study.  Retroperitoneal lymphadenopathy remains bulky with confluent periaortic  lymph nodes measuring up to 6 cm.  Iliac lymphadenopathy again visualized. Little interval change since  11/12/2023.      There is no free intraperitoneal air or loculated abscess collection  identified with nonenhanced imaging. Similar small volume ascites within  the lower pelvis. No evidence for bowel obstruction. Mild to moderate  vascular calcification.     Degenerative changes regional skeleton with a mild left convexity of the  thoracolumbar curvature. No focal aggressive regional bony lesions.       Impression:      1. Compared to 11/12/2023, overall similar bulky inferior mediastinal,  retrocrural, retroperitoneal, mesenteric, and pelvic iliac  lymphadenopathy related to patient's recently diagnosed B-cell lymphoma.  2. Similar small volume ascites within the lower pelvis. No evidence of  bowel obstruction. No free air or abscess.  3. Similar small bilateral pleural effusions with bibasilar atelectasis.     This report was signed and finalized on 12/3/2023 2:33 PM by Dr. Evelyne Dickson MD.       XR Chest 1 View [[id number]] Collected: 12/03/23 1245     Updated: 12/03/23 1249    Narrative:      XR CHEST 1 VW-     HISTORY: SOA RO fluid     COMPARISON: None     FINDINGS: Frontal view the chest obtained.     Prior mediastinal surgery. Mild to moderate elevation right  hemidiaphragm. Left lower lobe opacities questionable small left pleural  effusion. Vascular crowding versus mild venous congestion. No  pneumothorax.  No acute regional bony pathology.       Impression:      1. Left lower lobe opacities concerning for pneumonia with probable  small pleural effusion. Vascular crowding versus mild venous congestion.  Prior mediastinal surgery with elevated right hemidiaphragm.        This report was signed and finalized on 12/3/2023 12:46 PM by Dr. Evelyne Dickson MD.              Scheduled medications:  allopurinol (ALOPRIM) 50 mg in sodium chloride 0.9 % 100 mL IVPB, 50 mg, Intravenous, Every Other Day  aspirin, 81 mg, Oral, Daily  atorvastatin, 10 mg, Oral, Nightly  dronabinol, 2.5 mg, Oral, BID  enoxaparin, 30 mg, Subcutaneous, Daily  famotidine, 20 mg, Oral, Daily  insulin lispro, 2-7 Units, Subcutaneous, BID AC  [START ON 12/6/2023] levothyroxine, 37.5 mcg, Oral, Once per day on Mon Wed Fri  levothyroxine, 75 mcg, Oral, Once per day on Sun Tue Thu Sat  senna-docusate sodium, 2 tablet, Oral, BID  sodium chloride, 10 mL, Intravenous, Q12H         I have reviewed the patient's current medications.     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  Acute kidney injury superimposed on CKD stage IIIb.  On admission, creatinine 2.08 (GFR 23.4).  Previous creatinine 1.43 on 11/20/2023 (GFR 36.7%).  Continue IV at 75 mL/h fluids.  Creatinine remains elevated 2.37 despite IV fluids.  Hold nephrotoxic agents, Lasix, lisinopril.  BMP in AM.  Dr. Wan added allopurinol 50 mg IV every other day due to GFR 20.    2.  Follicular lymphoma grade 3A.  Oncology, Dr. Peralta consulted and discussed with Dr. Peralta.  Recommends hydration for acute kidney injury.  Consider Bendamustine if GFR at least 30% with rituxamab with  anticipation of 6 cycles.  Will need outpatient PET scan.  Patient for port placement 12/12 per Dr. Restrepo.  Follow-up with Dr. Aleman 12/11/2023 at 2 PM.  I discussed with family members that chemotherapy would not be started till GFR consistently greater than 30%.  No concerns for pneumonia as white blood cell count normal, WBC normal, no cough or congestion.     3.  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  Calcium 10.8 on admission and 10.8 today. Received Zometa 11/11 during previous admission.    4.  Diabetes mellitus type 2 without insulin.  Hemoglobin A1c 6.1.  Accu-Cheks with sliding scale insulin coverage.  Glucoses 92, 92, 98.  Hold Jardiance due to poor appetite and decreased intake.  Family reports no longer taking metformin.    5.  Elevated alkaline phosphatase.  540 previously 958 on 11/20/2023.  Right upper quadrant ultrasound 11/17/2023 noted patent main portal vein, left portal vein, right portal vein.    6.  Acquired hypothyroidism.  TSH 2.95 on 11/12/2023.  Continue Synthroid.    7.  Decreased appetite with mild nausea likely contributing to acute kidney injury. Dronabinol 2.5 mg twice daily.  Famotidine daily.  Pepcid 20 mg orally twice daily.  Continue IV fluids.  Reports nausea with food smell.  Compazine and Zofran ordered.    8.  Lovenox for deep vein thrombosis prophylaxis.  Physical therapy consulted and patient declined earlier today.    9.  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     [facility] Data  External documents reviewed: Reviewed hospitalization with discharge 11/20/2023  Cardiac tracing (EKG, telemetry) interpretation: None  Radiology interpretation: Reviewed radiology interpretation ultrasound abdomen, CT abdomen and pelvis, chest x-ray  Labs reviewed:   BMP 12/5/23.  Repeat CMP in AM.  CBC 12/5/2023.  Repeat CBC in AM.  Respiratory PCR panel negative.  Blood cultures no growth    Any tests that were considered but not ordered: None     Decision rules/scores evaluated (example [id number], Wells, etc): None     Discussed with: Dr. Lam, Dr. Peralta, oncology, patient, son and daughter-in-law present in room.     Care Planning  Shared decision making: Dr. Lam, Dr. Peralta, oncology, patient, 2 sons and 2 daughters present in room.  Patient and son POA agrees to IV fluid hydration, repeat lab work, general surgery consult for port placement, recommendations per oncology pending lab work improvement.  Outpatient PET scan palliative care consult.     Code status and discussions: Full code  Patient surrogate decision maker is her son, Keith    Disposition  Social Determinants of Health that impact treatment or disposition: Decreased activities of ADL  I expect the patient to be discharged to home with home health versus SNF possible palliative care comfort to be determined.    Electronically signed by SOFIYA Zuleta, 12/05/23, 13:44 CST.     The above documentation resulted from a face-to-face encounter by me Jaclyn ARRIAZA, [facility].

## 2023-12-05 NOTE — CONSULTS
[facility] Palliative Care Services  Initial Consult    Attending Physician: Alvarez Lam MD  Referring Provider:  Jaclyn Burleson APRN    Patient Name: Imani Chauhan  Date of Admission: 12/3/2023  Today's Date: 12/05/23     Reason for Referral: Support for Patient/Family    Code Status and Medical Interventions:   Ordered at: 12/03/23 1653     Level Of Support Discussed With:    Patient     Code Status (Patient has no pulse and is not breathing):    CPR (Attempt to Resuscitate)     Medical Interventions (Patient has pulse or is breathing):    Full Support        Subjective     HPI: 83 y.o. female with past medical history including aortic valve regurgitation, coronary artery disease, COVID-19, GERD, heart murmur, hyperlipidemia, hypertension, hypothyroidism, mitral valve regurgitation, prediabetes, shortness of breath, squamous cell cancer of skin and tricuspid valve regurgitation.  Additional past medical history listed below.  Cording to chart review she was recently hospitalized from 11/11/2023-11/20/2023 due to hypercalcemia and  lymphadenopathy.  She was ultimately discharged home with home health.  Patient presented to [facility] on 12/3/2023 related to fluid on abdomen, generalized weakness and nausea. Work-up in ED revealed multiple abnormalities in labs including sodium 133, creatinine 2.08, BUN 29, GFR 23.4, calcium 10.8, alkaline phosphatase 16, AST 35, lactate 2.6, hemoglobin 10.2, hematocrit 33.1 and platelets 492,000.  Chest x-ray revealed left lower lobe opacities concerning for pneumonia with probable small pleural effusion and vascular crowding versus mild venous congestion.  CT of abdomen and pelvis revealed prominent bulky mesenteric and retroperitoneal lymphadenopathy, confluent lymphadenopathy at root of the mesentery measuring up to 13 cm in width.  Retroperitoneal lymphadenopathy remains bulky with confluent.  Aortic lymph nodes measuring up to 6 cm and iliac  lymphadenopathy.  Findings related to recently diagnosed B-cell lymphoma per radiology report.  Small volume ascites within the lower pelvis visualized however no evidence of bowel obstruction or free air or abscess.  She was admitted to the medical floor for further workup and treatment.  Ultrasound of abdomen completed which revealed minimal perisplenic ascites and noted too small to safely drain.  Hematology/oncology consulted and recommended outpatient PET and port placement.  Could consider bendamustine with rituximab if renal function improved with GFR at least 30.  Recommended port placement and noted plans to have procedure outpatient on 12/12/2023 with Dr. Restrepo.  Labs collected this morning reveal slight improvement in sodium however renal function remains impaired with creatinine 2.37, BUN 35 and GFR 20.0.  Calcium also remains elevated at 10.8 alkaline phosphatase 540.  Palliative care has been consulted to discuss goals of care/advance care planning.  She is lying in bed, alert and in no apparent distress at time of exam.  Reports increased nausea and episodes of vomiting.  Her two sons and daughter-in-laws are at bedside.  Ms. Chauhan drifted in and out of sleep during discussion however was able to share she has been experiencing increased nausea and pain and having difficulty keeping anything PO down.     Advance Care Planning   Advanced Directives: Patient reports previously completed an advance directive. Requested copy of document to place in EMR.    Advance Care Planning Discussion: Care conference held with Ms. Chauhan, her two sons and two daughter-in-laws.  Explored previous discussions regarding goals of care and proposed treatment options.  Patient and family appear to have good understanding of treatment options.  Expressed concerns that renal function has continued to decline despite being on IV fluids.  Fear that it is only going to decline more when IV fluids are discontinued as she is  unable to tolerate p.o. at this time.  We discussed potential of renal function not recovering enough to be able to do chemotherapy as well.  Encouraged them to have continued discussions regarding if Ms. Chauhan wishes to proceed with chemotherapy if/when able.  Plans to have continued discussions.  We also discussed goals of care including medical priorities.  After further discussion Ms. Chauhan expressed she did not wish to receive CPR, be intubated or receive artificial nutrition.  Have changed CODE STATUS to reflect this.  We also briefly discussed transitioning to more limited care versus comfort measures and hospice as resource.  Patient and family appear to have good prognostic awareness.  Encouraged continued discussions.  Plan to meet with patient and family again tomorrow morning at 9 AM per request.  Support provided.     The patient receives support from her children and extended family. Patient's son is her POA.    Due to the palliative care topics discussed including goals of care, treatment options, discharge options, medical priorities, and hospice services we will establish an advance care plan.      Review of Systems   Constitutional: Positive for malaise/fatigue.   Musculoskeletal:  Positive for joint pain.   Gastrointestinal:  Positive for nausea and vomiting.   Neurological:  Positive for weakness.     Pain Assessment  Nonverbal Indicators of Pain: nonverbal indicators absent  Past Medical History:   Diagnosis Date    Acute kidney injury superimposed on chronic kidney disease stage IIIb 12/4/2023    Aortic regurgitation     mild 2018    CAD in native artery     COVID-19 06/01/2022    mild case, not hospitalized, no MAB    GERD (gastroesophageal reflux disease)     Heart murmur     Hyperlipidemia     Hypertension     Hypothyroidism     Mitral regurgitation     mild per 2013 echo, no significant per 2018    Myalgia     Near syncope     Pre-diabetes     SOB (shortness of breath)     Squamous cell  cancer of skin of right forearm     Syncope     Tricuspid regurgitation     mild per 2013 echo, trivial per 2018      Past Surgical History:   Procedure Laterality Date    BACK SURGERY      BIOPSY / EXCISION / DISSECTION AXILLARY NODE  2023    had bx w/ Jennifer Restrepo MD at [facility]    CARDIAC CATHETERIZATION      COLONOSCOPY      CORONARY ARTERY BYPASS GRAFT  10/11/2010    Dr. Karina Lora, [facility]    DIAGNOSTIC LAPAROSCOPY N/A 11/14/2023    Procedure: DIAGNOSTIC LAPAROSCOPY;  Surgeon: Adenike Ponce MD;  Location: [facility] OR;  Service: General;  Laterality: N/A;    HYSTERECTOMY      LAPAROSCOPIC CHOLECYSTECTOMY      MEDIAN STERNOTOMY      Thymus cyst resection at same time as CABG    OTHER SURGICAL HISTORY      Rectal surgery    PARTIAL THYMECTOMY  10/11/2010    Dr. Lora    REPLACEMENT TOTAL KNEE Left 2005    TOTAL KNEE ARTHROPLASTY Left 06/2019    TOTAL KNEE ARTHROPLASTY Right 11/04/2020    Dr. Santamaria      Social History     Socioeconomic History    Marital status:    Tobacco Use    Smoking status: Never     Passive exposure: Yes    Smokeless tobacco: Never    Tobacco comments:     father smoked pipe in the house,  smoked   Vaping Use    Vaping Use: Never used   Substance and Sexual Activity    Alcohol use: Never    Drug use: Never    Sexual activity: Defer     Family History   Problem Relation Age of Onset    Coronary artery disease Father     Alzheimer's disease Father     Hypertension Father     Alzheimer's disease Mother     Hypertension Mother     Hyperlipidemia Mother       No Known Allergies    Objective   Diagnostics: Reviewed    Intake/Output Summary (Last 24 hours) at 12/5/2023 1443  Last data filed at 12/5/2023 0334  Gross per 24 hour   Intake --   Output 300 ml   Net -300 ml       Current medications patient is presently taking including all prescriptions, over-the-counter, herbals and vitamin/mineral/dietary (nutritional) supplements with reviewed including route, type, dose and frequency and are  current per MAR at time of dictation.  Current Facility-Administered Medications   Medication Dose Route Frequency Provider Last Rate Last Admin    acetaminophen (TYLENOL) tablet 650 mg  650 mg Oral Q4H PRN Alvarez Lam MD        [START ON 12/7/2023] allopurinol (ZYLOPRIM) tablet 50 mg  50 mg Oral Every Other Day Arnaldo Peralta MD        aspirin EC tablet 81 mg  81 mg Oral Daily Jaclyn Burleson APRN        atorvastatin (LIPITOR) tablet 10 mg  10 mg Oral Nightly Alvarez Lam MD   10 mg at 12/03/23 2035    sennosides-docusate (PERICOLACE) 8.6-50 MG per tablet 2 tablet  2 tablet Oral BID Alvarez Lam MD   2 tablet at 12/05/23 0825    And    polyethylene glycol (MIRALAX) packet 17 g  17 g Oral Daily PRN Alvarez Lam MD        And    bisacodyl (DULCOLAX) EC tablet 5 mg  5 mg Oral Daily PRN Alvarez Lam MD        And    bisacodyl (DULCOLAX) suppository 10 mg  10 mg Rectal Daily PRN Alvarez Lam MD        calcium carbonate (TUMS) chewable tablet 500 mg (200 mg elemental)  2 tablet Oral BID PRN Alvarez Lam MD        dextrose (D50W) (25 g/50 mL) IV injection 25 g  25 g Intravenous Q15 Min PRN Alvarez Lam MD        dextrose (GLUTOSE) oral gel 15 g  15 g Oral Q15 Min PRN Alvarez Lam MD        dronabinol (MARINOL) capsule 2.5 mg  2.5 mg Oral BID Alvarez Lam MD   2.5 mg at 12/05/23 0825    Enoxaparin Sodium (LOVENOX) syringe 30 mg  30 mg Subcutaneous Daily Alvarez Lam MD   30 mg at 12/05/23 0825    famotidine (PEPCID) tablet 20 mg  20 mg Oral Daily Alvarez Lam MD   20 mg at 12/05/23 0825    glucagon (GLUCAGEN) injection 1 mg  1 mg Intramuscular Q15 Min PRN Alvarez Lam MD        HYDROcodone-acetaminophen (NORCO) 7.5-325 MG per tablet 1 tablet  1 tablet Oral Q6H PRN Alvarez Lam MD        Insulin Lispro (humaLOG) injection 2-7 Units  2-7 Units Subcutaneous BID AC Alvarez Lam MD        lactated ringers infusion  75 mL/hr Intravenous Continuous  "Alvarez Lam MD 75 mL/hr at 12/05/23 1254 75 mL/hr at 12/05/23 1254    [START ON 12/6/2023] levothyroxine (SYNTHROID, LEVOTHROID) tablet 37.5 mcg  37.5 mcg Oral Once per day on Mon Wed Fri Jaclyn Burleson APRN        levothyroxine (SYNTHROID, LEVOTHROID) tablet 75 mcg  75 mcg Oral Once per day on Sun Tue Thu Sat Jaclyn Burleson APRN        LORazepam (ATIVAN) tablet 0.5 mg  0.5 mg Oral Q8H PRN Alvarez Lam MD        melatonin tablet 5 mg  5 mg Oral Nightly PRN Alvarez Lam MD        ondansetron (ZOFRAN) injection 4 mg  4 mg Intravenous Q6H PRN Alvarez Lam MD   4 mg at 12/05/23 1254    prochlorperazine (COMPAZINE) injection 5 mg  5 mg Intravenous Q6H PRN Jaclyn Burleson APRN   5 mg at 12/05/23 0841    sodium chloride 0.9 % flush 10 mL  10 mL Intravenous Q12H Alvarez Lam MD   10 mL at 12/05/23 0826    sodium chloride 0.9 % flush 10 mL  10 mL Intravenous PRN Alvarez Lam MD        sodium chloride 0.9 % infusion 40 mL  40 mL Intravenous PRN Alvarez Lam MD        lactated ringers, 75 mL/hr, Last Rate: 75 mL/hr (12/05/23 1254)       acetaminophen    senna-docusate sodium **AND** polyethylene glycol **AND** bisacodyl **AND** bisacodyl    calcium carbonate    dextrose    dextrose    glucagon (human recombinant)    HYDROcodone-acetaminophen    LORazepam    melatonin    ondansetron    prochlorperazine    sodium chloride    sodium chloride  Assessment   /81 (BP Location: Left arm, Patient Position: Lying)   Pulse 104   Temp 97.7 °F (36.5 °C) (Axillary)   Resp 16   Ht 162.6 cm (64\")   Wt 70.8 kg (156 lb)   SpO2 96%   BMI 26.78 kg/m²     Physical Exam  Vitals and nursing note reviewed.   Constitutional:       General: She is not in acute distress.     Appearance: She is ill-appearing.      Interventions: Nasal cannula in place.   HENT:      Head: Normocephalic and atraumatic.   Eyes:      General: Lids are normal.      Extraocular Movements: Extraocular movements intact. "   Neck:      Vascular: No JVD.      Trachea: Trachea normal.   Cardiovascular:      Rate and Rhythm: Tachycardia present.   Pulmonary:      Effort: Pulmonary effort is normal.   Musculoskeletal:      Cervical back: Neck supple.   Skin:     General: Skin is warm and dry.   Neurological:      Mental Status: She is alert and oriented to person, place, and time.   Psychiatric:         Behavior: Behavior is cooperative.     Functional status: Palliative Performance Scale Score: Performance 70% based on the following measures: Ambulation: Reduced, Activity and Evidence of Disease: Unable to do normal work, some evidence of disease, Self-Care: Fully independent,  Intake: Normal or reduced, LOC: Full.  ECOG Status(2) Ambulatory and capable of self care, unable to carry out work activity, up and about > 50% or waking hours.  Nutritional status: Albumin 3.2. Body mass index is 26.78 kg/m²..  Patient status: Disease state: Deteriorating despite treatments.    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 intake  Decreased appetite  Diabetes mellitus type 2  Elevated alkaline phosphatase  Advanced age       Plan / Recommendations     Palliative Care Encounter   Goals of care include CODE STATUS changes to include NO CPR with limited support interventions.    Prognosis is guarded long-term secondary to follicular lymphoma grade 3A, RICHA superimposed on CKD, nausea and vomiting, epigastric pain, hypercalcemia, decreased appetite and p.o. intake and other comorbidities  listed above.     Care conference held with Ms. Chauhan, her two sons and two daughter-in-laws.  Details of discussion above.    Explored previous discussions regarding goals of care and proposed treatment options.      Discussed medical priorities and Ms. Chauhan expressed wishes to to de-escalate CODE STATUS to include no CPR, DO NOT INTUBATE and no artificial nutrition.    Wish to continue with current management at this time while continuing to monitor for renal improvement.    Encouraged ongoing discussions regarding treatment options including proceeding with chemotherapy if/when able versus transition to more comfort focused measures and/or hospice.    Patient and family appear to have good understanding and prognostic awareness.     Denied any further questions and/or concerns at this time.  Plan to meet with patient and family at 9 AM tomorrow to discuss further.    2.   Nausea and Vomiting    Recommend continuing Zofran and Compazine as needed for nausea and vomiting.       Compazine just started as needed this morning.  Consider adding Phenergan if Zofran and Compazine are ineffective.    Reports increased pain and inability to tolerate p.o. pain medication due to nausea and vomiting.  Have added low-dose of IV morphine as needed for moderate to severe pain.    Encourage use of p.o. pain medication if able to tolerate once nausea subsides.    Thank you for allowing us to participate in patient's plan of care. Palliative Care Team will continue to follow patient. Will plan to follow up tomorrow or sooner if needed.    Time spent:100 minutes spent reviewing medical and medication records, assessing and examining patient, discussing with family, answering questions, providing some guidance about a plan and documentation of care, and coordinating care with other healthcare members, with > 50% time spent face to face.   25 minutes spent on advance care planning.    Electronically signed by, SOFIYA Fontanez,  12/05/23.

## 2023-12-05 NOTE — PROGRESS NOTES
[facility] Progress Note    Progress Note    Patient:  Imani Chauhan  YOB: 1940  Date of Service: 12/5/2023  MRN: [id number]   Acct: [id number]   Primary Care Physician: Светлана Loyd MD  Advance Directive:   Code Status and Medical Interventions:   Ordered at: 12/03/23 1653     Level Of Support Discussed With:    Patient     Code Status (Patient has no pulse and is not breathing):    CPR (Attempt to Resuscitate)     Medical Interventions (Patient has pulse or is breathing):    Full Support     Admit Date: 12/3/2023       Hospital Day: 0      Subjective:     Chief Compliant: Patient seen with nurse Paulo, 2 sons and 2 daughters in law at the bedside.  Positive for nausea.  On treatment with Zofran.      Review of Systems:   Review of Systems   Constitutional:  Positive for fatigue. Negative for chills and fever.   HENT:  Negative for congestion and trouble swallowing.    Eyes:  Negative for redness and visual disturbance.   Respiratory:  Negative for cough, shortness of breath and wheezing.    Cardiovascular:  Negative for chest pain and palpitations.   Gastrointestinal:  Positive for nausea. Negative for constipation and diarrhea.   Endocrine: Negative for polydipsia and polyphagia.   Genitourinary:  Negative for dysuria and hematuria.   Musculoskeletal:  Negative for myalgias.   Skin:  Positive for pallor.   Neurological:  Negative for dizziness, facial asymmetry and speech difficulty.   Hematological:  Does not bruise/bleed easily.   Psychiatric/Behavioral:  Negative for agitation, confusion and hallucinations.            Medications:   Scheduled Meds:atorvastatin, 10 mg, Oral, Nightly  dronabinol, 2.5 mg, Oral, BID  enoxaparin, 30 mg, Subcutaneous, Daily  famotidine, 20 mg, Oral, Daily  insulin lispro, 2-7 Units, Subcutaneous, BID AC  senna-docusate sodium, 2 tablet, Oral, BID  sodium chloride, 10 mL, Intravenous, Q12H        Continuous Infusions:lactated ringers, 75 mL/hr,  Last Rate: 75 mL/hr (12/04/23 1435)        Labs:     Lab Results (last 72 hours)       Procedure Component Value Units Date/Time    Beta 2 Microglobulin, Serum [[id number]]  (Abnormal) Collected: 12/04/23 0458    Specimen: Blood Updated: 12/04/23 1955     Beta-2 Microglobulin 13.2 mg/L     POC Glucose Once [[id number]]  (Normal) Collected: 12/04/23 1645    Specimen: Blood Updated: 12/04/23 1656     Glucose 98 mg/dL      Comment: : [id number] Flor NielsonMeter ID: [id number]       Blood Culture - Blood, Arm, Left [[id number]]  (Normal) Collected: 12/03/23 1400    Specimen: Blood from Arm, Left Updated: 12/04/23 1500     Blood Culture No growth at 24 hours    Blood Culture - Blood, Wrist, Left [[id number]]  (Normal) Collected: 12/03/23 1357    Specimen: Blood from Wrist, Left Updated: 12/04/23 1500     Blood Culture No growth at 24 hours    Respiratory Panel PCR w/COVID-19(SARS-CoV-2) SAMIRA/COMFORT/CARMELO/PAD/[facility] In-House, NP Swab in UTM/VTM, 2 HR TAT - Swab, Nasopharynx [[id number]]  (Normal) Collected: 12/04/23 1152    Specimen: Swab from Nasopharynx Updated: 12/04/23 1252     ADENOVIRUS, PCR Not Detected     Coronavirus 229E Not Detected     Coronavirus HKU1 Not Detected     Coronavirus NL63 Not Detected     Coronavirus OC43 Not Detected     COVID19 Not Detected     Human Metapneumovirus Not Detected     Human Rhinovirus/Enterovirus Not Detected     Influenza A PCR Not Detected     Influenza B PCR Not Detected     Parainfluenza Virus 1 Not Detected     Parainfluenza Virus 2 Not Detected     Parainfluenza Virus 3 Not Detected     Parainfluenza Virus 4 Not Detected     RSV, PCR Not Detected     Bordetella pertussis pcr Not Detected     Bordetella parapertussis PCR Not Detected     Chlamydophila pneumoniae PCR Not Detected     Mycoplasma pneumo by PCR Not Detected    Narrative:      In the setting of a positive respiratory panel with a viral infection PLUS a negative procalcitonin without other underlying concern for  bacterial infection, consider observing off antibiotics or discontinuation of antibiotics and continue supportive care. If the respiratory panel is positive for atypical bacterial infection (Bordetella pertussis, Chlamydophila pneumoniae, or Mycoplasma pneumoniae), consider antibiotic de-escalation to target atypical bacterial infection.    Lactate Dehydrogenase [[id number]]  (Abnormal) Collected: 12/04/23 0458    Specimen: Blood Updated: 12/04/23 1204     LDH 1,180 U/L      Comment: Specimen hemolyzed.  Results may be affected.       POC Glucose Once [[id number]]  (Normal) Collected: 12/04/23 1151    Specimen: Blood Updated: 12/04/23 1202     Glucose 88 mg/dL      Comment: : [id number] Ray SarahMeter ID: [id number]       POC Glucose Once [[id number]]  (Normal) Collected: 12/04/23 0749    Specimen: Blood Updated: 12/04/23 0800     Glucose 93 mg/dL      Comment: : [id number] Ray SarahMeter ID: [id number]       Phosphorus [[id number]]  (Abnormal) Collected: 12/04/23 0458    Specimen: Blood Updated: 12/04/23 0604     Phosphorus 1.6 mg/dL     Basic Metabolic Panel [[id number]]  (Abnormal) Collected: 12/04/23 0458    Specimen: Blood Updated: 12/04/23 0551     Glucose 93 mg/dL      BUN 32 mg/dL      Creatinine 2.35 mg/dL      Sodium 133 mmol/L      Potassium 4.3 mmol/L      Chloride 94 mmol/L      CO2 23.0 mmol/L      Calcium 10.0 mg/dL      BUN/Creatinine Ratio 13.6     Anion Gap 16.0 mmol/L      eGFR 20.2 mL/min/1.73     Narrative:      GFR Normal >60  Chronic Kidney Disease <60  Kidney Failure <15    The GFR formula is only valid for adults with stable renal function between ages 18 and 70.    Magnesium [[id number]]  (Normal) Collected: 12/04/23 0458    Specimen: Blood Updated: 12/04/23 0551     Magnesium 1.8 mg/dL     CBC & Differential [[id number]]  (Abnormal) Collected: 12/04/23 0458    Specimen: Blood Updated: 12/04/23 0533    Narrative:      The following orders were created for panel order CBC &  Differential.  Procedure                               Abnormality         Status                     ---------                               -----------         ------                     CBC Auto Differential[[id number]]        Abnormal            Final result                 Please view results for these tests on the individual orders.    CBC Auto Differential [[id number]]  (Abnormal) Collected: 12/04/23 0458    Specimen: Blood Updated: 12/04/23 0533     WBC 8.87 10*3/mm3      RBC 3.35 10*6/mm3      Hemoglobin 9.3 g/dL      Hematocrit 31.6 %      MCV 94.3 fL      MCH 27.8 pg      MCHC 29.4 g/dL      RDW 16.6 %      RDW-SD 55.9 fl      MPV 9.9 fL      Platelets 464 10*3/mm3      Neutrophil % 86.1 %      Lymphocyte % 5.7 %      Monocyte % 5.6 %      Eosinophil % 1.1 %      Basophil % 0.8 %      Immature Grans % 0.7 %      Neutrophils, Absolute 7.63 10*3/mm3      Lymphocytes, Absolute 0.51 10*3/mm3      Monocytes, Absolute 0.50 10*3/mm3      Eosinophils, Absolute 0.10 10*3/mm3      Basophils, Absolute 0.07 10*3/mm3      Immature Grans, Absolute 0.06 10*3/mm3      nRBC 0.0 /100 WBC     STAT Lactic Acid, Reflex [[id number]]  (Abnormal) Collected: 12/03/23 1859    Specimen: Blood Updated: 12/03/23 1952     Lactate 2.1 mmol/L     COVID PRE-OP / PRE-PROCEDURE SCREENING ORDER (NO ISOLATION) - Swab, Nasopharynx [[id number]]  (Normal) Collected: 12/03/23 1200    Specimen: Swab from Nasopharynx Updated: 12/03/23 1300    Narrative:      The following orders were created for panel order COVID PRE-OP / PRE-PROCEDURE SCREENING ORDER (NO ISOLATION) - Swab, Nasopharynx.  Procedure                               Abnormality         Status                     ---------                               -----------         ------                     COVID-19 and FLU A/B PCR...[[id number]]  Normal              Final result                 Please view results for these tests on the individual orders.    COVID-19 and FLU A/B PCR, 1 HR TAT - Swab,  Nasopharynx [[id number]]  (Normal) Collected: 12/03/23 1200    Specimen: Swab from Nasopharynx Updated: 12/03/23 1300     COVID19 Not Detected     Influenza A PCR Not Detected     Influenza B PCR Not Detected    Narrative:      Fact sheet for providers: https://www.fda.gov/media/389642/download    Fact sheet for patients: https://www.fda.gov/media/178741/download    Test performed by PCR.    BNP [[id number]]  (Normal) Collected: 12/03/23 1200    Specimen: Blood Updated: 12/03/23 1229     proBNP 648.2 pg/mL     Narrative:      This assay is used as an aid in the diagnosis of individuals suspected of having heart failure. It can be used as an aid in the diagnosis of acute decompensated heart failure (ADHF) in patients presenting with signs and symptoms of ADHF to the emergency department (ED). In addition, NT-proBNP of <300 pg/mL indicates ADHF is not likely.    Age Range Result Interpretation  NT-proBNP Concentration (pg/mL:      <50             Positive            >450                   Gray                 300-450                    Negative             <300    50-75           Positive            >900                  Gray                300-900                  Negative            <300      >75             Positive            >1800                  Gray                300-1800                  Negative            <300    Comprehensive Metabolic Panel [[id number]]  (Abnormal) Collected: 12/03/23 1200    Specimen: Blood Updated: 12/03/23 1227     Glucose 93 mg/dL      BUN 29 mg/dL      Creatinine 2.08 mg/dL      Sodium 133 mmol/L      Potassium 4.4 mmol/L      Chloride 93 mmol/L      CO2 26.0 mmol/L      Calcium 10.8 mg/dL      Total Protein 6.4 g/dL      Albumin 3.0 g/dL      ALT (SGPT) 20 U/L      AST (SGOT) 35 U/L      Alkaline Phosphatase 610 U/L      Total Bilirubin 0.3 mg/dL      Globulin 3.4 gm/dL      A/G Ratio 0.9 g/dL      BUN/Creatinine Ratio 13.9     Anion Gap 14.0 mmol/L      eGFR 23.4 mL/min/1.73      Narrative:      GFR Normal >60  Chronic Kidney Disease <60  Kidney Failure <15    The GFR formula is only valid for adults with stable renal function between ages 18 and 70.    Lactic Acid, Plasma [[id number]]  (Abnormal) Collected: 12/03/23 1200    Specimen: Blood Updated: 12/03/23 1225     Lactate 2.6 mmol/L     CBC & Differential [[id number]]  (Abnormal) Collected: 12/03/23 1200    Specimen: Blood Updated: 12/03/23 1209    Narrative:      The following orders were created for panel order CBC & Differential.  Procedure                               Abnormality         Status                     ---------                               -----------         ------                     CBC Auto Differential[[id number]]        Abnormal            Final result                 Please view results for these tests on the individual orders.    CBC Auto Differential [[id number]]  (Abnormal) Collected: 12/03/23 1200    Specimen: Blood Updated: 12/03/23 1209     WBC 10.01 10*3/mm3      RBC 3.57 10*6/mm3      Hemoglobin 10.2 g/dL      Hematocrit 33.1 %      MCV 92.7 fL      MCH 28.6 pg      MCHC 30.8 g/dL      RDW 16.2 %      RDW-SD 53.6 fl      MPV 9.6 fL      Platelets 492 10*3/mm3      Neutrophil % 87.5 %      Lymphocyte % 4.3 %      Monocyte % 5.4 %      Eosinophil % 1.1 %      Basophil % 0.8 %      Immature Grans % 0.9 %      Neutrophils, Absolute 8.76 10*3/mm3      Lymphocytes, Absolute 0.43 10*3/mm3      Monocytes, Absolute 0.54 10*3/mm3      Eosinophils, Absolute 0.11 10*3/mm3      Basophils, Absolute 0.08 10*3/mm3      Immature Grans, Absolute 0.09 10*3/mm3      nRBC 0.2 /100 WBC               Radiology:     Imaging Results (Last 72 Hours)       Procedure Component Value Units Date/Time    US Abdomen Limited [[id number]] Collected: 12/04/23 1007     Updated: 12/04/23 1011    Narrative:      EXAM: US ABDOMEN LIMITED-      DATE: 12/4/2023 9:29 AM     HISTORY: check for drainable ascitic fluid; J18.9-Pneumonia,  unspecified  organism       COMPARISON: No existing relevant imaging studies available.      TECHNIQUE: Real-time ultrasound performed with representative images  saved to PACS.     FINDINGS:    Targeted ultrasound performed of the RIGHT upper, RIGHT lower, LEFT  upper and LEFT lower quadrants. Minimal ascites demonstrated adjacent to  the spleen, too small to safely drain.          Impression:      1. Minimal perisplenic ascites.        This report was signed and finalized on 12/4/2023 10:08 AM by Dr Lorelei Amor MD.       CT Abdomen Pelvis Without Contrast [[id number]] Collected: 12/03/23 1425     Updated: 12/03/23 1436    Narrative:      CT ABDOMEN PELVIS WO CONTRAST- 12/3/2023 1:58 PM     HISTORY: Abdominal pain and distention. Recently diagnosed B-cell  lymphoma.; J18.9-Pneumonia, unspecified organism; recently diagnosed  B-cell lymphoma     COMPARISON: 11/12/2023     DOSE LENGTH PRODUCT: 400 mGy cm. Automated exposure control was also  utilized to decrease patient radiation dose.     TECHNIQUE: Axial images of the abdomen and pelvis are performed without  IV contrast     FINDINGS: Similar small bilateral pleural effusions with bibasilar  atelectasis. Posterior inferior as well as retrocrural lymphadenopathy  remains similar to the previous study.     The nonenhanced liver, spleen, and adrenal glands are unremarkable.  Spleen is normal in size measuring 7.8 cm. Limited evaluation of the  pancreatic tissue without IV contrast. No abnormal perinephric fluid  collection. No hydronephrosis. Mildly distended bladder unremarkable.     There is prominent bulky mesenteric and retroperitoneal lymphadenopathy.  Confluent lymphadenopathy at the root of the mesentery measuring up to  13 cm in width, similar to the prior study.  Retroperitoneal lymphadenopathy remains bulky with confluent periaortic  lymph nodes measuring up to 6 cm.  Iliac lymphadenopathy again visualized. Little interval change since  11/12/2023.     "  There is no free intraperitoneal air or loculated abscess collection  identified with nonenhanced imaging. Similar small volume ascites within  the lower pelvis. No evidence for bowel obstruction. Mild to moderate  vascular calcification.     Degenerative changes regional skeleton with a mild left convexity of the  thoracolumbar curvature. No focal aggressive regional bony lesions.       Impression:      1. Compared to 11/12/2023, overall similar bulky inferior mediastinal,  retrocrural, retroperitoneal, mesenteric, and pelvic iliac  lymphadenopathy related to patient's recently diagnosed B-cell lymphoma.  2. Similar small volume ascites within the lower pelvis. No evidence of  bowel obstruction. No free air or abscess.  3. Similar small bilateral pleural effusions with bibasilar atelectasis.     This report was signed and finalized on 12/3/2023 2:33 PM by Dr. Evelyne Dickson MD.       XR Chest 1 View [[id number]] Collected: 12/03/23 1245     Updated: 12/03/23 1249    Narrative:      XR CHEST 1 VW-     HISTORY: SOA RO fluid     COMPARISON: None     FINDINGS: Frontal view the chest obtained.     Prior mediastinal surgery. Mild to moderate elevation right  hemidiaphragm. Left lower lobe opacities questionable small left pleural  effusion. Vascular crowding versus mild venous congestion. No  pneumothorax. No acute regional bony pathology.       Impression:      1. Left lower lobe opacities concerning for pneumonia with probable  small pleural effusion. Vascular crowding versus mild venous congestion.  Prior mediastinal surgery with elevated right hemidiaphragm.        This report was signed and finalized on 12/3/2023 12:46 PM by Dr. Evelyne Dickson MD.                 Objective:   Vitals: /65 (BP Location: Left arm, Patient Position: Lying)   Pulse 101   Temp 98.4 °F (36.9 °C) (Oral)   Resp 16   Ht 162.6 cm (64\")   Wt 70.8 kg (156 lb)   SpO2 91%   BMI 26.78 kg/m²   Physical Exam  Vitals and nursing note " reviewed.   Constitutional:       Appearance: She is ill-appearing.   HENT:      Head: Normocephalic and atraumatic.   Eyes:      General: No scleral icterus.  Cardiovascular:      Rate and Rhythm: Normal rate.   Pulmonary:      Effort: No respiratory distress.      Breath sounds: No wheezing.   Abdominal:      General: Bowel sounds are normal. There is no distension.      Palpations: Abdomen is soft.   Musculoskeletal:         General: Swelling present.      Cervical back: No rigidity.   Skin:     Coloration: Skin is pale.   Neurological:      Mental Status: She is oriented to person, place, and time.   Psychiatric:         Mood and Affect: Mood normal.         Behavior: Behavior normal.       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  AJCC stage: III.  IPI score of 4, age greater than 60, elevated LDH, hemoglobin less than 12, stage at least 3.  Treatment status: Pending outpatient therapy.    2.  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 Normal calcium 12/4/2023. Zometa 4 mg 11/11/2023.  3.  Performance status of 2.  4.  Left lower lobe opacities concerning for pneumonia. Had a dose of Zosyn.  Negative for pneumonia per primary team.  5.  Acute kidney injury from dehydration.   6.  Uric acid 12.Plan for allopurinol.        PLAN:   regarding note from surgery on 12/4/2023.  Plan for port placement due to Bendamustine and to return with vesicant like properties.  2.     regarding  continue hydration per primary team due to acute kidney injury.  3.    regarding therapy for stage IIIa follicular lymphoma is controversial and treatment is individualized. Anticipate bendamustine if GFR at least 30 with rituxamab. Anticipate 6 cycles.    4.  Plan for outpatient PET.    5.  Pending hepatitis B surface antigen and hepatitis B core antibody, prior to therapy with rituximab, LDH 1180 and B2M 13.2. Phosphorus 1.6.   6.  Discussed prognosis.  With rituximab based treatment for IPI score of 4, 2-year overall survival approximately 87%.  2-year progression free survival approximately 65% and median progression free survival 42 months.  7.  Allopurinol dose 50 mg IV every other day due to GFR at 20.   8.  Follow-up with Dr. Boyer on 12/11/2023 at 2 pm per patient's request.   9.  Questions were answered to their satisfaction.  10.  Above plan discussed with nurse Bates.  He will relay message to SOFIYA Arreola.  11.  Anticipate discharge to rehab.

## 2023-12-06 VITALS
HEIGHT: 64 IN | SYSTOLIC BLOOD PRESSURE: 143 MMHG | HEART RATE: 105 BPM | DIASTOLIC BLOOD PRESSURE: 73 MMHG | BODY MASS INDEX: 26.63 KG/M2 | OXYGEN SATURATION: 91 % | WEIGHT: 156 LBS | TEMPERATURE: 97.5 F | RESPIRATION RATE: 16 BRPM

## 2023-12-06 PROBLEM — J18.9 HCAP (HEALTHCARE-ASSOCIATED PNEUMONIA): Status: ACTIVE | Noted: 2023-12-06

## 2023-12-06 LAB
ALBUMIN SERPL-MCNC: 3 G/DL (ref 3.5–5.2)
ALBUMIN/GLOB SERPL: 0.9 G/DL
ALP SERPL-CCNC: 453 U/L (ref 39–117)
ALT SERPL W P-5'-P-CCNC: 12 U/L (ref 1–33)
ANION GAP SERPL CALCULATED.3IONS-SCNC: 21 MMOL/L (ref 5–15)
AST SERPL-CCNC: 26 U/L (ref 1–32)
BASOPHILS # BLD AUTO: 0.07 10*3/MM3 (ref 0–0.2)
BASOPHILS NFR BLD AUTO: 0.6 % (ref 0–1.5)
BILIRUB SERPL-MCNC: 0.3 MG/DL (ref 0–1.2)
BUN SERPL-MCNC: 40 MG/DL (ref 8–23)
BUN/CREAT SERPL: 17.8 (ref 7–25)
CALCIUM SPEC-SCNC: 10.3 MG/DL (ref 8.6–10.5)
CHLORIDE SERPL-SCNC: 93 MMOL/L (ref 98–107)
CO2 SERPL-SCNC: 25 MMOL/L (ref 22–29)
CREAT SERPL-MCNC: 2.25 MG/DL (ref 0.57–1)
DEPRECATED RDW RBC AUTO: 55.3 FL (ref 37–54)
EGFRCR SERPLBLD CKD-EPI 2021: 21.3 ML/MIN/1.73
EOSINOPHIL # BLD AUTO: 0 10*3/MM3 (ref 0–0.4)
EOSINOPHIL NFR BLD AUTO: 0 % (ref 0.3–6.2)
ERYTHROCYTE [DISTWIDTH] IN BLOOD BY AUTOMATED COUNT: 17 % (ref 12.3–15.4)
GLOBULIN UR ELPH-MCNC: 3.4 GM/DL
GLUCOSE BLDC GLUCOMTR-MCNC: 111 MG/DL (ref 70–130)
GLUCOSE SERPL-MCNC: 112 MG/DL (ref 65–99)
HCT VFR BLD AUTO: 33.8 % (ref 34–46.6)
HGB BLD-MCNC: 10.4 G/DL (ref 12–15.9)
IMM GRANULOCYTES # BLD AUTO: 0.1 10*3/MM3 (ref 0–0.05)
IMM GRANULOCYTES NFR BLD AUTO: 0.9 % (ref 0–0.5)
LYMPHOCYTES # BLD AUTO: 0.64 10*3/MM3 (ref 0.7–3.1)
LYMPHOCYTES NFR BLD AUTO: 5.6 % (ref 19.6–45.3)
MCH RBC QN AUTO: 28.5 PG (ref 26.6–33)
MCHC RBC AUTO-ENTMCNC: 30.8 G/DL (ref 31.5–35.7)
MCV RBC AUTO: 92.6 FL (ref 79–97)
MONOCYTES # BLD AUTO: 0.57 10*3/MM3 (ref 0.1–0.9)
MONOCYTES NFR BLD AUTO: 5 % (ref 5–12)
NEUTROPHILS NFR BLD AUTO: 10.03 10*3/MM3 (ref 1.7–7)
NEUTROPHILS NFR BLD AUTO: 87.9 % (ref 42.7–76)
NRBC BLD AUTO-RTO: 0 /100 WBC (ref 0–0.2)
PLATELET # BLD AUTO: 523 10*3/MM3 (ref 140–450)
PMV BLD AUTO: 9.8 FL (ref 6–12)
POTASSIUM SERPL-SCNC: 4.2 MMOL/L (ref 3.5–5.2)
PROT SERPL-MCNC: 6.4 G/DL (ref 6–8.5)
RBC # BLD AUTO: 3.65 10*6/MM3 (ref 3.77–5.28)
SODIUM SERPL-SCNC: 139 MMOL/L (ref 136–145)
WBC NRBC COR # BLD AUTO: 11.41 10*3/MM3 (ref 3.4–10.8)

## 2023-12-06 PROCEDURE — 99215 OFFICE O/P EST HI 40 MIN: CPT | Performed by: INTERNAL MEDICINE

## 2023-12-06 PROCEDURE — 99233 SBSQ HOSP IP/OBS HIGH 50: CPT

## 2023-12-06 PROCEDURE — 25010000002 ENOXAPARIN PER 10 MG: Performed by: INTERNAL MEDICINE

## 2023-12-06 PROCEDURE — 25010000002 PROMETHAZINE PER 50 MG: Performed by: INTERNAL MEDICINE

## 2023-12-06 PROCEDURE — 85025 COMPLETE CBC W/AUTO DIFF WBC: CPT | Performed by: INTERNAL MEDICINE

## 2023-12-06 PROCEDURE — 82948 REAGENT STRIP/BLOOD GLUCOSE: CPT

## 2023-12-06 PROCEDURE — 63710000001 DRONABINOL PER 2.5 MG: Performed by: INTERNAL MEDICINE

## 2023-12-06 PROCEDURE — 25010000002 PROCHLORPERAZINE 10 MG/2ML SOLUTION: Performed by: NURSE PRACTITIONER

## 2023-12-06 PROCEDURE — 25010000002 ONDANSETRON PER 1 MG: Performed by: INTERNAL MEDICINE

## 2023-12-06 PROCEDURE — 80053 COMPREHEN METABOLIC PANEL: CPT | Performed by: NURSE PRACTITIONER

## 2023-12-06 RX ORDER — HYDROCODONE BITARTRATE AND ACETAMINOPHEN 7.5; 325 MG/1; MG/1
1 TABLET ORAL EVERY 6 HOURS PRN
Qty: 12 TABLET | Refills: 0 | Status: SHIPPED | OUTPATIENT
Start: 2023-12-06

## 2023-12-06 RX ORDER — NALOXONE HYDROCHLORIDE 4 MG/.1ML
SPRAY NASAL
Qty: 2 EACH | Refills: 0 | Status: SHIPPED | OUTPATIENT
Start: 2023-12-06

## 2023-12-06 RX ORDER — PROMETHAZINE HYDROCHLORIDE 12.5 MG/1
12.5 SUPPOSITORY RECTAL EVERY 6 HOURS PRN
Status: DISCONTINUED | OUTPATIENT
Start: 2023-12-06 | End: 2023-12-06 | Stop reason: HOSPADM

## 2023-12-06 RX ORDER — PROMETHAZINE HYDROCHLORIDE 12.5 MG/1
12.5 SUPPOSITORY RECTAL EVERY 6 HOURS PRN
Qty: 12 SUPPOSITORY | Refills: 0 | Status: SHIPPED | OUTPATIENT
Start: 2023-12-06

## 2023-12-06 RX ORDER — ONDANSETRON 4 MG/1
4 TABLET, ORALLY DISINTEGRATING ORAL EVERY 8 HOURS PRN
Qty: 12 TABLET | Refills: 0 | Status: SHIPPED | OUTPATIENT
Start: 2023-12-06

## 2023-12-06 RX ADMIN — PROMETHAZINE HYDROCHLORIDE 12.5 MG: 25 INJECTION INTRAMUSCULAR; INTRAVENOUS at 03:17

## 2023-12-06 RX ADMIN — ONDANSETRON 4 MG: 2 INJECTION INTRAMUSCULAR; INTRAVENOUS at 05:09

## 2023-12-06 RX ADMIN — PROMETHAZINE HYDROCHLORIDE 12.5 MG: 12.5 SUPPOSITORY RECTAL at 17:58

## 2023-12-06 RX ADMIN — Medication 10 ML: at 10:24

## 2023-12-06 RX ADMIN — ENOXAPARIN SODIUM 30 MG: 100 INJECTION SUBCUTANEOUS at 10:22

## 2023-12-06 RX ADMIN — PROCHLORPERAZINE EDISYLATE 5 MG: 5 INJECTION INTRAMUSCULAR; INTRAVENOUS at 16:35

## 2023-12-06 RX ADMIN — DRONABINOL 2.5 MG: 2.5 CAPSULE ORAL at 10:32

## 2023-12-06 RX ADMIN — HYDROCODONE BITARTRATE AND ACETAMINOPHEN 1 TABLET: 7.5; 325 TABLET ORAL at 18:11

## 2023-12-06 RX ADMIN — PROMETHAZINE HYDROCHLORIDE 12.5 MG: 25 INJECTION INTRAMUSCULAR; INTRAVENOUS at 11:15

## 2023-12-06 RX ADMIN — PROCHLORPERAZINE EDISYLATE 5 MG: 5 INJECTION INTRAMUSCULAR; INTRAVENOUS at 08:45

## 2023-12-06 NOTE — PLAN OF CARE
Goal Outcome Evaluation:  Plan of Care Reviewed With: patient        Progress: no change  Outcome Evaluation: Patient no c/o pain so far this shift. Patient having c/o nausea and x1 emesis this shift. See Mar. VSS

## 2023-12-06 NOTE — CASE MANAGEMENT/SOCIAL WORK
Continued Stay Note   [facility]     Patient Name: Imani Chauhan  MRN: [id number]  Today's Date: 12/6/2023    Admit Date: 12/3/2023    Plan: Referral to [facility]   Discharge Plan       Row Name 12/06/23 1006       Plan    Plan Referral to [facility]    Patient/Family in Agreement with Plan yes    Plan Comments Spoke with pt and grandchildren in the room. She is wanting to go to the [facility] in [place]. Referral sent to [phone]. Will follow for decision. If she does not meet in hospice critera they may require private pay.                   Discharge Codes    No documentation.                       JADE Ibarra

## 2023-12-06 NOTE — CASE MANAGEMENT/SOCIAL WORK
Continued Stay Note  [facility]     Patient Name: Imani Chauhan  MRN: [id number]  Today's Date: 12/6/2023    Admit Date: 12/3/2023    Plan: Home Hospice   Discharge Plan       Row Name 12/06/23 5468       Plan    Plan Home Hospice    Patient/Family in Agreement with Plan yes    Final Discharge Disposition Code 50 - home with hospice    Final Note [facility] does not have a bed so pt is going to go to [facility] with a Purpose at [address] [place] [address]. Family is making the arrangements. Have talked with Quyen at [facility] [phone] and they are contacting the family to make arrangements for DME delivery. Pt is being d/c'ed today. Faxing the d/c summary to [phone]. Pt can go by [employer] [phone] or [employer] [phone].      Row Name 12/06/23 1009       Plan    Plan Referral to [facility]    Patient/Family in Agreement with Plan yes    Plan Comments Spoke with pt and grandchildren in the room. She is wanting to go to the [facility] in [place]. Referral sent to [phone]. Will follow for decision. If she does not meet in hospice critera they may require private pay.                   Discharge Codes    No documentation.                 Expected Discharge Date and Time       Expected Discharge Date Expected Discharge Time    Dec 6, 2023               JADE Ibarra

## 2023-12-06 NOTE — PROGRESS NOTES
[facility] Palliative Care Services  Progress Note  Patient Name: Imani Chauhan  Date of Admission: 12/3/2023  Today's Date: 12/06/23     Code Status and Medical Interventions:   Ordered at: 12/05/23 1528     Medical Intervention Limits:    NO intubation (DNI)    NO artificial nutrition     Level Of Support Discussed With:    Patient    Next of Kin (If No Surrogate)     Code Status (Patient has no pulse and is not breathing):    No CPR (Do Not Attempt to Resuscitate)     Medical Interventions (Patient has pulse or is breathing):    Limited Support     Subjective   Chief complaint/Reason for Referral/Visit: Follow up on Support for Patient/Family.    Medical record reviewed. Events noted.  Labs collected this morning reveal only minimal improvement in renal function.  She is lying in bed, awake however closes her eyes frequently during discussion.  Denies pain however reports continued nausea.  Multiple family members at bedside.     Advance Care Planning   Advanced Directives: Patient reports previously completed an advance directive. Requested copy of document to place in EMR.    Advance Care Planning Discussion: Care conference held with Ms. Chauhan, her sons, daughter-in-laws and other family members per request.  Explored previous discussions including this morning with oncology.  Patient and family report after further discussions they feel hospice is the best option.  We discussed goals of care further including option of transitioning to more comfort focused care to avoid any interventions that may cause discomfort including labs, therapies, etc.  Ms. Chauhan expressed she did not understand and we discussed additional interventions further.  Wishes to discuss hospice first prior to making any further decisions regarding goals of care while hospitalized.  Patient/family expressed interest in discharge to hospice facility in [place], [place].  They reflected on previous experience with hospice  services and expressed understanding of care.  Patient/family appreciative of discussion.  Have notified SW of patient/family request of hospice placement in [place], [place] and referral has been placed by oncology.  Patient/family denied any further questions and/or concerns at this time.  Encouraged if arise.  Support provided.    The patient receives support from her children and extended family. Patient's son is her POA.    Due to the palliative care topics discussed including goals of care, treatment options, medical priorities, and hospice services we will establish an advance care plan.    Goals of care: Ongoing.    Review of Systems   Constitutional: Positive for malaise/fatigue.   Gastrointestinal:  Positive for nausea and vomiting.       Pain Assessment  Nonverbal Indicators of Pain: nonverbal indicators absent  Pain Location: abdomen  Objective   Diagnostics: Reviewed      Intake/Output Summary (Last 24 hours) at 12/6/2023 0922  Last data filed at 12/6/2023 0500  Gross per 24 hour   Intake --   Output 250 ml   Net -250 ml     Current Facility-Administered Medications   Medication Dose Route Frequency Provider Last Rate Last Admin    acetaminophen (TYLENOL) tablet 650 mg  650 mg Oral Q4H PRN Alvarez Lam MD        [START ON 12/7/2023] allopurinol (ZYLOPRIM) tablet 50 mg  50 mg Oral Every Other Day Arnaldo Peralta MD        aspirin EC tablet 81 mg  81 mg Oral Daily Jaclyn Burleson APRN   81 mg at 12/05/23 1642    atorvastatin (LIPITOR) tablet 10 mg  10 mg Oral Nightly Alvarez Lam MD   10 mg at 12/05/23 2021    sennosides-docusate (PERICOLACE) 8.6-50 MG per tablet 2 tablet  2 tablet Oral BID Alvarez Lam MD   2 tablet at 12/05/23 2021    And    polyethylene glycol (MIRALAX) packet 17 g  17 g Oral Daily PRN Alvarez Lam MD        And    bisacodyl (DULCOLAX) EC tablet 5 mg  5 mg Oral Daily PRN Alvarez Lam MD        And    bisacodyl (DULCOLAX) suppository 10 mg  10 mg Rectal Daily  PRN Alvarez Lam MD        calcium carbonate (TUMS) chewable tablet 500 mg (200 mg elemental)  2 tablet Oral BID PRN Alvarez Lam MD        dextrose (D50W) (25 g/50 mL) IV injection 25 g  25 g Intravenous Q15 Min PRN Alvarez Lam MD        dextrose (GLUTOSE) oral gel 15 g  15 g Oral Q15 Min PRN Alvarez Lam MD        dronabinol (MARINOL) capsule 2.5 mg  2.5 mg Oral BID Alvarez Lam MD   2.5 mg at 12/05/23 2021    Enoxaparin Sodium (LOVENOX) syringe 30 mg  30 mg Subcutaneous Daily Alvarez Lam MD   30 mg at 12/05/23 0825    famotidine (PEPCID) tablet 20 mg  20 mg Oral Daily Alvarez Lam MD   20 mg at 12/05/23 0825    glucagon (GLUCAGEN) injection 1 mg  1 mg Intramuscular Q15 Min PRN Alvarez Lam MD        HYDROcodone-acetaminophen (NORCO) 7.5-325 MG per tablet 1 tablet  1 tablet Oral Q6H PRN Alvarez Lam MD        Insulin Lispro (humaLOG) injection 2-7 Units  2-7 Units Subcutaneous BID AC Alvarez Lam MD        lactated ringers infusion  75 mL/hr Intravenous Continuous Alvarez Lam MD 75 mL/hr at 12/05/23 1254 75 mL/hr at 12/05/23 1254    levothyroxine (SYNTHROID, LEVOTHROID) tablet 37.5 mcg  37.5 mcg Oral Once per day on Mon Wed Fri Jaclyn Burleson APRN        levothyroxine (SYNTHROID, LEVOTHROID) tablet 75 mcg  75 mcg Oral Once per day on Sun Tue Thu Sat Jaclyn Burleson APRN   75 mcg at 12/05/23 1643    LORazepam (ATIVAN) tablet 0.5 mg  0.5 mg Oral Q8H PRN Alvarez Lam MD        melatonin tablet 5 mg  5 mg Oral Nightly PRN Alvarez Lam MD        Morphine sulfate (PF) injection 1 mg  1 mg Intravenous Q4H PRN Kaila Restrepo APRN   1 mg at 12/05/23 1629    ondansetron (ZOFRAN) injection 4 mg  4 mg Intravenous Q6H PRN Alvarez Lam MD   4 mg at 12/06/23 0509    prochlorperazine (COMPAZINE) injection 5 mg  5 mg Intravenous Q6H PRN Jaclyn Burleson APRN   5 mg at 12/06/23 0845    promethazine (PHENERGAN) 12.5 mg in sodium chloride 0.9 % 50  "mL  12.5 mg Intravenous Q4H PRN Alvarez Lam MD   12.5 mg at 12/06/23 0317    promethazine (PHENERGAN) suppository 12.5 mg  12.5 mg Rectal Q6H PRN Jaclyn Burleson APRN        sodium chloride 0.9 % flush 10 mL  10 mL Intravenous Q12H Alvarez Lam MD   10 mL at 12/05/23 2021    sodium chloride 0.9 % flush 10 mL  10 mL Intravenous PRN Alvarez Lam MD        sodium chloride 0.9 % infusion 40 mL  40 mL Intravenous PRN Alvarez Lam MD         lactated ringers, 75 mL/hr, Last Rate: 75 mL/hr (12/05/23 1254)        acetaminophen    senna-docusate sodium **AND** polyethylene glycol **AND** bisacodyl **AND** bisacodyl    calcium carbonate    dextrose    dextrose    glucagon (human recombinant)    HYDROcodone-acetaminophen    LORazepam    melatonin    Morphine    ondansetron    prochlorperazine    promethazine    promethazine    sodium chloride    sodium chloride  Current medications patient is presently taking including all prescriptions, over-the-counter, herbals and vitamin/mineral/dietary (nutritional) supplements with reviewed including route, type, dose and frequency and are current per MAR at time of dictation.    Assessment:  Vital Signs: /73 (BP Location: Left arm, Patient Position: Lying)   Pulse 105   Temp 97.5 °F (36.4 °C) (Axillary)   Resp 16   Ht 162.6 cm (64\")   Wt 70.8 kg (156 lb)   SpO2 91%   BMI 26.78 kg/m²     Physical Exam  Vitals and nursing note reviewed.   Constitutional:       General: She is not in acute distress.  HENT:      Head: Normocephalic and atraumatic.   Eyes:      General: Lids are normal.      Extraocular Movements: Extraocular movements intact.   Neck:      Vascular: No JVD.      Trachea: Trachea normal.   Musculoskeletal:      Cervical back: Neck supple.   Skin:     General: Skin is warm and dry.          Functional status: Palliative Performance Scale Score: Performance 40% based on the following measures: Ambulation: Mainly in bed, Activity and Evidence " of Disease: Unable to do any work, extensive evidence of disease, Self-Care: Mainly assistance required,  Intake: Normal or reduced, LOC: Full, drowsy or confusion.  Nutritional status: Albumin 3.0. Body mass index is 26.78 kg/m².   Patient status: Disease state: No further treatment being pursued.    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 intake  Decreased appetite  Diabetes mellitus type 2  Elevated alkaline phosphatase  Advanced age          Plan / Recommendations     Palliative Care Encounter   Goals of care include CODE STATUS CPR with limited support interventions.    Prognosis is guarded long-term secondary to follicular lymphoma grade 3A, RICHA superimposed on CKD, nausea and vomiting, epigastric pain, hypercalcemia, decreased appetite and p.o. intake and other comorbidities listed above.     Care conference held with Ms. Chauhan, her sons, daughter-in-laws and other family members per request.  Details of discussion above.      Report after further discussions plans to proceed with hospice services and are interested in outpatient hospice placement in [place], [place].   Referral placed by oncology.    Notified SW of patient/family request of hospice via secure chat.     Patient/family appreciative of discussion.  Denied any further questions and/or concerns at this time.  Encouraged if arise.  Support provided.     MOST form initiated to reflect wishes of NO CPR and limited support interventions with assistance of palliative SW.  Awaiting  attending signature for completion and will plan to fax copy to EMR and provide family with copies.       2.   Nausea and Vomiting    Recommend continuing Zofran, Compazine and Phenergan as needed for nausea and vomiting.       Previously reported increased pain and inability to tolerate p.o. pain medication due to nausea and vomiting.  Added low-dose of IV morphine as needed for moderate to severe pain on 12/5/2023.  Recommend continuing as needed.  Encourage use of p.o. pain medication if able to tolerate once nausea subsides.    Thank you for allowing us to participate in patient's plan of care. Palliative Care Team will continue to follow patient.     Time spent:50 minutes spent reviewing medical and medication records, assessing and examining patient, discussing with family, answering questions, providing some guidance about a plan and documentation of care, and coordinating care with other healthcare members, with > 50% time spent face to face.       Electronically signed by, SOFIYA Fontanez, 12/06/23.

## 2023-12-06 NOTE — PAYOR COMM NOTE
"Imani Branch (83 y.o. Female)       [id number]      admit 12/6 inpt order     new request    [facility] phone   fax           [phone]         Date of Birth   1940    Social Security Number       Address   [address] [facility] [address]    Home Phone   [phone]    MRN   [id number]       Rastafarian   [employer] [place]    Marital Status                               Admission Date   12/3/23    Admission Type   Emergency    Admitting Provider   Alvarez Lam MD    Attending Provider   Christiano Holcomb MD    Department, Room/Bed   [facility] 3C, 388/1       Discharge Date       Discharge Disposition   Hospice/Home    Discharge Destination                                 Attending Provider: Christiano Holcomb MD    Allergies: No Known Allergies    Isolation: None   Infection: None   Code Status: No CPR    Ht: 162.6 cm (64\")   Wt: 70.8 kg (156 lb)    Admission Cmt: None   Principal Problem: Acute kidney injury superimposed on chronic kidney disease stage IIIb [N17.9,N18.9]                   Active Insurance as of 12/3/2023       Primary Coverage       Payor Plan Insurance Group Employer/Plan Group    ANTHEM MEDICARE REPLACEMENT ANTHEM MEDICARE ADVANTAGE [id number]       Payor Plan Address Payor Plan Phone Number Payor Plan Fax Number Effective Dates    [address] [phone]  1/1/2015 - None Entered    [facility] [address]         Subscriber Name Subscriber Birth Date Member ID       IMANI BRANCH 1940 [id number]                     Emergency Contacts        (Rel.) Home Phone Work Phone Mobile Phone    Rosamaria Branch (Relative) [phone] -- --    Karolina Rivasy (Grandchild) -- -- [phone]                 History & Physical        Alvarez Lam MD at 12/03/23 1938              [facility]  HISTORY AND PHYSICAL    Date of " Admission: 12/3/2023  Primary Care Physician: Светална Loyd MD    Subjective   Primary Historian: Pt and/or family    Chief Complaint: see H&P    History and Physical:          ROS:   Otherwise complete ROS reviewed and negative except as mentioned in the HPI.    Past Medical History:   Past Medical History:   Diagnosis Date    Aortic regurgitation     mild 2018    CAD in native artery     COVID-19 06/01/2022    mild case, not hospitalized, no MAB    GERD (gastroesophageal reflux disease)     Heart murmur     Hyperlipidemia     Hypertension     Hypothyroidism     Mitral regurgitation     mild per 2013 echo, no significant per 2018    Myalgia     Near syncope     Pre-diabetes     SOB (shortness of breath)     Squamous cell cancer of skin of right forearm     Syncope     Tricuspid regurgitation     mild per 2013 echo, trivial per 2018     Past Surgical History:  Past Surgical History:   Procedure Laterality Date    BACK SURGERY      BIOPSY / EXCISION / DISSECTION AXILLARY NODE  2023    had bx w/ Jennifer Restrepo MD at [facility]    CARDIAC CATHETERIZATION      COLONOSCOPY      CORONARY ARTERY BYPASS GRAFT  10/11/2010    Dr. Karina Lora, [facility]    DIAGNOSTIC LAPAROSCOPY N/A 11/14/2023    Procedure: DIAGNOSTIC LAPAROSCOPY;  Surgeon: Adenike Ponce MD;  Location: [facility] OR;  Service: General;  Laterality: N/A;    HYSTERECTOMY      LAPAROSCOPIC CHOLECYSTECTOMY      MEDIAN STERNOTOMY      Thymus cyst resection at same time as CABG    OTHER SURGICAL HISTORY      Rectal surgery    PARTIAL THYMECTOMY  10/11/2010    Dr. Lora    REPLACEMENT TOTAL KNEE Left 2005    TOTAL KNEE ARTHROPLASTY Left 06/2019    TOTAL KNEE ARTHROPLASTY Right 11/04/2020    Dr. Santamaria     Social History:  reports that she has never smoked. She has been exposed to tobacco smoke. She has never used smokeless tobacco. She reports that she does not drink alcohol and does not use drugs.    Family History: family history includes Alzheimer's disease in her father  and mother; Coronary artery disease in her father; Hyperlipidemia in her mother; Hypertension in her father and mother.       Allergies:  No Known Allergies    Medications:  Prior to Admission medications    Medication Sig Start Date End Date Taking? Authorizing Provider   acetaminophen (TYLENOL) 500 MG tablet Take 1 tablet by mouth Every 6 (Six) Hours As Needed for Mild Pain. prn    Saige Cervantes MD   aspirin 81 MG EC tablet Take 1 tablet by mouth Daily.    Saige Cervantes MD   atorvastatin (LIPITOR) 10 MG tablet Take 1 tablet by mouth Every Night. 8/16/16   Saige Cervantes MD   Bioflavonoid Products (SUPER-C 1000 PO) Take 1 tablet by mouth Daily. Super c 900 mg, zinc 11 mg, vitamin D3 1,000 units, Vit A 450 mcg, Vitamin E 15 mg OTC    Saige Cervantes MD   Ca Carbonate-Mag Hydroxide (ROLAIDS PO) Take 1 tablet by mouth Daily As Needed (heart burn).    Saige Cervantes MD   cloNIDine (CATAPRES) 0.1 MG tablet Take 1 tablet by mouth 2 (Two) Times a Day As Needed for High Blood Pressure (SBP greater than 180 or DBP greater than 100). 8/24/23 8/23/24  Meghan Freeman APRN   empagliflozin (Jardiance) 10 MG tablet tablet Take 1 tablet by mouth Daily.    Saige Cervantes MD   famotidine (PEPCID) 20 MG tablet Take 1 tablet by mouth Daily. 11/21/23   Charles Mcnair MD   famotidine (PEPCID) 20 MG tablet Take 1 tablet by mouth 2 (Two) Times a Day.    Saige Cervantes MD   furosemide (LASIX) 20 MG tablet Take 1 tablet by mouth Daily As Needed (swelling). 8/24/23 8/23/24  Meghan Freeman APRN   Gabapentin, Once-Daily, 300 MG tablet Take 300 mg by mouth Every Night. 10/17/16   Saige Cervantes MD   HYDROcodone-acetaminophen (NORCO) 7.5-325 MG per tablet Take 1 tablet by mouth Every 6 (Six) Hours As Needed for Moderate Pain or Severe Pain.    Saige Cervantes MD   levothyroxine (SYNTHROID, LEVOTHROID) 75 MCG tablet Take 0.5-1 tablets by mouth Daily. Takes 1/2 tab M, W,  "& F and 1 tab on S, Tu, Th, & Sa 10/13/16   Saige Cervantes MD   lisinopril (PRINIVIL,ZESTRIL) 20 MG tablet Take 1 tablet by mouth 2 (Two) Times a Day. 1/11/18   Saige Cervantes MD   MAGNESIUM GLYCINATE PO Take 1,300 mg by mouth 2 (Two) Times a Day.    Saige Cervantes MD   metFORMIN ER (GLUCOPHAGE-XR) 500 MG 24 hr tablet Take 1 tablet by mouth 2 (Two) Times a Day.    Saige Cervantes MD   NIFEdipine XL (ADALAT CC) 30 MG 24 hr tablet Take 1 tablet by mouth Daily. 11/21/23   Charles Mcnair MD   ondansetron (ZOFRAN) 4 MG tablet Take 1 tablet by mouth Every 6 (Six) Hours As Needed for Nausea or Vomiting. 11/20/23   Charles Mcnair MD   spironolactone (ALDACTONE) 25 MG tablet Take 1 tablet by mouth Daily.    Saige Cervantes MD   therapeutic multivitamin-minerals (THERAGRAN-M) tablet Take 1 tablet by mouth Daily.    Saige Cervantes MD   lansoprazole (PREVACID) 15 MG capsule Take 1 capsule by mouth Daily As Needed.  12/3/23  Saige Cervantes MD     I have utilized all available immediate resources to obtain, update, or review the patient's current medications (including all prescriptions, over-the-counter products, herbals, cannabis/cannabidiol products, and vitamin/mineral/dietary (nutritional) supplements).    Objective     Vital Signs: /63 (BP Location: Left arm, Patient Position: Lying)   Pulse 90   Temp 97.8 °F (36.6 °C) (Oral)   Resp 18   Ht 162.6 cm (64\")   Wt 70.8 kg (156 lb)   SpO2 94%   BMI 26.78 kg/m²   Physical exam:  General: No acute distress  HEENT: normocephalic, atraumatic  Neck: Supple  CV: RRR  Lung: Clear to auscultation bilaterally.  No wheeze, rales, or rhonchi noted.  Abdominal exam: Positive bowel sounds, nontender, non distended  Extremity: No edema noted  Neurological exam: AAO x3. Cranial nerve II to XII grossly intact  Skin exam: Dry and warm  Psychiatric exam: Calm, cooperative, and normal affect         Results Reviewed:  Lab Results (last " 24 hours)       Procedure Component Value Units Date/Time    STAT Lactic Acid, Reflex [[id number]] Collected: 12/03/23 1859    Specimen: Blood Updated: 12/03/23 1931    Blood Culture - Blood, Wrist, Left [[id number]] Collected: 12/03/23 1357    Specimen: Blood from Wrist, Left Updated: 12/03/23 1459    Blood Culture - Blood, Arm, Left [[id number]] Collected: 12/03/23 1400    Specimen: Blood from Arm, Left Updated: 12/03/23 1459    COVID PRE-OP / PRE-PROCEDURE SCREENING ORDER (NO ISOLATION) - Swab, Nasopharynx [[id number]]  (Normal) Collected: 12/03/23 1200    Specimen: Swab from Nasopharynx Updated: 12/03/23 1300    Narrative:      The following orders were created for panel order COVID PRE-OP / PRE-PROCEDURE SCREENING ORDER (NO ISOLATION) - Swab, Nasopharynx.  Procedure                               Abnormality         Status                     ---------                               -----------         ------                     COVID-19 and FLU A/B PCR...[[id number]]  Normal              Final result                 Please view results for these tests on the individual orders.    COVID-19 and FLU A/B PCR, 1 HR TAT - Swab, Nasopharynx [[id number]]  (Normal) Collected: 12/03/23 1200    Specimen: Swab from Nasopharynx Updated: 12/03/23 1300     COVID19 Not Detected     Influenza A PCR Not Detected     Influenza B PCR Not Detected    Narrative:      Fact sheet for providers: https://www.fda.gov/media/954575/download    Fact sheet for patients: https://www.fda.gov/media/935378/download    Test performed by PCR.    BNP [[id number]]  (Normal) Collected: 12/03/23 1200    Specimen: Blood Updated: 12/03/23 1229     proBNP 648.2 pg/mL     Narrative:      This assay is used as an aid in the diagnosis of individuals suspected of having heart failure. It can be used as an aid in the diagnosis of acute decompensated heart failure (ADHF) in patients presenting with signs and symptoms of ADHF to the emergency department (ED). In  addition, NT-proBNP of <300 pg/mL indicates ADHF is not likely.    Age Range Result Interpretation  NT-proBNP Concentration (pg/mL:      <50             Positive            >450                   Gray                 300-450                    Negative             <300    50-75           Positive            >900                  Gray                300-900                  Negative            <300      >75             Positive            >1800                  Gray                300-1800                  Negative            <300    Comprehensive Metabolic Panel [[id number]]  (Abnormal) Collected: 12/03/23 1200    Specimen: Blood Updated: 12/03/23 1227     Glucose 93 mg/dL      BUN 29 mg/dL      Creatinine 2.08 mg/dL      Sodium 133 mmol/L      Potassium 4.4 mmol/L      Chloride 93 mmol/L      CO2 26.0 mmol/L      Calcium 10.8 mg/dL      Total Protein 6.4 g/dL      Albumin 3.0 g/dL      ALT (SGPT) 20 U/L      AST (SGOT) 35 U/L      Alkaline Phosphatase 610 U/L      Total Bilirubin 0.3 mg/dL      Globulin 3.4 gm/dL      A/G Ratio 0.9 g/dL      BUN/Creatinine Ratio 13.9     Anion Gap 14.0 mmol/L      eGFR 23.4 mL/min/1.73     Narrative:      GFR Normal >60  Chronic Kidney Disease <60  Kidney Failure <15    The GFR formula is only valid for adults with stable renal function between ages 18 and 70.    Lactic Acid, Plasma [[id number]]  (Abnormal) Collected: 12/03/23 1200    Specimen: Blood Updated: 12/03/23 1225     Lactate 2.6 mmol/L     CBC & Differential [[id number]]  (Abnormal) Collected: 12/03/23 1200    Specimen: Blood Updated: 12/03/23 1209    Narrative:      The following orders were created for panel order CBC & Differential.  Procedure                               Abnormality         Status                     ---------                               -----------         ------                     CBC Auto Differential[[id number]]        Abnormal            Final result                 Please view results for these  tests on the individual orders.    CBC Auto Differential [[id number]]  (Abnormal) Collected: 12/03/23 1200    Specimen: Blood Updated: 12/03/23 1209     WBC 10.01 10*3/mm3      RBC 3.57 10*6/mm3      Hemoglobin 10.2 g/dL      Hematocrit 33.1 %      MCV 92.7 fL      MCH 28.6 pg      MCHC 30.8 g/dL      RDW 16.2 %      RDW-SD 53.6 fl      MPV 9.6 fL      Platelets 492 10*3/mm3      Neutrophil % 87.5 %      Lymphocyte % 4.3 %      Monocyte % 5.4 %      Eosinophil % 1.1 %      Basophil % 0.8 %      Immature Grans % 0.9 %      Neutrophils, Absolute 8.76 10*3/mm3      Lymphocytes, Absolute 0.43 10*3/mm3      Monocytes, Absolute 0.54 10*3/mm3      Eosinophils, Absolute 0.11 10*3/mm3      Basophils, Absolute 0.08 10*3/mm3      Immature Grans, Absolute 0.09 10*3/mm3      nRBC 0.2 /100 WBC           Imaging Results (Last 24 Hours)       Procedure Component Value Units Date/Time    CT Abdomen Pelvis Without Contrast [[id number]] Collected: 12/03/23 1425     Updated: 12/03/23 1436    Narrative:      CT ABDOMEN PELVIS WO CONTRAST- 12/3/2023 1:58 PM     HISTORY: Abdominal pain and distention. Recently diagnosed B-cell  lymphoma.; J18.9-Pneumonia, unspecified organism; recently diagnosed  B-cell lymphoma     COMPARISON: 11/12/2023     DOSE LENGTH PRODUCT: 400 mGy cm. Automated exposure control was also  utilized to decrease patient radiation dose.     TECHNIQUE: Axial images of the abdomen and pelvis are performed without  IV contrast     FINDINGS: Similar small bilateral pleural effusions with bibasilar  atelectasis. Posterior inferior as well as retrocrural lymphadenopathy  remains similar to the previous study.     The nonenhanced liver, spleen, and adrenal glands are unremarkable.  Spleen is normal in size measuring 7.8 cm. Limited evaluation of the  pancreatic tissue without IV contrast. No abnormal perinephric fluid  collection. No hydronephrosis. Mildly distended bladder unremarkable.     There is prominent bulky mesenteric  and retroperitoneal lymphadenopathy.  Confluent lymphadenopathy at the root of the mesentery measuring up to  13 cm in width, similar to the prior study.  Retroperitoneal lymphadenopathy remains bulky with confluent periaortic  lymph nodes measuring up to 6 cm.  Iliac lymphadenopathy again visualized. Little interval change since  11/12/2023.      There is no free intraperitoneal air or loculated abscess collection  identified with nonenhanced imaging. Similar small volume ascites within  the lower pelvis. No evidence for bowel obstruction. Mild to moderate  vascular calcification.     Degenerative changes regional skeleton with a mild left convexity of the  thoracolumbar curvature. No focal aggressive regional bony lesions.       Impression:      1. Compared to 11/12/2023, overall similar bulky inferior mediastinal,  retrocrural, retroperitoneal, mesenteric, and pelvic iliac  lymphadenopathy related to patient's recently diagnosed B-cell lymphoma.  2. Similar small volume ascites within the lower pelvis. No evidence of  bowel obstruction. No free air or abscess.  3. Similar small bilateral pleural effusions with bibasilar atelectasis.     This report was signed and finalized on 12/3/2023 2:33 PM by Dr. Evelyne Dickson MD.       XR Chest 1 View [[id number]] Collected: 12/03/23 1245     Updated: 12/03/23 1249    Narrative:      XR CHEST 1 VW-     HISTORY: SOA RO fluid     COMPARISON: None     FINDINGS: Frontal view the chest obtained.     Prior mediastinal surgery. Mild to moderate elevation right  hemidiaphragm. Left lower lobe opacities questionable small left pleural  effusion. Vascular crowding versus mild venous congestion. No  pneumothorax. No acute regional bony pathology.       Impression:      1. Left lower lobe opacities concerning for pneumonia with probable  small pleural effusion. Vascular crowding versus mild venous congestion.  Prior mediastinal surgery with elevated right hemidiaphragm.        This  report was signed and finalized on 12/3/2023 12:46 PM by Dr. Evelyne Dickson MD.             I have personally reviewed and interpreted the radiology studies and ECG obtained at time of admission.     Assessment / Plan   Assessment:   Active Hospital Problems    Diagnosis     **HCAP (healthcare-associated pneumonia)      Hypercalcemia likely secondary to DLBCL (diffuse large B cell lymphoma) corrected Ca 11.3, stable  -She received 1 doses zoledronic acid on 11/11 per oncology  -hold IVF for now  -recheck CMP in am      Elevated alkaline phosphatase, downtrending: no bilirubin elevation, AST elevated but downtrending. RUQ ultrasound 11/17 showed patent main portal vein, left portal vein, right portal vein.   -cont to monitor for now     Epigastric pain 2/2 abdominal swelling:  -cont pepcid 20 po bid  -discontinued home PPI for recurrent hypomagnesemia last admission     Diffuse large B cell lymphoma:  -oncology consult  -social work consult since pt was never given oncology appointment after disharge     RICHA on CKD 3, 2/2 dehydration: Cr 1.5-1.6 appears to be her baseline  -recheck labs daily     DM2: A1c 6.1  -goal glucose < 180 while inpatient  -Hold home Jardiance for poor appetite  -low ISS bid with breakfast and supper     Hypothyroidism:  -cont home Synthroid         Dispo: We are treating for RICHA secondary to dehydration, patient was recently diagnosed with diffuse large B-cell lymphoma and was seen by Dr. Aleman however there was a mixup and she did not have any follow-up appointment scheduled after she left the hospital, oncology has been consulted.    Alvarez Lam MD, 12/03/23, 19:38 CST      Treatment Plan  The patient will be admitted to my service here at [facility].     Medical Decision Making  Number and Complexity of problems: see assessment and plan  Differential Diagnosis: see assessment and plan    Conditions and Status             MDM Data  External documents reviewed:  yes  Cardiac tracing (EKG, telemetry) interpretation: see cardiology read and/or assessment and plan  Radiology interpretation: see radiologist read  Labs reviewed: yes  Any tests that were considered but not ordered: n/a     Decision rules/scores evaluated (example [id number], Wells, etc): see assessment and plan     Discussed with: see assessment and plan     Care Planning  Shared decision making: see assessment and plan  Code status and discussions: see assessment and plan    Disposition  Social Determinants of Health that impact treatment or disposition: see dispo above.  Estimated length of stay is see dispo above.    I confirmed that the patient's advanced care plan is present, code status is documented, and a surrogate decision maker is listed in the patient's medical record.     The patient's surrogate decision maker is see chart    The patient was seen and examined by me is see admission order time.    Electronically signed by Alvarez Lam MD, 12/03/23, 19:38 CST.                Electronically signed by Alvarez Lam MD at 12/03/23 1942          Emergency Department Notes        Sarika Hernandez, RN at 12/03/23 1413          Nursing report ED to floor  Imani Chauhan  83 y.o.  female    HPI:   Chief Complaint   Patient presents with    Weakness - Generalized       Admitting doctor:   Alvarez Lam MD    Consulting provider(s):  Consults       Date and Time Order Name Status Description    11/12/2023 10:31 AM Inpatient Hematology & Oncology Consult Completed     11/11/2023  9:07 PM Inpatient General Surgery Consult Completed              Admitting diagnosis:   The encounter diagnosis was HCAP (healthcare-associated pneumonia).    Code status:   Current Code Status       Date Active Code Status Order ID Comments User Context       Prior            Allergies:   Patient has no known allergies.    Intake and Output  No intake or output data in the 24 hours ending 12/03/23 1413    Weight:        "12/03/23  1128   Weight: 70.8 kg (156 lb)       Most recent vitals:   Vitals:    12/03/23 1128 12/03/23 1231 12/03/23 1408   BP: 114/60 123/59    Pulse: 86 83 85   Resp: 16     Temp: 97.9 °F (36.6 °C)     SpO2: 94% 93% 93%   Weight: 70.8 kg (156 lb)     Height: 162.6 cm (64\")       Oxygen Therapy: .    Active LDAs/IV Access:   Lines, Drains & Airways       Active LDAs       Name Placement date Placement time Site Days    Peripheral IV 12/03/23 1200 Anterior;Distal;Right Forearm 12/03/23  1200  Forearm  less than 1                    Labs (abnormal labs have a star):   Labs Reviewed   COMPREHENSIVE METABOLIC PANEL - Abnormal; Notable for the following components:       Result Value    BUN 29 (*)     Creatinine 2.08 (*)     Sodium 133 (*)     Chloride 93 (*)     Calcium 10.8 (*)     Albumin 3.0 (*)     AST (SGOT) 35 (*)     Alkaline Phosphatase 610 (*)     eGFR 23.4 (*)     All other components within normal limits    Narrative:     GFR Normal >60  Chronic Kidney Disease <60  Kidney Failure <15    The GFR formula is only valid for adults with stable renal function between ages 18 and 70.   LACTIC ACID, PLASMA - Abnormal; Notable for the following components:    Lactate 2.6 (*)     All other components within normal limits   CBC WITH AUTO DIFFERENTIAL - Abnormal; Notable for the following components:    RBC 3.57 (*)     Hemoglobin 10.2 (*)     Hematocrit 33.1 (*)     MCHC 30.8 (*)     RDW 16.2 (*)     Platelets 492 (*)     Neutrophil % 87.5 (*)     Lymphocyte % 4.3 (*)     Immature Grans % 0.9 (*)     Neutrophils, Absolute 8.76 (*)     Lymphocytes, Absolute 0.43 (*)     Immature Grans, Absolute 0.09 (*)     All other components within normal limits   COVID-19 AND FLU A/B, NP SWAB IN TRANSPORT MEDIA 1 HR TAT - Normal    Narrative:     Fact sheet for providers: https://www.fda.gov/media/736938/download    Fact sheet for patients: https://www.fda.gov/media/609279/download    Test performed by PCR.   BNP (IN-HOUSE) - " Normal    Narrative:     This assay is used as an aid in the diagnosis of individuals suspected of having heart failure. It can be used as an aid in the diagnosis of acute decompensated heart failure (ADHF) in patients presenting with signs and symptoms of ADHF to the emergency department (ED). In addition, NT-proBNP of <300 pg/mL indicates ADHF is not likely.    Age Range Result Interpretation  NT-proBNP Concentration (pg/mL:      <50             Positive            >450                   Gray                 300-450                    Negative             <300    50-75           Positive            >900                  Gray                300-900                  Negative            <300      >75             Positive            >1800                  Gray                300-1800                  Negative            <300   COVID PRE-OP / PRE-PROCEDURE SCREENING ORDER (NO ISOLATION)    Narrative:     The following orders were created for panel order COVID PRE-OP / PRE-PROCEDURE SCREENING ORDER (NO ISOLATION) - Swab, Nasopharynx.  Procedure                               Abnormality         Status                     ---------                               -----------         ------                     COVID-19 and FLU A/B PCR...[[id number]]  Normal              Final result                 Please view results for these tests on the individual orders.   BLOOD CULTURE   BLOOD CULTURE   OCCULT BLOOD X 1, STOOL   LACTIC ACID, REFLEX   CBC AND DIFFERENTIAL    Narrative:     The following orders were created for panel order CBC & Differential.  Procedure                               Abnormality         Status                     ---------                               -----------         ------                     CBC Auto Differential[[id number]]        Abnormal            Final result                 Please view results for these tests on the individual orders.       Meds given in ED:   Medications   lactated ringers  bolus 250 mL (has no administration in time range)   piperacillin-tazobactam (ZOSYN) IVPB 3.375 g in 100 mL NS (CD) (3.375 g Intravenous New Bag 12/3/23 1405)   furosemide (LASIX) injection 40 mg (40 mg Intravenous Given 12/3/23 1209)   ondansetron (ZOFRAN) injection 4 mg (4 mg Intravenous Given 12/3/23 1207)           NIH Stroke Scale:       Isolation/Infection(s):  No active isolations   No active infections     COVID Testing  Collected .  Resulted .    Nursing report ED to floor:  Mental status: .  Ambulatory status: .  Precautions: .    ED nurse phone extentsion- ..      Electronically signed by Sarika Hernandez RN at 12/03/23 1413       Lei Baker DO at 12/03/23 1150          Subjective   History of Present Illness  83-year-old female who presents emergency department with fluid on her abdomen and nausea.  She has noticed an increase in swelling in her stomach.  She was recently at our facility with notes noted below.  She was discharged on the 20th.  She states for the last 3 to 4 days she has not been able to walk with her walker due to generalized weakness.  She will give out and have to sit down.  Her left leg has generalized weakness.  She is not eating, and notes that everything that she tries to eat sours on her stomach.  She did vomit black this morning, and has had some dark stool.  She has some bruising on her forearms.  She has had cough with some mild phlegm production.    Date of Admission: 11/11/2023  Date of Discharge:  11/20/2023  Primary Care Physician: Светлана Loyd MD     Presenting Problem/History of Present Illness:  Final Discharge Diagnoses:       Active Hospital Problems     Diagnosis      **Hypercalcemia      Lymphadenopathy        Consults: Oncology    PET 4/13/23  Impression:              Markedly progressed bulky lymphadenopathy (lower mediastinal,  retrocrural, perigastric, retroperitoneal, right common iliac,  periportal, mesenteric) as detailed above, compatible  with metastatic  disease. The bulky lymphadenopathy causes severe narrowing of the right  renal artery and severe narrowing/near occlusion of the IVC.     Soft tissue infiltration of the felisa hepatis as well as along the left  portal vein with associated mild intrahepatic biliary ductal dilation,  compatible with malignancy. 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 This causes severe narrowing and near  occlusion of the left portal vein.     Small left and trace right pleural effusions with mild passive  atelectasis.     Mild left proximal urothelial enhancement, correlate with urinalysis for  urinary tract infection.     Small volume ascites.     Likely secondary to metastatic cancer preliminary results shows malignant cells of unknown type-pathology still pending.  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     Review of Systems   Constitutional:  Negative for chills and fever.   Respiratory:  Positive for cough.    Gastrointestinal:  Positive for abdominal pain, nausea and vomiting.   Neurological:  Positive for weakness.       Past Medical History:   Diagnosis Date    Aortic regurgitation     mild 2018    CAD in native artery     COVID-19 06/01/2022    mild case, not hospitalized, no MAB    GERD (gastroesophageal reflux disease)     Heart murmur     Hyperlipidemia     Hypertension     Hypothyroidism     Mitral regurgitation     mild per 2013 echo, no significant per 2018    Myalgia     Near syncope     Pre-diabetes     SOB (shortness of breath)     Squamous cell cancer of skin of right forearm     Syncope     Tricuspid regurgitation     mild per 2013 echo, trivial per 2018       No Known Allergies    Past Surgical History:   Procedure Laterality Date    BACK SURGERY      BIOPSY / EXCISION / DISSECTION AXILLARY NODE  2023    had bx keanu/ Jennifer Restrepo MD at [facility]    CARDIAC CATHETERIZATION      COLONOSCOPY      CORONARY ARTERY BYPASS GRAFT  10/11/2010     Dr. Karina Lora, [facility]    DIAGNOSTIC LAPAROSCOPY N/A 11/14/2023    Procedure: DIAGNOSTIC LAPAROSCOPY;  Surgeon: Adenike Ponce MD;  [facility];  Service: General;  Laterality: N/A;    HYSTERECTOMY      LAPAROSCOPIC CHOLECYSTECTOMY      MEDIAN STERNOTOMY      Thymus cyst resection at same time as CABG    OTHER SURGICAL HISTORY      Rectal surgery    PARTIAL THYMECTOMY  10/11/2010    Dr. Lora    REPLACEMENT TOTAL KNEE Left 2005    TOTAL KNEE ARTHROPLASTY Left 06/2019    TOTAL KNEE ARTHROPLASTY Right 11/04/2020    Dr. Santamaria       Family History   Problem Relation Age of Onset    Coronary artery disease Father     Alzheimer's disease Father     Hypertension Father     Alzheimer's disease Mother     Hypertension Mother     Hyperlipidemia Mother        Social History     Socioeconomic History    Marital status:    Tobacco Use    Smoking status: Never     Passive exposure: Yes    Smokeless tobacco: Never    Tobacco comments:     father smoked pipe in the house,  smoked   Vaping Use    Vaping Use: Never used   Substance and Sexual Activity    Alcohol use: Never    Drug use: Never    Sexual activity: Defer           Objective   Physical Exam  Vitals reviewed.   Constitutional:       Appearance: She is ill-appearing.   HENT:      Head: Normocephalic and atraumatic.      Right Ear: External ear normal.      Left Ear: External ear normal.      Nose: Nose normal.   Eyes:      General: No scleral icterus.     Conjunctiva/sclera: Conjunctivae normal.   Cardiovascular:      Rate and Rhythm: Normal rate and regular rhythm.      Heart sounds: Normal heart sounds.   Pulmonary:      Effort: Pulmonary effort is normal.      Breath sounds: Normal breath sounds.   Abdominal:      General: There is distension.      Palpations: Abdomen is soft.      Tenderness: There is abdominal tenderness.   Musculoskeletal:         General: No swelling or tenderness.      Cervical back: Normal range of motion and neck supple.    Skin:     General: Skin is warm and dry.   Neurological:      Mental Status: She is alert and oriented to person, place, and time.      Cranial Nerves: No cranial nerve deficit.   Psychiatric:         Mood and Affect: Mood normal.         Behavior: Behavior normal.         Procedures          ED Course                                 Lab Results (last 24 hours)       Procedure Component Value Units Date/Time    CBC & Differential [[id number]]  (Abnormal) Collected: 12/03/23 1200    Specimen: Blood Updated: 12/03/23 1209    Narrative:      The following orders were created for panel order CBC & Differential.  Procedure                               Abnormality         Status                     ---------                               -----------         ------                     CBC Auto Differential[[id number]]        Abnormal            Final result                 Please view results for these tests on the individual orders.    Comprehensive Metabolic Panel [[id number]]  (Abnormal) Collected: 12/03/23 1200    Specimen: Blood Updated: 12/03/23 1227     Glucose 93 mg/dL      BUN 29 mg/dL      Creatinine 2.08 mg/dL      Sodium 133 mmol/L      Potassium 4.4 mmol/L      Chloride 93 mmol/L      CO2 26.0 mmol/L      Calcium 10.8 mg/dL      Total Protein 6.4 g/dL      Albumin 3.0 g/dL      ALT (SGPT) 20 U/L      AST (SGOT) 35 U/L      Alkaline Phosphatase 610 U/L      Total Bilirubin 0.3 mg/dL      Globulin 3.4 gm/dL      A/G Ratio 0.9 g/dL      BUN/Creatinine Ratio 13.9     Anion Gap 14.0 mmol/L      eGFR 23.4 mL/min/1.73     Narrative:      GFR Normal >60  Chronic Kidney Disease <60  Kidney Failure <15    The GFR formula is only valid for adults with stable renal function between ages 18 and 70.    Lactic Acid, Plasma [[id number]]  (Abnormal) Collected: 12/03/23 1200    Specimen: Blood Updated: 12/03/23 1225     Lactate 2.6 mmol/L     BNP [[id number]]  (Normal) Collected: 12/03/23 1200    Specimen: Blood Updated: 12/03/23  1229     proBNP 648.2 pg/mL     Narrative:      This assay is used as an aid in the diagnosis of individuals suspected of having heart failure. It can be used as an aid in the diagnosis of acute decompensated heart failure (ADHF) in patients presenting with signs and symptoms of ADHF to the emergency department (ED). In addition, NT-proBNP of <300 pg/mL indicates ADHF is not likely.    Age Range Result Interpretation  NT-proBNP Concentration (pg/mL:      <50             Positive            >450                   Gray                 300-450                    Negative             <300    50-75           Positive            >900                  Gray                300-900                  Negative            <300      >75             Positive            >1800                  Gray                300-1800                  Negative            <300    COVID PRE-OP / PRE-PROCEDURE SCREENING ORDER (NO ISOLATION) - Swab, Nasopharynx [[id number]]  (Normal) Collected: 12/03/23 1200    Specimen: Swab from Nasopharynx Updated: 12/03/23 1300    Narrative:      The following orders were created for panel order COVID PRE-OP / PRE-PROCEDURE SCREENING ORDER (NO ISOLATION) - Swab, Nasopharynx.  Procedure                               Abnormality         Status                     ---------                               -----------         ------                     COVID-19 and FLU A/B PCR...[[id number]]  Normal              Final result                 Please view results for these tests on the individual orders.    CBC Auto Differential [[id number]]  (Abnormal) Collected: 12/03/23 1200    Specimen: Blood Updated: 12/03/23 1209     WBC 10.01 10*3/mm3      RBC 3.57 10*6/mm3      Hemoglobin 10.2 g/dL      Hematocrit 33.1 %      MCV 92.7 fL      MCH 28.6 pg      MCHC 30.8 g/dL      RDW 16.2 %      RDW-SD 53.6 fl      MPV 9.6 fL      Platelets 492 10*3/mm3      Neutrophil % 87.5 %      Lymphocyte % 4.3 %      Monocyte % 5.4 %       Eosinophil % 1.1 %      Basophil % 0.8 %      Immature Grans % 0.9 %      Neutrophils, Absolute 8.76 10*3/mm3      Lymphocytes, Absolute 0.43 10*3/mm3      Monocytes, Absolute 0.54 10*3/mm3      Eosinophils, Absolute 0.11 10*3/mm3      Basophils, Absolute 0.08 10*3/mm3      Immature Grans, Absolute 0.09 10*3/mm3      nRBC 0.2 /100 WBC     COVID-19 and FLU A/B PCR, 1 HR TAT - Swab, Nasopharynx [[id number]]  (Normal) Collected: 12/03/23 1200    Specimen: Swab from Nasopharynx Updated: 12/03/23 1300     COVID19 Not Detected     Influenza A PCR Not Detected     Influenza B PCR Not Detected    Narrative:      Fact sheet for providers: https://www.fda.gov/media/502806/download    Fact sheet for patients: https://www.fda.gov/media/640991/download    Test performed by PCR.          XR Chest 1 View   Final Result   1. Left lower lobe opacities concerning for pneumonia with probable   small pleural effusion. Vascular crowding versus mild venous congestion.   Prior mediastinal surgery with elevated right hemidiaphragm.           This report was signed and finalized on 12/3/2023 12:46 PM by Dr. Evelyne Dickson MD.          CT Abdomen Pelvis Without Contrast    (Results Pending)                 Medical Decision Making  Problems Addressed:  HCAP (healthcare-associated pneumonia): complicated acute illness or injury    Amount and/or Complexity of Data Reviewed  Labs: ordered.     Details: CBC CMP  Radiology: ordered.     Details: CT abdomen and pelvis    Risk  Prescription drug management.  Decision regarding hospitalization.        Final diagnoses:   HCAP (healthcare-associated pneumonia)       ED Disposition  ED Disposition       ED Disposition   Decision to Admit    Condition   --    Comment   Level of Care: Med/Surg [1]   Diagnosis: HCAP (healthcare-associated pneumonia) [205668]   Admitting Physician: BARB NATION [[id number]]                 No follow-up provider specified.       Medication List      No changes were made to  your prescriptions during this visit.            Lei Baker DO  12/03/23 1201       Lei Baker DO  12/03/23 1354      Electronically signed by Lei Baker DO at 12/03/23 1354       Current Facility-Administered Medications   Medication Dose Route Frequency Provider Last Rate Last Admin    acetaminophen (TYLENOL) tablet 650 mg  650 mg Oral Q4H PRN Alvarez Lam MD        calcium carbonate (TUMS) chewable tablet 500 mg (200 mg elemental)  2 tablet Oral BID PRN Alvarez Lam MD        dextrose (D50W) (25 g/50 mL) IV injection 25 g  25 g Intravenous Q15 Min PRN Alvarez Lam MD        dextrose (GLUTOSE) oral gel 15 g  15 g Oral Q15 Min PRN Alvarez Lam MD        dronabinol (MARINOL) capsule 2.5 mg  2.5 mg Oral BID Alvarez Lam MD   2.5 mg at 12/06/23 1032    glucagon (GLUCAGEN) injection 1 mg  1 mg Intramuscular Q15 Min PRN Alvarez Lam MD        HYDROcodone-acetaminophen (NORCO) 7.5-325 MG per tablet 1 tablet  1 tablet Oral Q6H PRN Alvarez Lam MD        levothyroxine (SYNTHROID, LEVOTHROID) tablet 37.5 mcg  37.5 mcg Oral Once per day on Mon Wed Fri Jaclyn Burleson APRN        levothyroxine (SYNTHROID, LEVOTHROID) tablet 75 mcg  75 mcg Oral Once per day on Sun Tue Thu Sat Jaclyn Burleson APRN   75 mcg at 12/05/23 1643    LORazepam (ATIVAN) tablet 0.5 mg  0.5 mg Oral Q8H PRN Alvarez Lam MD        melatonin tablet 5 mg  5 mg Oral Nightly PRN Alvarez Lam MD        Morphine sulfate (PF) injection 1 mg  1 mg Intravenous Q4H PRN Kaila Restrepo APRN   1 mg at 12/05/23 1629    ondansetron (ZOFRAN) injection 4 mg  4 mg Intravenous Q6H PRN Alvarez Lam MD   4 mg at 12/06/23 0509    prochlorperazine (COMPAZINE) injection 5 mg  5 mg Intravenous Q6H PRN Jaclyn Burleson APRN   5 mg at 12/06/23 0863    promethazine (PHENERGAN) 12.5 mg in sodium chloride 0.9 % 50 mL  12.5 mg Intravenous Q4H PRN Alvarez Lam MD   12.5 mg at 12/06/23 1110     promethazine (PHENERGAN) suppository 12.5 mg  12.5 mg Rectal Q6H PRN Jaclyn Burleson APRN        sodium chloride 0.9 % flush 10 mL  10 mL Intravenous PRN Alvarez Lam MD        sodium chloride 0.9 % infusion 40 mL  40 mL Intravenous PRN Alvarez Lam MD            Physician Progress Notes (last 48 hours)        Kaila Restrepo APRN at 12/06/23 0850              [facility] Palliative Care Services  Progress Note  Patient Name: Imani Chauhan  Date of Admission: 12/3/2023  Today's Date: 12/06/23     Code Status and Medical Interventions:   Ordered at: 12/05/23 1528     Medical Intervention Limits:    NO intubation (DNI)    NO artificial nutrition     Level Of Support Discussed With:    Patient    Next of Kin (If No Surrogate)     Code Status (Patient has no pulse and is not breathing):    No CPR (Do Not Attempt to Resuscitate)     Medical Interventions (Patient has pulse or is breathing):    Limited Support     Subjective   Chief complaint/Reason for Referral/Visit: Follow up on Support for Patient/Family.    Medical record reviewed. Events noted.  Labs collected this morning reveal only minimal improvement in renal function.  She is lying in bed, awake however closes her eyes frequently during discussion.  Denies pain however reports continued nausea.  Multiple family members at bedside.     Advance Care Planning   Advanced Directives: Patient reports previously completed an advance directive. Requested copy of document to place in EMR.    Advance Care Planning Discussion: Care conference held with Ms. Chauhan, her sons, daughter-in-laws and other family members per request.  Explored previous discussions including this morning with oncology.  Patient and family report after further discussions they feel hospice is the best option.  We discussed goals of care further including option of transitioning to more comfort focused care to avoid any interventions that may cause discomfort including  labs, therapies, etc.  Ms. Chauhan expressed she did not understand and we discussed additional interventions further.  Wishes to discuss hospice first prior to making any further decisions regarding goals of care while hospitalized.  Patient/family expressed interest in discharge to hospice facility in [place], [place].  They reflected on previous experience with hospice services and expressed understanding of care.  Patient/family appreciative of discussion.  Have notified SW of patient/family request of hospice placement in [place], [place] and referral has been placed by oncology.  Patient/family denied any further questions and/or concerns at this time.  Encouraged if arise.  Support provided.    The patient receives support from her children and extended family. Patient's son is her POA.    Due to the palliative care topics discussed including goals of care, treatment options, medical priorities, and hospice services we will establish an advance care plan.    Goals of care: Ongoing.    Review of Systems   Constitutional: Positive for malaise/fatigue.   Gastrointestinal:  Positive for nausea and vomiting.       Pain Assessment  Nonverbal Indicators of Pain: nonverbal indicators absent  Pain Location: abdomen  Objective   Diagnostics: Reviewed      Intake/Output Summary (Last 24 hours) at 12/6/2023 0922  Last data filed at 12/6/2023 0500  Gross per 24 hour   Intake --   Output 250 ml   Net -250 ml     Current Facility-Administered Medications   Medication Dose Route Frequency Provider Last Rate Last Admin    acetaminophen (TYLENOL) tablet 650 mg  650 mg Oral Q4H PRN Alvarez Lam MD        [START ON 12/7/2023] allopurinol (ZYLOPRIM) tablet 50 mg  50 mg Oral Every Other Day Arnaldo Peralta MD        aspirin EC tablet 81 mg  81 mg Oral Daily Jaclyn Burleson APRN   81 mg at 12/05/23 1642    atorvastatin (LIPITOR) tablet 10 mg  10 mg Oral Nightly Alvarez Lam MD   10 mg at 12/05/23 2021     sennosides-docusate (PERICOLACE) 8.6-50 MG per tablet 2 tablet  2 tablet Oral BID Alvarez Lam MD   2 tablet at 12/05/23 2021    And    polyethylene glycol (MIRALAX) packet 17 g  17 g Oral Daily PRN Alvarez Lam MD        And    bisacodyl (DULCOLAX) EC tablet 5 mg  5 mg Oral Daily PRN Alvarez Lam MD        And    bisacodyl (DULCOLAX) suppository 10 mg  10 mg Rectal Daily PRN Alvarez Lam MD        calcium carbonate (TUMS) chewable tablet 500 mg (200 mg elemental)  2 tablet Oral BID PRN Alvarez Lam MD        dextrose (D50W) (25 g/50 mL) IV injection 25 g  25 g Intravenous Q15 Min PRN Alvarez Lam MD        dextrose (GLUTOSE) oral gel 15 g  15 g Oral Q15 Min PRN Alvarez Lam MD        dronabinol (MARINOL) capsule 2.5 mg  2.5 mg Oral BID Alvarez Lam MD   2.5 mg at 12/05/23 2021    Enoxaparin Sodium (LOVENOX) syringe 30 mg  30 mg Subcutaneous Daily Alvarez Lam MD   30 mg at 12/05/23 0825    famotidine (PEPCID) tablet 20 mg  20 mg Oral Daily Alvarez Lam MD   20 mg at 12/05/23 0825    glucagon (GLUCAGEN) injection 1 mg  1 mg Intramuscular Q15 Min PRN Alvarez Lam MD        HYDROcodone-acetaminophen (NORCO) 7.5-325 MG per tablet 1 tablet  1 tablet Oral Q6H PRN Alvarez Lam MD        Insulin Lispro (humaLOG) injection 2-7 Units  2-7 Units Subcutaneous BID AC Alvarez Lam MD        lactated ringers infusion  75 mL/hr Intravenous Continuous Alvarez Lam MD 75 mL/hr at 12/05/23 1254 75 mL/hr at 12/05/23 1254    levothyroxine (SYNTHROID, LEVOTHROID) tablet 37.5 mcg  37.5 mcg Oral Once per day on Mon Wed Fri Jaclyn Burleson APRN        levothyroxine (SYNTHROID, LEVOTHROID) tablet 75 mcg  75 mcg Oral Once per day on Sun Tue Thu Sat Jaclyn Burleson APRN   75 mcg at 12/05/23 1643    LORazepam (ATIVAN) tablet 0.5 mg  0.5 mg Oral Q8H PRN Alvarez Lam MD        melatonin tablet 5 mg  5 mg Oral Nightly PRN Alvarez Lam MD        Morphine sulfate  "(PF) injection 1 mg  1 mg Intravenous Q4H PRN Kaila Restrepo APRLILLIAM   1 mg at 12/05/23 1629    ondansetron (ZOFRAN) injection 4 mg  4 mg Intravenous Q6H PRN Alvarez Lam MD   4 mg at 12/06/23 0509    prochlorperazine (COMPAZINE) injection 5 mg  5 mg Intravenous Q6H PRN Jaclyn Burleson APRN   5 mg at 12/06/23 0845    promethazine (PHENERGAN) 12.5 mg in sodium chloride 0.9 % 50 mL  12.5 mg Intravenous Q4H PRN Alvarez Lam MD   12.5 mg at 12/06/23 0317    promethazine (PHENERGAN) suppository 12.5 mg  12.5 mg Rectal Q6H PRN Jaclyn Burleson APRN        sodium chloride 0.9 % flush 10 mL  10 mL Intravenous Q12H Alvarez Lam MD   10 mL at 12/05/23 2021    sodium chloride 0.9 % flush 10 mL  10 mL Intravenous PRN Alvarez Lam MD        sodium chloride 0.9 % infusion 40 mL  40 mL Intravenous PRN Alvarez Lam MD         lactated ringers, 75 mL/hr, Last Rate: 75 mL/hr (12/05/23 1254)        acetaminophen    senna-docusate sodium **AND** polyethylene glycol **AND** bisacodyl **AND** bisacodyl    calcium carbonate    dextrose    dextrose    glucagon (human recombinant)    HYDROcodone-acetaminophen    LORazepam    melatonin    Morphine    ondansetron    prochlorperazine    promethazine    promethazine    sodium chloride    sodium chloride  Current medications patient is presently taking including all prescriptions, over-the-counter, herbals and vitamin/mineral/dietary (nutritional) supplements with reviewed including route, type, dose and frequency and are current per MAR at time of dictation.    Assessment:  Vital Signs: /73 (BP Location: Left arm, Patient Position: Lying)   Pulse 105   Temp 97.5 °F (36.4 °C) (Axillary)   Resp 16   Ht 162.6 cm (64\")   Wt 70.8 kg (156 lb)   SpO2 91%   BMI 26.78 kg/m²     Physical Exam  Vitals and nursing note reviewed.   Constitutional:       General: She is not in acute distress.  HENT:      Head: Normocephalic and atraumatic.   Eyes:      General: " Lids are normal.      Extraocular Movements: Extraocular movements intact.   Neck:      Vascular: No JVD.      Trachea: Trachea normal.   Musculoskeletal:      Cervical back: Neck supple.   Skin:     General: Skin is warm and dry.          Functional status: Palliative Performance Scale Score: Performance 40% based on the following measures: Ambulation: Mainly in bed, Activity and Evidence of Disease: Unable to do any work, extensive evidence of disease, Self-Care: Mainly assistance required,  Intake: Normal or reduced, LOC: Full, drowsy or confusion.  Nutritional status: Albumin 3.0. Body mass index is 26.78 kg/m².   Patient status: Disease state: No further treatment being pursued.    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 intake  Decreased appetite  Diabetes mellitus type 2  Elevated alkaline phosphatase  Advanced age          Plan / Recommendations     Palliative Care Encounter   Goals of care include CODE STATUS CPR with limited support interventions.    Prognosis is guarded long-term secondary to follicular lymphoma grade 3A, RICHA superimposed on CKD, nausea and vomiting, epigastric pain, hypercalcemia, decreased appetite and p.o. intake and other comorbidities listed above.     Care conference held with Ms. Chauhan, her sons, daughter-in-laws and other family members per request.  Details of discussion above.      Report after further discussions plans to proceed with hospice services and are interested in outpatient hospice placement in  [place].   Referral placed by oncology.    Notified SW of patient/family request of hospice via secure chat.     Patient/family appreciative of discussion.  Denied any further questions and/or concerns at this time.  Encouraged if arise.  Support provided.     MOST form initiated to reflect wishes of NO CPR and limited support interventions with assistance of palliative SW.  Awaiting attending signature for completion and will plan to fax copy to EMR and provide family with copies.       2.   Nausea and Vomiting    Recommend continuing Zofran, Compazine and Phenergan as needed for nausea and vomiting.       Previously reported increased pain and inability to tolerate p.o. pain medication due to nausea and vomiting.  Added low-dose of IV morphine as needed for moderate to severe pain on 12/5/2023.  Recommend continuing as needed.  Encourage use of p.o. pain medication if able to tolerate once nausea subsides.    Thank you for allowing us to participate in patient's plan of care. Palliative Care Team will continue to follow patient.     Time spent:50 minutes spent reviewing medical and medication records, assessing and examining patient, discussing with family, answering questions, providing some guidance about a plan and documentation of care, and coordinating care with other healthcare members, with > 50% time spent face to face.       Electronically signed by, SOFIYA Fontanez, 12/06/23.     Electronically signed by Kaila Restrepo APRN at 12/06/23 1115       Arnaldo Peralta MD at 12/06/23 0551          [facility] Progress Note    Progress Note    Patient:  Imani Chauhan  YOB: 1940  Date of Service: 12/6/2023  MRN: [id number]   Acct: [id number]   Primary Care Physician: Светлана Loyd MD  Advance Directive:   Code Status and Medical Interventions:   Ordered at: 12/05/23 1528     Medical Intervention Limits:    NO intubation (DNI)    NO artificial nutrition     Level Of  "Support Discussed With:    Patient    Next of Kin (If No Surrogate)     Code Status (Patient has no pulse and is not breathing):    No CPR (Do Not Attempt to Resuscitate)     Medical Interventions (Patient has pulse or is breathing):    Limited Support     Admit Date: 12/3/2023       Hospital Day: 0      Subjective:     Chief Compliant: Patient seen with nurse Rosie at the bedside.  Positive for nausea. Patient decided to de escalate care yesterday.       Review of Systems:   Review of Systems   Constitutional:  Positive for fatigue. Negative for fever.        \"Very weak, I can't even get out of bed.\"   HENT:  Negative for facial swelling.    Eyes:  Negative for discharge and redness.   Respiratory:  Negative for shortness of breath and wheezing.    Cardiovascular:  Positive for leg swelling. Negative for chest pain.   Gastrointestinal:  Positive for nausea.   Endocrine: Negative for polydipsia and polyphagia.   Genitourinary:  Negative for flank pain.   Musculoskeletal:  Negative for myalgias.   Skin:  Positive for pallor.   Neurological:  Negative for speech difficulty.   Hematological:  Does not bruise/bleed easily.   Psychiatric/Behavioral:  Negative for agitation, confusion and hallucinations.            Medications:   Scheduled Meds:[START ON 12/7/2023] allopurinol, 50 mg, Oral, Every Other Day  aspirin, 81 mg, Oral, Daily  atorvastatin, 10 mg, Oral, Nightly  dronabinol, 2.5 mg, Oral, BID  enoxaparin, 30 mg, Subcutaneous, Daily  famotidine, 20 mg, Oral, Daily  insulin lispro, 2-7 Units, Subcutaneous, BID AC  levothyroxine, 37.5 mcg, Oral, Once per day on Mon Wed Fri  levothyroxine, 75 mcg, Oral, Once per day on Sun Tue Thu Sat  senna-docusate sodium, 2 tablet, Oral, BID  sodium chloride, 10 mL, Intravenous, Q12H        Continuous Infusions:lactated ringers, 75 mL/hr, Last Rate: 75 mL/hr (12/05/23 8754)        Labs:     Lab Results (last 72 hours)       Procedure Component Value Units Date/Time    POC " Glucose Once [[id number]]  (Normal) Collected: 12/05/23 1731    Specimen: Blood Updated: 12/05/23 1742     Glucose 93 mg/dL      Comment: : [id number] Flor JonesTopShelf ClothesMeter ID: [id number]       Blood Culture - Blood, Arm, Left [[id number]]  (Normal) Collected: 12/03/23 1400    Specimen: Blood from Arm, Left Updated: 12/05/23 1500     Blood Culture No growth at 2 days    Blood Culture - Blood, Wrist, Left [[id number]]  (Normal) Collected: 12/03/23 1357    Specimen: Blood from Wrist, Left Updated: 12/05/23 1500     Blood Culture No growth at 2 days    Hepatitis B Core Antibody, IgM [[id number]]  (Normal) Collected: 12/05/23 0720    Specimen: Blood Updated: 12/05/23 1214     Hep B C IgM Non-Reactive    Narrative:      Results may be falsely decreased if patient taking Biotin.      POC Glucose Once [[id number]]  (Normal) Collected: 12/05/23 0822    Specimen: Blood Updated: 12/05/23 0833     Glucose 92 mg/dL      Comment: : [id number] Ray [employer] TechnologyMeter ID: [id number]       Hepatitis B Surface Antigen [[id number]]  (Normal) Collected: 12/05/23 0720    Specimen: Blood Updated: 12/05/23 0811     Hepatitis B Surface Ag Non-Reactive    Comprehensive Metabolic Panel [[id number]]  (Abnormal) Collected: 12/05/23 0720    Specimen: Blood Updated: 12/05/23 0806     Glucose 92 mg/dL      BUN 35 mg/dL      Creatinine 2.37 mg/dL      Sodium 135 mmol/L      Potassium 4.3 mmol/L      Chloride 92 mmol/L      CO2 25.0 mmol/L      Calcium 10.8 mg/dL      Total Protein 6.6 g/dL      Albumin 3.2 g/dL      ALT (SGPT) 15 U/L      AST (SGOT) 32 U/L      Alkaline Phosphatase 540 U/L      Total Bilirubin 0.3 mg/dL      Globulin 3.4 gm/dL      A/G Ratio 0.9 g/dL      BUN/Creatinine Ratio 14.8     Anion Gap 18.0 mmol/L      eGFR 20.0 mL/min/1.73     Narrative:      GFR Normal >60  Chronic Kidney Disease <60  Kidney Failure <15    The GFR formula is only valid for adults with stable renal function between ages 18 and 70.    CBC & Differential  [[id number]]  (Abnormal) Collected: 12/05/23 0720    Specimen: Blood Updated: 12/05/23 0728    Narrative:      The following orders were created for panel order CBC & Differential.  Procedure                               Abnormality         Status                     ---------                               -----------         ------                     CBC Auto Differential[[id number]]        Abnormal            Final result                 Please view results for these tests on the individual orders.    CBC Auto Differential [[id number]]  (Abnormal) Collected: 12/05/23 0720    Specimen: Blood Updated: 12/05/23 0728     WBC 10.24 10*3/mm3      RBC 3.68 10*6/mm3      Hemoglobin 10.4 g/dL      Hematocrit 33.8 %      MCV 91.8 fL      MCH 28.3 pg      MCHC 30.8 g/dL      RDW 16.7 %      RDW-SD 53.4 fl      MPV 9.5 fL      Platelets 516 10*3/mm3      Neutrophil % 88.9 %      Lymphocyte % 4.8 %      Monocyte % 4.6 %      Eosinophil % 0.2 %      Basophil % 0.6 %      Immature Grans % 0.9 %      Neutrophils, Absolute 9.11 10*3/mm3      Lymphocytes, Absolute 0.49 10*3/mm3      Monocytes, Absolute 0.47 10*3/mm3      Eosinophils, Absolute 0.02 10*3/mm3      Basophils, Absolute 0.06 10*3/mm3      Immature Grans, Absolute 0.09 10*3/mm3      nRBC 0.0 /100 WBC     Uric Acid [[id number]]  (Abnormal) Collected: 12/04/23 0458    Specimen: Blood Updated: 12/05/23 0704     Uric Acid 12.0 mg/dL     Beta 2 Microglobulin, Serum [[id number]]  (Abnormal) Collected: 12/04/23 0458    Specimen: Blood Updated: 12/04/23 1955     Beta-2 Microglobulin 13.2 mg/L     POC Glucose Once [[id number]]  (Normal) Collected: 12/04/23 1645    Specimen: Blood Updated: 12/04/23 1656     Glucose 98 mg/dL      Comment: : [id number] Flor SarahMeter ID: [id number]       Respiratory Panel PCR w/COVID-19(SARS-CoV-2) [facility] In-House, NP Swab in UTM/VTM, 2 HR TAT - Swab, Nasopharynx [[id number]]  (Normal) Collected: 12/04/23 1152    Specimen: Swab  from Nasopharynx Updated: 12/04/23 1252     ADENOVIRUS, PCR Not Detected     Coronavirus 229E Not Detected     Coronavirus HKU1 Not Detected     Coronavirus NL63 Not Detected     Coronavirus OC43 Not Detected     COVID19 Not Detected     Human Metapneumovirus Not Detected     Human Rhinovirus/Enterovirus Not Detected     Influenza A PCR Not Detected     Influenza B PCR Not Detected     Parainfluenza Virus 1 Not Detected     Parainfluenza Virus 2 Not Detected     Parainfluenza Virus 3 Not Detected     Parainfluenza Virus 4 Not Detected     RSV, PCR Not Detected     Bordetella pertussis pcr Not Detected     Bordetella parapertussis PCR Not Detected     Chlamydophila pneumoniae PCR Not Detected     Mycoplasma pneumo by PCR Not Detected    Narrative:      In the setting of a positive respiratory panel with a viral infection PLUS a negative procalcitonin without other underlying concern for bacterial infection, consider observing off antibiotics or discontinuation of antibiotics and continue supportive care. If the respiratory panel is positive for atypical bacterial infection (Bordetella pertussis, Chlamydophila pneumoniae, or Mycoplasma pneumoniae), consider antibiotic de-escalation to target atypical bacterial infection.    Lactate Dehydrogenase [[id number]]  (Abnormal) Collected: 12/04/23 0458    Specimen: Blood Updated: 12/04/23 1204     LDH 1,180 U/L      Comment: Specimen hemolyzed.  Results may be affected.       POC Glucose Once [[id number]]  (Normal) Collected: 12/04/23 1151    Specimen: Blood Updated: 12/04/23 1202     Glucose 88 mg/dL      Comment: : [id number] Ray Yuepu SifangMeter ID: [id number]       POC Glucose Once [[id number]]  (Normal) Collected: 12/04/23 0749    Specimen: Blood Updated: 12/04/23 0800     Glucose 93 mg/dL      Comment: : [id number] Ray SarahMeter ID: [id number]       Phosphorus [[id number]]  (Abnormal) Collected: 12/04/23 0458    Specimen: Blood Updated: 12/04/23 0604     Phosphorus  1.6 mg/dL     Basic Metabolic Panel [[id number]]  (Abnormal) Collected: 12/04/23 0458    Specimen: Blood Updated: 12/04/23 0551     Glucose 93 mg/dL      BUN 32 mg/dL      Creatinine 2.35 mg/dL      Sodium 133 mmol/L      Potassium 4.3 mmol/L      Chloride 94 mmol/L      CO2 23.0 mmol/L      Calcium 10.0 mg/dL      BUN/Creatinine Ratio 13.6     Anion Gap 16.0 mmol/L      eGFR 20.2 mL/min/1.73     Narrative:      GFR Normal >60  Chronic Kidney Disease <60  Kidney Failure <15    The GFR formula is only valid for adults with stable renal function between ages 18 and 70.    Magnesium [[id number]]  (Normal) Collected: 12/04/23 0458    Specimen: Blood Updated: 12/04/23 0551     Magnesium 1.8 mg/dL     CBC & Differential [[id number]]  (Abnormal) Collected: 12/04/23 0458    Specimen: Blood Updated: 12/04/23 0533    Narrative:      The following orders were created for panel order CBC & Differential.  Procedure                               Abnormality         Status                     ---------                               -----------         ------                     CBC Auto Differential[[id number]]        Abnormal            Final result                 Please view results for these tests on the individual orders.    CBC Auto Differential [[id number]]  (Abnormal) Collected: 12/04/23 0458    Specimen: Blood Updated: 12/04/23 0533     WBC 8.87 10*3/mm3      RBC 3.35 10*6/mm3      Hemoglobin 9.3 g/dL      Hematocrit 31.6 %      MCV 94.3 fL      MCH 27.8 pg      MCHC 29.4 g/dL      RDW 16.6 %      RDW-SD 55.9 fl      MPV 9.9 fL      Platelets 464 10*3/mm3      Neutrophil % 86.1 %      Lymphocyte % 5.7 %      Monocyte % 5.6 %      Eosinophil % 1.1 %      Basophil % 0.8 %      Immature Grans % 0.7 %      Neutrophils, Absolute 7.63 10*3/mm3      Lymphocytes, Absolute 0.51 10*3/mm3      Monocytes, Absolute 0.50 10*3/mm3      Eosinophils, Absolute 0.10 10*3/mm3      Basophils, Absolute 0.07 10*3/mm3      Immature Grans, Absolute  0.06 10*3/mm3      nRBC 0.0 /100 WBC     STAT Lactic Acid, Reflex [[id number]]  (Abnormal) Collected: 12/03/23 1859    Specimen: Blood Updated: 12/03/23 1952     Lactate 2.1 mmol/L     COVID PRE-OP / PRE-PROCEDURE SCREENING ORDER (NO ISOLATION) - Swab, Nasopharynx [[id number]]  (Normal) Collected: 12/03/23 1200    Specimen: Swab from Nasopharynx Updated: 12/03/23 1300    Narrative:      The following orders were created for panel order COVID PRE-OP / PRE-PROCEDURE SCREENING ORDER (NO ISOLATION) - Swab, Nasopharynx.  Procedure                               Abnormality         Status                     ---------                               -----------         ------                     COVID-19 and FLU A/B PCR...[[id number]]  Normal              Final result                 Please view results for these tests on the individual orders.    COVID-19 and FLU A/B PCR, 1 HR TAT - Swab, Nasopharynx [[id number]]  (Normal) Collected: 12/03/23 1200    Specimen: Swab from Nasopharynx Updated: 12/03/23 1300     COVID19 Not Detected     Influenza A PCR Not Detected     Influenza B PCR Not Detected    Narrative:      Fact sheet for providers: https://www.fda.gov/media/748797/download    Fact sheet for patients: https://www.fda.gov/media/659927/download    Test performed by PCR.    BNP [[id number]]  (Normal) Collected: 12/03/23 1200    Specimen: Blood Updated: 12/03/23 1229     proBNP 648.2 pg/mL     Narrative:      This assay is used as an aid in the diagnosis of individuals suspected of having heart failure. It can be used as an aid in the diagnosis of acute decompensated heart failure (ADHF) in patients presenting with signs and symptoms of ADHF to the emergency department (ED). In addition, NT-proBNP of <300 pg/mL indicates ADHF is not likely.    Age Range Result Interpretation  NT-proBNP Concentration (pg/mL:      <50             Positive            >450                   Gray                 300-450                    Negative              <300    50-75           Positive            >900                  Gray                300-900                  Negative            <300      >75             Positive            >1800                  Gray                300-1800                  Negative            <300    Comprehensive Metabolic Panel [[id number]]  (Abnormal) Collected: 12/03/23 1200    Specimen: Blood Updated: 12/03/23 1227     Glucose 93 mg/dL      BUN 29 mg/dL      Creatinine 2.08 mg/dL      Sodium 133 mmol/L      Potassium 4.4 mmol/L      Chloride 93 mmol/L      CO2 26.0 mmol/L      Calcium 10.8 mg/dL      Total Protein 6.4 g/dL      Albumin 3.0 g/dL      ALT (SGPT) 20 U/L      AST (SGOT) 35 U/L      Alkaline Phosphatase 610 U/L      Total Bilirubin 0.3 mg/dL      Globulin 3.4 gm/dL      A/G Ratio 0.9 g/dL      BUN/Creatinine Ratio 13.9     Anion Gap 14.0 mmol/L      eGFR 23.4 mL/min/1.73     Narrative:      GFR Normal >60  Chronic Kidney Disease <60  Kidney Failure <15    The GFR formula is only valid for adults with stable renal function between ages 18 and 70.    Lactic Acid, Plasma [[id number]]  (Abnormal) Collected: 12/03/23 1200    Specimen: Blood Updated: 12/03/23 1225     Lactate 2.6 mmol/L     CBC & Differential [[id number]]  (Abnormal) Collected: 12/03/23 1200    Specimen: Blood Updated: 12/03/23 1209    Narrative:      The following orders were created for panel order CBC & Differential.  Procedure                               Abnormality         Status                     ---------                               -----------         ------                     CBC Auto Differential[[id number]]        Abnormal            Final result                 Please view results for these tests on the individual orders.    CBC Auto Differential [[id number]]  (Abnormal) Collected: 12/03/23 1200    Specimen: Blood Updated: 12/03/23 1209     WBC 10.01 10*3/mm3      RBC 3.57 10*6/mm3      Hemoglobin 10.2 g/dL      Hematocrit 33.1 %      MCV  92.7 fL      MCH 28.6 pg      MCHC 30.8 g/dL      RDW 16.2 %      RDW-SD 53.6 fl      MPV 9.6 fL      Platelets 492 10*3/mm3      Neutrophil % 87.5 %      Lymphocyte % 4.3 %      Monocyte % 5.4 %      Eosinophil % 1.1 %      Basophil % 0.8 %      Immature Grans % 0.9 %      Neutrophils, Absolute 8.76 10*3/mm3      Lymphocytes, Absolute 0.43 10*3/mm3      Monocytes, Absolute 0.54 10*3/mm3      Eosinophils, Absolute 0.11 10*3/mm3      Basophils, Absolute 0.08 10*3/mm3      Immature Grans, Absolute 0.09 10*3/mm3      nRBC 0.2 /100 WBC               Radiology:     Imaging Results (Last 72 Hours)       Procedure Component Value Units Date/Time    US Abdomen Limited [[id number]] Collected: 12/04/23 1007     Updated: 12/04/23 1011    Narrative:      EXAM: US ABDOMEN LIMITED-      DATE: 12/4/2023 9:29 AM     HISTORY: check for drainable ascitic fluid; J18.9-Pneumonia, unspecified  organism       COMPARISON: No existing relevant imaging studies available.      TECHNIQUE: Real-time ultrasound performed with representative images  saved to PACS.     FINDINGS:    Targeted ultrasound performed of the RIGHT upper, RIGHT lower, LEFT  upper and LEFT lower quadrants. Minimal ascites demonstrated adjacent to  the spleen, too small to safely drain.          Impression:      1. Minimal perisplenic ascites.        This report was signed and finalized on 12/4/2023 10:08 AM by Dr Lorelei Amor MD.       CT Abdomen Pelvis Without Contrast [[id number]] Collected: 12/03/23 1425     Updated: 12/03/23 1436    Narrative:      CT ABDOMEN PELVIS WO CONTRAST- 12/3/2023 1:58 PM     HISTORY: Abdominal pain and distention. Recently diagnosed B-cell  lymphoma.; J18.9-Pneumonia, unspecified organism; recently diagnosed  B-cell lymphoma     COMPARISON: 11/12/2023     DOSE LENGTH PRODUCT: 400 mGy cm. Automated exposure control was also  utilized to decrease patient radiation dose.     TECHNIQUE: Axial images of the abdomen and pelvis are performed  without  IV contrast     FINDINGS: Similar small bilateral pleural effusions with bibasilar  atelectasis. Posterior inferior as well as retrocrural lymphadenopathy  remains similar to the previous study.     The nonenhanced liver, spleen, and adrenal glands are unremarkable.  Spleen is normal in size measuring 7.8 cm. Limited evaluation of the  pancreatic tissue without IV contrast. No abnormal perinephric fluid  collection. No hydronephrosis. Mildly distended bladder unremarkable.     There is prominent bulky mesenteric and retroperitoneal lymphadenopathy.  Confluent lymphadenopathy at the root of the mesentery measuring up to  13 cm in width, similar to the prior study.  Retroperitoneal lymphadenopathy remains bulky with confluent periaortic  lymph nodes measuring up to 6 cm.  Iliac lymphadenopathy again visualized. Little interval change since  11/12/2023.      There is no free intraperitoneal air or loculated abscess collection  identified with nonenhanced imaging. Similar small volume ascites within  the lower pelvis. No evidence for bowel obstruction. Mild to moderate  vascular calcification.     Degenerative changes regional skeleton with a mild left convexity of the  thoracolumbar curvature. No focal aggressive regional bony lesions.       Impression:      1. Compared to 11/12/2023, overall similar bulky inferior mediastinal,  retrocrural, retroperitoneal, mesenteric, and pelvic iliac  lymphadenopathy related to patient's recently diagnosed B-cell lymphoma.  2. Similar small volume ascites within the lower pelvis. No evidence of  bowel obstruction. No free air or abscess.  3. Similar small bilateral pleural effusions with bibasilar atelectasis.     This report was signed and finalized on 12/3/2023 2:33 PM by Dr. Evelyne Dickson MD.       XR Chest 1 View [[id number]] Collected: 12/03/23 1245     Updated: 12/03/23 1249    Narrative:      XR CHEST 1 VW-     HISTORY: SOA RO fluid     COMPARISON: None    "  FINDINGS: Frontal view the chest obtained.     Prior mediastinal surgery. Mild to moderate elevation right  hemidiaphragm. Left lower lobe opacities questionable small left pleural  effusion. Vascular crowding versus mild venous congestion. No  pneumothorax. No acute regional bony pathology.       Impression:      1. Left lower lobe opacities concerning for pneumonia with probable  small pleural effusion. Vascular crowding versus mild venous congestion.  Prior mediastinal surgery with elevated right hemidiaphragm.        This report was signed and finalized on 12/3/2023 12:46 PM by Dr. Evelyne Dickson MD.                 Objective:   Vitals: /77 (BP Location: Left arm, Patient Position: Lying)   Pulse 105   Temp 97.9 °F (36.6 °C) (Oral)   Resp 16   Ht 162.6 cm (64\")   Wt 70.8 kg (156 lb)   SpO2 95%   BMI 26.78 kg/m²   Physical Exam  Vitals and nursing note reviewed.   Constitutional:       Appearance: She is ill-appearing.   HENT:      Head: Normocephalic and atraumatic.   Eyes:      General: No scleral icterus.  Cardiovascular:      Rate and Rhythm: Normal rate.   Pulmonary:      Effort: No respiratory distress.      Breath sounds: No wheezing.   Abdominal:      General: Bowel sounds are normal.      Palpations: Abdomen is soft.   Musculoskeletal:         General: Swelling present.      Cervical back: No rigidity.   Skin:     Coloration: Skin is pale.   Neurological:      Mental Status: She is oriented to person, place, and time.   Psychiatric:         Mood and Affect: Mood normal.         Behavior: Behavior normal.         Thought Content: Thought content normal.         Judgment: Judgment normal.       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  AJCC stage: III.  IPI score of 4, age greater than 60, elevated LDH, hemoglobin less than 12, stage at least 3.  Treatment status: Too ill for active systemic therapy.   2.  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 Normal calcium 12/4/2023. Zometa 4 mg 11/11/2023.  3.  Poor performance status of 4.  4.  Left lower lobe opacities concerning for pneumonia. Had a dose of Zosyn.  Negative for pneumonia per primary team.  5.  Acute kidney injury from dehydration.   6.  Hyperuricemia from high tumor burden.  On allopurinol, renal dosing.        PLAN:   regarding note from palliative care. Patient decided to deescalate code status.  No CPR, intubation or artificial feedings.    2.    regarding calcium 10.3 from 10.8 from 10 from 10.8 and GFR 21.3 from 20 from 20.2 from 23.4 despite IV hydration.   3.    regarding supportive care/hospice care, renal function does not improve and performance status is very poor.     3.    regarding therapy for stage IIIa follicular lymphoma is controversial and treatment is individualized. Anticipate bendamustine if GFR at least 30 with rituxamab. Anticipate 6 cycles.    4.  Cancel PET.    5.  Negative hepatitis B surface antigen and negative hepatitis B core antibody..   6.  Discussed prognosis in ideal condition on 12/5/2023.  With rituximab based treatment for IPI score of 4, 2-year overall survival approximately 87%.  2-year progression free survival approximately 65% and median progression free survival 42 months. However, patient is too ill for systemic therapy.   7.  Allopurinol dose 50 mg po every other day due to GFR at 20.   8.  Cancel office visit with Dr. Boyer on 12/11/2023  "at 2 pm.    9.  Questions were answered to their satisfaction. \"Yes, I don't want to get sicker with chemo.\"  10.  Above plan discussed with nurse Rosamaria Velez, daughter in law and Keith, son.  \"Thank you for calling. Agreed with hospice. We had a meeting yesterday with Kaila and another meeting today\"    11.  Anticipate discharge to [facility] in [place].  12.  Sign off.  Please call if needed.           Electronically signed by Arnaldo Peralta MD at 12/06/23 0712       Jaclyn Burleson APRN at 12/05/23 1340       Attestation signed by Alvarez Lam MD at 12/05/23 1034    I have reviewed this documentation and agree.                      [facility] Medicine Services  INPATIENT PROGRESS NOTE    Patient Name: Imani Chauhan  Date of Admission: 12/3/2023  Today's Date: 12/05/23  Length of Stay: 0  Primary Care Physician: Светлана Loyd MD    Subjective   Chief Complaint: Nausea, fluid on her abdomen.  HPI   Ms. Chauhan presented to [facility] emergency room with nausea, \"fluid on her abdomen\" and increased abdominal swelling.  Patient reported the last 3 to 4 days she has had difficulty walking due to generalized weakness and had to sit down frequently.  She reports legs are weak, decreased appetite and everything she tries to eat sours on her stomach.  Patient did report vomiting the day of admission looked black.  Patient had recent admission 11/11/2023 - 11/20/2023 with hypercalcemia and lymphadenopathy.  Patient had left retroperitoneal lymph node biopsy 7/6/2023 with mixed lymphoid infiltrates and fibrous tissue.  Flow cytometry no detectable clonal B-cell population.  Liver biopsy 11/16/2023 mixed portal inflammation and mild steatosis with no diagnostic malignancy.  Additional histologic levels examined.  Retroperitoneal biopsy 11/20/2023 lymphoma favor large cell CD20 positive.  Follicular lymphoma grade 3A.  She presented to ER with abdominal " "pain and nausea.  WBC 10.01, hemoglobin 10.2, creatinine 2.08 (GFR 23.4).  Previous creatinine 1.43 on 11/20/2023 (GFR 36.7%), calcium 10.8, proBNP 648.  COVID-19 not detected.  Chest x-ray showed left lower lobe opacities concerning for pneumonia with probable small effusion.  Vascular crowding versus mild venous congestion.  CT abdomen pelvis 12/3/2023 compared to 11/12/2023 overall similar bulky mediastinal, retrocrural, retroperitoneal, mesenteric and pelvic iliac lymphadenopathy related to recent diagnosis of B-cell lymphoma.  Similar small volume ascites within the lower pelvis.  No bowel obstruction free air or abscess.  Similar small bilateral pleural effusions with atelectasis.  Lasix, Zofran, Zosyn given in ER.    Today  Patient seen earlier with nurse Nisreen.  Son and daughter-in-law present in room.  Patient sleeping.  Son reports patient has no appetite and the smell of food makes her nauseated.  Dr. Peralta again to spoke with family members this morning regarding need for IV fluid hydration, unable to administer any chemotherapy until GFR greater than 30%.  Port placement 12/12, follow-up with oncology 12/11.  Creatinine remains elevated 2.37 despite IV fluid hydration.  Family members requesting palliative care consult as patient expressed earlier may be \"forgetting all of this\".  She declined to work with physical therapy.    Review of Systems   Constitutional:  Positive for activity change, appetite change and fatigue. Negative for chills and fever.   HENT:  Negative for congestion and trouble swallowing.    Eyes:  Negative for photophobia and visual disturbance.   Respiratory:  Negative for cough, shortness of breath and wheezing.    Cardiovascular:  Negative for chest pain, palpitations and leg swelling.   Gastrointestinal:  Positive for abdominal distention and nausea. Negative for constipation, diarrhea and vomiting.   Endocrine: Negative for cold intolerance, heat intolerance and polyuria. "   Genitourinary:  Negative for dysuria, frequency and urgency.   Musculoskeletal:  Positive for gait problem.   Skin:  Negative for color change, pallor, rash and wound.   Allergic/Immunologic: Negative for immunocompromised state.   Neurological:  Positive for weakness. Negative for light-headedness.   Hematological:  Negative for adenopathy. Does not bruise/bleed easily.   Psychiatric/Behavioral:  Negative for agitation, behavioral problems and confusion.       All pertinent negatives and positives are as above. All other systems have been reviewed and are negative unless otherwise stated.     Objective    Temp:  [97 °F (36.1 °C)-98.6 °F (37 °C)] 97.7 °F (36.5 °C)  Heart Rate:  [] 104  Resp:  [16-18] 16  BP: (123-155)/(61-81) 155/81  Physical Exam  Vitals and nursing note reviewed.   Constitutional:       Comments: Lying in bed.  Oxygen off.  Son and daughter-in-law in room.   HENT:      Head: Normocephalic and atraumatic.      Nose: No congestion.      Mouth/Throat:      Pharynx: Oropharynx is clear. No oropharyngeal exudate or posterior oropharyngeal erythema.   Eyes:      Extraocular Movements: Extraocular movements intact.      Pupils: Pupils are equal, round, and reactive to light.   Cardiovascular:      Rate and Rhythm: Normal rate and regular rhythm.      Heart sounds: No murmur heard.  Pulmonary:      Breath sounds: No wheezing, rhonchi or rales.      Comments: Oxygen off.  Saturation 96%.  Abdominal:      General: There is distension (Minimal).   Genitourinary:     Comments: WIC in place  Musculoskeletal:         General: No swelling or tenderness.      Cervical back: Normal range of motion and neck supple.   Skin:     General: Skin is warm and dry.   Neurological:      General: No focal deficit present.      Mental Status: She is alert and oriented to person, place, and time.   Psychiatric:         Mood and Affect: Mood normal.         Behavior: Behavior normal.         Thought Content: Thought  content normal.         Judgment: Judgment normal.       Results Review:  I have reviewed the labs, radiology results, and diagnostic studies.    Laboratory Data:   Results from last 7 days   Lab Units 12/05/23  0720 12/04/23  0458 12/03/23  1200   WBC 10*3/mm3 10.24 8.87 10.01   HEMOGLOBIN g/dL 10.4* 9.3* 10.2*   HEMATOCRIT % 33.8* 31.6* 33.1*   PLATELETS 10*3/mm3 516* 464* 492*        Results from last 7 days   Lab Units 12/05/23  0720 12/04/23  0458 12/03/23  1200   SODIUM mmol/L 135* 133* 133*   POTASSIUM mmol/L 4.3 4.3 4.4   CHLORIDE mmol/L 92* 94* 93*   CO2 mmol/L 25.0 23.0 26.0   BUN mg/dL 35* 32* 29*   CREATININE mg/dL 2.37* 2.35* 2.08*   CALCIUM mg/dL 10.8* 10.0 10.8*   BILIRUBIN mg/dL 0.3  --  0.3   ALK PHOS U/L 540*  --  610*   ALT (SGPT) U/L 15  --  20   AST (SGOT) U/L 32  --  35*   GLUCOSE mg/dL 92 93 93       Culture Data:   Blood Culture   Date Value Ref Range Status   12/03/2023 No growth at 24 hours  Preliminary   12/03/2023 No growth at 24 hours  Preliminary     Imaging Results (All)       Procedure Component Value Units Date/Time    US Abdomen Limited [[id number]] Collected: 12/04/23 1007     Updated: 12/04/23 1011    Narrative:      EXAM: US ABDOMEN LIMITED-      DATE: 12/4/2023 9:29 AM     HISTORY: check for drainable ascitic fluid; J18.9-Pneumonia, unspecified  organism       COMPARISON: No existing relevant imaging studies available.      TECHNIQUE: Real-time ultrasound performed with representative images  saved to PACS.     FINDINGS:    Targeted ultrasound performed of the RIGHT upper, RIGHT lower, LEFT  upper and LEFT lower quadrants. Minimal ascites demonstrated adjacent to  the spleen, too small to safely drain.          Impression:      1. Minimal perisplenic ascites.        This report was signed and finalized on 12/4/2023 10:08 AM by Dr Lorelei Amor MD.       CT Abdomen Pelvis Without Contrast [[id number]] Collected: 12/03/23 1425     Updated: 12/03/23 1436    Narrative:      CT  ABDOMEN PELVIS WO CONTRAST- 12/3/2023 1:58 PM     HISTORY: Abdominal pain and distention. Recently diagnosed B-cell  lymphoma.; J18.9-Pneumonia, unspecified organism; recently diagnosed  B-cell lymphoma     COMPARISON: 11/12/2023     DOSE LENGTH PRODUCT: 400 mGy cm. Automated exposure control was also  utilized to decrease patient radiation dose.     TECHNIQUE: Axial images of the abdomen and pelvis are performed without  IV contrast     FINDINGS: Similar small bilateral pleural effusions with bibasilar  atelectasis. Posterior inferior as well as retrocrural lymphadenopathy  remains similar to the previous study.     The nonenhanced liver, spleen, and adrenal glands are unremarkable.  Spleen is normal in size measuring 7.8 cm. Limited evaluation of the  pancreatic tissue without IV contrast. No abnormal perinephric fluid  collection. No hydronephrosis. Mildly distended bladder unremarkable.     There is prominent bulky mesenteric and retroperitoneal lymphadenopathy.  Confluent lymphadenopathy at the root of the mesentery measuring up to  13 cm in width, similar to the prior study.  Retroperitoneal lymphadenopathy remains bulky with confluent periaortic  lymph nodes measuring up to 6 cm.  Iliac lymphadenopathy again visualized. Little interval change since  11/12/2023.      There is no free intraperitoneal air or loculated abscess collection  identified with nonenhanced imaging. Similar small volume ascites within  the lower pelvis. No evidence for bowel obstruction. Mild to moderate  vascular calcification.     Degenerative changes regional skeleton with a mild left convexity of the  thoracolumbar curvature. No focal aggressive regional bony lesions.       Impression:      1. Compared to 11/12/2023, overall similar bulky inferior mediastinal,  retrocrural, retroperitoneal, mesenteric, and pelvic iliac  lymphadenopathy related to patient's recently diagnosed B-cell lymphoma.  2. Similar small volume ascites within  the lower pelvis. No evidence of  bowel obstruction. No free air or abscess.  3. Similar small bilateral pleural effusions with bibasilar atelectasis.     This report was signed and finalized on 12/3/2023 2:33 PM by Dr. Evelyne Dickson MD.       XR Chest 1 View [[id number]] Collected: 12/03/23 1245     Updated: 12/03/23 1249    Narrative:      XR CHEST 1 VW-     HISTORY: SOA RO fluid     COMPARISON: None     FINDINGS: Frontal view the chest obtained.     Prior mediastinal surgery. Mild to moderate elevation right  hemidiaphragm. Left lower lobe opacities questionable small left pleural  effusion. Vascular crowding versus mild venous congestion. No  pneumothorax. No acute regional bony pathology.       Impression:      1. Left lower lobe opacities concerning for pneumonia with probable  small pleural effusion. Vascular crowding versus mild venous congestion.  Prior mediastinal surgery with elevated right hemidiaphragm.        This report was signed and finalized on 12/3/2023 12:46 PM by Dr. Evelyne Dickson MD.              Scheduled medications:  allopurinol (ALOPRIM) 50 mg in sodium chloride 0.9 % 100 mL IVPB, 50 mg, Intravenous, Every Other Day  aspirin, 81 mg, Oral, Daily  atorvastatin, 10 mg, Oral, Nightly  dronabinol, 2.5 mg, Oral, BID  enoxaparin, 30 mg, Subcutaneous, Daily  famotidine, 20 mg, Oral, Daily  insulin lispro, 2-7 Units, Subcutaneous, BID AC  [START ON 12/6/2023] levothyroxine, 37.5 mcg, Oral, Once per day on Mon Wed Fri  levothyroxine, 75 mcg, Oral, Once per day on Sun Tue Thu Sat  senna-docusate sodium, 2 tablet, Oral, BID  sodium chloride, 10 mL, Intravenous, Q12H         I have reviewed the patient's current medications.     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  Acute kidney injury superimposed on CKD stage IIIb.  On admission, creatinine 2.08 (GFR 23.4).  Previous creatinine 1.43 on 11/20/2023 (GFR 36.7%).  Continue IV at 75 mL/h fluids.  Creatinine remains elevated 2.37 despite IV fluids.  Hold nephrotoxic agents, Lasix, lisinopril.  BMP in AM.  Dr. Wan added allopurinol 50 mg IV every other day due to GFR 20.    2.  Follicular lymphoma grade 3A.  Oncology, Dr. Peralta consulted and discussed with Dr. Peralta.  Recommends hydration for acute kidney injury.  Consider Bendamustine if GFR at least 30% with rituxamab with anticipation of 6 cycles.  Will need outpatient PET scan.  Patient for port placement 12/12 per Dr. Restrepo.  Follow-up with Dr. Aleman 12/11/2023 at 2 PM.  I discussed with family members that chemotherapy would not be started till GFR consistently greater than 30%.  No concerns for pneumonia as white blood cell count normal, WBC normal, no cough or congestion.     3.  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  Calcium 10.8 on admission and 10.8 today. Received Zometa 11/11 during previous admission.    4.  Diabetes mellitus type 2 without insulin.  Hemoglobin A1c 6.1.  Accu-Cheks with sliding scale insulin coverage.  Glucoses 92, 92, 98.  Hold Jardiance due to poor appetite and decreased intake.  Family reports no longer taking metformin.    5.  Elevated alkaline phosphatase.  540 previously 958 on 11/20/2023.  Right upper quadrant ultrasound 11/17/2023 noted patent main portal vein, left portal vein, right portal vein.    6.  Acquired hypothyroidism.  TSH 2.95 on 11/12/2023.  Continue Synthroid.    7.  Decreased appetite with mild nausea likely contributing to acute kidney injury. Dronabinol 2.5 mg twice daily.  Famotidine daily.  Pepcid 20 mg orally twice daily.  Continue IV fluids.  Reports nausea with food smell.  Compazine and Zofran ordered.    8.   Lovenox for deep vein thrombosis prophylaxis.  Physical therapy consulted and patient declined earlier today.    9.  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     [facility] Data  External documents reviewed: Reviewed hospitalization with discharge 11/20/2023  Cardiac tracing (EKG, telemetry) interpretation: None  Radiology interpretation: Reviewed radiology interpretation ultrasound abdomen, CT abdomen and pelvis, chest x-ray  Labs reviewed:   BMP 12/5/23.  Repeat CMP in AM.  CBC 12/5/2023.  Repeat CBC in AM.  Respiratory PCR panel negative.  Blood cultures no growth    Any tests that were considered but not ordered: None     Decision rules/scores evaluated (example [id number], [place], etc): None     Discussed with: Dr. Lam, Dr. Peralta, oncology, patient, son and daughter-in-law present in room.     Care Planning  Shared decision making: Dr. Lam, Dr. Peralta, oncology, patient, 2 sons and 2 daughters present in room.  Patient and son POA agrees to IV fluid hydration, repeat lab work, general surgery consult for port placement, recommendations per oncology pending lab work improvement.  Outpatient PET scan palliative care consult.     Code status and  discussions: Full code  Patient surrogate decision maker is her son, Keith Donato  Social Determinants of Health that impact treatment or disposition: Decreased activities of ADL  I expect the patient to be discharged to home with home health versus SNF possible palliative care comfort to be determined.    Electronically signed by SOFIYA Zuleta, 12/05/23, 13:44 CST.     The above documentation resulted from a face-to-face encounter by me Jaclyn ARRIAZA, [facility].      Electronically signed by Alvarez Lam MD at 12/05/23 1729       Arnaldo Peralta MD at 12/05/23 0616          [facility] Progress Note    Progress Note    Patient:  Imani Chauhan  YOB: 1940  Date of Service: 12/5/2023  MRN: [id number]   Acct: [id number]   Primary Care Physician: Светлана Loyd MD  Advance Directive:   Code Status and Medical Interventions:   Ordered at: 12/03/23 1653     Level Of Support Discussed With:    Patient     Code Status (Patient has no pulse and is not breathing):    CPR (Attempt to Resuscitate)     Medical Interventions (Patient has pulse or is breathing):    Full Support     Admit Date: 12/3/2023       Hospital Day: 0      Subjective:     Chief Compliant: Patient seen with nurse Paulo, 2 sons and 2 daughters in law at the bedside.  Positive for nausea.  On treatment with Zofran.      Review of Systems:   Review of Systems   Constitutional:  Positive for fatigue. Negative for chills and fever.   HENT:  Negative for congestion and trouble swallowing.    Eyes:  Negative for redness and visual disturbance.   Respiratory:  Negative for cough, shortness of breath and wheezing.    Cardiovascular:  Negative for chest pain and palpitations.   Gastrointestinal:  Positive for nausea. Negative for constipation and diarrhea.   Endocrine: Negative for polydipsia and polyphagia.   Genitourinary:  Negative for dysuria and hematuria.   Musculoskeletal:  Negative for myalgias.   Skin:   Positive for pallor.   Neurological:  Negative for dizziness, facial asymmetry and speech difficulty.   Hematological:  Does not bruise/bleed easily.   Psychiatric/Behavioral:  Negative for agitation, confusion and hallucinations.            Medications:   Scheduled Meds:atorvastatin, 10 mg, Oral, Nightly  dronabinol, 2.5 mg, Oral, BID  enoxaparin, 30 mg, Subcutaneous, Daily  famotidine, 20 mg, Oral, Daily  insulin lispro, 2-7 Units, Subcutaneous, BID AC  senna-docusate sodium, 2 tablet, Oral, BID  sodium chloride, 10 mL, Intravenous, Q12H        Continuous Infusions:lactated ringers, 75 mL/hr, Last Rate: 75 mL/hr (12/04/23 1435)        Labs:     Lab Results (last 72 hours)       Procedure Component Value Units Date/Time    Beta 2 Microglobulin, Serum [[id number]]  (Abnormal) Collected: 12/04/23 0458    Specimen: Blood Updated: 12/04/23 1955     Beta-2 Microglobulin 13.2 mg/L     POC Glucose Once [[id number]]  (Normal) Collected: 12/04/23 1645    Specimen: Blood Updated: 12/04/23 1656     Glucose 98 mg/dL      Comment: : [id number] Flor JonesahMeter ID: [id number]       Blood Culture - Blood, Arm, Left [[id number]]  (Normal) Collected: 12/03/23 1400    Specimen: Blood from Arm, Left Updated: 12/04/23 1500     Blood Culture No growth at 24 hours    Blood Culture - Blood, Wrist, Left [[id number]]  (Normal) Collected: 12/03/23 1357    Specimen: Blood from Wrist, Left Updated: 12/04/23 1500     Blood Culture No growth at 24 hours    Respiratory Panel PCR w/COVID-19(SARS-CoV-2) SAMIRA/COMFORT/CARMELO/[facility] In-House, NP Swab in UTM/VTM, 2 HR TAT - Swab, Nasopharynx [[id number]]  (Normal) Collected: 12/04/23 1152    Specimen: Swab from Nasopharynx Updated: 12/04/23 1252     ADENOVIRUS, PCR Not Detected     Coronavirus 229E Not Detected     Coronavirus HKU1 Not Detected     Coronavirus NL63 Not Detected     Coronavirus OC43 Not Detected     COVID19 Not Detected     Human Metapneumovirus Not Detected     Human  Rhinovirus/Enterovirus Not Detected     Influenza A PCR Not Detected     Influenza B PCR Not Detected     Parainfluenza Virus 1 Not Detected     Parainfluenza Virus 2 Not Detected     Parainfluenza Virus 3 Not Detected     Parainfluenza Virus 4 Not Detected     RSV, PCR Not Detected     Bordetella pertussis pcr Not Detected     Bordetella parapertussis PCR Not Detected     Chlamydophila pneumoniae PCR Not Detected     Mycoplasma pneumo by PCR Not Detected    Narrative:      In the setting of a positive respiratory panel with a viral infection PLUS a negative procalcitonin without other underlying concern for bacterial infection, consider observing off antibiotics or discontinuation of antibiotics and continue supportive care. If the respiratory panel is positive for atypical bacterial infection (Bordetella pertussis, Chlamydophila pneumoniae, or Mycoplasma pneumoniae), consider antibiotic de-escalation to target atypical bacterial infection.    Lactate Dehydrogenase [[id number]]  (Abnormal) Collected: 12/04/23 0458    Specimen: Blood Updated: 12/04/23 1204     LDH 1,180 U/L      Comment: Specimen hemolyzed.  Results may be affected.       POC Glucose Once [[id number]]  (Normal) Collected: 12/04/23 1151    Specimen: Blood Updated: 12/04/23 1202     Glucose 88 mg/dL      Comment: : [id number] CustomerXPs SoftwareahMeter ID: [id number]       POC Glucose Once [[id number]]  (Normal) Collected: 12/04/23 0749    Specimen: Blood Updated: 12/04/23 0800     Glucose 93 mg/dL      Comment: : [id number] Ray SarahMeter ID: [id number]       Phosphorus [[id number]]  (Abnormal) Collected: 12/04/23 0458    Specimen: Blood Updated: 12/04/23 0604     Phosphorus 1.6 mg/dL     Basic Metabolic Panel [[id number]]  (Abnormal) Collected: 12/04/23 0458    Specimen: Blood Updated: 12/04/23 0551     Glucose 93 mg/dL      BUN 32 mg/dL      Creatinine 2.35 mg/dL      Sodium 133 mmol/L      Potassium 4.3 mmol/L      Chloride 94 mmol/L      CO2 23.0  mmol/L      Calcium 10.0 mg/dL      BUN/Creatinine Ratio 13.6     Anion Gap 16.0 mmol/L      eGFR 20.2 mL/min/1.73     Narrative:      GFR Normal >60  Chronic Kidney Disease <60  Kidney Failure <15    The GFR formula is only valid for adults with stable renal function between ages 18 and 70.    Magnesium [[id number]]  (Normal) Collected: 12/04/23 0458    Specimen: Blood Updated: 12/04/23 0551     Magnesium 1.8 mg/dL     CBC & Differential [[id number]]  (Abnormal) Collected: 12/04/23 0458    Specimen: Blood Updated: 12/04/23 0533    Narrative:      The following orders were created for panel order CBC & Differential.  Procedure                               Abnormality         Status                     ---------                               -----------         ------                     CBC Auto Differential[[id number]]        Abnormal            Final result                 Please view results for these tests on the individual orders.    CBC Auto Differential [[id number]]  (Abnormal) Collected: 12/04/23 0458    Specimen: Blood Updated: 12/04/23 0533     WBC 8.87 10*3/mm3      RBC 3.35 10*6/mm3      Hemoglobin 9.3 g/dL      Hematocrit 31.6 %      MCV 94.3 fL      MCH 27.8 pg      MCHC 29.4 g/dL      RDW 16.6 %      RDW-SD 55.9 fl      MPV 9.9 fL      Platelets 464 10*3/mm3      Neutrophil % 86.1 %      Lymphocyte % 5.7 %      Monocyte % 5.6 %      Eosinophil % 1.1 %      Basophil % 0.8 %      Immature Grans % 0.7 %      Neutrophils, Absolute 7.63 10*3/mm3      Lymphocytes, Absolute 0.51 10*3/mm3      Monocytes, Absolute 0.50 10*3/mm3      Eosinophils, Absolute 0.10 10*3/mm3      Basophils, Absolute 0.07 10*3/mm3      Immature Grans, Absolute 0.06 10*3/mm3      nRBC 0.0 /100 WBC     STAT Lactic Acid, Reflex [[id number]]  (Abnormal) Collected: 12/03/23 1859    Specimen: Blood Updated: 12/03/23 1952     Lactate 2.1 mmol/L     COVID PRE-OP / PRE-PROCEDURE SCREENING ORDER (NO ISOLATION) - Swab, Nasopharynx [[id number]]   (Normal) Collected: 12/03/23 1200    Specimen: Swab from Nasopharynx Updated: 12/03/23 1300    Narrative:      The following orders were created for panel order COVID PRE-OP / PRE-PROCEDURE SCREENING ORDER (NO ISOLATION) - Swab, Nasopharynx.  Procedure                               Abnormality         Status                     ---------                               -----------         ------                     COVID-19 and FLU A/B PCR...[[id number]]  Normal              Final result                 Please view results for these tests on the individual orders.    COVID-19 and FLU A/B PCR, 1 HR TAT - Swab, Nasopharynx [[id number]]  (Normal) Collected: 12/03/23 1200    Specimen: Swab from Nasopharynx Updated: 12/03/23 1300     COVID19 Not Detected     Influenza A PCR Not Detected     Influenza B PCR Not Detected    Narrative:      Fact sheet for providers: https://www.fda.gov/media/422711/download    Fact sheet for patients: https://www.fda.gov/media/448519/download    Test performed by PCR.    BNP [[id number]]  (Normal) Collected: 12/03/23 1200    Specimen: Blood Updated: 12/03/23 1229     proBNP 648.2 pg/mL     Narrative:      This assay is used as an aid in the diagnosis of individuals suspected of having heart failure. It can be used as an aid in the diagnosis of acute decompensated heart failure (ADHF) in patients presenting with signs and symptoms of ADHF to the emergency department (ED). In addition, NT-proBNP of <300 pg/mL indicates ADHF is not likely.    Age Range Result Interpretation  NT-proBNP Concentration (pg/mL:      <50             Positive            >450                   Gray                 300-450                    Negative             <300    50-75           Positive            >900                  Gray                300-900                  Negative            <300      >75             Positive            >1800                  Gray                300-1800                  Negative             <300    Comprehensive Metabolic Panel [[id number]]  (Abnormal) Collected: 12/03/23 1200    Specimen: Blood Updated: 12/03/23 1227     Glucose 93 mg/dL      BUN 29 mg/dL      Creatinine 2.08 mg/dL      Sodium 133 mmol/L      Potassium 4.4 mmol/L      Chloride 93 mmol/L      CO2 26.0 mmol/L      Calcium 10.8 mg/dL      Total Protein 6.4 g/dL      Albumin 3.0 g/dL      ALT (SGPT) 20 U/L      AST (SGOT) 35 U/L      Alkaline Phosphatase 610 U/L      Total Bilirubin 0.3 mg/dL      Globulin 3.4 gm/dL      A/G Ratio 0.9 g/dL      BUN/Creatinine Ratio 13.9     Anion Gap 14.0 mmol/L      eGFR 23.4 mL/min/1.73     Narrative:      GFR Normal >60  Chronic Kidney Disease <60  Kidney Failure <15    The GFR formula is only valid for adults with stable renal function between ages 18 and 70.    Lactic Acid, Plasma [[id number]]  (Abnormal) Collected: 12/03/23 1200    Specimen: Blood Updated: 12/03/23 1225     Lactate 2.6 mmol/L     CBC & Differential [[id number]]  (Abnormal) Collected: 12/03/23 1200    Specimen: Blood Updated: 12/03/23 1209    Narrative:      The following orders were created for panel order CBC & Differential.  Procedure                               Abnormality         Status                     ---------                               -----------         ------                     CBC Auto Differential[[id number]]        Abnormal            Final result                 Please view results for these tests on the individual orders.    CBC Auto Differential [[id number]]  (Abnormal) Collected: 12/03/23 1200    Specimen: Blood Updated: 12/03/23 1209     WBC 10.01 10*3/mm3      RBC 3.57 10*6/mm3      Hemoglobin 10.2 g/dL      Hematocrit 33.1 %      MCV 92.7 fL      MCH 28.6 pg      MCHC 30.8 g/dL      RDW 16.2 %      RDW-SD 53.6 fl      MPV 9.6 fL      Platelets 492 10*3/mm3      Neutrophil % 87.5 %      Lymphocyte % 4.3 %      Monocyte % 5.4 %      Eosinophil % 1.1 %      Basophil % 0.8 %      Immature Grans % 0.9 %       Neutrophils, Absolute 8.76 10*3/mm3      Lymphocytes, Absolute 0.43 10*3/mm3      Monocytes, Absolute 0.54 10*3/mm3      Eosinophils, Absolute 0.11 10*3/mm3      Basophils, Absolute 0.08 10*3/mm3      Immature Grans, Absolute 0.09 10*3/mm3      nRBC 0.2 /100 WBC               Radiology:     Imaging Results (Last 72 Hours)       Procedure Component Value Units Date/Time    US Abdomen Limited [[id number]] Collected: 12/04/23 1007     Updated: 12/04/23 1011    Narrative:      EXAM: US ABDOMEN LIMITED-      DATE: 12/4/2023 9:29 AM     HISTORY: check for drainable ascitic fluid; J18.9-Pneumonia, unspecified  organism       COMPARISON: No existing relevant imaging studies available.      TECHNIQUE: Real-time ultrasound performed with representative images  saved to PACS.     FINDINGS:    Targeted ultrasound performed of the RIGHT upper, RIGHT lower, LEFT  upper and LEFT lower quadrants. Minimal ascites demonstrated adjacent to  the spleen, too small to safely drain.          Impression:      1. Minimal perisplenic ascites.        This report was signed and finalized on 12/4/2023 10:08 AM by Dr Lorelei Amor MD.       CT Abdomen Pelvis Without Contrast [[id number]] Collected: 12/03/23 1425     Updated: 12/03/23 1436    Narrative:      CT ABDOMEN PELVIS WO CONTRAST- 12/3/2023 1:58 PM     HISTORY: Abdominal pain and distention. Recently diagnosed B-cell  lymphoma.; J18.9-Pneumonia, unspecified organism; recently diagnosed  B-cell lymphoma     COMPARISON: 11/12/2023     DOSE LENGTH PRODUCT: 400 mGy cm. Automated exposure control was also  utilized to decrease patient radiation dose.     TECHNIQUE: Axial images of the abdomen and pelvis are performed without  IV contrast     FINDINGS: Similar small bilateral pleural effusions with bibasilar  atelectasis. Posterior inferior as well as retrocrural lymphadenopathy  remains similar to the previous study.     The nonenhanced liver, spleen, and adrenal glands are  unremarkable.  Spleen is normal in size measuring 7.8 cm. Limited evaluation of the  pancreatic tissue without IV contrast. No abnormal perinephric fluid  collection. No hydronephrosis. Mildly distended bladder unremarkable.     There is prominent bulky mesenteric and retroperitoneal lymphadenopathy.  Confluent lymphadenopathy at the root of the mesentery measuring up to  13 cm in width, similar to the prior study.  Retroperitoneal lymphadenopathy remains bulky with confluent periaortic  lymph nodes measuring up to 6 cm.  Iliac lymphadenopathy again visualized. Little interval change since  11/12/2023.      There is no free intraperitoneal air or loculated abscess collection  identified with nonenhanced imaging. Similar small volume ascites within  the lower pelvis. No evidence for bowel obstruction. Mild to moderate  vascular calcification.     Degenerative changes regional skeleton with a mild left convexity of the  thoracolumbar curvature. No focal aggressive regional bony lesions.       Impression:      1. Compared to 11/12/2023, overall similar bulky inferior mediastinal,  retrocrural, retroperitoneal, mesenteric, and pelvic iliac  lymphadenopathy related to patient's recently diagnosed B-cell lymphoma.  2. Similar small volume ascites within the lower pelvis. No evidence of  bowel obstruction. No free air or abscess.  3. Similar small bilateral pleural effusions with bibasilar atelectasis.     This report was signed and finalized on 12/3/2023 2:33 PM by Dr. Evelyne Dickson MD.       XR Chest 1 View [[id number]] Collected: 12/03/23 1245     Updated: 12/03/23 1249    Narrative:      XR CHEST 1 VW-     HISTORY: SOA RO fluid     COMPARISON: None     FINDINGS: Frontal view the chest obtained.     Prior mediastinal surgery. Mild to moderate elevation right  hemidiaphragm. Left lower lobe opacities questionable small left pleural  effusion. Vascular crowding versus mild venous congestion. No  pneumothorax. No acute  "regional bony pathology.       Impression:      1. Left lower lobe opacities concerning for pneumonia with probable  small pleural effusion. Vascular crowding versus mild venous congestion.  Prior mediastinal surgery with elevated right hemidiaphragm.        This report was signed and finalized on 12/3/2023 12:46 PM by Dr. Evelyne Dickson MD.                 Objective:   Vitals: /65 (BP Location: Left arm, Patient Position: Lying)   Pulse 101   Temp 98.4 °F (36.9 °C) (Oral)   Resp 16   Ht 162.6 cm (64\")   Wt 70.8 kg (156 lb)   SpO2 91%   BMI 26.78 kg/m²   Physical Exam  Vitals and nursing note reviewed.   Constitutional:       Appearance: She is ill-appearing.   HENT:      Head: Normocephalic and atraumatic.   Eyes:      General: No scleral icterus.  Cardiovascular:      Rate and Rhythm: Normal rate.   Pulmonary:      Effort: No respiratory distress.      Breath sounds: No wheezing.   Abdominal:      General: Bowel sounds are normal. There is no distension.      Palpations: Abdomen is soft.   Musculoskeletal:         General: Swelling present.      Cervical back: No rigidity.   Skin:     Coloration: Skin is pale.   Neurological:      Mental Status: She is oriented to person, place, and time.   Psychiatric:         Mood and Affect: Mood normal.         Behavior: Behavior normal.       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  AJCC stage: III.  IPI score of 4, age greater than 60, elevated LDH, " hemoglobin less than 12, stage at least 3.  Treatment status: Pending outpatient therapy.    2.  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 Normal calcium 12/4/2023. Zometa 4 mg 11/11/2023.  3.  Performance status of 2.  4.  Left lower lobe opacities concerning for pneumonia. Had a dose of Zosyn.  Negative for pneumonia per primary team.  5.  Acute kidney injury from dehydration.   6.  Uric acid 12.Plan for allopurinol.        PLAN:   regarding note from surgery on 12/4/2023.  Plan for port placement due to Bendamustine and to return with vesicant like properties.  2.    regarding  continue hydration per primary team due to acute kidney injury.  3.    regarding therapy for stage IIIa follicular lymphoma is controversial and treatment is individualized. Anticipate bendamustine if GFR at least 30 with rituxamab. Anticipate 6 cycles.    4.  Plan for outpatient PET.    5.  Pending hepatitis B surface antigen and hepatitis B core antibody, prior to therapy with rituximab, LDH 1180 and B2M 13.2. Phosphorus 1.6.   6.  Discussed prognosis.  With rituximab based treatment for IPI score of 4, 2-year overall survival approximately 87%.  2-year progression free survival approximately 65% and median progression free survival 42 months.  7.  Allopurinol dose 50 mg IV every other day due to GFR at 20.   8.  Follow-up with Dr. Boyer on 12/11/2023 at 2 pm per patient's request.   9.  Questions were answered to their satisfaction.  10.  Above plan discussed with nurse Paulo.  He will relay message to SOFIYA Arreola.  11.  Anticipate discharge to rehab.            Electronically signed by Arnaldo Peralta MD at 12/05/23 2176       Jaclyn Burleson APRN at 12/04/23 1872       Attestation signed by Alvarez Lam MD at 12/04/23 3207 (Updated)    I spoke with 5 family members for 15 minutes going over the plan including reviewing the images of the ultrasound the abdomen and  "answering all questions to their satisfaction.    I have reviewed this documentation and agree.                      [facility] Medicine Services  INPATIENT PROGRESS NOTE    Patient Name: Imani Chauhan  Date of Admission: 12/3/2023  Today's Date: 12/04/23  Length of Stay: 0  Primary Care Physician: Светлана Loyd MD    Subjective   Chief Complaint: Nausea, fluid on her abdomen.  HPI   Ms. Chauhan presented to [facility] emergency room with nausea, \"fluid on her abdomen\" and increased abdominal swelling.  Patient reported the last 3 to 4 days she has had difficulty walking due to generalized weakness and had to sit down frequently.  She reports legs are weak, decreased appetite and everything she tries to eat sours on her stomach.  Patient did report vomiting the day of admission looked black.  Patient had recent admission 11/11/2023 - 11/20/2023 with hypercalcemia and lymphadenopathy.  Patient had left retroperitoneal lymph node biopsy 7/6/2023 with mixed lymphoid infiltrates and fibrous tissue.  Flow cytometry no detectable clonal B-cell population.  Liver biopsy 11/16/2023 mixed portal inflammation and mild steatosis with no diagnostic malignancy.  Additional histologic levels examined.  Retroperitoneal biopsy 11/20/2023 lymphoma favor large cell CD20 positive.  Follicular lymphoma grade 3A.  She presented to ER with abdominal pain and nausea.  WBC 10.01, hemoglobin 10.2, creatinine 2.08 (GFR 23.4).  Previous creatinine 1.43 on 11/20/2023 (GFR 36.7%), calcium 10.8, proBNP 648.  COVID-19 not detected.  Chest x-ray showed left lower lobe opacities concerning for pneumonia with probable small effusion.  Vascular crowding versus mild venous congestion.  CT abdomen pelvis 12/3/2023 compared to 11/12/2023 overall similar bulky mediastinal, retrocrural, retroperitoneal, mesenteric and pelvic iliac lymphadenopathy related to recent diagnosis of B-cell lymphoma.  Similar " small volume ascites within the lower pelvis.  No bowel obstruction free air or abscess.  Similar small bilateral pleural effusions with atelectasis.  Lasix, Zofran, Zosyn given in ER.    Today  Sitting up in bed.  Oxygen 1 L.  Multiple family members in room.  Have had a long discussion with family members including 2 sons and 2 daughter-in-law's for greater than 45 minutes today.  Discussed plan from Dr. Peralta to include treating acute kidney injury, consider bendamustine 6 cycles when GFR greater than 30 and outpatient PET and port placement.  Patient can follow-up with Dr. Aleman on 12/11/2023.  Patient reports decreased appetite and Megace added.  Ultrasound of the abdomen minimal ascites and no paracentesis needed.  Do not believe patient has pneumonia she is afebrile, white blood cell count normal and atelectasis essentially unchanged.  Okay for placement per Dr. Peralta.    Review of Systems   Constitutional:  Positive for activity change, appetite change and fatigue. Negative for chills and fever.   HENT:  Negative for congestion and trouble swallowing.    Eyes:  Negative for photophobia and visual disturbance.   Respiratory:  Negative for cough, shortness of breath and wheezing.    Cardiovascular:  Negative for chest pain, palpitations and leg swelling.   Gastrointestinal:  Positive for abdominal distention and nausea. Negative for constipation, diarrhea and vomiting.   Endocrine: Negative for cold intolerance, heat intolerance and polyuria.   Genitourinary:  Negative for dysuria, frequency and urgency.   Musculoskeletal:  Positive for gait problem.   Skin:  Negative for color change, pallor, rash and wound.   Allergic/Immunologic: Negative for immunocompromised state.   Neurological:  Positive for weakness. Negative for light-headedness.   Hematological:  Negative for adenopathy. Does not bruise/bleed easily.   Psychiatric/Behavioral:  Negative for agitation, behavioral problems and confusion.       All  pertinent negatives and positives are as above. All other systems have been reviewed and are negative unless otherwise stated.     Objective    Temp:  [97.5 °F (36.4 °C)-98 °F (36.7 °C)] 98 °F (36.7 °C)  Heart Rate:  [84-95] 95  Resp:  [16-18] 18  BP: (126-146)/(62-76) 139/67  Physical Exam  Vitals and nursing note reviewed.   Constitutional:       Comments: Sitting up in bed.  Oxygen at 1 L.  Multiple family members in room.   HENT:      Head: Normocephalic and atraumatic.      Nose: No congestion.      Mouth/Throat:      Pharynx: Oropharynx is clear. No oropharyngeal exudate or posterior oropharyngeal erythema.   Eyes:      Extraocular Movements: Extraocular movements intact.      Pupils: Pupils are equal, round, and reactive to light.   Cardiovascular:      Rate and Rhythm: Normal rate and regular rhythm.      Heart sounds: No murmur heard.  Pulmonary:      Breath sounds: No wheezing, rhonchi or rales.      Comments: Oxygen at 1 L and since been removed.  Abdominal:      General: There is distension (Minimal).   Genitourinary:     Comments: WIC in place  Musculoskeletal:         General: No swelling or tenderness.      Cervical back: Normal range of motion and neck supple.   Skin:     General: Skin is warm and dry.   Neurological:      General: No focal deficit present.      Mental Status: She is alert and oriented to person, place, and time.   Psychiatric:         Mood and Affect: Mood normal.         Behavior: Behavior normal.         Thought Content: Thought content normal.         Judgment: Judgment normal.       Results Review:  I have reviewed the labs, radiology results, and diagnostic studies.    Laboratory Data:   Results from last 7 days   Lab Units 12/04/23  0458 12/03/23  1200   WBC 10*3/mm3 8.87 10.01   HEMOGLOBIN g/dL 9.3* 10.2*   HEMATOCRIT % 31.6* 33.1*   PLATELETS 10*3/mm3 464* 492*        Results from last 7 days   Lab Units 12/04/23  0458 12/03/23  1200   SODIUM mmol/L 133* 133*   POTASSIUM  mmol/L 4.3 4.4   CHLORIDE mmol/L 94* 93*   CO2 mmol/L 23.0 26.0   BUN mg/dL 32* 29*   CREATININE mg/dL 2.35* 2.08*   CALCIUM mg/dL 10.0 10.8*   BILIRUBIN mg/dL  --  0.3   ALK PHOS U/L  --  610*   ALT (SGPT) U/L  --  20   AST (SGOT) U/L  --  35*   GLUCOSE mg/dL 93 93       Culture Data:   Blood Culture   Date Value Ref Range Status   12/03/2023 No growth at 24 hours  Preliminary   12/03/2023 No growth at 24 hours  Preliminary     Imaging Results (All)       Procedure Component Value Units Date/Time    US Abdomen Limited [[id number]] Collected: 12/04/23 1007     Updated: 12/04/23 1011    Narrative:      EXAM: US ABDOMEN LIMITED-      DATE: 12/4/2023 9:29 AM     HISTORY: check for drainable ascitic fluid; J18.9-Pneumonia, unspecified  organism       COMPARISON: No existing relevant imaging studies available.      TECHNIQUE: Real-time ultrasound performed with representative images  saved to PACS.     FINDINGS:    Targeted ultrasound performed of the RIGHT upper, RIGHT lower, LEFT  upper and LEFT lower quadrants. Minimal ascites demonstrated adjacent to  the spleen, too small to safely drain.          Impression:      1. Minimal perisplenic ascites.        This report was signed and finalized on 12/4/2023 10:08 AM by Dr Lorelei Amor MD.       CT Abdomen Pelvis Without Contrast [[id number]] Collected: 12/03/23 1425     Updated: 12/03/23 1436    Narrative:      CT ABDOMEN PELVIS WO CONTRAST- 12/3/2023 1:58 PM     HISTORY: Abdominal pain and distention. Recently diagnosed B-cell  lymphoma.; J18.9-Pneumonia, unspecified organism; recently diagnosed  B-cell lymphoma     COMPARISON: 11/12/2023     DOSE LENGTH PRODUCT: 400 mGy cm. Automated exposure control was also  utilized to decrease patient radiation dose.     TECHNIQUE: Axial images of the abdomen and pelvis are performed without  IV contrast     FINDINGS: Similar small bilateral pleural effusions with bibasilar  atelectasis. Posterior inferior as well as retrocrural  lymphadenopathy  remains similar to the previous study.     The nonenhanced liver, spleen, and adrenal glands are unremarkable.  Spleen is normal in size measuring 7.8 cm. Limited evaluation of the  pancreatic tissue without IV contrast. No abnormal perinephric fluid  collection. No hydronephrosis. Mildly distended bladder unremarkable.     There is prominent bulky mesenteric and retroperitoneal lymphadenopathy.  Confluent lymphadenopathy at the root of the mesentery measuring up to  13 cm in width, similar to the prior study.  Retroperitoneal lymphadenopathy remains bulky with confluent periaortic  lymph nodes measuring up to 6 cm.  Iliac lymphadenopathy again visualized. Little interval change since  11/12/2023.      There is no free intraperitoneal air or loculated abscess collection  identified with nonenhanced imaging. Similar small volume ascites within  the lower pelvis. No evidence for bowel obstruction. Mild to moderate  vascular calcification.     Degenerative changes regional skeleton with a mild left convexity of the  thoracolumbar curvature. No focal aggressive regional bony lesions.       Impression:      1. Compared to 11/12/2023, overall similar bulky inferior mediastinal,  retrocrural, retroperitoneal, mesenteric, and pelvic iliac  lymphadenopathy related to patient's recently diagnosed B-cell lymphoma.  2. Similar small volume ascites within the lower pelvis. No evidence of  bowel obstruction. No free air or abscess.  3. Similar small bilateral pleural effusions with bibasilar atelectasis.     This report was signed and finalized on 12/3/2023 2:33 PM by Dr. Evelyne Dickson MD.       XR Chest 1 View [[id number]] Collected: 12/03/23 1245     Updated: 12/03/23 1249    Narrative:      XR CHEST 1 VW-     HISTORY: SOA RO fluid     COMPARISON: None     FINDINGS: Frontal view the chest obtained.     Prior mediastinal surgery. Mild to moderate elevation right  hemidiaphragm. Left lower lobe opacities  questionable small left pleural  effusion. Vascular crowding versus mild venous congestion. No  pneumothorax. No acute regional bony pathology.       Impression:      1. Left lower lobe opacities concerning for pneumonia with probable  small pleural effusion. Vascular crowding versus mild venous congestion.  Prior mediastinal surgery with elevated right hemidiaphragm.        This report was signed and finalized on 12/3/2023 12:46 PM by Dr. Evelyne Dickson MD.              Scheduled medications:  atorvastatin, 10 mg, Oral, Nightly  dronabinol, 2.5 mg, Oral, BID  enoxaparin, 30 mg, Subcutaneous, Daily  famotidine, 20 mg, Oral, Daily  insulin lispro, 2-7 Units, Subcutaneous, BID AC  senna-docusate sodium, 2 tablet, Oral, BID  sodium chloride, 10 mL, Intravenous, Q12H         I have reviewed the patient's current medications.     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  Acute kidney injury superimposed on CKD stage IIIb.  On admission, creatinine 2.08 (GFR 23.4).  Previous creatinine 1.43 on 11/20/2023 (GFR 36.7%).  Patient received IV fluids.  Creatinine 2.35 today.  Will continue lactated Ringer's at 75 mL/h.  Repeat BMP in AM.  Hold nephrotoxic agents.  Hold Lasix and lisinopril.    2.  Follicular lymphoma grade 3A.  Oncology, Dr. Peralat consulted and discussed with Dr. Peralta today.  Recommends hydration for acute kidney injury.  Consider Bendamustine if GFR at least 30% with rituxamab with anticipation of 6 cycles.  Will need outpatient PET scan.  Patient for port placement.  Follow-up with Dr. Aleman 12/11/2023 at 2 PM.  I discussed with family members that chemotherapy would  not be started till GFR consistently greater than 30%.  Consult general surgery for port placement.  No concerns for pneumonia as white blood cell count normal, WBC normal, no cough or congestion.    3.  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  Calcium 10.8 on admission.  Calcium 10 today.  Patient received Zometa 11/11 during previous admission.    4.  Diabetes mellitus type 2 without insulin.  Hemoglobin A1c 6.1.  Accu-Cheks with sliding scale insulin coverage.  glucose 98, 88, 93. Family reports metformin discontinued.  Hold Jardiance due to decreased appetite.    5.  Elevated alkaline phosphatase.  611 previously 958 on 11/20/2023.  Right upper quadrant ultrasound 11/17/2023 noted patent main portal vein, left portal vein, right portal vein.    6.  Acquired hypothyroidism.  TSH 2.95 on 11/12/2023.  Continue Synthroid.    7.  Decreased appetite with mild nausea likely contributing to acute kidney injury. Dronabinol 2.5 mg twice daily.  Famotidine daily.  Pepcid 20 mg orally twice daily    8.  Lovenox for deep vein thrombosis prophylaxis.  Consult physical therapy due to functional decline.    9.  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     [facility] Data  External  documents reviewed: Reviewed hospitalization with discharge 11/20/2023  Cardiac tracing (EKG, telemetry) interpretation: None  Radiology interpretation: Reviewed radiology interpretation ultrasound abdomen, CT abdomen and pelvis, chest x-ray  Labs reviewed:   BMP 12/4/23.  Repeat CMP in AM.  CBC 12/4/2023.  Repeat CBC in AM.  Respiratory PCR panel negative.  Blood cultures no growth    Any tests that were considered but not ordered: None     Decision rules/scores evaluated (example [id number], [place], etc): None     Discussed with: Dr. Lam, Dr. Peralta, oncology, patient, 2 sons and 2 daughters present in room.     Care Planning  Shared decision making: Dr. Lam, Dr. Peralta, oncology, patient, 2 sons and 2 daughters present in room.  Patient and son POA agrees to IV fluid hydration, repeat lab work, general surgery consult for port placement, recommendations per oncology pending lab work improvement.  Outpatient PET scan     Code status and discussions: Full code  Patient surrogate decision maker is her son, Keith    Disposition  Social Determinants of Health that impact treatment or disposition: Decreased activities of ADL  I expect the patient to be discharged to home with home health versus SNF in 2-4 days.     Electronically signed by SOFIYA Zuleta, 12/04/23, 16:37 CST.     The above documentation resulted from a face-to-face encounter by me Jaclyn ARRIAZA, [facility].      Electronically signed by Alvarez Lam MD at 12/04/23 0894

## 2023-12-06 NOTE — DISCHARGE SUMMARY
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 Peralta, oncology  SOFIYA Reyes palliative care    Procedures Performed: None    Pertinent Test Results:   Results for orders placed during the hospital encounter of 11/11/23    Adult Transthoracic Echo Complete W/ Cont if Necessary Per Protocol    Interpretation Summary    Left ventricular ejection fraction appears to be 61 - 65%.    Left ventricular diastolic function was indeterminate.    Estimated right ventricular systolic pressure from tricuspid regurgitation is normal (<35 mmHg).    Abnormal global longitudinal LV strain (GLS) = -14.5%.      Imaging Results (All)       Procedure Component Value Units Date/Time    US Abdomen Limited [[id number]] Collected: 12/04/23 1007     Updated: 12/04/23 1011    Narrative:      EXAM: US ABDOMEN LIMITED-      DATE: 12/4/2023 9:29 AM     HISTORY: check for drainable ascitic fluid; J18.9-Pneumonia, unspecified  organism       COMPARISON: No existing relevant imaging studies available.      TECHNIQUE: Real-time ultrasound performed with representative images  saved to PACS.     FINDINGS:    Targeted ultrasound performed of the RIGHT upper, RIGHT lower, LEFT  upper and LEFT lower quadrants. Minimal ascites demonstrated adjacent to  the spleen, too small to safely drain.          Impression:      1. Minimal perisplenic  ascites.        This report was signed and finalized on 12/4/2023 10:08 AM by Dr Lorelei Amor MD.       CT Abdomen Pelvis Without Contrast [[id number]] Collected: 12/03/23 1425     Updated: 12/03/23 1436    Narrative:      CT ABDOMEN PELVIS WO CONTRAST- 12/3/2023 1:58 PM     HISTORY: Abdominal pain and distention. Recently diagnosed B-cell  lymphoma.; J18.9-Pneumonia, unspecified organism; recently diagnosed  B-cell lymphoma     COMPARISON: 11/12/2023     DOSE LENGTH PRODUCT: 400 mGy cm. Automated exposure control was also  utilized to decrease patient radiation dose.     TECHNIQUE: Axial images of the abdomen and pelvis are performed without  IV contrast     FINDINGS: Similar small bilateral pleural effusions with bibasilar  atelectasis. Posterior inferior as well as retrocrural lymphadenopathy  remains similar to the previous study.     The nonenhanced liver, spleen, and adrenal glands are unremarkable.  Spleen is normal in size measuring 7.8 cm. Limited evaluation of the  pancreatic tissue without IV contrast. No abnormal perinephric fluid  collection. No hydronephrosis. Mildly distended bladder unremarkable.     There is prominent bulky mesenteric and retroperitoneal lymphadenopathy.  Confluent lymphadenopathy at the root of the mesentery measuring up to  13 cm in width, similar to the prior study.  Retroperitoneal lymphadenopathy remains bulky with confluent periaortic  lymph nodes measuring up to 6 cm.  Iliac lymphadenopathy again visualized. Little interval change since  11/12/2023.      There is no free intraperitoneal air or loculated abscess collection  identified with nonenhanced imaging. Similar small volume ascites within  the lower pelvis. No evidence for bowel obstruction. Mild to moderate  vascular calcification.     Degenerative changes regional skeleton with a mild left convexity of the  thoracolumbar curvature. No focal aggressive regional bony lesions.       Impression:      1. Compared to  11/12/2023, overall similar bulky inferior mediastinal,  retrocrural, retroperitoneal, mesenteric, and pelvic iliac  lymphadenopathy related to patient's recently diagnosed B-cell lymphoma.  2. Similar small volume ascites within the lower pelvis. No evidence of  bowel obstruction. No free air or abscess.  3. Similar small bilateral pleural effusions with bibasilar atelectasis.     This report was signed and finalized on 12/3/2023 2:33 PM by Dr. Evelyne Dickson MD.       XR Chest 1 View [[id number]] Collected: 12/03/23 1245     Updated: 12/03/23 1249    Narrative:      XR CHEST 1 VW-     HISTORY: SOA RO fluid     COMPARISON: None     FINDINGS: Frontal view the chest obtained.     Prior mediastinal surgery. Mild to moderate elevation right  hemidiaphragm. Left lower lobe opacities questionable small left pleural  effusion. Vascular crowding versus mild venous congestion. No  pneumothorax. No acute regional bony pathology.       Impression:      1. Left lower lobe opacities concerning for pneumonia with probable  small pleural effusion. Vascular crowding versus mild venous congestion.  Prior mediastinal surgery with elevated right hemidiaphragm.        This report was signed and finalized on 12/3/2023 12:46 PM by Dr. Evelyne Dickson MD.             LAB RESULTS:      Lab 12/06/23  0530 12/05/23  0720 12/04/23  0458 12/03/23  1859 12/03/23  1200   WBC 11.41* 10.24 8.87  --  10.01   HEMOGLOBIN 10.4* 10.4* 9.3*  --  10.2*   HEMATOCRIT 33.8* 33.8* 31.6*  --  33.1*   PLATELETS 523* 516* 464*  --  492*   NEUTROS ABS 10.03* 9.11* 7.63*  --  8.76*   IMMATURE GRANS (ABS) 0.10* 0.09* 0.06*  --  0.09*   LYMPHS ABS 0.64* 0.49* 0.51*  --  0.43*   MONOS ABS 0.57 0.47 0.50  --  0.54   EOS ABS 0.00 0.02 0.10  --  0.11   MCV 92.6 91.8 94.3  --  92.7   LACTATE  --   --   --  2.1* 2.6*   LDH  --   --  1,180*  --   --          Lab 12/06/23  0530 12/05/23  0720 12/04/23  0458 12/03/23  1200   SODIUM 139 135* 133* 133*   POTASSIUM 4.2 4.3  4.3 4.4   CHLORIDE 93* 92* 94* 93*   CO2 25.0 25.0 23.0 26.0   ANION GAP 21.0* 18.0* 16.0* 14.0   BUN 40* 35* 32* 29*   CREATININE 2.25* 2.37* 2.35* 2.08*   EGFR 21.3* 20.0* 20.2* 23.4*   GLUCOSE 112* 92 93 93   CALCIUM 10.3 10.8* 10.0 10.8*   MAGNESIUM  --   --  1.8  --    PHOSPHORUS  --   --  1.6*  --          Lab 12/06/23  0530 12/05/23  0720 12/03/23  1200   TOTAL PROTEIN 6.4 6.6 6.4   ALBUMIN 3.0* 3.2* 3.0*   GLOBULIN 3.4 3.4 3.4   ALT (SGPT) 12 15 20   AST (SGOT) 26 32 35*   BILIRUBIN 0.3 0.3 0.3   ALK PHOS 453* 540* 610*         Lab 12/03/23  1200   PROBNP 648.2                 Brief Urine Lab Results  (Last result in the past 365 days)        Color   Clarity   Blood   Leuk Est   Nitrite   Protein   CREAT   Urine HCG        11/11/23 2016 Yellow   Clear   Negative   Trace   Negative   Negative                 Microbiology Results (last 10 days)       Procedure Component Value - Date/Time    Respiratory Panel PCR w/COVID-19(SARS-CoV-2) [facility] In-House, NP Swab in UTM/VTM, 2 HR TAT - Swab, Nasopharynx [[id number]]  (Normal) Collected: 12/04/23 1152    Lab Status: Final result Specimen: Swab from Nasopharynx Updated: 12/04/23 1252     ADENOVIRUS, PCR Not Detected     Coronavirus 229E Not Detected     Coronavirus HKU1 Not Detected     Coronavirus NL63 Not Detected     Coronavirus OC43 Not Detected     COVID19 Not Detected     Human Metapneumovirus Not Detected     Human Rhinovirus/Enterovirus Not Detected     Influenza A PCR Not Detected     Influenza B PCR Not Detected     Parainfluenza Virus 1 Not Detected     Parainfluenza Virus 2 Not Detected     Parainfluenza Virus 3 Not Detected     Parainfluenza Virus 4 Not Detected     RSV, PCR Not Detected     Bordetella pertussis pcr Not Detected     Bordetella parapertussis PCR Not Detected     Chlamydophila pneumoniae PCR Not Detected     Mycoplasma pneumo by PCR Not Detected    Narrative:      In the setting of a positive respiratory panel with a viral  infection PLUS a negative procalcitonin without other underlying concern for bacterial infection, consider observing off antibiotics or discontinuation of antibiotics and continue supportive care. If the respiratory panel is positive for atypical bacterial infection (Bordetella pertussis, Chlamydophila pneumoniae, or Mycoplasma pneumoniae), consider antibiotic de-escalation to target atypical bacterial infection.    Blood Culture - Blood, Arm, Left [[id number]]  (Normal) Collected: 12/03/23 1400    Lab Status: Preliminary result Specimen: Blood from Arm, Left Updated: 12/05/23 1500     Blood Culture No growth at 2 days    Blood Culture - Blood, Wrist, Left [[id number]]  (Normal) Collected: 12/03/23 1357    Lab Status: Preliminary result Specimen: Blood from Wrist, Left Updated: 12/05/23 1500     Blood Culture No growth at 2 days    COVID PRE-OP / PRE-PROCEDURE SCREENING ORDER (NO ISOLATION) - Swab, Nasopharynx [[id number]]  (Normal) Collected: 12/03/23 1200    Lab Status: Final result Specimen: Swab from Nasopharynx Updated: 12/03/23 1300    Narrative:      The following orders were created for panel order COVID PRE-OP / PRE-PROCEDURE SCREENING ORDER (NO ISOLATION) - Swab, Nasopharynx.  Procedure                               Abnormality         Status                     ---------                               -----------         ------                     COVID-19 and FLU A/B PCR...[[id number]]  Normal              Final result                 Please view results for these tests on the individual orders.    COVID-19 and FLU A/B PCR, 1 HR TAT - Swab, Nasopharynx [[id number]]  (Normal) Collected: 12/03/23 1200    Lab Status: Final result Specimen: Swab from Nasopharynx Updated: 12/03/23 1300     COVID19 Not Detected     Influenza A PCR Not Detected     Influenza B PCR Not Detected    Narrative:      Fact sheet for providers: https://www.fda.gov/media/483959/download    Fact sheet for patients:  "https://www.fda.gov/media/887766/download    Test performed by PCR.            Hospital Course: Ms. Chauhan presented to [facility] emergency room with nausea, \"fluid on her abdomen\" and increased abdominal swelling.  Patient reported the last 3 to 4 days she has had difficulty walking due to generalized weakness and had to sit down frequently.  She reports legs are weak, decreased appetite and everything she tries to eat sours on her stomach.  Patient did report vomiting the day of admission looked black.  Patient had recent admission 11/11/2023 - 11/20/2023 with hypercalcemia and lymphadenopathy.  Patient had left retroperitoneal lymph node biopsy 7/6/2023 with mixed lymphoid infiltrates and fibrous tissue.  Flow cytometry no detectable clonal B-cell population.  Liver biopsy 11/16/2023 mixed portal inflammation and mild steatosis with no diagnostic malignancy.  Additional histologic levels examined.  Retroperitoneal biopsy 11/20/2023 lymphoma favor large cell CD20 positive.  Follicular lymphoma grade 3A.    She presented  with abdominal pain and nausea.  WBC 10.01, hemoglobin 10.2, creatinine 2.08 (GFR 23.4).  Previous creatinine 1.43 on 11/20/2023 (GFR 36.7%), calcium 10.8, proBNP 648.  COVID-19 not detected.  Chest x-ray showed left lower lobe opacities concerning for pneumonia with probable small effusion.  Vascular crowding versus mild venous congestion.  CT abdomen pelvis 12/3/2023 compared to 11/12/2023 overall similar bulky mediastinal, retrocrural, retroperitoneal, mesenteric and pelvic iliac lymphadenopathy related to recent diagnosis of B-cell lymphoma.  Similar small volume ascites within the lower pelvis.  No bowel obstruction free air or abscess.  Similar small bilateral pleural effusions with atelectasis.  Lasix, Zofran, Zosyn given in ER.    She was admitted to the medical floor with acute kidney injury superimposed on CKD stage IIIb.  On admission, creatinine 2.08 (GFR 23.4).  Previous " creatinine 1.43 on 11/20/2023 (GFR 36.7%).  She was started on IV fluids.  Creatinine remained elevated despite IV fluid hydration.  Creatinine 2.25 on date of discharge.  Nephrotoxic agents, Lasix, lisinopril held.  Allopurinol 50 mg IV every other day added due to GFR less than 20% per Dr. Peralta.      Patient has history of  Follicular lymphoma grade 3A.  Oncology, Dr. Peralta consulted and discussed with Dr. Peralta.  Recommends hydration for acute kidney injury.  Consider Bendamustine if GFR at least 30% with rituxamab with anticipation of 6 cycles.  Will need outpatient PET scan.  Patient for port placement 12/12 per Dr. Restrepo.  Follow-up with Dr. Aleman 12/11/2023 at 2 PM.  I discussed with family members that chemotherapy would not be started till GFR consistently greater than 30%.  No concerns for pneumonia as white blood cell count normal, WBC normal, no cough or congestion.     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  Calcium 10.8 on admission and 10.  3 on date of discharge.  Patient received Zometa on 11/11/2023 during previous admission.    She has a history of diabetes mellitus type 2 without insulin.  Hemoglobin A1c 6.1.  Accu-Cheks with sliding scale insulin coverage ordered.  Jardiance held due to poor appetite and decreased oral intake.  Patient no longer takes metformin.    Alkaline phosphatase elevated 580 previously 958 on 11/20/2023.  Right upper quadrant ultrasound on 11/17/2023 noted patent main portal vein, left portal vein, right portal vein.    Synthroid continued for hypothyroidism.  TSH 2.95 on 11/12/2023.    Patient with decreased appetite and increased nausea likely contributing to acute kidney injury. Dronabinol 2.5 mg twice daily ordered without improvement of symptoms.  Famotidine continued, IV fluids continued.  Compazine, Zofran, Phenergan suppositories offered.    Lovenox ordered for deep vein thrombosis prophylaxis.    Palliative care consulted and patient with multiple family  "members were seen by SOFIYA Reyes palliative care nurse.  After further discussing with patient and family members, POA and patient requested hospice and comfort measures.      Patient will be discharged to [facility] in [place].  MOST form completed and signed by Dr. Holcomb.      Physical Exam on Discharge:  /73 (BP Location: Left arm, Patient Position: Lying)   Pulse 105   Temp 97.5 °F (36.4 °C) (Axillary)   Resp 16   Ht 162.6 cm (64\")   Wt 70.8 kg (156 lb)   SpO2 91%   BMI 26.78 kg/m²   Physical Exam  Vitals and nursing note reviewed.   Constitutional:       Comments: Lying in bed.  No oxygen use multiple family members in room.   HENT:      Head: Normocephalic and atraumatic.      Nose: No congestion.      Mouth/Throat:      Pharynx: Oropharynx is clear. No oropharyngeal exudate or posterior oropharyngeal erythema.   Eyes:      Extraocular Movements: Extraocular movements intact.      Pupils: Pupils are equal, round, and reactive to light.   Cardiovascular:      Rate and Rhythm: Normal rate and regular rhythm.      Heart sounds: No murmur heard.  Pulmonary:      Breath sounds: No wheezing, rhonchi or rales.      Comments: No oxygen in use.  Abdominal:      General: There is distension.   Genitourinary:     Comments: WIC in place  Musculoskeletal:         General: No swelling or tenderness.      Cervical back: Normal range of motion and neck supple.   Skin:     General: Skin is warm and dry.   Neurological:      General: No focal deficit present.      Comments: Resting.  Opens eyes.  Oriented.   Psychiatric:         Mood and Affect: Mood normal.         Behavior: Behavior normal.       Condition on Discharge: Comfort measures, [facility] in [place]    Discharge Disposition:  Hospice/Home    Discharge Medications:     Discharge Medications        New Medications        Instructions Start Date   naloxone 4 MG/0.1ML nasal spray  Commonly known as: NARCAN   Call 911. Don't " prime. Spray in 1 nostril for overdose. Repeat in 2-3 minutes in other nostril if no or minimal breathing/responsiveness.      ondansetron ODT 4 MG disintegrating tablet  Commonly known as: ZOFRAN-ODT   4 mg, Translingual, Every 8 Hours PRN      promethazine 12.5 MG suppository  Commonly known as: PHENERGAN   12.5 mg, Rectal, Every 6 Hours PRN             Continue These Medications        Instructions Start Date   HYDROcodone-acetaminophen 7.5-325 MG per tablet  Commonly known as: NORCO   1 tablet, Oral, Every 6 Hours PRN             Stop These Medications      acetaminophen 500 MG tablet  Commonly known as: TYLENOL     aspirin 81 MG EC tablet     atorvastatin 10 MG tablet  Commonly known as: LIPITOR     cloNIDine 0.1 MG tablet  Commonly known as: CATAPRES     famotidine 20 MG tablet  Commonly known as: PEPCID     furosemide 20 MG tablet  Commonly known as: LASIX     Gabapentin (Once-Daily) 300 MG tablet     Jardiance 10 MG tablet tablet  Generic drug: empagliflozin     levothyroxine 75 MCG tablet  Commonly known as: SYNTHROID, LEVOTHROID     lisinopril 20 MG tablet  Commonly known as: PRINIVIL,ZESTRIL     MAGNESIUM GLYCINATE PO     NIFEdipine CC 30 MG 24 hr tablet  Commonly known as: ADALAT CC     ondansetron 4 MG tablet  Commonly known as: ZOFRAN     ROLAIDS PO     SUPER-C 1000 PO     therapeutic multivitamin-minerals tablet  Generic drug: multivitamin with minerals              Discharge Diet:   Diet Instructions       Diet: Regular/House Diet; Regular Texture (IDDSI 7); Thin (IDDSI 0)      Discharge Diet: Regular/House Diet    Texture: Regular Texture (IDDSI 7)    Fluid Consistency: Thin (IDDSI 0)          Activity at Discharge:   Activity Instructions       Activity as Tolerated            Discharge to [facility] comfort measures      Follow-up Appointments:   Future Appointments   Date Time Provider Department Center   2/22/2024  1:00 PM Meghan Freeman APRN [facility]     Test Results Pending  at Discharge: None    Electronically signed by SOFIYA Zuleta, 12/06/23, 11:51 CST.    Time: 40 minutes.  Discussed with Dr. Holcomb, Dr. Peralta, patient, son TREV Parker and multiple family members present in room.    The above documentation resulted from a face-to-face encounter by me Jaclyn ARRIAZA, [facility].

## 2023-12-06 NOTE — TELEPHONE ENCOUNTER
Caller: MARIELA BRANCH (NO [facility] VERBAL)    Relationship: PT'S DAUGHTER IN LAW    Best call back number: [phone]     What form or medical record are you requesting: [facility]    Who is requesting this form or medical record from you: JEFFERSON BROWN (PT'S SON'S WORK)    How would you like to receive the form or medical records (pick-up, mail, fax): FAX  If fax, what is the fax number: [phone]    Timeframe paperwork needed: ASAP    Additional notes: PT'S SON'S WORK WAS FAXING OVER [facility] PAPERWORK AND THEY ARE WANTING TO MAKE SURE THAT IS RECEIVED. IT WAS FAXED IT OVER AT APPROX 4:15 PM 12/06/23. PAPERWORK WAS FAXED [phone]    PLEASE ADVISE

## 2023-12-06 NOTE — PLAN OF CARE
Dr. En Rivera has seen this pt.for right wrist \"popping\". He advises patient that he would use conservative measures vs surgical  intervention at this time.  advises pt to be referred to Rheumatology for work up for RA .  Referral okayed with Dr. Nguyen & brenda.   Goal Outcome Evaluation:      OT orders received, checked on pt 12/5/23 however she was feeling too nauseas to perform any activity throughout the day. Today, checked on pt however she was visiting with grandchildren. I dicussed role of OT/PT with son & family outside the room, they told me their plans to transition to hospice care. At this time, OT/PT will sign off per family request.

## 2023-12-06 NOTE — PROGRESS NOTES
"[facility] Progress Note    Progress Note    Patient:  Imani Chauhan  YOB: 1940  Date of Service: 12/6/2023  MRN: [id number]   Acct: [id number]   Primary Care Physician: Светлана Loyd MD  Advance Directive:   Code Status and Medical Interventions:   Ordered at: 12/05/23 1528     Medical Intervention Limits:    NO intubation (DNI)    NO artificial nutrition     Level Of Support Discussed With:    Patient    Next of Kin (If No Surrogate)     Code Status (Patient has no pulse and is not breathing):    No CPR (Do Not Attempt to Resuscitate)     Medical Interventions (Patient has pulse or is breathing):    Limited Support     Admit Date: 12/3/2023       Hospital Day: 0      Subjective:     Chief Compliant: Patient seen with nurse Velez at the bedside.  Positive for nausea. Patient decided to de escalate care yesterday.       Review of Systems:   Review of Systems   Constitutional:  Positive for fatigue. Negative for fever.        \"Very weak, I can't even get out of bed.\"   HENT:  Negative for facial swelling.    Eyes:  Negative for discharge and redness.   Respiratory:  Negative for shortness of breath and wheezing.    Cardiovascular:  Positive for leg swelling. Negative for chest pain.   Gastrointestinal:  Positive for nausea.   Endocrine: Negative for polydipsia and polyphagia.   Genitourinary:  Negative for flank pain.   Musculoskeletal:  Negative for myalgias.   Skin:  Positive for pallor.   Neurological:  Negative for speech difficulty.   Hematological:  Does not bruise/bleed easily.   Psychiatric/Behavioral:  Negative for agitation, confusion and hallucinations.            Medications:   Scheduled Meds:[START ON 12/7/2023] allopurinol, 50 mg, Oral, Every Other Day  aspirin, 81 mg, Oral, Daily  atorvastatin, 10 mg, Oral, Nightly  dronabinol, 2.5 mg, Oral, BID  enoxaparin, 30 mg, Subcutaneous, Daily  famotidine, 20 mg, Oral, Daily  insulin lispro, 2-7 Units, Subcutaneous, BID " AC  levothyroxine, 37.5 mcg, Oral, Once per day on Mon Wed Fri  levothyroxine, 75 mcg, Oral, Once per day on Sun Tue Thu Sat  senna-docusate sodium, 2 tablet, Oral, BID  sodium chloride, 10 mL, Intravenous, Q12H        Continuous Infusions:lactated ringers, 75 mL/hr, Last Rate: 75 mL/hr (12/05/23 1254)        Labs:     Lab Results (last 72 hours)       Procedure Component Value Units Date/Time    POC Glucose Once [[id number]]  (Normal) Collected: 12/05/23 1731    Specimen: Blood Updated: 12/05/23 1742     Glucose 93 mg/dL      Comment: : [id number] Ray Adarza BioSystemsMeter ID: [id number]       Blood Culture - Blood, Arm, Left [[id number]]  (Normal) Collected: 12/03/23 1400    Specimen: Blood from Arm, Left Updated: 12/05/23 1500     Blood Culture No growth at 2 days    Blood Culture - Blood, Wrist, Left [[id number]]  (Normal) Collected: 12/03/23 1357    Specimen: Blood from Wrist, Left Updated: 12/05/23 1500     Blood Culture No growth at 2 days    Hepatitis B Core Antibody, IgM [[id number]]  (Normal) Collected: 12/05/23 0720    Specimen: Blood Updated: 12/05/23 1214     Hep B C IgM Non-Reactive    Narrative:      Results may be falsely decreased if patient taking Biotin.      POC Glucose Once [[id number]]  (Normal) Collected: 12/05/23 0822    Specimen: Blood Updated: 12/05/23 0833     Glucose 92 mg/dL      Comment: : [id number] Ray Adarza [employer]Meter ID: [id number]       Hepatitis B Surface Antigen [[id number]]  (Normal) Collected: 12/05/23 0720    Specimen: Blood Updated: 12/05/23 0811     Hepatitis B Surface Ag Non-Reactive    Comprehensive Metabolic Panel [[id number]]  (Abnormal) Collected: 12/05/23 0720    Specimen: Blood Updated: 12/05/23 0806     Glucose 92 mg/dL      BUN 35 mg/dL      Creatinine 2.37 mg/dL      Sodium 135 mmol/L      Potassium 4.3 mmol/L      Chloride 92 mmol/L      CO2 25.0 mmol/L      Calcium 10.8 mg/dL      Total Protein 6.6 g/dL      Albumin 3.2 g/dL      ALT (SGPT) 15 U/L      AST (SGOT) 32 U/L       Alkaline Phosphatase 540 U/L      Total Bilirubin 0.3 mg/dL      Globulin 3.4 gm/dL      A/G Ratio 0.9 g/dL      BUN/Creatinine Ratio 14.8     Anion Gap 18.0 mmol/L      eGFR 20.0 mL/min/1.73     Narrative:      GFR Normal >60  Chronic Kidney Disease <60  Kidney Failure <15    The GFR formula is only valid for adults with stable renal function between ages 18 and 70.    CBC & Differential [[id number]]  (Abnormal) Collected: 12/05/23 0720    Specimen: Blood Updated: 12/05/23 0728    Narrative:      The following orders were created for panel order CBC & Differential.  Procedure                               Abnormality         Status                     ---------                               -----------         ------                     CBC Auto Differential[[id number]]        Abnormal            Final result                 Please view results for these tests on the individual orders.    CBC Auto Differential [[id number]]  (Abnormal) Collected: 12/05/23 0720    Specimen: Blood Updated: 12/05/23 0728     WBC 10.24 10*3/mm3      RBC 3.68 10*6/mm3      Hemoglobin 10.4 g/dL      Hematocrit 33.8 %      MCV 91.8 fL      MCH 28.3 pg      MCHC 30.8 g/dL      RDW 16.7 %      RDW-SD 53.4 fl      MPV 9.5 fL      Platelets 516 10*3/mm3      Neutrophil % 88.9 %      Lymphocyte % 4.8 %      Monocyte % 4.6 %      Eosinophil % 0.2 %      Basophil % 0.6 %      Immature Grans % 0.9 %      Neutrophils, Absolute 9.11 10*3/mm3      Lymphocytes, Absolute 0.49 10*3/mm3      Monocytes, Absolute 0.47 10*3/mm3      Eosinophils, Absolute 0.02 10*3/mm3      Basophils, Absolute 0.06 10*3/mm3      Immature Grans, Absolute 0.09 10*3/mm3      nRBC 0.0 /100 WBC     Uric Acid [[id number]]  (Abnormal) Collected: 12/04/23 0458    Specimen: Blood Updated: 12/05/23 0704     Uric Acid 12.0 mg/dL     Beta 2 Microglobulin, Serum [[id number]]  (Abnormal) Collected: 12/04/23 0458    Specimen: Blood Updated: 12/04/23 1955     Beta-2 Microglobulin 13.2 mg/L      POC Glucose Once [[id number]]  (Normal) Collected: 12/04/23 1645    Specimen: Blood Updated: 12/04/23 1656     Glucose 98 mg/dL      Comment: : [id number] Flor Lozada ID: [id number]       Respiratory Panel PCR w/COVID-19(SARS-CoV-2) [facility] In-House, NP Swab in UTM/VTM, 2 HR TAT - Swab, Nasopharynx [[id number]]  (Normal) Collected: 12/04/23 1152    Specimen: Swab from Nasopharynx Updated: 12/04/23 1252     ADENOVIRUS, PCR Not Detected     Coronavirus 229E Not Detected     Coronavirus HKU1 Not Detected     Coronavirus NL63 Not Detected     Coronavirus OC43 Not Detected     COVID19 Not Detected     Human Metapneumovirus Not Detected     Human Rhinovirus/Enterovirus Not Detected     Influenza A PCR Not Detected     Influenza B PCR Not Detected     Parainfluenza Virus 1 Not Detected     Parainfluenza Virus 2 Not Detected     Parainfluenza Virus 3 Not Detected     Parainfluenza Virus 4 Not Detected     RSV, PCR Not Detected     Bordetella pertussis pcr Not Detected     Bordetella parapertussis PCR Not Detected     Chlamydophila pneumoniae PCR Not Detected     Mycoplasma pneumo by PCR Not Detected    Narrative:      In the setting of a positive respiratory panel with a viral infection PLUS a negative procalcitonin without other underlying concern for bacterial infection, consider observing off antibiotics or discontinuation of antibiotics and continue supportive care. If the respiratory panel is positive for atypical bacterial infection (Bordetella pertussis, Chlamydophila pneumoniae, or Mycoplasma pneumoniae), consider antibiotic de-escalation to target atypical bacterial infection.    Lactate Dehydrogenase [[id number]]  (Abnormal) Collected: 12/04/23 0458    Specimen: Blood Updated: 12/04/23 1204     LDH 1,180 U/L      Comment: Specimen hemolyzed.  Results may be affected.       POC Glucose Once [[id number]]  (Normal) Collected: 12/04/23 1151    Specimen: Blood Updated: 12/04/23 1202     Glucose  88 mg/dL      Comment: : [id number] Flor JonesahMeter ID: [id number]       POC Glucose Once [[id number]]  (Normal) Collected: 12/04/23 0749    Specimen: Blood Updated: 12/04/23 0800     Glucose 93 mg/dL      Comment: : [id number] Ray SarahMeter ID: [id number]       Phosphorus [[id number]]  (Abnormal) Collected: 12/04/23 0458    Specimen: Blood Updated: 12/04/23 0604     Phosphorus 1.6 mg/dL     Basic Metabolic Panel [[id number]]  (Abnormal) Collected: 12/04/23 0458    Specimen: Blood Updated: 12/04/23 0551     Glucose 93 mg/dL      BUN 32 mg/dL      Creatinine 2.35 mg/dL      Sodium 133 mmol/L      Potassium 4.3 mmol/L      Chloride 94 mmol/L      CO2 23.0 mmol/L      Calcium 10.0 mg/dL      BUN/Creatinine Ratio 13.6     Anion Gap 16.0 mmol/L      eGFR 20.2 mL/min/1.73     Narrative:      GFR Normal >60  Chronic Kidney Disease <60  Kidney Failure <15    The GFR formula is only valid for adults with stable renal function between ages 18 and 70.    Magnesium [[id number]]  (Normal) Collected: 12/04/23 0458    Specimen: Blood Updated: 12/04/23 0551     Magnesium 1.8 mg/dL     CBC & Differential [[id number]]  (Abnormal) Collected: 12/04/23 0458    Specimen: Blood Updated: 12/04/23 0533    Narrative:      The following orders were created for panel order CBC & Differential.  Procedure                               Abnormality         Status                     ---------                               -----------         ------                     CBC Auto Differential[[id number]]        Abnormal            Final result                 Please view results for these tests on the individual orders.    CBC Auto Differential [[id number]]  (Abnormal) Collected: 12/04/23 0458    Specimen: Blood Updated: 12/04/23 0533     WBC 8.87 10*3/mm3      RBC 3.35 10*6/mm3      Hemoglobin 9.3 g/dL      Hematocrit 31.6 %      MCV 94.3 fL      MCH 27.8 pg      MCHC 29.4 g/dL      RDW 16.6 %      RDW-SD 55.9 fl      MPV 9.9 fL       Platelets 464 10*3/mm3      Neutrophil % 86.1 %      Lymphocyte % 5.7 %      Monocyte % 5.6 %      Eosinophil % 1.1 %      Basophil % 0.8 %      Immature Grans % 0.7 %      Neutrophils, Absolute 7.63 10*3/mm3      Lymphocytes, Absolute 0.51 10*3/mm3      Monocytes, Absolute 0.50 10*3/mm3      Eosinophils, Absolute 0.10 10*3/mm3      Basophils, Absolute 0.07 10*3/mm3      Immature Grans, Absolute 0.06 10*3/mm3      nRBC 0.0 /100 WBC     STAT Lactic Acid, Reflex [[id number]]  (Abnormal) Collected: 12/03/23 1859    Specimen: Blood Updated: 12/03/23 1952     Lactate 2.1 mmol/L     COVID PRE-OP / PRE-PROCEDURE SCREENING ORDER (NO ISOLATION) - Swab, Nasopharynx [[id number]]  (Normal) Collected: 12/03/23 1200    Specimen: Swab from Nasopharynx Updated: 12/03/23 1300    Narrative:      The following orders were created for panel order COVID PRE-OP / PRE-PROCEDURE SCREENING ORDER (NO ISOLATION) - Swab, Nasopharynx.  Procedure                               Abnormality         Status                     ---------                               -----------         ------                     COVID-19 and FLU A/B PCR...[[id number]]  Normal              Final result                 Please view results for these tests on the individual orders.    COVID-19 and FLU A/B PCR, 1 HR TAT - Swab, Nasopharynx [[id number]]  (Normal) Collected: 12/03/23 1200    Specimen: Swab from Nasopharynx Updated: 12/03/23 1300     COVID19 Not Detected     Influenza A PCR Not Detected     Influenza B PCR Not Detected    Narrative:      Fact sheet for providers: https://www.fda.gov/media/221426/download    Fact sheet for patients: https://www.fda.gov/media/436792/download    Test performed by PCR.    BNP [[id number]]  (Normal) Collected: 12/03/23 1200    Specimen: Blood Updated: 12/03/23 1229     proBNP 648.2 pg/mL     Narrative:      This assay is used as an aid in the diagnosis of individuals suspected of having heart failure. It can be used as an aid in the  diagnosis of acute decompensated heart failure (ADHF) in patients presenting with signs and symptoms of ADHF to the emergency department (ED). In addition, NT-proBNP of <300 pg/mL indicates ADHF is not likely.    Age Range Result Interpretation  NT-proBNP Concentration (pg/mL:      <50             Positive            >450                   Gray                 300-450                    Negative             <300    50-75           Positive            >900                  Gray                300-900                  Negative            <300      >75             Positive            >1800                  Gray                300-1800                  Negative            <300    Comprehensive Metabolic Panel [[id number]]  (Abnormal) Collected: 12/03/23 1200    Specimen: Blood Updated: 12/03/23 1227     Glucose 93 mg/dL      BUN 29 mg/dL      Creatinine 2.08 mg/dL      Sodium 133 mmol/L      Potassium 4.4 mmol/L      Chloride 93 mmol/L      CO2 26.0 mmol/L      Calcium 10.8 mg/dL      Total Protein 6.4 g/dL      Albumin 3.0 g/dL      ALT (SGPT) 20 U/L      AST (SGOT) 35 U/L      Alkaline Phosphatase 610 U/L      Total Bilirubin 0.3 mg/dL      Globulin 3.4 gm/dL      A/G Ratio 0.9 g/dL      BUN/Creatinine Ratio 13.9     Anion Gap 14.0 mmol/L      eGFR 23.4 mL/min/1.73     Narrative:      GFR Normal >60  Chronic Kidney Disease <60  Kidney Failure <15    The GFR formula is only valid for adults with stable renal function between ages 18 and 70.    Lactic Acid, Plasma [[id number]]  (Abnormal) Collected: 12/03/23 1200    Specimen: Blood Updated: 12/03/23 1225     Lactate 2.6 mmol/L     CBC & Differential [[id number]]  (Abnormal) Collected: 12/03/23 1200    Specimen: Blood Updated: 12/03/23 1209    Narrative:      The following orders were created for panel order CBC & Differential.  Procedure                               Abnormality         Status                     ---------                               -----------          ------                     CBC Auto Differential[[id number]]        Abnormal            Final result                 Please view results for these tests on the individual orders.    CBC Auto Differential [[id number]]  (Abnormal) Collected: 12/03/23 1200    Specimen: Blood Updated: 12/03/23 1209     WBC 10.01 10*3/mm3      RBC 3.57 10*6/mm3      Hemoglobin 10.2 g/dL      Hematocrit 33.1 %      MCV 92.7 fL      MCH 28.6 pg      MCHC 30.8 g/dL      RDW 16.2 %      RDW-SD 53.6 fl      MPV 9.6 fL      Platelets 492 10*3/mm3      Neutrophil % 87.5 %      Lymphocyte % 4.3 %      Monocyte % 5.4 %      Eosinophil % 1.1 %      Basophil % 0.8 %      Immature Grans % 0.9 %      Neutrophils, Absolute 8.76 10*3/mm3      Lymphocytes, Absolute 0.43 10*3/mm3      Monocytes, Absolute 0.54 10*3/mm3      Eosinophils, Absolute 0.11 10*3/mm3      Basophils, Absolute 0.08 10*3/mm3      Immature Grans, Absolute 0.09 10*3/mm3      nRBC 0.2 /100 WBC               Radiology:     Imaging Results (Last 72 Hours)       Procedure Component Value Units Date/Time    US Abdomen Limited [[id number]] Collected: 12/04/23 1007     Updated: 12/04/23 1011    Narrative:      EXAM: US ABDOMEN LIMITED-      DATE: 12/4/2023 9:29 AM     HISTORY: check for drainable ascitic fluid; J18.9-Pneumonia, unspecified  organism       COMPARISON: No existing relevant imaging studies available.      TECHNIQUE: Real-time ultrasound performed with representative images  saved to PACS.     FINDINGS:    Targeted ultrasound performed of the RIGHT upper, RIGHT lower, LEFT  upper and LEFT lower quadrants. Minimal ascites demonstrated adjacent to  the spleen, too small to safely drain.          Impression:      1. Minimal perisplenic ascites.        This report was signed and finalized on 12/4/2023 10:08 AM by Dr Lorelei Amor MD.       CT Abdomen Pelvis Without Contrast [[id number]] Collected: 12/03/23 1425     Updated: 12/03/23 1436    Narrative:      CT ABDOMEN PELVIS WO  CONTRAST- 12/3/2023 1:58 PM     HISTORY: Abdominal pain and distention. Recently diagnosed B-cell  lymphoma.; J18.9-Pneumonia, unspecified organism; recently diagnosed  B-cell lymphoma     COMPARISON: 11/12/2023     DOSE LENGTH PRODUCT: 400 mGy cm. Automated exposure control was also  utilized to decrease patient radiation dose.     TECHNIQUE: Axial images of the abdomen and pelvis are performed without  IV contrast     FINDINGS: Similar small bilateral pleural effusions with bibasilar  atelectasis. Posterior inferior as well as retrocrural lymphadenopathy  remains similar to the previous study.     The nonenhanced liver, spleen, and adrenal glands are unremarkable.  Spleen is normal in size measuring 7.8 cm. Limited evaluation of the  pancreatic tissue without IV contrast. No abnormal perinephric fluid  collection. No hydronephrosis. Mildly distended bladder unremarkable.     There is prominent bulky mesenteric and retroperitoneal lymphadenopathy.  Confluent lymphadenopathy at the root of the mesentery measuring up to  13 cm in width, similar to the prior study.  Retroperitoneal lymphadenopathy remains bulky with confluent periaortic  lymph nodes measuring up to 6 cm.  Iliac lymphadenopathy again visualized. Little interval change since  11/12/2023.      There is no free intraperitoneal air or loculated abscess collection  identified with nonenhanced imaging. Similar small volume ascites within  the lower pelvis. No evidence for bowel obstruction. Mild to moderate  vascular calcification.     Degenerative changes regional skeleton with a mild left convexity of the  thoracolumbar curvature. No focal aggressive regional bony lesions.       Impression:      1. Compared to 11/12/2023, overall similar bulky inferior mediastinal,  retrocrural, retroperitoneal, mesenteric, and pelvic iliac  lymphadenopathy related to patient's recently diagnosed B-cell lymphoma.  2. Similar small volume ascites within the lower pelvis. No  "evidence of  bowel obstruction. No free air or abscess.  3. Similar small bilateral pleural effusions with bibasilar atelectasis.     This report was signed and finalized on 12/3/2023 2:33 PM by Dr. Evelyne Dickson MD.       XR Chest 1 View [[id number]] Collected: 12/03/23 1245     Updated: 12/03/23 1249    Narrative:      XR CHEST 1 VW-     HISTORY: SOA RO fluid     COMPARISON: None     FINDINGS: Frontal view the chest obtained.     Prior mediastinal surgery. Mild to moderate elevation right  hemidiaphragm. Left lower lobe opacities questionable small left pleural  effusion. Vascular crowding versus mild venous congestion. No  pneumothorax. No acute regional bony pathology.       Impression:      1. Left lower lobe opacities concerning for pneumonia with probable  small pleural effusion. Vascular crowding versus mild venous congestion.  Prior mediastinal surgery with elevated right hemidiaphragm.        This report was signed and finalized on 12/3/2023 12:46 PM by Dr. Evelyne Dickson MD.                 Objective:   Vitals: /77 (BP Location: Left arm, Patient Position: Lying)   Pulse 105   Temp 97.9 °F (36.6 °C) (Oral)   Resp 16   Ht 162.6 cm (64\")   Wt 70.8 kg (156 lb)   SpO2 95%   BMI 26.78 kg/m²   Physical Exam  Vitals and nursing note reviewed.   Constitutional:       Appearance: She is ill-appearing.   HENT:      Head: Normocephalic and atraumatic.   Eyes:      General: No scleral icterus.  Cardiovascular:      Rate and Rhythm: Normal rate.   Pulmonary:      Effort: No respiratory distress.      Breath sounds: No wheezing.   Abdominal:      General: Bowel sounds are normal.      Palpations: Abdomen is soft.   Musculoskeletal:         General: Swelling present.      Cervical back: No rigidity.   Skin:     Coloration: Skin is pale.   Neurological:      Mental Status: She is oriented to person, place, and time.   Psychiatric:         Mood and Affect: Mood normal.         Behavior: Behavior normal.   "       Thought Content: Thought content normal.         Judgment: Judgment normal.       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  AJCC stage: III.  IPI score of 4, age greater than 60, elevated LDH, hemoglobin less than 12, stage at least 3.  Treatment status: Too ill for active systemic therapy.   2.  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 Normal calcium 12/4/2023. Zometa 4 mg 11/11/2023.  3.  Poor performance status of 4.  4.  Left lower lobe opacities concerning for pneumonia. Had a dose of Zosyn.  Negative for pneumonia per primary team.  5.  Acute kidney injury from dehydration.   6.  Hyperuricemia from high tumor burden.  On allopurinol, renal dosing.        PLAN:   regarding note from palliative care. Patient decided to deescalate code status.  No CPR, intubation or artificial feedings.    2.    regarding calcium 10.3 from 10.8 from 10 from 10.8 and GFR 21.3 from 20 from 20.2 from 23.4 despite IV hydration.   3.    regarding supportive care/hospice care, renal function does not improve and performance status is very poor.     3.    regarding therapy for stage IIIa follicular lymphoma is controversial and treatment is individualized. Anticipate bendamustine if GFR at least 30 with rituxamab. Anticipate 6 cycles.    4.  Cancel PET.    5.  Negative hepatitis B surface antigen and negative hepatitis B core antibody..   6.  Discussed prognosis in ideal  "condition on 12/5/2023.  With rituximab based treatment for IPI score of 4, 2-year overall survival approximately 87%.  2-year progression free survival approximately 65% and median progression free survival 42 months. However, patient is too ill for systemic therapy.   7.  Allopurinol dose 50 mg po every other day due to GFR at 20.   8.  Cancel office visit with Dr. Boyer on 12/11/2023 at 2 pm.    9.  Questions were answered to their satisfaction. \"Yes, I don't want to get sicker with chemo.\"  10.  Above plan discussed with nurse Rosamaria Velez, daughter in law and Keith, son.  \"Thank you for calling. Agreed with hospice. We had a meeting yesterday with Kaila and another meeting today\"    11.  Anticipate discharge to [facility] in [place].  12.  Sign off.  Please call if needed.         "

## 2023-12-07 NOTE — PAYOR COMM NOTE
"[facility]  FAX  [phone]    Imani Branch (83 y.o. Female)       Date of Birth   1940    Social Security Number       Address   [address] [id number]    Home Phone   [phone]    MRN   [id number]       [employer]   [employer]    Marital Status                               Admission Date   12/3/23    Admission Type   Emergency    Admitting Provider   Alvarez Lam MD    Attending Provider       Department, Room/Bed   [facility] 3C, 388/1       Discharge Date   12/6/2023    Discharge Disposition   Hospice/Home    Discharge Destination                                 Attending Provider: (none)   Allergies: No Known Allergies    Isolation: None   Infection: None   Code Status: Prior    Ht: 162.6 cm (64\")   Wt: 70.8 kg (156 lb)    Admission Cmt: None   Principal Problem: Acute kidney injury superimposed on chronic kidney disease stage IIIb [N17.9,N18.9]                   Active Insurance as of 12/3/2023       Primary Coverage       Payor Plan Insurance Group Employer/Plan Group    ANTHEM MEDICARE REPLACEMENT ANTHEM MEDICARE ADVANTAGE [id number]       Payor Plan Address Payor Plan Phone Number Payor Plan Fax Number Effective Dates    [address] [phone]  1/1/2015 - None Entered    [facility] [id number]         Subscriber Name Subscriber Birth Date Member ID       IMANI BRANCH 1940 [id number]                     Emergency Contacts        (Rel.) Home Phone Work Phone Mobile Phone    Rosamaria Branch (Relative) [phone] -- --    Mary Rivas (Grandchild) -- -- [phone]                 Discharge Summary        Jaclyn Burleson, APRN at 12/06/23 1137       Attestation signed by Christiano Holcomb MD at 12/06/23 1727    I have reviewed this documentation and agree.                        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 Peralta, oncology  SOFIYA Reyes palliative care    Procedures Performed: None    Pertinent Test Results:   Results for orders placed during the hospital encounter of 11/11/23    Adult Transthoracic Echo Complete W/ Cont if Necessary Per Protocol    Interpretation Summary    Left ventricular ejection fraction appears to be 61 - 65%.    Left ventricular diastolic function was indeterminate.    Estimated right ventricular systolic pressure from tricuspid regurgitation is normal (<35 mmHg).    Abnormal global longitudinal LV strain (GLS) = -14.5%.      Imaging Results (All)       Procedure Component Value Units Date/Time    US Abdomen Limited [[id number]] Collected: 12/04/23 1007     Updated: 12/04/23 1011    Narrative:      EXAM: US ABDOMEN LIMITED-      DATE: 12/4/2023 9:29 AM     HISTORY: check for drainable ascitic fluid; J18.9-Pneumonia, unspecified  organism       COMPARISON: No existing relevant imaging studies available.      TECHNIQUE: Real-time ultrasound performed with representative images  saved to PACS.     FINDINGS:    Targeted ultrasound performed of the RIGHT upper, RIGHT lower, LEFT  upper and LEFT lower quadrants. Minimal ascites demonstrated adjacent to  the spleen, too small to safely drain.          Impression:      1. Minimal perisplenic ascites.        This report was signed and finalized on 12/4/2023 10:08 AM by Dr Lorelei Amor MD.       CT Abdomen Pelvis Without  Contrast [[id number]] Collected: 12/03/23 1425     Updated: 12/03/23 1436    Narrative:      CT ABDOMEN PELVIS WO CONTRAST- 12/3/2023 1:58 PM     HISTORY: Abdominal pain and distention. Recently diagnosed B-cell  lymphoma.; J18.9-Pneumonia, unspecified organism; recently diagnosed  B-cell lymphoma     COMPARISON: 11/12/2023     DOSE LENGTH PRODUCT: 400 mGy cm. Automated exposure control was also  utilized to decrease patient radiation dose.     TECHNIQUE: Axial images of the abdomen and pelvis are performed without  IV contrast     FINDINGS: Similar small bilateral pleural effusions with bibasilar  atelectasis. Posterior inferior as well as retrocrural lymphadenopathy  remains similar to the previous study.     The nonenhanced liver, spleen, and adrenal glands are unremarkable.  Spleen is normal in size measuring 7.8 cm. Limited evaluation of the  pancreatic tissue without IV contrast. No abnormal perinephric fluid  collection. No hydronephrosis. Mildly distended bladder unremarkable.     There is prominent bulky mesenteric and retroperitoneal lymphadenopathy.  Confluent lymphadenopathy at the root of the mesentery measuring up to  13 cm in width, similar to the prior study.  Retroperitoneal lymphadenopathy remains bulky with confluent periaortic  lymph nodes measuring up to 6 cm.  Iliac lymphadenopathy again visualized. Little interval change since  11/12/2023.      There is no free intraperitoneal air or loculated abscess collection  identified with nonenhanced imaging. Similar small volume ascites within  the lower pelvis. No evidence for bowel obstruction. Mild to moderate  vascular calcification.     Degenerative changes regional skeleton with a mild left convexity of the  thoracolumbar curvature. No focal aggressive regional bony lesions.       Impression:      1. Compared to 11/12/2023, overall similar bulky inferior mediastinal,  retrocrural, retroperitoneal, mesenteric, and pelvic iliac  lymphadenopathy  related to patient's recently diagnosed B-cell lymphoma.  2. Similar small volume ascites within the lower pelvis. No evidence of  bowel obstruction. No free air or abscess.  3. Similar small bilateral pleural effusions with bibasilar atelectasis.     This report was signed and finalized on 12/3/2023 2:33 PM by Dr. Evelyne Dickson MD.       XR Chest 1 View [[id number]] Collected: 12/03/23 1245     Updated: 12/03/23 1249    Narrative:      XR CHEST 1 VW-     HISTORY: SOA RO fluid     COMPARISON: None     FINDINGS: Frontal view the chest obtained.     Prior mediastinal surgery. Mild to moderate elevation right  hemidiaphragm. Left lower lobe opacities questionable small left pleural  effusion. Vascular crowding versus mild venous congestion. No  pneumothorax. No acute regional bony pathology.       Impression:      1. Left lower lobe opacities concerning for pneumonia with probable  small pleural effusion. Vascular crowding versus mild venous congestion.  Prior mediastinal surgery with elevated right hemidiaphragm.        This report was signed and finalized on 12/3/2023 12:46 PM by Dr. Evelyne Dickson MD.             LAB RESULTS:      Lab 12/06/23 0530 12/05/23 0720 12/04/23 0458 12/03/23  1859 12/03/23  1200   WBC 11.41* 10.24 8.87  --  10.01   HEMOGLOBIN 10.4* 10.4* 9.3*  --  10.2*   HEMATOCRIT 33.8* 33.8* 31.6*  --  33.1*   PLATELETS 523* 516* 464*  --  492*   NEUTROS ABS 10.03* 9.11* 7.63*  --  8.76*   IMMATURE GRANS (ABS) 0.10* 0.09* 0.06*  --  0.09*   LYMPHS ABS 0.64* 0.49* 0.51*  --  0.43*   MONOS ABS 0.57 0.47 0.50  --  0.54   EOS ABS 0.00 0.02 0.10  --  0.11   MCV 92.6 91.8 94.3  --  92.7   LACTATE  --   --   --  2.1* 2.6*   LDH  --   --  1,180*  --   --          Lab 12/06/23 0530 12/05/23  0720 12/04/23 0458 12/03/23  1200   SODIUM 139 135* 133* 133*   POTASSIUM 4.2 4.3 4.3 4.4   CHLORIDE 93* 92* 94* 93*   CO2 25.0 25.0 23.0 26.0   ANION GAP 21.0* 18.0* 16.0* 14.0   BUN 40* 35* 32* 29*   CREATININE  2.25* 2.37* 2.35* 2.08*   EGFR 21.3* 20.0* 20.2* 23.4*   GLUCOSE 112* 92 93 93   CALCIUM 10.3 10.8* 10.0 10.8*   MAGNESIUM  --   --  1.8  --    PHOSPHORUS  --   --  1.6*  --          Lab 12/06/23  0530 12/05/23  0720 12/03/23  1200   TOTAL PROTEIN 6.4 6.6 6.4   ALBUMIN 3.0* 3.2* 3.0*   GLOBULIN 3.4 3.4 3.4   ALT (SGPT) 12 15 20   AST (SGOT) 26 32 35*   BILIRUBIN 0.3 0.3 0.3   ALK PHOS 453* 540* 610*         Lab 12/03/23  1200   PROBNP 648.2                 Brief Urine Lab Results  (Last result in the past 365 days)        Color   Clarity   Blood   Leuk Est   Nitrite   Protein   CREAT   Urine HCG        11/11/23 2016 Yellow   Clear   Negative   Trace   Negative   Negative                 Microbiology Results (last 10 days)       Procedure Component Value - Date/Time    Respiratory Panel PCR w/COVID-19(SARS-CoV-2) [facility]IRA/COMFORT/CARMELO/[facility] In-House, NP Swab in UTM/VTM, 2 HR TAT - Swab, Nasopharynx [[id number]]  (Normal) Collected: 12/04/23 1152    Lab Status: Final result Specimen: Swab from Nasopharynx Updated: 12/04/23 1252     ADENOVIRUS, PCR Not Detected     Coronavirus 229E Not Detected     Coronavirus HKU1 Not Detected     Coronavirus NL63 Not Detected     Coronavirus OC43 Not Detected     COVID19 Not Detected     Human Metapneumovirus Not Detected     Human Rhinovirus/Enterovirus Not Detected     Influenza A PCR Not Detected     Influenza B PCR Not Detected     Parainfluenza Virus 1 Not Detected     Parainfluenza Virus 2 Not Detected     Parainfluenza Virus 3 Not Detected     Parainfluenza Virus 4 Not Detected     RSV, PCR Not Detected     Bordetella pertussis pcr Not Detected     Bordetella parapertussis PCR Not Detected     Chlamydophila pneumoniae PCR Not Detected     Mycoplasma pneumo by PCR Not Detected    Narrative:      In the setting of a positive respiratory panel with a viral infection PLUS a negative procalcitonin without other underlying concern for bacterial infection, consider observing off  antibiotics or discontinuation of antibiotics and continue supportive care. If the respiratory panel is positive for atypical bacterial infection (Bordetella pertussis, Chlamydophila pneumoniae, or Mycoplasma pneumoniae), consider antibiotic de-escalation to target atypical bacterial infection.    Blood Culture - Blood, Arm, Left [[id number]]  (Normal) Collected: 12/03/23 1400    Lab Status: Preliminary result Specimen: Blood from Arm, Left Updated: 12/05/23 1500     Blood Culture No growth at 2 days    Blood Culture - Blood, Wrist, Left [[id number]]  (Normal) Collected: 12/03/23 1357    Lab Status: Preliminary result Specimen: Blood from Wrist, Left Updated: 12/05/23 1500     Blood Culture No growth at 2 days    COVID PRE-OP / PRE-PROCEDURE SCREENING ORDER (NO ISOLATION) - Swab, Nasopharynx [[id number]]  (Normal) Collected: 12/03/23 1200    Lab Status: Final result Specimen: Swab from Nasopharynx Updated: 12/03/23 1300    Narrative:      The following orders were created for panel order COVID PRE-OP / PRE-PROCEDURE SCREENING ORDER (NO ISOLATION) - Swab, Nasopharynx.  Procedure                               Abnormality         Status                     ---------                               -----------         ------                     COVID-19 and FLU A/B PCR...[[id number]]  Normal              Final result                 Please view results for these tests on the individual orders.    COVID-19 and FLU A/B PCR, 1 HR TAT - Swab, Nasopharynx [[id number]]  (Normal) Collected: 12/03/23 1200    Lab Status: Final result Specimen: Swab from Nasopharynx Updated: 12/03/23 1300     COVID19 Not Detected     Influenza A PCR Not Detected     Influenza B PCR Not Detected    Narrative:      Fact sheet for providers: https://www.fda.gov/media/916117/download    Fact sheet for patients: https://www.fda.gov/media/427545/download    Test performed by PCR.            Hospital Course: Ms. Chauhan presented to [facility]  "emergency room with nausea, \"fluid on her abdomen\" and increased abdominal swelling.  Patient reported the last 3 to 4 days she has had difficulty walking due to generalized weakness and had to sit down frequently.  She reports legs are weak, decreased appetite and everything she tries to eat sours on her stomach.  Patient did report vomiting the day of admission looked black.  Patient had recent admission 11/11/2023 - 11/20/2023 with hypercalcemia and lymphadenopathy.  Patient had left retroperitoneal lymph node biopsy 7/6/2023 with mixed lymphoid infiltrates and fibrous tissue.  Flow cytometry no detectable clonal B-cell population.  Liver biopsy 11/16/2023 mixed portal inflammation and mild steatosis with no diagnostic malignancy.  Additional histologic levels examined.  Retroperitoneal biopsy 11/20/2023 lymphoma favor large cell CD20 positive.  Follicular lymphoma grade 3A.    She presented  with abdominal pain and nausea.  WBC 10.01, hemoglobin 10.2, creatinine 2.08 (GFR 23.4).  Previous creatinine 1.43 on 11/20/2023 (GFR 36.7%), calcium 10.8, proBNP 648.  COVID-19 not detected.  Chest x-ray showed left lower lobe opacities concerning for pneumonia with probable small effusion.  Vascular crowding versus mild venous congestion.  CT abdomen pelvis 12/3/2023 compared to 11/12/2023 overall similar bulky mediastinal, retrocrural, retroperitoneal, mesenteric and pelvic iliac lymphadenopathy related to recent diagnosis of B-cell lymphoma.  Similar small volume ascites within the lower pelvis.  No bowel obstruction free air or abscess.  Similar small bilateral pleural effusions with atelectasis.  Lasix, Zofran, Zosyn given in ER.    She was admitted to the medical floor with acute kidney injury superimposed on CKD stage IIIb.  On admission, creatinine 2.08 (GFR 23.4).  Previous creatinine 1.43 on 11/20/2023 (GFR 36.7%).  She was started on IV fluids.  Creatinine remained elevated despite IV fluid hydration.  Creatinine " 2.25 on date of discharge.  Nephrotoxic agents, Lasix, lisinopril held.  Allopurinol 50 mg IV every other day added due to GFR less than 20% per Dr. Peralta.      Patient has history of  Follicular lymphoma grade 3A.  Oncology, Dr. Peralta consulted and discussed with Dr. Peralta.  Recommends hydration for acute kidney injury.  Consider Bendamustine if GFR at least 30% with rituxamab with anticipation of 6 cycles.  Will need outpatient PET scan.  Patient for port placement 12/12 per Dr. Restrepo.  Follow-up with Dr. Aleman 12/11/2023 at 2 PM.  I discussed with family members that chemotherapy would not be started till GFR consistently greater than 30%.  No concerns for pneumonia as white blood cell count normal, WBC normal, no cough or congestion.     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  Calcium 10.8 on admission and 10.  3 on date of discharge.  Patient received Zometa on 11/11/2023 during previous admission.    She has a history of diabetes mellitus type 2 without insulin.  Hemoglobin A1c 6.1.  Accu-Cheks with sliding scale insulin coverage ordered.  Jardiance held due to poor appetite and decreased oral intake.  Patient no longer takes metformin.    Alkaline phosphatase elevated 580 previously 958 on 11/20/2023.  Right upper quadrant ultrasound on 11/17/2023 noted patent main portal vein, left portal vein, right portal vein.    Synthroid continued for hypothyroidism.  TSH 2.95 on 11/12/2023.    Patient with decreased appetite and increased nausea likely contributing to acute kidney injury. Dronabinol 2.5 mg twice daily ordered without improvement of symptoms.  Famotidine continued, IV fluids continued.  Compazine, Zofran, Phenergan suppositories offered.    Lovenox ordered for deep vein thrombosis prophylaxis.    Palliative care consulted and patient with multiple family members were seen by SOFIYA Reyes palliative care nurse.  After further discussing with patient and family members, POA and patient  "requested hospice and comfort measures.      Patient will be discharged to [facility] in [place].  MOST form completed and signed by Dr. Holcomb.      Physical Exam on Discharge:  /73 (BP Location: Left arm, Patient Position: Lying)   Pulse 105   Temp 97.5 °F (36.4 °C) (Axillary)   Resp 16   Ht 162.6 cm (64\")   Wt 70.8 kg (156 lb)   SpO2 91%   BMI 26.78 kg/m²   Physical Exam  Vitals and nursing note reviewed.   Constitutional:       Comments: Lying in bed.  No oxygen use multiple family members in room.   HENT:      Head: Normocephalic and atraumatic.      Nose: No congestion.      Mouth/Throat:      Pharynx: Oropharynx is clear. No oropharyngeal exudate or posterior oropharyngeal erythema.   Eyes:      Extraocular Movements: Extraocular movements intact.      Pupils: Pupils are equal, round, and reactive to light.   Cardiovascular:      Rate and Rhythm: Normal rate and regular rhythm.      Heart sounds: No murmur heard.  Pulmonary:      Breath sounds: No wheezing, rhonchi or rales.      Comments: No oxygen in use.  Abdominal:      General: There is distension.   Genitourinary:     Comments: WIC in place  Musculoskeletal:         General: No swelling or tenderness.      Cervical back: Normal range of motion and neck supple.   Skin:     General: Skin is warm and dry.   Neurological:      General: No focal deficit present.      Comments: Resting.  Opens eyes.  Oriented.   Psychiatric:         Mood and Affect: Mood normal.         Behavior: Behavior normal.       Condition on Discharge: Comfort measures, [facility] in [place]    Discharge Disposition:  Hospice/Home    Discharge Medications:     Discharge Medications        New Medications        Instructions Start Date   naloxone 4 MG/0.1ML nasal spray  Commonly known as: NARCAN   Call 911. Don't prime. [place] in 1 nostril for overdose. Repeat in 2-3 minutes in other nostril if no or minimal breathing/responsiveness.      ondansetron ODT " 4 MG disintegrating tablet  Commonly known as: ZOFRAN-ODT   4 mg, Translingual, Every 8 Hours PRN      promethazine 12.5 MG suppository  Commonly known as: PHENERGAN   12.5 mg, Rectal, Every 6 Hours PRN             Continue These Medications        Instructions Start Date   HYDROcodone-acetaminophen 7.5-325 MG per tablet  Commonly known as: NORCO   1 tablet, Oral, Every 6 Hours PRN             Stop These Medications      acetaminophen 500 MG tablet  Commonly known as: TYLENOL     aspirin 81 MG EC tablet     atorvastatin 10 MG tablet  Commonly known as: LIPITOR     cloNIDine 0.1 MG tablet  Commonly known as: CATAPRES     famotidine 20 MG tablet  Commonly known as: PEPCID     furosemide 20 MG tablet  Commonly known as: LASIX     Gabapentin (Once-Daily) 300 MG tablet     Jardiance 10 MG tablet tablet  Generic drug: empagliflozin     levothyroxine 75 MCG tablet  Commonly known as: SYNTHROID, LEVOTHROID     lisinopril 20 MG tablet  Commonly known as: PRINIVIL,ZESTRIL     MAGNESIUM GLYCINATE PO     NIFEdipine CC 30 MG 24 hr tablet  Commonly known as: ADALAT CC     ondansetron 4 MG tablet  Commonly known as: ZOFRAN     ROLAIDS PO     SUPER-C 1000 PO     therapeutic multivitamin-minerals tablet  Generic drug: multivitamin with minerals              Discharge Diet:   Diet Instructions       Diet: Regular/House Diet; Regular Texture (IDDSI 7); Thin (IDDSI 0)      Discharge Diet: Regular/House Diet    Texture: Regular Texture (IDDSI 7)    Fluid Consistency: Thin (IDDSI 0)          Activity at Discharge:   Activity Instructions       Activity as Tolerated            Discharge to [facility] comfort measures      Follow-up Appointments:   Future Appointments   Date Time Provider Department Center   2/22/2024  1:00 PM Meghan Freeman APRN [facility]     Test Results Pending at Discharge: None    Electronically signed by SOFIYA Zuleta, 12/06/23, 11:51 CST.    Time: 40 minutes.  Discussed with Dr. Holcomb,  Dr. Peralta, patient, son TREV Parker and multiple family members present in room.    The above documentation resulted from a face-to-face encounter by me Jaclyn ARRIAZA, [facility].           Electronically signed by Art Shetty MD at 12/06/23 1728       Discharge Order (From admission, onward)       Start     Ordered    12/06/23 1139  Discharge patient  Once        Expected Discharge Date: 12/06/23   Discharge Disposition: Hospice/Home   Physician of Record for Attribution - Please select from Treatment Team: ART SHETTY [[id number]]   Review needed by CMO to determine Physician of Record: No      Question Answer Comment   Physician of Record for Attribution - Please select from Treatment Team ART SHETTY    Review needed by CMO to determine Physician of Record No        12/06/23 1135

## 2023-12-08 ENCOUNTER — TELEPHONE (OUTPATIENT)
Dept: SURGERY | Facility: CLINIC | Age: 83
End: 2023-12-08
Payer: MEDICARE

## 2023-12-08 LAB
BACTERIA SPEC AEROBE CULT: NORMAL
BACTERIA SPEC AEROBE CULT: NORMAL

## 2023-12-08 NOTE — TELEPHONE ENCOUNTER
Rosamaria Tien called about her mother-in-law Imani and stated that she received a message about her up coming procedure having a port placed and Rosamaria stated that Imani does not want a port placed and wanted to cancel the procedure. I explained to Rosamaria that I would get this canceled for her. Rosamaria thanked me for doing this for her. 12/08/2023 bs

## 2023-12-12 ENCOUNTER — APPOINTMENT (OUTPATIENT)
Dept: LAB | Facility: HOSPITAL | Age: 83
End: 2023-12-12
Payer: MEDICARE

## (undated) DEVICE — SUT VIC 3/0 RB1 27IN UD VCP215H

## (undated) DEVICE — SUT VIC 4/0 P3 18IN UD VCP494H

## (undated) DEVICE — THREE QUARTER SHEET: Brand: CONVERTORS

## (undated) DEVICE — PAD LAP CHOLE: Brand: MEDLINE INDUSTRIES, INC.

## (undated) DEVICE — EXTREMITY III-LF: Brand: MEDLINE INDUSTRIES, INC.

## (undated) DEVICE — GLOVE SURG SZ 65 CRM LTX FREE POLYISOPRENE POLYMER BEAD ANTI

## (undated) DEVICE — GLOVE ORANGE PI 8   MSG9080

## (undated) DEVICE — MONOPOLAR METZENBAUM SCISSOR, MINI BLADE TIP, DISPOSABLE: Brand: MONOPOLAR METZENBAUM SCISSOR, MINI BLADE TIP, DISPOSABLE

## (undated) DEVICE — 2, DISPOSABLE SUCTION/IRRIGATOR WITHOUT DISPOSABLE TIP: Brand: STRYKEFLOW

## (undated) DEVICE — TRAP,MUCUS SPECIMEN,40CC: Brand: MEDLINE

## (undated) DEVICE — MAX-CORE® DISPOSABLE CORE BIOPSY INSTRUMENT, 18G X 20CM: Brand: MAX-CORE

## (undated) DEVICE — AMBU AURAONCE U SIZE 3: Brand: AURAONCE

## (undated) DEVICE — Device

## (undated) DEVICE — 90-S MAX, SUCTION PROBE, NON-BENDABLE, MAX CUT LEVEL 11: Brand: SERFAS ENERGY

## (undated) DEVICE — APPL CHLORAPREP HI/LITE 26ML ORNG

## (undated) DEVICE — 4-PORT MANIFOLD: Brand: NEPTUNE 2

## (undated) DEVICE — GLOVE ORANGE PI 8 1/2   MSG9085

## (undated) DEVICE — DRSNG TELFA PAD NONADH STR 1S 3X8IN

## (undated) DEVICE — TRAP FLD MINIVAC MEGADYNE 100ML

## (undated) DEVICE — ENSEAL LAPAROSCOPIC TISSUE SEALER G2 STRAIGHT JAW FOR USE WITH G2 GENERATOR 5MM DIAMETER 35CM SHAFT LENGTH: Brand: ENSEAL

## (undated) DEVICE — Z INACTIVE USE 2660664 SOLUTION IRRIG 3000ML 0.9% SOD CHL USP UROMATIC PLAS CONT

## (undated) DEVICE — CHLORAPREP 26ML ORANGE

## (undated) DEVICE — GLOVE SURG SZ 7 CRM LTX FREE POLYISOPRENE POLYMER BEAD ANTI

## (undated) DEVICE — AIRWAY CIRCUIT: Brand: DEROYAL